# Patient Record
Sex: FEMALE | Race: WHITE | Employment: OTHER | URBAN - METROPOLITAN AREA
[De-identification: names, ages, dates, MRNs, and addresses within clinical notes are randomized per-mention and may not be internally consistent; named-entity substitution may affect disease eponyms.]

---

## 2020-09-28 ENCOUNTER — APPOINTMENT (OUTPATIENT)
Dept: GENERAL RADIOLOGY | Age: 65
End: 2020-09-28
Payer: MEDICARE

## 2020-09-28 ENCOUNTER — HOSPITAL ENCOUNTER (EMERGENCY)
Age: 65
Discharge: HOME | End: 2020-09-28
Attending: EMERGENCY MEDICINE
Payer: MEDICARE

## 2020-09-28 VITALS
HEIGHT: 64 IN | SYSTOLIC BLOOD PRESSURE: 134 MMHG | HEART RATE: 68 BPM | RESPIRATION RATE: 18 BRPM | OXYGEN SATURATION: 96 % | WEIGHT: 210 LBS | BODY MASS INDEX: 35.85 KG/M2 | TEMPERATURE: 99 F | DIASTOLIC BLOOD PRESSURE: 88 MMHG

## 2020-09-28 LAB
ABO/RH: NORMAL
ALBUMIN SERPL-MCNC: 4.3 G/DL (ref 3.5–5.2)
ALP BLD-CCNC: 79 U/L (ref 35–104)
ALT SERPL-CCNC: 9 U/L (ref 0–32)
ANION GAP SERPL CALCULATED.3IONS-SCNC: 11 MMOL/L (ref 7–16)
ANTIBODY SCREEN: NORMAL
AST SERPL-CCNC: 21 U/L (ref 0–31)
BACTERIA: ABNORMAL /HPF
BASOPHILS ABSOLUTE: 0.07 E9/L (ref 0–0.2)
BASOPHILS RELATIVE PERCENT: 0.8 % (ref 0–2)
BILIRUB SERPL-MCNC: 0.3 MG/DL (ref 0–1.2)
BILIRUBIN URINE: NEGATIVE
BLOOD, URINE: NEGATIVE
BUN BLDV-MCNC: 19 MG/DL (ref 8–23)
CALCIUM SERPL-MCNC: 9.6 MG/DL (ref 8.6–10.2)
CHLORIDE BLD-SCNC: 98 MMOL/L (ref 98–107)
CLARITY: ABNORMAL
CO2: 27 MMOL/L (ref 22–29)
COLOR: YELLOW
CREAT SERPL-MCNC: 1.2 MG/DL (ref 0.5–1)
EOSINOPHILS ABSOLUTE: 0.1 E9/L (ref 0.05–0.5)
EOSINOPHILS RELATIVE PERCENT: 1.2 % (ref 0–6)
EPITHELIAL CELLS, UA: ABNORMAL /HPF
GFR AFRICAN AMERICAN: 54
GFR NON-AFRICAN AMERICAN: 45 ML/MIN/1.73
GLUCOSE BLD-MCNC: 103 MG/DL (ref 74–99)
GLUCOSE URINE: NEGATIVE MG/DL
HCT VFR BLD CALC: 37.2 % (ref 34–48)
HEMOGLOBIN: 11.5 G/DL (ref 11.5–15.5)
IMMATURE GRANULOCYTES #: 0.02 E9/L
IMMATURE GRANULOCYTES %: 0.2 % (ref 0–5)
KETONES, URINE: ABNORMAL MG/DL
LEUKOCYTE ESTERASE, URINE: ABNORMAL
LYMPHOCYTES ABSOLUTE: 3.55 E9/L (ref 1.5–4)
LYMPHOCYTES RELATIVE PERCENT: 42 % (ref 20–42)
MCH RBC QN AUTO: 25.4 PG (ref 26–35)
MCHC RBC AUTO-ENTMCNC: 30.9 % (ref 32–34.5)
MCV RBC AUTO: 82.3 FL (ref 80–99.9)
MONOCYTES ABSOLUTE: 0.66 E9/L (ref 0.1–0.95)
MONOCYTES RELATIVE PERCENT: 7.8 % (ref 2–12)
NEUTROPHILS ABSOLUTE: 4.06 E9/L (ref 1.8–7.3)
NEUTROPHILS RELATIVE PERCENT: 48 % (ref 43–80)
NITRITE, URINE: NEGATIVE
PDW BLD-RTO: 13.8 FL (ref 11.5–15)
PH UA: 6 (ref 5–9)
PLATELET # BLD: 325 E9/L (ref 130–450)
PMV BLD AUTO: 8.6 FL (ref 7–12)
POTASSIUM SERPL-SCNC: 4.5 MMOL/L (ref 3.5–5)
PRO-BNP: 2103 PG/ML (ref 0–125)
PROTEIN UA: NEGATIVE MG/DL
RBC # BLD: 4.52 E12/L (ref 3.5–5.5)
RBC UA: ABNORMAL /HPF (ref 0–2)
SODIUM BLD-SCNC: 136 MMOL/L (ref 132–146)
SPECIFIC GRAVITY UA: 1.02 (ref 1–1.03)
TOTAL PROTEIN: 7.7 G/DL (ref 6.4–8.3)
TROPONIN: <0.01 NG/ML (ref 0–0.03)
UROBILINOGEN, URINE: 1 E.U./DL
WBC # BLD: 8.5 E9/L (ref 4.5–11.5)
WBC UA: ABNORMAL /HPF (ref 0–5)

## 2020-09-28 PROCEDURE — 86850 RBC ANTIBODY SCREEN: CPT

## 2020-09-28 PROCEDURE — 86900 BLOOD TYPING SEROLOGIC ABO: CPT

## 2020-09-28 PROCEDURE — 99284 EMERGENCY DEPT VISIT MOD MDM: CPT

## 2020-09-28 PROCEDURE — 84484 ASSAY OF TROPONIN QUANT: CPT

## 2020-09-28 PROCEDURE — 36415 COLL VENOUS BLD VENIPUNCTURE: CPT

## 2020-09-28 PROCEDURE — 80053 COMPREHEN METABOLIC PANEL: CPT

## 2020-09-28 PROCEDURE — 93005 ELECTROCARDIOGRAM TRACING: CPT | Performed by: EMERGENCY MEDICINE

## 2020-09-28 PROCEDURE — 85025 COMPLETE CBC W/AUTO DIFF WBC: CPT

## 2020-09-28 PROCEDURE — 86901 BLOOD TYPING SEROLOGIC RH(D): CPT

## 2020-09-28 PROCEDURE — 83880 ASSAY OF NATRIURETIC PEPTIDE: CPT

## 2020-09-28 PROCEDURE — 71045 X-RAY EXAM CHEST 1 VIEW: CPT

## 2020-09-28 PROCEDURE — U0003 INFECTIOUS AGENT DETECTION BY NUCLEIC ACID (DNA OR RNA); SEVERE ACUTE RESPIRATORY SYNDROME CORONAVIRUS 2 (SARS-COV-2) (CORONAVIRUS DISEASE [COVID-19]), AMPLIFIED PROBE TECHNIQUE, MAKING USE OF HIGH THROUGHPUT TECHNOLOGIES AS DESCRIBED BY CMS-2020-01-R: HCPCS

## 2020-09-28 PROCEDURE — 81001 URINALYSIS AUTO W/SCOPE: CPT

## 2020-09-28 RX ORDER — ASPIRIN 81 MG/1
81 TABLET ORAL DAILY
COMMUNITY
End: 2021-06-28

## 2020-09-28 RX ORDER — FAMOTIDINE 40 MG/1
40 TABLET, FILM COATED ORAL NIGHTLY
COMMUNITY

## 2020-09-28 RX ORDER — LISINOPRIL 20 MG/1
20 TABLET ORAL DAILY
COMMUNITY
End: 2021-11-02

## 2020-09-28 RX ORDER — VENLAFAXINE 75 MG/1
75 TABLET ORAL NIGHTLY
COMMUNITY
End: 2022-07-25 | Stop reason: ALTCHOICE

## 2020-09-28 ASSESSMENT — ENCOUNTER SYMPTOMS
COUGH: 1
ABDOMINAL PAIN: 0
SHORTNESS OF BREATH: 1
SINUS PRESSURE: 0
WHEEZING: 0
CHEST TIGHTNESS: 0
VOMITING: 0
ABDOMINAL DISTENTION: 0
DIARRHEA: 0
NAUSEA: 0
RHINORRHEA: 1
BACK PAIN: 0
SORE THROAT: 0

## 2020-09-28 NOTE — ED PROVIDER NOTES
Chief complaint:  Flulike symptoms      HPI history provided by the patient  Patient was in complaint 1 week of generalized fatigue and weakness with some congestion and slight cough. Feels little short of breath with exertion at times she states although it is more described as fatigue. No dyspnea otherwise. No stiff neck or headache. No abdominal pain. No flank pain. No nausea vomiting or diarrhea. Has had poor appetite and diminished activity. No vomiting. No actual fevers at home, temperature max of 99. Does have a history of chronic anemia not requiring transfusions in the past.  Known history of atrial fibrillation and takes both Eliquis and aspirin and has not missed doses. No extremity pain or swelling. Review of Systems   Constitutional: Positive for activity change, appetite change, fatigue and fever. Negative for chills and diaphoresis. HENT: Positive for congestion and rhinorrhea. Negative for ear pain, sinus pressure and sore throat. Respiratory: Positive for cough and shortness of breath. Negative for chest tightness and wheezing. Cardiovascular: Negative for chest pain, palpitations and leg swelling. Gastrointestinal: Negative for abdominal distention, abdominal pain, diarrhea, nausea and vomiting. Genitourinary: Negative for dysuria, flank pain, frequency and urgency. Musculoskeletal: Negative for arthralgias, back pain, gait problem, joint swelling, myalgias, neck pain and neck stiffness. Skin: Negative for rash and wound. Neurological: Negative for dizziness, seizures, syncope, weakness, light-headedness, numbness and headaches. Hematological: Negative for adenopathy. All other systems reviewed and are negative. Physical Exam  Vitals signs and nursing note reviewed. Constitutional:       General: She is not in acute distress. Appearance: She is well-developed. She is not ill-appearing, toxic-appearing or diaphoretic.    HENT:      Head: Normocephalic and atraumatic. Right Ear: Tympanic membrane, ear canal and external ear normal.      Left Ear: Tympanic membrane, ear canal and external ear normal.      Nose: Mucosal edema present. No congestion or rhinorrhea. Mouth/Throat:      Pharynx: Oropharynx is clear. Uvula midline. No pharyngeal swelling, oropharyngeal exudate, posterior oropharyngeal erythema or uvula swelling. Tonsils: No tonsillar exudate or tonsillar abscesses. Eyes:      General: No scleral icterus. Pupils: Pupils are equal, round, and reactive to light. Neck:      Musculoskeletal: Normal range of motion and neck supple. Normal range of motion. No neck rigidity, injury, pain with movement, spinous process tenderness or muscular tenderness. Trachea: Trachea and phonation normal. No tracheal tenderness or tracheal deviation. Cardiovascular:      Rate and Rhythm: Normal rate and regular rhythm. Heart sounds: Normal heart sounds. No murmur. Pulmonary:      Effort: Pulmonary effort is normal. No respiratory distress. Breath sounds: Normal breath sounds. No stridor, decreased air movement or transmitted upper airway sounds. No decreased breath sounds, wheezing, rhonchi or rales. Chest:      Chest wall: No tenderness. Abdominal:      General: Bowel sounds are normal. There is no distension. Palpations: Abdomen is soft. Tenderness: There is no abdominal tenderness. There is no right CVA tenderness, left CVA tenderness, guarding or rebound. Musculoskeletal:         General: No swelling, tenderness, deformity or signs of injury. Right lower leg: No edema. Left lower leg: No edema. Comments: Arms legs are neurovascular intact. No pretibial edema or calf pain. Lymphadenopathy:      Cervical: No cervical adenopathy. Skin:     General: Skin is warm and dry. Coloration: Skin is pale. Skin is not cyanotic, jaundiced or mottled.       Findings: No erythema, petechiae or rash.      Comments: Mildly pallorous appearing skin   Neurological:      General: No focal deficit present. Mental Status: She is alert and oriented to person, place, and time. GCS: GCS eye subscore is 4. GCS verbal subscore is 5. GCS motor subscore is 6. Cranial Nerves: Cranial nerves are intact. No cranial nerve deficit. Sensory: Sensation is intact. Motor: Motor function is intact. Coordination: Coordination is intact. Coordination normal.      Gait: Gait is intact. Procedures     MDM           EKG Interpretation    Interpreted by emergency department physician    Rhythm: atrial fibrillation - controlled  Rate: 73  Axis: normal  Ectopy: none  Conduction: normal  ST Segments: no acute change  T Waves: no acute change  Q Waves: none    Clinical Impression: atrial fibrillation (chronic)    Doyal Raddle Cardinal         --------------------------------------------- PAST HISTORY ---------------------------------------------  Past Medical History:  has a past medical history of Acid reflux, Atrial fibrillation ([facility] [address] Cancer (HCC), Chronic back pain, CPAP (continuous positive airway pressure) dependence, Fibromyalgia, History of bariatric surgery, History of cardiovascular stress test, Hyperlipidemia, Hypertension, Kidney stones, Osteoarthritis, and Unspecified sleep apnea. Past Surgical History:  has a past surgical history that includes tracheostomy (1999); Stanley-en-Y Gastric Bypass (04/17/2002); Cholecystectomy (2004); hernia repair (2004); Hysterectomy (1988); Appendectomy; Tonsillectomy; bladder suspension (1998); joint replacement (2009); Upper gastrointestinal endoscopy (2009); Colonoscopy (2008); Bariatric Surgery (2003); Breast surgery (2005); Shoulder arthroscopy (2/17/12); ECHO Compl W Dop Color Flow (8/10/2012); Uvulopalatopharygoplasty; and Neck surgery (06/2015). Social History:  reports that she has been smoking. She has a 20.00 pack-year smoking history.  She has never used smokeless tobacco. She reports that she does not drink alcohol or use drugs. Family History: family history includes Cancer in her paternal grandfather; Heart Disease in her maternal grandfather, maternal grandmother, mother, and sister; High Blood Pressure in her mother and sister; Stroke in her mother. The patients home medications have been reviewed. Allergies: Patient has no known allergies.     -------------------------------------------------- RESULTS -------------------------------------------------  Labs:  Results for orders placed or performed during the hospital encounter of 09/28/20   CBC Auto Differential   Result Value Ref Range    WBC 8.5 4.5 - 11.5 E9/L    RBC 4.52 3.50 - 5.50 E12/L    Hemoglobin 11.5 11.5 - 15.5 g/dL    Hematocrit 37.2 34.0 - 48.0 %    MCV 82.3 80.0 - 99.9 fL    MCH 25.4 (L) 26.0 - 35.0 pg    MCHC 30.9 (L) 32.0 - 34.5 %    RDW 13.8 11.5 - 15.0 fL    Platelets 332 050 - 909 E9/L    MPV 8.6 7.0 - 12.0 fL    Neutrophils % 48.0 43.0 - 80.0 %    Immature Granulocytes % 0.2 0.0 - 5.0 %    Lymphocytes % 42.0 20.0 - 42.0 %    Monocytes % 7.8 2.0 - 12.0 %    Eosinophils % 1.2 0.0 - 6.0 %    Basophils % 0.8 0.0 - 2.0 %    Neutrophils Absolute 4.06 1.80 - 7.30 E9/L    Immature Granulocytes # 0.02 E9/L    Lymphocytes Absolute 3.55 1.50 - 4.00 E9/L    Monocytes Absolute 0.66 0.10 - 0.95 E9/L    Eosinophils Absolute 0.10 0.05 - 0.50 E9/L    Basophils Absolute 0.07 0.00 - 0.20 E9/L   Comprehensive Metabolic Panel   Result Value Ref Range    Sodium 136 132 - 146 mmol/L    Potassium 4.5 3.5 - 5.0 mmol/L    Chloride 98 98 - 107 mmol/L    CO2 27 22 - 29 mmol/L    Anion Gap 11 7 - 16 mmol/L    Glucose 103 (H) 74 - 99 mg/dL    BUN 19 8 - 23 mg/dL    CREATININE 1.2 (H) 0.5 - 1.0 mg/dL    GFR Non-African American 45 >=60 mL/min/1.73    GFR African American 54     Calcium 9.6 8.6 - 10.2 mg/dL    Total Protein 7.7 6.4 - 8.3 g/dL    Alb 4.3 3.5 - 5.2 g/dL    Total Bilirubin 0.3 0.0 - 1.2 mg/dL    Alkaline Phosphatase 79 35 - 104 U/L    ALT 9 0 - 32 U/L    AST 21 0 - 31 U/L   Brain Natriuretic Peptide   Result Value Ref Range    Pro-BNP 2,103 (H) 0 - 125 pg/mL   Troponin   Result Value Ref Range    Troponin <0.01 0.00 - 0.03 ng/mL   Urinalysis   Result Value Ref Range    Color, UA Yellow Straw/Yellow    Clarity, UA SLCLOUDY Clear    Glucose, Ur Negative Negative mg/dL    Bilirubin Urine Negative Negative    Ketones, Urine TRACE (A) Negative mg/dL    Specific Gravity, UA 1.025 1.005 - 1.030    Blood, Urine Negative Negative    pH, UA 6.0 5.0 - 9.0    Protein, UA Negative Negative mg/dL    Urobilinogen, Urine 1.0 <2.0 E.U./dL    Nitrite, Urine Negative Negative    Leukocyte Esterase, Urine TRACE (A) Negative   Microscopic Urinalysis   Result Value Ref Range    WBC, UA 0-1 0 - 5 /HPF    RBC, UA NONE 0 - 2 /HPF    Epithelial Cells, UA MODERATE /HPF    Bacteria, UA FEW (A) None Seen /HPF   EKG 12 Lead   Result Value Ref Range    Ventricular Rate 73 BPM    Atrial Rate 288 BPM    QRS Duration 80 ms    Q-T Interval 396 ms    QTc Calculation (Bazett) 436 ms    R Axis 14 degrees    T Axis 68 degrees   TYPE AND SCREEN   Result Value Ref Range    ABO/Rh A POS     Antibody Screen NEG        Radiology:  XR CHEST PORTABLE   Final Result   No acute cardiopulmonary process. ------------------------- NURSING NOTES AND VITALS REVIEWED ---------------------------  Date / Time Roomed:  9/28/2020  3:36 PM  ED Bed Assignment:  05/05    The nursing notes within the ED encounter and vital signs as below have been reviewed.    BP (!) 128/92   Pulse 75   Temp 99 °F (37.2 °C) (Oral)   Resp 19   Ht 5' 4\" (1.626 m)   Wt 210 lb (95.3 kg)   SpO2 96%   BMI 36.05 kg/m²   Oxygen Saturation Interpretation: Normal      ------------------------------------------ PROGRESS NOTES ------------------------------------------  I have spoken with the patient and discussed todays results, in addition to providing specific details for the plan of care and counseling regarding the diagnosis and prognosis. Their questions are answered at this time and they are agreeable with the plan. I discussed at length with them reasons for immediate return here for re evaluation. They will followup with primary care by calling their office tomorrow. --------------------------------- ADDITIONAL PROVIDER NOTES ---------------------------------  At this time the patient is without objective evidence of an acute process requiring hospitalization or inpatient management. They have remained hemodynamically stable throughout their entire ED visit and are stable for discharge with outpatient follow-up. The plan has been discussed in detail and they are aware of the specific conditions for emergent return, as well as the importance of follow-up. New Prescriptions    No medications on file       Diagnosis:  1. Viral syndrome        Disposition:  Patient's disposition: Discharge to home  Patient's condition is stable.          [address] [facility], DO  09/28/20 1737

## 2020-09-29 ENCOUNTER — CARE COORDINATION (OUTPATIENT)
Dept: CARE COORDINATION | Age: 65
End: 2020-09-29

## 2020-09-29 LAB
EKG ATRIAL RATE: 288 BPM
EKG Q-T INTERVAL: 396 MS
EKG QRS DURATION: 80 MS
EKG QTC CALCULATION (BAZETT): 436 MS
EKG R AXIS: 14 DEGREES
EKG T AXIS: 68 DEGREES
EKG VENTRICULAR RATE: 73 BPM

## 2020-09-29 PROCEDURE — 93010 ELECTROCARDIOGRAM REPORT: CPT | Performed by: INTERNAL MEDICINE

## 2020-09-29 NOTE — CARE COORDINATION
Patient contacted regarding recent discharge and COVID-19 risk. Discussed COVID-19 related testing which was pending at this time. Test results were pending. Patient informed of results, if available? No     Care Transition Nurse/ Ambulatory Care Manager contacted the patient by telephone to perform post discharge assessment. Verified name and  with patient as identifiers. Patient has following risk factors of: no known risk factors. CTN/ACM reviewed discharge instructions, medical action plan and red flags related to discharge diagnosis. Reviewed and educated them on any new and changed medications related to discharge diagnosis. Advised obtaining a 90-day supply of all daily and as-needed medications. Education provided regarding infection prevention, and signs and symptoms of COVID-19 and when to seek medical attention with patient who verbalized understanding. Discussed exposure protocols and quarantine from [address] you at higher risk for severe illness  and given an opportunity for questions and concerns. The patient agrees to contact the COVID-19 hotline [phone] or PCP office for questions related to their healthcare. CTN/ACM provided contact information for future reference. From CDC: Are you at higher risk for severe illness?  Wash your hands often.  Avoid close contact (6 feet, which is about two arm lengths) with people who are sick.  Put distance between yourself and other people if COVID-19 is spreading in your community.  Clean and disinfect frequently touched surfaces.  Avoid all cruise travel and non-essential air travel.  Call your healthcare professional if you have concerns about COVID-19 and your underlying condition or if you are sick. For more information on steps you can take to protect yourself, see CDC's How to Protect Yourself    Pt will be further monitored by COVID Loop Team based on severity of symptoms and risk factors.   Patient: Pt states there are new or worsening symptoms  Prescribed Medications from ED/UC/Minor Care: N/A  MyChart: Pt MyChart is pending  Follow up Appointment: Patient has follow up appointment scheduled  Pt states she has appt with Dr Zuri Perez and Dr Estuardo Correia is no longer her PCP  ACP: Medical Decision Maker(s) confirmed

## 2020-09-30 LAB
SARS-COV-2: NOT DETECTED
SOURCE: NORMAL

## 2020-10-04 ENCOUNTER — CARE COORDINATION (OUTPATIENT)
Dept: CARE COORDINATION | Age: 65
End: 2020-10-04

## 2020-10-04 NOTE — CARE COORDINATION
Amparo Bowling MSN, RN, RN, 8:22 AM  Your test result came back as 'Not Detected'. However, you should still continue to follow CDC guidelines and continuing checking in on the Get Well Loop.   In reply to  \"I haven't seen my results of my covid test\"

## 2020-10-29 ENCOUNTER — HOSPITAL ENCOUNTER (EMERGENCY)
Age: 65
Discharge: HOME | End: 2020-10-29
Attending: EMERGENCY MEDICINE
Payer: MEDICARE

## 2020-10-29 VITALS
DIASTOLIC BLOOD PRESSURE: 53 MMHG | RESPIRATION RATE: 20 BRPM | HEART RATE: 64 BPM | SYSTOLIC BLOOD PRESSURE: 122 MMHG | WEIGHT: 215 LBS | OXYGEN SATURATION: 98 % | TEMPERATURE: 97.5 F | BODY MASS INDEX: 36.9 KG/M2

## 2020-10-29 LAB
BACTERIA: ABNORMAL /HPF
BILIRUBIN URINE: NEGATIVE
BLOOD, URINE: ABNORMAL
CLARITY: CLEAR
COLOR: YELLOW
EPITHELIAL CELLS, UA: ABNORMAL /HPF
GLUCOSE URINE: NEGATIVE MG/DL
KETONES, URINE: NEGATIVE MG/DL
LEUKOCYTE ESTERASE, URINE: ABNORMAL
NITRITE, URINE: NEGATIVE
PH UA: 6 (ref 5–9)
PROTEIN UA: NEGATIVE MG/DL
RBC UA: ABNORMAL /HPF (ref 0–2)
SPECIFIC GRAVITY UA: 1.02 (ref 1–1.03)
UROBILINOGEN, URINE: 1 E.U./DL
WBC UA: ABNORMAL /HPF (ref 0–5)

## 2020-10-29 PROCEDURE — G0382 LEV 3 HOSP TYPE B ED VISIT: HCPCS

## 2020-10-29 PROCEDURE — 81001 URINALYSIS AUTO W/SCOPE: CPT

## 2020-10-29 RX ORDER — PHENAZOPYRIDINE HYDROCHLORIDE 100 MG/1
100 TABLET, FILM COATED ORAL 3 TIMES DAILY PRN
Qty: 9 TABLET | Refills: 0 | Status: SHIPPED | OUTPATIENT
Start: 2020-10-29 | End: 2020-11-01

## 2020-10-29 RX ORDER — CEFDINIR 300 MG/1
300 CAPSULE ORAL 2 TIMES DAILY
Qty: 14 CAPSULE | Refills: 0 | Status: SHIPPED | OUTPATIENT
Start: 2020-10-29 | End: 2020-11-05

## 2020-10-29 ASSESSMENT — ENCOUNTER SYMPTOMS
WHEEZING: 0
NAUSEA: 0
ABDOMINAL DISTENTION: 0
SORE THROAT: 0
VOMITING: 0
EYE REDNESS: 0
BACK PAIN: 0
ABDOMINAL PAIN: 1
EYE PAIN: 0
DIARRHEA: 0
SHORTNESS OF BREATH: 0
COUGH: 0
EYE DISCHARGE: 0
SINUS PRESSURE: 0

## 2020-10-29 NOTE — ED PROVIDER NOTES
The history is provided by the patient. Female  Problem   Pain quality:  Burning  Pain severity:  Mild  Onset quality:  Gradual  Timing:  Constant  Progression:  Unchanged  Chronicity:  New  Relieved by:  Nothing  Worsened by:  Nothing  Ineffective treatments:  Cranberry juice  Urinary symptoms: frequent urination    Associated symptoms: abdominal pain    Associated symptoms: no fever, no nausea and no vomiting         Review of Systems   Constitutional: Negative for chills and fever. HENT: Negative for ear pain, sinus pressure and sore throat. Eyes: Negative for pain, discharge and redness. Respiratory: Negative for cough, shortness of breath and wheezing. Cardiovascular: Negative for chest pain. Gastrointestinal: Positive for abdominal pain. Negative for abdominal distention, diarrhea, nausea and vomiting. Genitourinary: Positive for dysuria and frequency. Musculoskeletal: Negative for arthralgias and back pain. Skin: Negative for rash and wound. Neurological: Negative for weakness and headaches. Hematological: Negative for adenopathy. Psychiatric/Behavioral: Negative. All other systems reviewed and are negative. Physical Exam  Vitals signs and nursing note reviewed. Constitutional:       Appearance: She is well-developed. HENT:      Head: Normocephalic and atraumatic. Eyes:      Pupils: Pupils are equal, round, and reactive to light. Neck:      Musculoskeletal: Normal range of motion and neck supple. Cardiovascular:      Rate and Rhythm: Normal rate and regular rhythm. Heart sounds: Normal heart sounds. No murmur. Pulmonary:      Effort: Pulmonary effort is normal.      Breath sounds: Normal breath sounds. Abdominal:      General: Bowel sounds are normal.      Palpations: Abdomen is soft. Tenderness: There is no abdominal tenderness. There is no left CVA tenderness, guarding or rebound. Skin:     General: Skin is warm and dry.    Neurological: Mental Status: She is alert and oriented to person, place, and time. Psychiatric:         Behavior: Behavior normal.         Thought Content: Thought content normal.         Judgment: Judgment normal.        --------------------------------------------- PAST HISTORY ---------------------------------------------  Past Medical History:  has a past medical history of Acid reflux, Atrial fibrillation ([address] Cancer (HCC), Chronic back pain, CPAP (continuous positive airway pressure) dependence, Fibromyalgia, History of bariatric surgery, History of cardiovascular stress test, Hyperlipidemia, Hypertension, Kidney stones, Osteoarthritis, and Unspecified sleep apnea. Past Surgical History:  has a past surgical history that includes tracheostomy (1999); Stanley-en-Y Gastric Bypass (04/17/2002); Cholecystectomy (2004); hernia repair (2004); Hysterectomy (1988); Appendectomy; Tonsillectomy; bladder suspension (1998); joint replacement (2009); Upper gastrointestinal endoscopy (2009); Colonoscopy (2008); Bariatric Surgery (2003); Breast surgery (2005); Shoulder arthroscopy (2/17/12); ECHO Compl W Dop Color Flow (8/10/2012); Uvulopalatopharygoplasty; and Neck surgery (06/2015). Social History:  reports that she has been smoking. She has a 20.00 pack-year smoking history. She has never used smokeless tobacco. She reports that she does not drink alcohol or use drugs. Family History: family history includes Cancer in her paternal grandfather; Heart Disease in her maternal grandfather, maternal grandmother, mother, and sister; High Blood Pressure in her mother and sister; Stroke in her mother. The patients home medications have been reviewed. Allergies: Patient has no known allergies.     -------------------------------------------------- RESULTS -------------------------------------------------  Results for orders placed or performed during the hospital encounter of 10/29/20   Urinalysis with Microscopic   Result

## 2021-01-04 ENCOUNTER — APPOINTMENT (OUTPATIENT)
Dept: GENERAL RADIOLOGY | Age: 66
End: 2021-01-04
Payer: MEDICARE

## 2021-01-04 ENCOUNTER — HOSPITAL ENCOUNTER (EMERGENCY)
Age: 66
Discharge: HOME | End: 2021-01-04
Attending: EMERGENCY MEDICINE
Payer: MEDICARE

## 2021-01-04 VITALS
TEMPERATURE: 98.7 F | SYSTOLIC BLOOD PRESSURE: 117 MMHG | RESPIRATION RATE: 18 BRPM | OXYGEN SATURATION: 96 % | HEART RATE: 78 BPM | DIASTOLIC BLOOD PRESSURE: 77 MMHG

## 2021-01-04 DIAGNOSIS — M17.12 OSTEOARTHRITIS OF LEFT KNEE, UNSPECIFIED OSTEOARTHRITIS TYPE: Primary | ICD-10-CM

## 2021-01-04 PROCEDURE — G0382 LEV 3 HOSP TYPE B ED VISIT: HCPCS

## 2021-01-04 PROCEDURE — 6360000002 HC RX W HCPCS: Performed by: EMERGENCY MEDICINE

## 2021-01-04 PROCEDURE — 73560 X-RAY EXAM OF KNEE 1 OR 2: CPT

## 2021-01-04 PROCEDURE — 96372 THER/PROPH/DIAG INJ SC/IM: CPT

## 2021-01-04 RX ORDER — DEXAMETHASONE SODIUM PHOSPHATE 10 MG/ML
10 INJECTION, SOLUTION INTRAMUSCULAR; INTRAVENOUS ONCE
Status: COMPLETED | OUTPATIENT
Start: 2021-01-04 | End: 2021-01-04

## 2021-01-04 RX ORDER — KETOROLAC TROMETHAMINE 30 MG/ML
30 INJECTION, SOLUTION INTRAMUSCULAR; INTRAVENOUS ONCE
Status: COMPLETED | OUTPATIENT
Start: 2021-01-04 | End: 2021-01-04

## 2021-01-04 RX ORDER — TRAMADOL HYDROCHLORIDE 50 MG/1
50 TABLET ORAL EVERY 6 HOURS PRN
Qty: 20 TABLET | Refills: 0 | Status: SHIPPED | OUTPATIENT
Start: 2021-01-04 | End: 2021-01-09

## 2021-01-04 RX ADMIN — KETOROLAC TROMETHAMINE 30 MG: 30 INJECTION, SOLUTION INTRAMUSCULAR; INTRAVENOUS at 16:42

## 2021-01-04 RX ADMIN — DEXAMETHASONE SODIUM PHOSPHATE 10 MG: 10 INJECTION, SOLUTION INTRAMUSCULAR; INTRAVENOUS at 16:43

## 2021-01-04 ASSESSMENT — ENCOUNTER SYMPTOMS
SINUS PRESSURE: 0
SHORTNESS OF BREATH: 0
EYE PAIN: 0
EYE REDNESS: 0
COUGH: 0
DIARRHEA: 0
WHEEZING: 0
ABDOMINAL DISTENTION: 0
NAUSEA: 0
BACK PAIN: 0
VOMITING: 0
SORE THROAT: 0
EYE DISCHARGE: 0

## 2021-01-04 ASSESSMENT — PAIN DESCRIPTION - PROGRESSION: CLINICAL_PROGRESSION: NOT CHANGED

## 2021-01-04 ASSESSMENT — PAIN DESCRIPTION - LOCATION: LOCATION: KNEE

## 2021-01-04 ASSESSMENT — PAIN - FUNCTIONAL ASSESSMENT: PAIN_FUNCTIONAL_ASSESSMENT: PREVENTS OR INTERFERES SOME ACTIVE ACTIVITIES AND ADLS

## 2021-01-04 NOTE — ED PROVIDER NOTES
The history is provided by the patient. Knee Problem  Location:  Knee  Time since incident:  5 days  Injury: no    Knee location:  L knee  Pain details:     Quality:  Aching    Severity:  Moderate  Associated symptoms: no back pain and no fever         Review of Systems   Constitutional: Negative for chills and fever. HENT: Negative for ear pain, sinus pressure and sore throat. Eyes: Negative for pain, discharge and redness. Respiratory: Negative for cough, shortness of breath and wheezing. Cardiovascular: Negative for chest pain. Gastrointestinal: Negative for abdominal distention, diarrhea, nausea and vomiting. Genitourinary: Negative for dysuria and frequency. Musculoskeletal: Negative for arthralgias and back pain. Skin: Negative for rash and wound. Neurological: Negative for weakness and headaches. Hematological: Negative for adenopathy. All other systems reviewed and are negative. Physical Exam  Vitals signs and nursing note reviewed. Constitutional:       Appearance: She is well-developed. HENT:      Head: Normocephalic and atraumatic. Right Ear: Hearing and external ear normal.      Left Ear: Hearing and external ear normal.      Nose: Nose normal.      Mouth/Throat:      Pharynx: Uvula midline. Eyes:      General: Lids are normal.      Conjunctiva/sclera: Conjunctivae normal.      Pupils: Pupils are equal, round, and reactive to light. Neck:      Musculoskeletal: Normal range of motion and neck supple. Cardiovascular:      Rate and Rhythm: Normal rate and regular rhythm. Heart sounds: Normal heart sounds. No murmur. Pulmonary:      Effort: Pulmonary effort is normal. No respiratory distress. Breath sounds: Normal breath sounds. No wheezing or rales. Abdominal:      General: Bowel sounds are normal.      Palpations: Abdomen is soft. Abdomen is not rigid. Tenderness: There is no abdominal tenderness. There is no guarding or rebound. Musculoskeletal:      Left knee: She exhibits decreased range of motion and swelling. She exhibits no effusion. Tenderness found. Skin:     General: Skin is warm and dry. Findings: No abrasion or rash. Neurological:      Mental Status: She is alert and oriented to person, place, and time. GCS: GCS eye subscore is 4. GCS verbal subscore is 5. GCS motor subscore is 6. Cranial Nerves: No cranial nerve deficit. Sensory: No sensory deficit. Coordination: Coordination normal.      Gait: Gait normal.          Procedures     MDM          --------------------------------------------- PAST HISTORY ---------------------------------------------  Past Medical History:  has a past medical history of Acid reflux, Atrial fibrillation ([facility] [address] Cancer (HCC), Chronic back pain, CPAP (continuous positive airway pressure) dependence, Fibromyalgia, History of bariatric surgery, History of cardiovascular stress test, Hyperlipidemia, Hypertension, Kidney stones, Osteoarthritis, and Unspecified sleep apnea. Past Surgical History:  has a past surgical history that includes tracheostomy (1999); Stanley-en-Y Gastric Bypass (04/17/2002); Cholecystectomy (2004); hernia repair (2004); Hysterectomy (1988); Appendectomy; Tonsillectomy; bladder suspension (1998); joint replacement (2009); Upper gastrointestinal endoscopy (2009); Colonoscopy (2008); Bariatric Surgery (2003); Breast surgery (2005); Shoulder arthroscopy (2/17/12); ECHO Compl W Dop Color Flow (8/10/2012); Uvulopalatopharygoplasty; and Neck surgery (06/2015). Social History:  reports that she quit smoking about a year ago. She has a 20.00 pack-year smoking history. She has never used smokeless tobacco. She reports that she does not drink alcohol or use drugs.     Family History: family history includes Cancer in her paternal grandfather; Heart Disease in her maternal grandfather, maternal grandmother, mother, and sister; High Blood Pressure in her mother and sister; Stroke in her mother. The patients home medications have been reviewed. Allergies: Patient has no known allergies. -------------------------------------------------- RESULTS -------------------------------------------------  Labs:  No results found for this visit on 01/04/21. Radiology:  XR KNEE LEFT (1-2 VIEWS)   Final Result   1. Decreased osseous mineralization. No acute osseous findings on this exam.   2.  Moderate to severe left knee osteoarthritis most pronounced in the medial   and patellofemoral compartments. ------------------------- NURSING NOTES AND VITALS REVIEWED ---------------------------  Date / Time Roomed:  1/4/2021  3:45 PM  ED Bed Assignment:  01/01    The nursing notes within the ED encounter and vital signs as below have been reviewed. /77   Pulse 78   Temp 98.7 °F (37.1 °C) (Infrared)   Resp 18   SpO2 96%   Oxygen Saturation Interpretation: Normal      ------------------------------------------ PROGRESS NOTES ------------------------------------------  I have spoken with the patient and discussed todays results, in addition to providing specific details for the plan of care and counseling regarding the diagnosis and prognosis. Their questions are answered at this time and they are agreeable with the plan. I discussed at length with them reasons for immediate return here for re evaluation. They will followup with primary care by calling their office tomorrow. Medications   ketorolac (TORADOL) injection 30 mg (30 mg Intramuscular Given 1/4/21 3692)   dexamethasone (PF) (DECADRON) injection 10 mg (10 mg Intramuscular Given 1/4/21 7599)           --------------------------------- ADDITIONAL PROVIDER NOTES ---------------------------------  At this time the patient is without objective evidence of an acute process requiring hospitalization or inpatient management.   They have remained hemodynamically stable throughout their entire ED visit and are stable for discharge with outpatient follow-up. The plan has been discussed in detail and they are aware of the specific conditions for emergent return, as well as the importance of follow-up. New Prescriptions    TRAMADOL (ULTRAM) 50 MG TABLET    Take 1 tablet by mouth every 6 hours as needed for Pain for up to 5 days. Diagnosis:  1. Osteoarthritis of left knee, unspecified osteoarthritis type        Disposition:  Patient's disposition: Discharge to home  Patient's condition is stable.                       Jez Cedillo MD  01/04/21 9774

## 2021-01-05 ENCOUNTER — HOSPITAL ENCOUNTER (OUTPATIENT)
Age: 66
Discharge: HOME | End: 2021-01-05
Payer: MEDICARE

## 2021-01-05 LAB
ALBUMIN SERPL-MCNC: 3.9 G/DL (ref 3.5–5.2)
ALP BLD-CCNC: 83 U/L (ref 35–104)
ALT SERPL-CCNC: 13 U/L (ref 0–32)
ANION GAP SERPL CALCULATED.3IONS-SCNC: 12 MMOL/L (ref 7–16)
AST SERPL-CCNC: 15 U/L (ref 0–31)
BASOPHILS ABSOLUTE: 0.02 E9/L (ref 0–0.2)
BASOPHILS RELATIVE PERCENT: 0.2 % (ref 0–2)
BILIRUB SERPL-MCNC: 0.4 MG/DL (ref 0–1.2)
BUN BLDV-MCNC: 23 MG/DL (ref 8–23)
CALCIUM SERPL-MCNC: 8.8 MG/DL (ref 8.6–10.2)
CHLORIDE BLD-SCNC: 99 MMOL/L (ref 98–107)
CO2: 24 MMOL/L (ref 22–29)
CREAT SERPL-MCNC: 0.8 MG/DL (ref 0.5–1)
EOSINOPHILS ABSOLUTE: 0 E9/L (ref 0.05–0.5)
EOSINOPHILS RELATIVE PERCENT: 0 % (ref 0–6)
GFR AFRICAN AMERICAN: >60
GFR NON-AFRICAN AMERICAN: >60 ML/MIN/1.73
GLUCOSE BLD-MCNC: 101 MG/DL (ref 74–99)
HCT VFR BLD CALC: 28.6 % (ref 34–48)
HEMOGLOBIN: 8.6 G/DL (ref 11.5–15.5)
IMMATURE GRANULOCYTES #: 0.07 E9/L
IMMATURE GRANULOCYTES %: 0.5 % (ref 0–5)
LYMPHOCYTES ABSOLUTE: 1.5 E9/L (ref 1.5–4)
LYMPHOCYTES RELATIVE PERCENT: 11.7 % (ref 20–42)
MCH RBC QN AUTO: 23.9 PG (ref 26–35)
MCHC RBC AUTO-ENTMCNC: 30.1 % (ref 32–34.5)
MCV RBC AUTO: 79.4 FL (ref 80–99.9)
MONOCYTES ABSOLUTE: 1.05 E9/L (ref 0.1–0.95)
MONOCYTES RELATIVE PERCENT: 8.2 % (ref 2–12)
NEUTROPHILS ABSOLUTE: 10.13 E9/L (ref 1.8–7.3)
NEUTROPHILS RELATIVE PERCENT: 79.4 % (ref 43–80)
PDW BLD-RTO: 14.8 FL (ref 11.5–15)
PLATELET # BLD: 317 E9/L (ref 130–450)
PMV BLD AUTO: 8.9 FL (ref 7–12)
POTASSIUM SERPL-SCNC: 3.9 MMOL/L (ref 3.5–5)
RBC # BLD: 3.6 E12/L (ref 3.5–5.5)
SODIUM BLD-SCNC: 135 MMOL/L (ref 132–146)
TOTAL PROTEIN: 7.4 G/DL (ref 6.4–8.3)
TSH SERPL DL<=0.05 MIU/L-ACNC: 1.85 UIU/ML (ref 0.27–4.2)
WBC # BLD: 12.8 E9/L (ref 4.5–11.5)

## 2021-01-05 PROCEDURE — 36415 COLL VENOUS BLD VENIPUNCTURE: CPT

## 2021-01-05 PROCEDURE — 84443 ASSAY THYROID STIM HORMONE: CPT

## 2021-01-05 PROCEDURE — 85025 COMPLETE CBC W/AUTO DIFF WBC: CPT

## 2021-01-05 PROCEDURE — 80053 COMPREHEN METABOLIC PANEL: CPT

## 2021-01-08 ENCOUNTER — HOSPITAL ENCOUNTER (OUTPATIENT)
Dept: MAMMOGRAPHY | Age: 66
Discharge: HOME | End: 2021-01-10
Payer: MEDICARE

## 2021-01-08 DIAGNOSIS — C50.919 MALIGNANT NEOPLASM OF FEMALE BREAST, UNSPECIFIED ESTROGEN RECEPTOR STATUS, UNSPECIFIED LATERALITY, UNSPECIFIED SITE OF BREAST (HCC): ICD-10-CM

## 2021-01-08 PROCEDURE — 77067 SCR MAMMO BI INCL CAD: CPT

## 2021-01-19 ENCOUNTER — OFFICE VISIT (OUTPATIENT)
Dept: ORTHOPEDIC SURGERY | Age: 66
End: 2021-01-19
Payer: MEDICARE

## 2021-01-19 VITALS — WEIGHT: 215 LBS | BODY MASS INDEX: 36.7 KG/M2 | HEIGHT: 64 IN

## 2021-01-19 DIAGNOSIS — M17.12 PRIMARY OSTEOARTHRITIS OF LEFT KNEE: Primary | ICD-10-CM

## 2021-01-19 PROCEDURE — G8417 CALC BMI ABV UP PARAM F/U: HCPCS | Performed by: ORTHOPAEDIC SURGERY

## 2021-01-19 PROCEDURE — 3017F COLORECTAL CA SCREEN DOC REV: CPT | Performed by: ORTHOPAEDIC SURGERY

## 2021-01-19 PROCEDURE — 1123F ACP DISCUSS/DSCN MKR DOCD: CPT | Performed by: ORTHOPAEDIC SURGERY

## 2021-01-19 PROCEDURE — G8427 DOCREV CUR MEDS BY ELIG CLIN: HCPCS | Performed by: ORTHOPAEDIC SURGERY

## 2021-01-19 PROCEDURE — 1036F TOBACCO NON-USER: CPT | Performed by: ORTHOPAEDIC SURGERY

## 2021-01-19 PROCEDURE — 99203 OFFICE O/P NEW LOW 30 MIN: CPT | Performed by: ORTHOPAEDIC SURGERY

## 2021-01-19 PROCEDURE — G8484 FLU IMMUNIZE NO ADMIN: HCPCS | Performed by: ORTHOPAEDIC SURGERY

## 2021-01-19 PROCEDURE — G8400 PT W/DXA NO RESULTS DOC: HCPCS | Performed by: ORTHOPAEDIC SURGERY

## 2021-01-19 PROCEDURE — 1090F PRES/ABSN URINE INCON ASSESS: CPT | Performed by: ORTHOPAEDIC SURGERY

## 2021-01-19 PROCEDURE — 4040F PNEUMOC VAC/ADMIN/RCVD: CPT | Performed by: ORTHOPAEDIC SURGERY

## 2021-01-19 RX ORDER — PANTOPRAZOLE SODIUM 40 MG/1
TABLET, DELAYED RELEASE ORAL
COMMUNITY
Start: 2021-01-18 | End: 2021-06-28

## 2021-01-19 NOTE — PROGRESS NOTES
Chief Complaint   Patient presents with    Knee Pain     Left knee pain for the past couple years. C/o the knee locking. No MARY JANE. Patient was given toradol and steroid at Urgent Care and is doing better today. Subjective:     Patient ID: Vesna Gomez is a 72 y.o..  female    Knee Pain  Patient complains of left knee pain. This is evaluated as a personal injury. There was not a history of injury. The pain began a few years ago. The pain is located medial. She describes  Her symptoms as aching. She has experienced popping, clicking, locking, and giving way in the affected knee. The patient has had pain with kneeling, squating, and climbing stairs. Symptoms improve with rest. The symptoms are worse with activity. The knee has not given out or felt unstable. The patient cannot bend and straighten the knee fully. The patient is active in none. Treatment to date has been ice, heat, Tylenol, NSAID's, without significant relief.      Past Medical History:   Diagnosis Date    Acid reflux     Atrial fibrillation (HCC)     Cancer (HCC) 5/19/2005    Breast Cancer (Left Breast)    Chronic back pain     CPAP (continuous positive airway pressure) dependence     sleep apnea    Fibromyalgia     History of bariatric surgery 04/17/2002    Dr. rTacey Morales - Open RYGB    History of cardiovascular stress test 8/10/2012    [facility]    Hyperlipidemia     Hypertension     Kidney stones     Osteoarthritis     Unspecified sleep apnea     Patient uses C-Pap      Past Surgical History:   Procedure Laterality Date    APPENDECTOMY      BARIATRIC SURGERY  2003   Araya Pump  2005    Dr. Nneka Chan  2004    Dr. Isaac Holland  2008    ECHO COMPL W DOP COLOR FLOW  8/10/2012        Zayda Templeton  2004    Dr. Eric York education: Not on file    Highest education level: Not on file   Occupational History    Not on file   Social Needs    Financial resource strain: Not on file    Food insecurity     Worry: Not on file     Inability: Not on file    Transportation needs     Medical: Not on file     Non-medical: Not on file   Tobacco Use    Smoking status: Former Smoker     Packs/day: 0.50     Years: 40.00     Pack years: 20.00     Quit date: 2020     Years since quittin.0    Smokeless tobacco: Never Used   Substance and Sexual Activity    Alcohol use: No     Alcohol/week: 0.0 standard drinks     Comment: rarely    Drug use: No    Sexual activity: Yes   Lifestyle    Physical activity     Days per week: Not on file     Minutes per session: Not on file    Stress: Not on file   Relationships    Social connections     Talks on phone: Not on file     Gets together: Not on file     Attends [employer] service: Not on file     Active member of club or organization: Not on file     Attends meetings of clubs or organizations: Not on file     Relationship status: Not on file    Intimate partner violence     Fear of current or ex partner: Not on file     Emotionally abused: Not on file     Physically abused: Not on file     Forced sexual activity: Not on file   Other Topics Concern    Not on file   Social History Narrative    Not on file     Family History   Problem Relation Age of Onset    Stroke Mother     High Blood Pressure Mother     Heart Disease Mother     Heart Disease Sister     High Blood Pressure Sister     Heart Disease Maternal Grandmother     Heart Disease Maternal Grandfather     Cancer Paternal Grandfather         Stomach         REVIEW OF SYSTEMS:     General/Constitution:  (-)weight loss, (-)fever, (-)chills, (-)weakness. Skin: (-) rash,(-) psoriasis,(-) eczema, (-)skin cancer.    Musculoskeletal: (-) fractures,  (-) dislocations,(-) collagen vascular disease, (-) fibromyalgia, (-) multiple sclerosis, (-) muscular dystrophy, (-) RSD,(-) joint pain (-)swelling, (-) joint pain,swelling. Neurologic: (-) epilepsy, (-)seizures,(-) brain tumor,(-) TIA, (-)stroke, (-)headaches, (-)Parkinson disease,(-) memory loss, (-) LOC. Cardiovascular: (-) Chest pain, (-) swelling in legs/feet, (-) SOB, (-) cramping in legs/feet with walking. Respiratory: (-) SOB, (-) Coughing, (-) night sweats. GI: (-) nausea, (-) vomiting, (-) diarrhea, (-) blood in stool, (-) gastric ulcer. Psychiatric: (-) Depression, (-) Anxiety, (-) bipolar disease, (-) Alzheimer's Disease  Allergic/Immunologic: (-) allergies latex, (-) allergies metal, (-) skin sensitivity. Hematlogic: (-) anemia, (-) blood transfusion, (-) DVT/PE, (-) Clotting disorders    Subjective:    Constitution:  Ht 5' 4\" (1.626 m)   Wt 215 lb (97.5 kg)   BMI 36.90 kg/m²     Psycihatric:  The patient is alert and oriented x 3, appears to be stated age and in no distress. Respiratory:  Respiratory effort is not labored. Patient is not gasping. Palpation of the chest reveals no tactile fremitus. Skin:  Upon inspection: the skin appears warm, dry and intact. There is  a previous scar over the affected area. There is any cellulitis, lymphedema or cutaneous lesions noted in the lower extremities. Upon palpation there is no induration noted. Neurologic:  Gait: antalgic; Motor exam of the lower extremities show ; quadriceps, hamstrings, foot dorsi and plantar flexors intact R.  5/5 and L. 5/5. Deep tendon reflexes are 2/4 at the knees and 2/4 at the ankles with strong extensor hallicus longus motor strength bilaterally. Sensory to both feet is intact to all sensory roots. Cardiovascular: The vascular exam is normal and is well perfused to distal extremities. Distal pulses DP/PT: R. 2+; L. 2+. There is cap refill noted less than two seconds in all digits. There is not edema of the bilateral lower extremities.   There is not varicosities noted in the diagnosis. I explained treatment options including surgical vs non surgical treatment. I reviewed in detail the risks and benefits and outlined the procedure in detail with expected outcomes and possible complications. I also discussed non surgical treatment such as injections (CSI and visco supplementation), physical therapy, topical creams and NSAID's. They have elected for conservative management at this time. Patient got steroid at ED and it has gotten somewhat better.  She gil odonnelln

## 2021-01-27 ENCOUNTER — HOSPITAL ENCOUNTER (OUTPATIENT)
Age: 66
Discharge: HOME | End: 2021-01-27
Payer: MEDICARE

## 2021-01-27 LAB
FERRITIN: 16 NG/ML
FOLATE: >20 NG/ML (ref 4.8–24.2)
HCT VFR BLD CALC: 31.1 % (ref 34–48)
HEMOGLOBIN: 8.9 G/DL (ref 11.5–15.5)
IRON SATURATION: 10 % (ref 15–50)
IRON: 38 MCG/DL (ref 37–145)
TOTAL IRON BINDING CAPACITY: 382 MCG/DL (ref 250–450)
VITAMIN B-12: 588 PG/ML (ref 211–946)

## 2021-01-27 PROCEDURE — 82728 ASSAY OF FERRITIN: CPT

## 2021-01-27 PROCEDURE — 85014 HEMATOCRIT: CPT

## 2021-01-27 PROCEDURE — 85018 HEMOGLOBIN: CPT

## 2021-01-27 PROCEDURE — 36415 COLL VENOUS BLD VENIPUNCTURE: CPT

## 2021-01-27 PROCEDURE — 83550 IRON BINDING TEST: CPT

## 2021-01-27 PROCEDURE — 83540 ASSAY OF IRON: CPT

## 2021-01-27 PROCEDURE — 82746 ASSAY OF FOLIC ACID SERUM: CPT

## 2021-01-27 PROCEDURE — 82607 VITAMIN B-12: CPT

## 2021-02-02 DIAGNOSIS — D50.9 IRON DEFICIENCY ANEMIA, UNSPECIFIED IRON DEFICIENCY ANEMIA TYPE: ICD-10-CM

## 2021-02-02 RX ORDER — SODIUM CHLORIDE 0.9 % (FLUSH) 0.9 %
10 SYRINGE (ML) INJECTION PRN
Status: CANCELLED | OUTPATIENT
Start: 2021-02-03

## 2021-02-02 RX ORDER — SODIUM CHLORIDE 9 MG/ML
INJECTION, SOLUTION INTRAVENOUS CONTINUOUS
Status: CANCELLED | OUTPATIENT
Start: 2021-02-03

## 2021-02-02 RX ORDER — METHYLPREDNISOLONE SODIUM SUCCINATE 125 MG/2ML
125 INJECTION, POWDER, LYOPHILIZED, FOR SOLUTION INTRAMUSCULAR; INTRAVENOUS ONCE
Status: CANCELLED | OUTPATIENT
Start: 2021-02-03 | End: 2021-02-03

## 2021-02-02 RX ORDER — DIPHENHYDRAMINE HYDROCHLORIDE 50 MG/ML
50 INJECTION INTRAMUSCULAR; INTRAVENOUS ONCE
Status: CANCELLED | OUTPATIENT
Start: 2021-02-03 | End: 2021-02-03

## 2021-02-02 RX ORDER — EPINEPHRINE 1 MG/ML
0.3 INJECTION, SOLUTION, CONCENTRATE INTRAVENOUS PRN
Status: CANCELLED | OUTPATIENT
Start: 2021-02-03

## 2021-02-02 RX ORDER — HEPARIN SODIUM (PORCINE) LOCK FLUSH IV SOLN 100 UNIT/ML 100 UNIT/ML
500 SOLUTION INTRAVENOUS PRN
Status: CANCELLED | OUTPATIENT
Start: 2021-02-03

## 2021-02-11 ENCOUNTER — HOSPITAL ENCOUNTER (OUTPATIENT)
Dept: INFUSION THERAPY | Age: 66
Setting detail: INFUSION SERIES
Discharge: HOME | End: 2021-02-11
Payer: MEDICARE

## 2021-02-11 VITALS
TEMPERATURE: 97.4 F | SYSTOLIC BLOOD PRESSURE: 141 MMHG | DIASTOLIC BLOOD PRESSURE: 78 MMHG | OXYGEN SATURATION: 100 % | HEART RATE: 80 BPM | RESPIRATION RATE: 22 BRPM

## 2021-02-11 DIAGNOSIS — D50.9 IRON DEFICIENCY ANEMIA, UNSPECIFIED IRON DEFICIENCY ANEMIA TYPE: Primary | ICD-10-CM

## 2021-02-11 PROCEDURE — 96361 HYDRATE IV INFUSION ADD-ON: CPT

## 2021-02-11 PROCEDURE — 6360000002 HC RX W HCPCS: Performed by: NURSE PRACTITIONER

## 2021-02-11 PROCEDURE — 96365 THER/PROPH/DIAG IV INF INIT: CPT

## 2021-02-11 PROCEDURE — 2580000003 HC RX 258: Performed by: NURSE PRACTITIONER

## 2021-02-11 RX ORDER — SODIUM CHLORIDE 0.9 % (FLUSH) 0.9 %
10 SYRINGE (ML) INJECTION PRN
Status: DISCONTINUED | OUTPATIENT
Start: 2021-02-11 | End: 2021-02-12 | Stop reason: HOSPADM

## 2021-02-11 RX ORDER — SODIUM CHLORIDE 0.9 % (FLUSH) 0.9 %
10 SYRINGE (ML) INJECTION PRN
Status: CANCELLED | OUTPATIENT
Start: 2021-02-18

## 2021-02-11 RX ORDER — SODIUM CHLORIDE 9 MG/ML
INJECTION, SOLUTION INTRAVENOUS CONTINUOUS
Status: ACTIVE | OUTPATIENT
Start: 2021-02-11 | End: 2021-02-11

## 2021-02-11 RX ORDER — HEPARIN SODIUM (PORCINE) LOCK FLUSH IV SOLN 100 UNIT/ML 100 UNIT/ML
500 SOLUTION INTRAVENOUS PRN
Status: DISCONTINUED | OUTPATIENT
Start: 2021-02-11 | End: 2021-02-12 | Stop reason: HOSPADM

## 2021-02-11 RX ORDER — HEPARIN SODIUM (PORCINE) LOCK FLUSH IV SOLN 100 UNIT/ML 100 UNIT/ML
500 SOLUTION INTRAVENOUS PRN
Status: CANCELLED | OUTPATIENT
Start: 2021-02-18

## 2021-02-11 RX ORDER — EPINEPHRINE 1 MG/ML
0.3 INJECTION, SOLUTION, CONCENTRATE INTRAVENOUS PRN
Status: CANCELLED | OUTPATIENT
Start: 2021-02-18

## 2021-02-11 RX ORDER — METHYLPREDNISOLONE SODIUM SUCCINATE 125 MG/2ML
125 INJECTION, POWDER, LYOPHILIZED, FOR SOLUTION INTRAMUSCULAR; INTRAVENOUS ONCE
Status: CANCELLED | OUTPATIENT
Start: 2021-02-18 | End: 2021-02-18

## 2021-02-11 RX ORDER — SODIUM CHLORIDE 9 MG/ML
INJECTION, SOLUTION INTRAVENOUS CONTINUOUS
Status: CANCELLED | OUTPATIENT
Start: 2021-02-18

## 2021-02-11 RX ORDER — DIPHENHYDRAMINE HYDROCHLORIDE 50 MG/ML
50 INJECTION INTRAMUSCULAR; INTRAVENOUS ONCE
Status: CANCELLED | OUTPATIENT
Start: 2021-02-18 | End: 2021-02-18

## 2021-02-11 RX ADMIN — FERRIC CARBOXYMALTOSE INJECTION 750 MG: 50 INJECTION, SOLUTION INTRAVENOUS at 10:53

## 2021-02-11 RX ADMIN — SODIUM CHLORIDE: 9 INJECTION, SOLUTION INTRAVENOUS at 10:30

## 2021-02-11 RX ADMIN — SODIUM CHLORIDE, PRESERVATIVE FREE 10 ML: 5 INJECTION INTRAVENOUS at 10:28

## 2021-02-18 ENCOUNTER — HOSPITAL ENCOUNTER (OUTPATIENT)
Dept: INFUSION THERAPY | Age: 66
Setting detail: INFUSION SERIES
Discharge: HOME | End: 2021-02-18
Payer: MEDICARE

## 2021-02-18 VITALS
DIASTOLIC BLOOD PRESSURE: 80 MMHG | TEMPERATURE: 96.6 F | SYSTOLIC BLOOD PRESSURE: 147 MMHG | HEART RATE: 83 BPM | RESPIRATION RATE: 24 BRPM

## 2021-02-18 DIAGNOSIS — D50.9 IRON DEFICIENCY ANEMIA, UNSPECIFIED IRON DEFICIENCY ANEMIA TYPE: Primary | ICD-10-CM

## 2021-02-18 PROCEDURE — 6360000002 HC RX W HCPCS: Performed by: NURSE PRACTITIONER

## 2021-02-18 PROCEDURE — 2580000003 HC RX 258: Performed by: NURSE PRACTITIONER

## 2021-02-18 PROCEDURE — 96365 THER/PROPH/DIAG IV INF INIT: CPT

## 2021-02-18 PROCEDURE — 96361 HYDRATE IV INFUSION ADD-ON: CPT

## 2021-02-18 RX ORDER — DIPHENHYDRAMINE HYDROCHLORIDE 50 MG/ML
50 INJECTION INTRAMUSCULAR; INTRAVENOUS ONCE
Status: CANCELLED | OUTPATIENT
Start: 2021-02-18 | End: 2021-02-18

## 2021-02-18 RX ORDER — SODIUM CHLORIDE 9 MG/ML
INJECTION, SOLUTION INTRAVENOUS CONTINUOUS
Status: CANCELLED | OUTPATIENT
Start: 2021-02-18

## 2021-02-18 RX ORDER — EPINEPHRINE 1 MG/ML
0.3 INJECTION, SOLUTION, CONCENTRATE INTRAVENOUS PRN
Status: CANCELLED | OUTPATIENT
Start: 2021-02-18

## 2021-02-18 RX ORDER — SODIUM CHLORIDE 0.9 % (FLUSH) 0.9 %
10 SYRINGE (ML) INJECTION PRN
Status: DISCONTINUED | OUTPATIENT
Start: 2021-02-18 | End: 2021-02-19 | Stop reason: HOSPADM

## 2021-02-18 RX ORDER — SODIUM CHLORIDE 9 MG/ML
INJECTION, SOLUTION INTRAVENOUS CONTINUOUS
Status: ACTIVE | OUTPATIENT
Start: 2021-02-18 | End: 2021-02-18

## 2021-02-18 RX ORDER — SODIUM CHLORIDE 0.9 % (FLUSH) 0.9 %
10 SYRINGE (ML) INJECTION PRN
Status: CANCELLED | OUTPATIENT
Start: 2021-02-18

## 2021-02-18 RX ORDER — METHYLPREDNISOLONE SODIUM SUCCINATE 125 MG/2ML
125 INJECTION, POWDER, LYOPHILIZED, FOR SOLUTION INTRAMUSCULAR; INTRAVENOUS ONCE
Status: CANCELLED | OUTPATIENT
Start: 2021-02-18 | End: 2021-02-18

## 2021-02-18 RX ORDER — HEPARIN SODIUM (PORCINE) LOCK FLUSH IV SOLN 100 UNIT/ML 100 UNIT/ML
500 SOLUTION INTRAVENOUS PRN
Status: CANCELLED | OUTPATIENT
Start: 2021-02-18

## 2021-02-18 RX ADMIN — SODIUM CHLORIDE: 9 INJECTION, SOLUTION INTRAVENOUS at 10:25

## 2021-02-18 RX ADMIN — SODIUM CHLORIDE, PRESERVATIVE FREE 10 ML: 5 INJECTION INTRAVENOUS at 10:17

## 2021-02-18 RX ADMIN — FERRIC CARBOXYMALTOSE INJECTION 750 MG: 50 INJECTION, SOLUTION INTRAVENOUS at 10:34

## 2021-02-18 RX ADMIN — SODIUM CHLORIDE, PRESERVATIVE FREE 10 ML: 5 INJECTION INTRAVENOUS at 10:54

## 2021-02-19 ENCOUNTER — HOSPITAL ENCOUNTER (OUTPATIENT)
Age: 66
Discharge: HOME | End: 2021-02-19
Payer: MEDICARE

## 2021-02-19 LAB
BASOPHILS ABSOLUTE: 0.06 E9/L (ref 0–0.2)
BASOPHILS RELATIVE PERCENT: 1.2 % (ref 0–2)
EOSINOPHILS ABSOLUTE: 0.08 E9/L (ref 0.05–0.5)
EOSINOPHILS RELATIVE PERCENT: 1.5 % (ref 0–6)
FERRITIN: 994 NG/ML
HCT VFR BLD CALC: 33.7 % (ref 34–48)
HEMOGLOBIN: 10.3 G/DL (ref 11.5–15.5)
IMMATURE GRANULOCYTES #: 0.03 E9/L
IMMATURE GRANULOCYTES %: 0.6 % (ref 0–5)
IMMATURE RETIC FRACT: 24 % (ref 3–15.9)
IRON SATURATION: 69 % (ref 15–50)
IRON: 273 MCG/DL (ref 37–145)
LYMPHOCYTES ABSOLUTE: 2.19 E9/L (ref 1.5–4)
LYMPHOCYTES RELATIVE PERCENT: 42.2 % (ref 20–42)
MCH RBC QN AUTO: 24.9 PG (ref 26–35)
MCHC RBC AUTO-ENTMCNC: 30.6 % (ref 32–34.5)
MCV RBC AUTO: 81.4 FL (ref 80–99.9)
MONOCYTES ABSOLUTE: 0.53 E9/L (ref 0.1–0.95)
MONOCYTES RELATIVE PERCENT: 10.2 % (ref 2–12)
NEUTROPHILS ABSOLUTE: 2.3 E9/L (ref 1.8–7.3)
NEUTROPHILS RELATIVE PERCENT: 44.3 % (ref 43–80)
PDW BLD-RTO: 17.8 FL (ref 11.5–15)
PLATELET # BLD: 244 E9/L (ref 130–450)
PMV BLD AUTO: 9.5 FL (ref 7–12)
RBC # BLD: 4.14 E12/L (ref 3.5–5.5)
RETIC HGB EQUIVALENT: 35.2 PG (ref 28.2–36.6)
RETICULOCYTE ABSOLUTE COUNT: 0.18 E12/L
RETICULOCYTE COUNT PCT: 4.3 % (ref 0.4–1.9)
TOTAL IRON BINDING CAPACITY: 398 MCG/DL (ref 250–450)
WBC # BLD: 5.2 E9/L (ref 4.5–11.5)

## 2021-02-19 PROCEDURE — 36415 COLL VENOUS BLD VENIPUNCTURE: CPT

## 2021-02-19 PROCEDURE — 83540 ASSAY OF IRON: CPT

## 2021-02-19 PROCEDURE — 82728 ASSAY OF FERRITIN: CPT

## 2021-02-19 PROCEDURE — 83550 IRON BINDING TEST: CPT

## 2021-02-19 PROCEDURE — 85025 COMPLETE CBC W/AUTO DIFF WBC: CPT

## 2021-02-19 PROCEDURE — 85045 AUTOMATED RETICULOCYTE COUNT: CPT

## 2021-03-02 ENCOUNTER — HOSPITAL ENCOUNTER (OUTPATIENT)
Age: 66
Discharge: HOME | End: 2021-03-04
Payer: MEDICARE

## 2021-03-02 ENCOUNTER — HOSPITAL ENCOUNTER (OUTPATIENT)
Dept: GENERAL RADIOLOGY | Age: 66
Discharge: HOME | End: 2021-03-04
Payer: MEDICARE

## 2021-03-02 DIAGNOSIS — R52 PAIN: ICD-10-CM

## 2021-03-02 PROCEDURE — 74018 RADEX ABDOMEN 1 VIEW: CPT

## 2021-03-11 ENCOUNTER — HOSPITAL ENCOUNTER (OUTPATIENT)
Dept: CT IMAGING | Age: 66
Discharge: HOME | End: 2021-03-11
Payer: MEDICARE

## 2021-03-11 DIAGNOSIS — R93.3 ABNORMAL SMALL BOWEL X-RAY: ICD-10-CM

## 2021-03-11 LAB
ALBUMIN SERPL-MCNC: 4.2 G/DL (ref 3.5–5.2)
ALP BLD-CCNC: 77 U/L (ref 35–104)
ALT SERPL-CCNC: 7 U/L (ref 0–32)
ANION GAP SERPL CALCULATED.3IONS-SCNC: 8 MMOL/L (ref 7–16)
AST SERPL-CCNC: 13 U/L (ref 0–31)
BASOPHILS ABSOLUTE: 0 E9/L (ref 0–0.2)
BASOPHILS RELATIVE PERCENT: 0 % (ref 0–2)
BILIRUB SERPL-MCNC: 0.6 MG/DL (ref 0–1.2)
BUN BLDV-MCNC: 16 MG/DL (ref 8–23)
C-REACTIVE PROTEIN: 0.4 MG/DL (ref 0–0.4)
CALCIUM SERPL-MCNC: 9 MG/DL (ref 8.6–10.2)
CHLORIDE BLD-SCNC: 104 MMOL/L (ref 98–107)
CO2: 27 MMOL/L (ref 22–29)
CREAT SERPL-MCNC: 0.9 MG/DL (ref 0.5–1)
EOSINOPHILS ABSOLUTE: 0.04 E9/L (ref 0.05–0.5)
EOSINOPHILS RELATIVE PERCENT: 1 % (ref 0–6)
GFR AFRICAN AMERICAN: >60
GFR NON-AFRICAN AMERICAN: >60 ML/MIN/1.73
GLUCOSE BLD-MCNC: 87 MG/DL (ref 74–99)
HCT VFR BLD CALC: 34.5 % (ref 34–48)
HEMOGLOBIN: 11.1 G/DL (ref 11.5–15.5)
LYMPHOCYTES ABSOLUTE: 1.6 E9/L (ref 1.5–4)
LYMPHOCYTES RELATIVE PERCENT: 41 % (ref 20–42)
MCH RBC QN AUTO: 27.3 PG (ref 26–35)
MCHC RBC AUTO-ENTMCNC: 32.2 % (ref 32–34.5)
MCV RBC AUTO: 85 FL (ref 80–99.9)
MONOCYTES ABSOLUTE: 0.23 E9/L (ref 0.1–0.95)
MONOCYTES RELATIVE PERCENT: 6 % (ref 2–12)
NEUTROPHILS ABSOLUTE: 2.03 E9/L (ref 1.8–7.3)
NEUTROPHILS RELATIVE PERCENT: 52 % (ref 43–80)
PDW BLD-RTO: 20.3 FL (ref 11.5–15)
PLATELET # BLD: 183 E9/L (ref 130–450)
PMV BLD AUTO: 8.8 FL (ref 7–12)
POTASSIUM SERPL-SCNC: 4 MMOL/L (ref 3.5–5)
RBC # BLD: 4.06 E12/L (ref 3.5–5.5)
RBC # BLD: NORMAL 10*6/UL
SEDIMENTATION RATE, ERYTHROCYTE: 24 MM/HR (ref 0–20)
SODIUM BLD-SCNC: 139 MMOL/L (ref 132–146)
TOTAL PROTEIN: 7 G/DL (ref 6.4–8.3)
WBC # BLD: 3.9 E9/L (ref 4.5–11.5)

## 2021-03-11 PROCEDURE — 85651 RBC SED RATE NONAUTOMATED: CPT

## 2021-03-11 PROCEDURE — 86140 C-REACTIVE PROTEIN: CPT

## 2021-03-11 PROCEDURE — 6360000004 HC RX CONTRAST MEDICATION: Performed by: RADIOLOGY

## 2021-03-11 PROCEDURE — 36415 COLL VENOUS BLD VENIPUNCTURE: CPT

## 2021-03-11 PROCEDURE — 82784 ASSAY IGA/IGD/IGG/IGM EACH: CPT

## 2021-03-11 PROCEDURE — 85025 COMPLETE CBC W/AUTO DIFF WBC: CPT

## 2021-03-11 PROCEDURE — 80053 COMPREHEN METABOLIC PANEL: CPT

## 2021-03-11 PROCEDURE — 83516 IMMUNOASSAY NONANTIBODY: CPT

## 2021-03-11 PROCEDURE — 2500000003 HC RX 250 WO HCPCS: Performed by: RADIOLOGY

## 2021-03-11 PROCEDURE — 74177 CT ABD & PELVIS W/CONTRAST: CPT

## 2021-03-11 RX ADMIN — IOPAMIDOL 75 ML: 755 INJECTION, SOLUTION INTRAVENOUS at 14:56

## 2021-03-11 RX ADMIN — BARIUM SULFATE 1350 ML: 1 SUSPENSION ORAL at 14:56

## 2021-03-14 LAB
GLIADIN ANTIBODIES IGA: 2 UNITS (ref 0–19)
GLIADIN ANTIBODIES IGG: 2 UNITS (ref 0–19)
IGA: 200 MG/DL (ref 70–400)
TISSUE TRANSGLUTAMINASE ANTIBODY: 8 U/ML (ref 0–5)
TISSUE TRANSGLUTAMINASE IGA: <2 U/ML (ref 0–3)

## 2021-05-04 ENCOUNTER — HOSPITAL ENCOUNTER (OUTPATIENT)
Age: 66
Discharge: HOME | End: 2021-05-06
Payer: MEDICARE

## 2021-05-04 ENCOUNTER — HOSPITAL ENCOUNTER (OUTPATIENT)
Dept: GENERAL RADIOLOGY | Age: 66
Discharge: HOME | End: 2021-05-06
Payer: MEDICARE

## 2021-05-04 DIAGNOSIS — M54.2 CERVICALGIA: ICD-10-CM

## 2021-05-04 PROCEDURE — 72050 X-RAY EXAM NECK SPINE 4/5VWS: CPT

## 2021-06-21 ENCOUNTER — INITIAL CONSULT (OUTPATIENT)
Dept: SURGERY | Age: 66
End: 2021-06-21
Payer: MEDICARE

## 2021-06-21 ENCOUNTER — TELEPHONE (OUTPATIENT)
Dept: SURGERY | Age: 66
End: 2021-06-21

## 2021-06-21 VITALS
SYSTOLIC BLOOD PRESSURE: 124 MMHG | WEIGHT: 206 LBS | TEMPERATURE: 97.2 F | BODY MASS INDEX: 35.17 KG/M2 | DIASTOLIC BLOOD PRESSURE: 77 MMHG | OXYGEN SATURATION: 96 % | HEART RATE: 84 BPM | HEIGHT: 64 IN

## 2021-06-21 DIAGNOSIS — K40.90 LEFT INGUINAL HERNIA: ICD-10-CM

## 2021-06-21 DIAGNOSIS — Z01.810 PREOP CARDIOVASCULAR EXAM: Primary | ICD-10-CM

## 2021-06-21 PROCEDURE — 1123F ACP DISCUSS/DSCN MKR DOCD: CPT | Performed by: SURGERY

## 2021-06-21 PROCEDURE — G8400 PT W/DXA NO RESULTS DOC: HCPCS | Performed by: SURGERY

## 2021-06-21 PROCEDURE — G8427 DOCREV CUR MEDS BY ELIG CLIN: HCPCS | Performed by: SURGERY

## 2021-06-21 PROCEDURE — G8417 CALC BMI ABV UP PARAM F/U: HCPCS | Performed by: SURGERY

## 2021-06-21 PROCEDURE — 1036F TOBACCO NON-USER: CPT | Performed by: SURGERY

## 2021-06-21 PROCEDURE — 4040F PNEUMOC VAC/ADMIN/RCVD: CPT | Performed by: SURGERY

## 2021-06-21 PROCEDURE — 3017F COLORECTAL CA SCREEN DOC REV: CPT | Performed by: SURGERY

## 2021-06-21 PROCEDURE — 1090F PRES/ABSN URINE INCON ASSESS: CPT | Performed by: SURGERY

## 2021-06-21 PROCEDURE — 99204 OFFICE O/P NEW MOD 45 MIN: CPT | Performed by: SURGERY

## 2021-06-21 NOTE — TELEPHONE ENCOUNTER
Prior Authorization Form:      DEMOGRAPHICS:                     Patient Name:  Joseline Carter  Patient :  1955            Insurance:  Payor: Christie Marcano / Plan: Chet GRISSOM HMO / Product Type: *No Product type* /   Insurance ID Number:    Payor/Plan Subscr  Sex Relation Sub. Ins. ID Effective Group Num   1. Luiz Sanya MEDICA* Estela Kumarel 1955 Female Self [id number] 1/3/21 [id number]                                   [address] [address]   2.  [place] -* Estela Quevedo 1955 Female Self [id number] 3/1/16                                    [address]         DIAGNOSIS & PROCEDURE:                       Procedure/Operation: Laparoscopic robotic left inguinal hernia repair with mesh, possible bilateral            CPT Code: 49931    Diagnosis:  Left inguinal hernia     ICD10 Code: K40.91    Location:  [address] [place] [address]    Surgeon:  Tuan Burrell INFORMATION:                          Date: 21    Time: TBD              Anesthesia:  General                                                       Status:  Outpatient        Special Comments:         Electronically signed by Amy Orosco RN on 2021 at 9:33 AM

## 2021-06-21 NOTE — PROGRESS NOTES
[id number] [facility]  2008    ECHO COMPL W DOP COLOR FLOW  8/10/2012         HERNIA REPAIR  2004    Dr. Suma Steven [facility]     JOINT REPLACEMENT  2009    Right Knee    NECK SURGERY  2015    fused 4,5,6- screws, in [address]    ASHOK-EN-Y GASTRIC BYPASS  2002    Dr. Bernard Dumont ARTHROSCOPY  12    LEFT REPAIR ROTATOR CUFF TEAR SUBACROMIAL DECOMPRESSION    TONSILLECTOMY     [address] - Patient states that this was due to her weight prior to [address] [facility]  2009    UVULOPALATOPHARYGOPLASTY         No Known Allergies    The patient has a family history that is negative for severe cardiovascular or respiratory issues, negative for reaction to anesthesia. Time spent reviewing past medical, surgical, social and family history, vitals, nursing assessment and images. No changes from above documented history.     Social History     Socioeconomic History    Marital status:      Spouse name: Not on file    Number of children: Not on file    Years of education: Not on file    Highest education level: Not on file   Occupational History    Not on file   Tobacco Use    Smoking status: Former Smoker     Packs/day: 0.50     Years: 40.00     Pack years: 20.00     Quit date: 2020     Years since quittin.4    Smokeless tobacco: Never Used   Substance and Sexual Activity    Alcohol use: No     Alcohol/week: 0.0 standard drinks     Comment: rarely    Drug use: No    Sexual activity: Yes   Other Topics Concern    Not on file   Social History Narrative    Not on file     Social Determinants of Health     Financial Resource Strain:     Difficulty of Paying Living Expenses:    Food Insecurity:     Worried About Running Out of Food in the Last Year:     [address] in the Last Year:    Transportation Needs:     Lack of Transportation (Medical):  Lack of Transportation (Non-Medical):    Physical Activity:     Days of Exercise per Week:     Minutes of Exercise per Session:    Stress:     Feeling of Stress :    Social Connections:     Frequency of Communication with Friends and Family:     Frequency of Social Gatherings with Friends and Family:     Attends Restoration Services:     Active Member of Clubs or Organizations:     Attends Club or Organization Meetings:     Marital Status:    Intimate Partner Violence:     Fear of Current or Ex-Partner:     Emotionally Abused:     Physically Abused:     Sexually Abused:        A complete 10 system review was performed and are otherwise negative unless mentioned in the above HPI. Specific negatives are listed below but may not include all those reviewed.     General ROS: negative obtundation, AMS  ENT ROS: negative rhinorrhea, epistaxis  Allergy and Immunology ROS: negative itchy/watery eyes or nasal congestion  Hematological and Lymphatic ROS: negative spontaneous bleeding or bruising  Endocrine ROS: negative  lethargy, mood swings, palpitations or polydipsia/polyuria  Respiratory ROS: negative sputum changes, stridor, tachypnea or wheezing  Cardiovascular ROS: negative for - loss of consciousness, murmur or orthopnea  Gastrointestinal ROS: negative for - hematochezia or hematemesis  Genito-Urinary ROS: negative for -  genital discharge or hematuria  Musculoskeletal ROS: negative for - focal weakness, gangrene  Psych/Neuro ROS: negative for - visual or auditory hallucinations, suicidal ideation    Physical exam:   /77 (Site: Right Upper Arm, Position: Sitting, Cuff Size: Large Adult)   Pulse 84   Temp 97.2 °F (36.2 °C)   Ht 5' 4\" (1.626 m)   Wt 206 lb (93.4 kg)   SpO2 96%   BMI 35.36 kg/m²   General appearance:  NAD, appears stated age  Head: NCAT, PERRLA, EOMI, red conjunctiva  Neck: supple, no masses, trachea midline  Lungs: Equal chest rise

## 2021-06-21 NOTE — TELEPHONE ENCOUNTER
Per Dr. Claudia Aleman, patient is scheduled for Laparoscopic robotic left inguinal hernia repair with mesh, possible bilateral  at [address] [place] [address] on 7/8/21. Surgery scheduling form faxed with confirmation, surgeon's calendar updated. Dr. Claudia Aleman to enter orders. Follow up appointment scheduled. Medical and cardiac clearance requested. Electronically signed by Chelsea Corrales RN on 6/21/2021 at 9:33 AM    Clearance appointments scheduled. Dr. Femi Mayer 7/1/21  Dr. Gabino Bacon 7/2/21  Electronically signed by Chelsea Corrales RN on 6/29/2021 at 1:25 PM    Medical clearance received from Dr. Gabino Bacon as long as cleared by cardiology. Dr. Aman Andrade office contacted to follow up on clearance. Patient was scheduled to have stress test last week, however prior auth was not obtained. Surgery to be rescheduled once clearance obtained. Electronically signed by Chelsea Corrales RN on 7/6/2021 at 10:01 AM    Call made to Dr. Aman Andrade office to follow up on cardiac clearance. Per office staff, patient did have stress test done. Clearance request re-faxed to Dr. Aman Andrade office.    Electronically signed by Chelsea Corrales RN on 9/29/2021 at 1:05 PM

## 2021-06-25 RX ORDER — SODIUM CHLORIDE, SODIUM LACTATE, POTASSIUM CHLORIDE, CALCIUM CHLORIDE 600; 310; 30; 20 MG/100ML; MG/100ML; MG/100ML; MG/100ML
INJECTION, SOLUTION INTRAVENOUS CONTINUOUS
Status: CANCELLED | OUTPATIENT
Start: 2021-06-25

## 2021-06-28 ENCOUNTER — HOSPITAL ENCOUNTER (OUTPATIENT)
Dept: PREADMISSION TESTING | Age: 66
Discharge: HOME | End: 2021-06-28
Payer: MEDICARE

## 2021-06-28 VITALS
WEIGHT: 211.38 LBS | HEART RATE: 53 BPM | DIASTOLIC BLOOD PRESSURE: 82 MMHG | HEIGHT: 64 IN | SYSTOLIC BLOOD PRESSURE: 152 MMHG | OXYGEN SATURATION: 95 % | RESPIRATION RATE: 18 BRPM | TEMPERATURE: 98.7 F | BODY MASS INDEX: 36.09 KG/M2

## 2021-06-28 DIAGNOSIS — Z01.818 PREOP TESTING: Primary | ICD-10-CM

## 2021-06-28 LAB
ALBUMIN SERPL-MCNC: 4.2 G/DL (ref 3.5–5.2)
ALP BLD-CCNC: 72 U/L (ref 35–104)
ALT SERPL-CCNC: 10 U/L (ref 0–32)
ANION GAP SERPL CALCULATED.3IONS-SCNC: 8 MMOL/L (ref 7–16)
AST SERPL-CCNC: 16 U/L (ref 0–31)
BACTERIA: NORMAL /HPF
BASOPHILS ABSOLUTE: 0.04 E9/L (ref 0–0.2)
BASOPHILS RELATIVE PERCENT: 0.8 % (ref 0–2)
BILIRUB SERPL-MCNC: 0.6 MG/DL (ref 0–1.2)
BILIRUBIN URINE: NEGATIVE
BLOOD, URINE: NEGATIVE
BUN BLDV-MCNC: 16 MG/DL (ref 6–23)
CALCIUM SERPL-MCNC: 9.2 MG/DL (ref 8.6–10.2)
CHLORIDE BLD-SCNC: 102 MMOL/L (ref 98–107)
CHOLESTEROL, FASTING: 194 MG/DL (ref 0–199)
CLARITY: CLEAR
CO2: 28 MMOL/L (ref 22–29)
COLOR: ABNORMAL
CREAT SERPL-MCNC: 0.8 MG/DL (ref 0.5–1)
EKG ATRIAL RATE: 57 BPM
EKG P-R INTERVAL: 180 MS
EKG Q-T INTERVAL: 470 MS
EKG QRS DURATION: 76 MS
EKG QTC CALCULATION (BAZETT): 457 MS
EKG R AXIS: 44 DEGREES
EKG T AXIS: 93 DEGREES
EKG VENTRICULAR RATE: 57 BPM
EOSINOPHILS ABSOLUTE: 0.09 E9/L (ref 0.05–0.5)
EOSINOPHILS RELATIVE PERCENT: 1.9 % (ref 0–6)
GFR AFRICAN AMERICAN: >60
GFR NON-AFRICAN AMERICAN: >60 ML/MIN/1.73
GLUCOSE FASTING: 93 MG/DL (ref 74–99)
GLUCOSE URINE: NEGATIVE MG/DL
HCT VFR BLD CALC: 36 % (ref 34–48)
HDLC SERPL-MCNC: 51 MG/DL
HEMOGLOBIN: 12.1 G/DL (ref 11.5–15.5)
IMMATURE GRANULOCYTES #: 0.01 E9/L
IMMATURE GRANULOCYTES %: 0.2 % (ref 0–5)
KETONES, URINE: NEGATIVE MG/DL
LDL CHOLESTEROL CALCULATED: 123 MG/DL (ref 0–99)
LEUKOCYTE ESTERASE, URINE: ABNORMAL
LYMPHOCYTES ABSOLUTE: 1.69 E9/L (ref 1.5–4)
LYMPHOCYTES RELATIVE PERCENT: 35.1 % (ref 20–42)
MCH RBC QN AUTO: 31.3 PG (ref 26–35)
MCHC RBC AUTO-ENTMCNC: 33.6 % (ref 32–34.5)
MCV RBC AUTO: 93 FL (ref 80–99.9)
MONOCYTES ABSOLUTE: 0.41 E9/L (ref 0.1–0.95)
MONOCYTES RELATIVE PERCENT: 8.5 % (ref 2–12)
NEUTROPHILS ABSOLUTE: 2.57 E9/L (ref 1.8–7.3)
NEUTROPHILS RELATIVE PERCENT: 53.5 % (ref 43–80)
NITRITE, URINE: NEGATIVE
PDW BLD-RTO: 13 FL (ref 11.5–15)
PH UA: 6 (ref 5–9)
PLATELET # BLD: 196 E9/L (ref 130–450)
PMV BLD AUTO: 9.4 FL (ref 7–12)
POTASSIUM SERPL-SCNC: 4.6 MMOL/L (ref 3.5–5)
PROTEIN UA: NEGATIVE MG/DL
RBC # BLD: 3.87 E12/L (ref 3.5–5.5)
RBC UA: NORMAL /HPF (ref 0–2)
SODIUM BLD-SCNC: 138 MMOL/L (ref 132–146)
SPECIFIC GRAVITY UA: 1.02 (ref 1–1.03)
TOTAL PROTEIN: 7.1 G/DL (ref 6.4–8.3)
TRIGLYCERIDE, FASTING: 101 MG/DL (ref 0–149)
UROBILINOGEN, URINE: 4 E.U./DL
VITAMIN D 25-HYDROXY: 28 NG/ML (ref 30–100)
VLDLC SERPL CALC-MCNC: 20 MG/DL
WBC # BLD: 4.8 E9/L (ref 4.5–11.5)
WBC UA: NORMAL /HPF (ref 0–5)

## 2021-06-28 PROCEDURE — 36415 COLL VENOUS BLD VENIPUNCTURE: CPT

## 2021-06-28 PROCEDURE — 80061 LIPID PANEL: CPT

## 2021-06-28 PROCEDURE — 85025 COMPLETE CBC W/AUTO DIFF WBC: CPT

## 2021-06-28 PROCEDURE — 81001 URINALYSIS AUTO W/SCOPE: CPT

## 2021-06-28 PROCEDURE — 86803 HEPATITIS C AB TEST: CPT

## 2021-06-28 PROCEDURE — 80053 COMPREHEN METABOLIC PANEL: CPT

## 2021-06-28 PROCEDURE — 82306 VITAMIN D 25 HYDROXY: CPT

## 2021-06-28 PROCEDURE — 93005 ELECTROCARDIOGRAM TRACING: CPT | Performed by: ANESTHESIOLOGY

## 2021-06-28 RX ORDER — OMEPRAZOLE 40 MG/1
40 CAPSULE, DELAYED RELEASE ORAL EVERY MORNING
COMMUNITY
Start: 2021-06-14

## 2021-06-28 RX ORDER — AMLODIPINE BESYLATE 5 MG/1
5 TABLET ORAL NIGHTLY
COMMUNITY
Start: 2021-04-19 | End: 2021-11-02

## 2021-06-28 RX ORDER — BUSPIRONE HYDROCHLORIDE 5 MG/1
5 TABLET ORAL 3 TIMES DAILY
COMMUNITY
Start: 2021-04-19

## 2021-06-28 NOTE — PROGRESS NOTES
Pt brought in lab  Orders from PCP. Sent to lab for draw. Westley Cast RN  6/28/2021  9:35 AM  CBC, CMP, Lipids, U/A and EKG faxed to Colleen Millan and [facility]. Pt has upcoming  appts for clearance from both doctors.   Westley Cast RN  6/28/2021  12:49 PM
sore throat, nausea, vomiting, etc.  Please notify your surgeon if you experience dizziness, shortness of breath or blurred vision between now & the time of your surgery. 12. If you have _x__dentures, they will be removed before going to the OR; we will provide you a container. If you wear ___contact lenses or ___glasses, they will be removed; please bring a case for them. 13. To provide excellent care visitors will be limited to 1  in the room at any given time. 14. During flu season no children under the age of 15 are permitted in the hospital for the safety of all patients. 15. Other come in main lobby and stop at                Please call AMBULATORY CARE if you have any further questions.    [address] [facility]     [address]

## 2021-06-29 LAB — HEPATITIS C ANTIBODY INTERPRETATION: NORMAL

## 2021-10-04 ENCOUNTER — TELEPHONE (OUTPATIENT)
Dept: SURGERY | Age: 66
End: 2021-10-04

## 2021-10-04 NOTE — TELEPHONE ENCOUNTER
Prior Authorization Form:      DEMOGRAPHICS:                     Patient Name:  Candice Keller  Patient :  1955            Insurance:  Payor: Irlanda Morales / Plan: Muriel GRISSOM HMO / Product Type: *No Product type* /   Insurance ID Number:    Payor/Plan Subscr  Sex Relation Sub. Ins. ID Effective Group Num   1. Evelene Candice MEDICA* Sherry [facility] 1955 Female Self [id number] 1/3/21 [id number]                                   [address] [address]   2.  [place] -* Sherry [facility] 1955 Female Self [id number] 3/1/16                                    [address]         DIAGNOSIS & PROCEDURE:                       Procedure/Operation: Laparoscopic robotic left inguinal hernia repair with mesh, possible bilateral            CPT Code: 58032    Diagnosis:  Left inguinal hernia     ICD10 Code: K40.90    Location:  [address] [place] [address]    Surgeon:  Jordin Peterson INFORMATION:                          Date: 21    Time: TBD              Anesthesia:  General                                                       Status:  Outpatient        Special Comments:         Electronically signed by Brandy Martinez RN on 10/4/2021 at 2:56 PM

## 2021-10-04 NOTE — TELEPHONE ENCOUNTER
Per Dr. Deloris Samuels, patient is scheduled for Laparoscopic robotic left inguinal hernia repair with mesh, possible bilateral  at [facility] on 11/4/21. Surgery scheduling form faxed with confirmation, surgeon's calendar updated. Dr. Deloris Samuels to enter orders. Follow up appointment scheduled. Patient has received COVID 19 vaccine. Cardiac clearance received from Dr. Mercy Brewer. Patient instructed to hold eliquis 3 days prior to surgery. Electronically signed by Bridgette Sharif RN on 10/4/2021 at 2:56 PM    Medical clearance received from Dr. Rohini Brooks.   Electronically signed by Bridgette Sharif RN on 10/11/2021 at 1:35 PM

## 2021-11-01 RX ORDER — SODIUM CHLORIDE 9 MG/ML
INJECTION, SOLUTION INTRAVENOUS CONTINUOUS
Status: CANCELLED | OUTPATIENT
Start: 2021-11-01

## 2021-11-02 ENCOUNTER — HOSPITAL ENCOUNTER (OUTPATIENT)
Dept: PREADMISSION TESTING | Age: 66
Discharge: HOME | End: 2021-11-02
Payer: MEDICARE

## 2021-11-02 VITALS
WEIGHT: 206 LBS | HEIGHT: 63 IN | RESPIRATION RATE: 18 BRPM | OXYGEN SATURATION: 98 % | TEMPERATURE: 97.2 F | BODY MASS INDEX: 36.5 KG/M2 | HEART RATE: 70 BPM

## 2021-11-02 DIAGNOSIS — Z01.818 PRE-OP TESTING: Primary | ICD-10-CM

## 2021-11-02 LAB
ANION GAP SERPL CALCULATED.3IONS-SCNC: 9 MMOL/L (ref 7–16)
BUN BLDV-MCNC: 28 MG/DL (ref 6–23)
CALCIUM SERPL-MCNC: 9.1 MG/DL (ref 8.6–10.2)
CHLORIDE BLD-SCNC: 100 MMOL/L (ref 98–107)
CO2: 27 MMOL/L (ref 22–29)
CREAT SERPL-MCNC: 1.1 MG/DL (ref 0.5–1)
GFR AFRICAN AMERICAN: >60
GFR NON-AFRICAN AMERICAN: 50 ML/MIN/1.73
GLUCOSE BLD-MCNC: 80 MG/DL (ref 74–99)
HCT VFR BLD CALC: 36.8 % (ref 34–48)
HEMOGLOBIN: 12.3 G/DL (ref 11.5–15.5)
MCH RBC QN AUTO: 31.3 PG (ref 26–35)
MCHC RBC AUTO-ENTMCNC: 33.4 % (ref 32–34.5)
MCV RBC AUTO: 93.6 FL (ref 80–99.9)
PDW BLD-RTO: 12.7 FL (ref 11.5–15)
PLATELET # BLD: 203 E9/L (ref 130–450)
PMV BLD AUTO: 9 FL (ref 7–12)
POTASSIUM REFLEX MAGNESIUM: 4 MMOL/L (ref 3.5–5)
RBC # BLD: 3.93 E12/L (ref 3.5–5.5)
SODIUM BLD-SCNC: 136 MMOL/L (ref 132–146)
WBC # BLD: 5.7 E9/L (ref 4.5–11.5)

## 2021-11-02 PROCEDURE — 36415 COLL VENOUS BLD VENIPUNCTURE: CPT

## 2021-11-02 PROCEDURE — 85027 COMPLETE CBC AUTOMATED: CPT

## 2021-11-02 PROCEDURE — 80048 BASIC METABOLIC PNL TOTAL CA: CPT

## 2021-11-02 RX ORDER — LISINOPRIL AND HYDROCHLOROTHIAZIDE 25; 20 MG/1; MG/1
1 TABLET ORAL DAILY
COMMUNITY
Start: 2021-10-22 | End: 2022-07-25 | Stop reason: ALTCHOICE

## 2021-11-02 RX ORDER — NICOTINE POLACRILEX 2 MG
1 GUM BUCCAL DAILY
COMMUNITY

## 2021-11-02 RX ORDER — MULTIVIT-MIN/IRON/FOLIC ACID/K 18-600-40
2 CAPSULE ORAL DAILY
COMMUNITY

## 2021-11-02 NOTE — PROGRESS NOTES
[address] [facility]                                                                                                                    PRE OP INSTRUCTIONS FOR  Mehran Solis        Date: 11/2/2021    Date of surgery: 11/4/21   Arrival Time: Hospital will call you between 5pm and 7pm with your final arrival time for surgery    1. Do not eat or drink anything after midnight prior to surgery. This includes no water, chewing gum, mints or ice chips. 2. Take the following medications with a small sip of water on the morning of Surgery: metoprolol, omeprazole buspar      3. Aspirin, Ibuprofen, Advil, Naproxen, Vitamin E and other Anti-inflammatory products should be stopped  before surgery  as directed by your physician. Take Tylenol only unless instructed otherwise by your surgeon. 4. Check with your Doctor regarding stopping Plavix, Coumadin, Lovenox, Eliquis, Effient, or other blood thinners. 5. Do not smoke,use illicit drugs and do not drink any alcoholic beverages 24 hours prior to surgery. 6. You may brush your teeth the morning of surgery. DO NOT SWALLOW WATER    7. You MUST make arrangements for a responsible adult to take you home after your surgery. You will not be allowed to leave alone or drive yourself home. It is strongly suggested someone stay with you the first 24 hrs. Your surgery will be cancelled if you do not have a ride home. 8. Please wear simple, loose fitting clothing to the hospital.  Rex Rojas not bring valuables (money, credit cards, checkbooks, etc.) Do not wear any makeup (including no eye makeup) or nail polish on your fingers or toes. 9. DO NOT wear any jewelry or piercings on day of surgery. All body piercing jewelry must be removed. 10. Shower the night before surgery with _x__Antibacterial soap /FREDA WIPES________    11. If you have a Living Will and Durable Power of  for Healthcare, please bring in a copy.     15. Notify your Surgeon if you develop any illness between now and surgery time, cough, cold, fever, sore throat, nausea, vomiting, etc.  Please notify your surgeon if you experience dizziness, shortness of breath or blurred vision between now & the time of your surgery. 13. If you have _x__dentures, they will be removed before going to the OR; we will provide you a container. If you wear ___contact lenses or ___glasses, they will be removed; please bring a case for them. 14. To provide excellent care visitors will be limited to 1n the room at any given time. 15. Sleep apnea patients need to bring CPAP AND SETTINGS to hospital on day of surgery. 16. During flu season no children under the age of 15 are permitted in the hospital for the safety of all patients. 17. Other come in main lobby and and stop at                  Please call AMBULATORY CARE if you have any further questions.    [address] [facility]     [address]

## 2021-11-03 ENCOUNTER — ANESTHESIA EVENT (OUTPATIENT)
Dept: OPERATING ROOM | Age: 66
End: 2021-11-03
Payer: MEDICARE

## 2021-11-04 ENCOUNTER — ANESTHESIA (OUTPATIENT)
Dept: OPERATING ROOM | Age: 66
End: 2021-11-04
Payer: MEDICARE

## 2021-11-04 ENCOUNTER — HOSPITAL ENCOUNTER (OUTPATIENT)
Age: 66
Setting detail: OUTPATIENT SURGERY
Discharge: HOME | End: 2021-11-04
Attending: SURGERY | Admitting: SURGERY
Payer: MEDICARE

## 2021-11-04 VITALS
OXYGEN SATURATION: 96 % | RESPIRATION RATE: 18 BRPM | TEMPERATURE: 97.2 F | SYSTOLIC BLOOD PRESSURE: 118 MMHG | DIASTOLIC BLOOD PRESSURE: 76 MMHG | HEART RATE: 90 BPM

## 2021-11-04 VITALS
SYSTOLIC BLOOD PRESSURE: 138 MMHG | OXYGEN SATURATION: 100 % | RESPIRATION RATE: 16 BRPM | DIASTOLIC BLOOD PRESSURE: 76 MMHG

## 2021-11-04 DIAGNOSIS — G89.18 POSTOPERATIVE PAIN: Primary | ICD-10-CM

## 2021-11-04 PROCEDURE — 6360000002 HC RX W HCPCS

## 2021-11-04 PROCEDURE — 2709999900 HC NON-CHARGEABLE SUPPLY: Performed by: SURGERY

## 2021-11-04 PROCEDURE — 3600000019 HC SURGERY ROBOT ADDTL 15MIN: Performed by: SURGERY

## 2021-11-04 PROCEDURE — 3700000001 HC ADD 15 MINUTES (ANESTHESIA): Performed by: SURGERY

## 2021-11-04 PROCEDURE — 7100000011 HC PHASE II RECOVERY - ADDTL 15 MIN: Performed by: SURGERY

## 2021-11-04 PROCEDURE — 7100000010 HC PHASE II RECOVERY - FIRST 15 MIN: Performed by: SURGERY

## 2021-11-04 PROCEDURE — 2500000003 HC RX 250 WO HCPCS

## 2021-11-04 PROCEDURE — 6360000002 HC RX W HCPCS: Performed by: SURGERY

## 2021-11-04 PROCEDURE — S2900 ROBOTIC SURGICAL SYSTEM: HCPCS | Performed by: SURGERY

## 2021-11-04 PROCEDURE — 3600000009 HC SURGERY ROBOT BASE: Performed by: SURGERY

## 2021-11-04 PROCEDURE — 49650 LAP ING HERNIA REPAIR INIT: CPT | Performed by: SURGERY

## 2021-11-04 PROCEDURE — 3700000000 HC ANESTHESIA ATTENDED CARE: Performed by: SURGERY

## 2021-11-04 PROCEDURE — 7100000001 HC PACU RECOVERY - ADDTL 15 MIN: Performed by: SURGERY

## 2021-11-04 PROCEDURE — 2580000003 HC RX 258

## 2021-11-04 PROCEDURE — 7100000000 HC PACU RECOVERY - FIRST 15 MIN: Performed by: SURGERY

## 2021-11-04 PROCEDURE — 6360000002 HC RX W HCPCS: Performed by: ANESTHESIOLOGY

## 2021-11-04 PROCEDURE — 2500000003 HC RX 250 WO HCPCS: Performed by: SURGERY

## 2021-11-04 PROCEDURE — C1781 MESH (IMPLANTABLE): HCPCS | Performed by: SURGERY

## 2021-11-04 PROCEDURE — 2580000003 HC RX 258: Performed by: SURGERY

## 2021-11-04 DEVICE — MESH CS LEFT EXTRA-LARGE 12CM X 17CM: Type: IMPLANTABLE DEVICE | Site: ABDOMEN | Status: FUNCTIONAL

## 2021-11-04 RX ORDER — PROPOFOL 10 MG/ML
INJECTION, EMULSION INTRAVENOUS PRN
Status: DISCONTINUED | OUTPATIENT
Start: 2021-11-04 | End: 2021-11-04 | Stop reason: SDUPTHER

## 2021-11-04 RX ORDER — MEPERIDINE HYDROCHLORIDE 25 MG/ML
INJECTION INTRAMUSCULAR; INTRAVENOUS; SUBCUTANEOUS
Status: COMPLETED
Start: 2021-11-04 | End: 2021-11-04

## 2021-11-04 RX ORDER — PROMETHAZINE HYDROCHLORIDE 25 MG/ML
6.25 INJECTION, SOLUTION INTRAMUSCULAR; INTRAVENOUS
Status: DISCONTINUED | OUTPATIENT
Start: 2021-11-04 | End: 2021-11-04 | Stop reason: HOSPADM

## 2021-11-04 RX ORDER — LIDOCAINE HYDROCHLORIDE 20 MG/ML
INJECTION, SOLUTION INTRAVENOUS PRN
Status: DISCONTINUED | OUTPATIENT
Start: 2021-11-04 | End: 2021-11-04 | Stop reason: SDUPTHER

## 2021-11-04 RX ORDER — SODIUM CHLORIDE 9 MG/ML
INJECTION, SOLUTION INTRAVENOUS CONTINUOUS
Status: DISCONTINUED | OUTPATIENT
Start: 2021-11-04 | End: 2021-11-04 | Stop reason: HOSPADM

## 2021-11-04 RX ORDER — SODIUM CHLORIDE 0.9 % (FLUSH) 0.9 %
10 SYRINGE (ML) INJECTION PRN
Status: DISCONTINUED | OUTPATIENT
Start: 2021-11-04 | End: 2021-11-04 | Stop reason: HOSPADM

## 2021-11-04 RX ORDER — LABETALOL HYDROCHLORIDE 5 MG/ML
INJECTION, SOLUTION INTRAVENOUS PRN
Status: DISCONTINUED | OUTPATIENT
Start: 2021-11-04 | End: 2021-11-04 | Stop reason: SDUPTHER

## 2021-11-04 RX ORDER — GLYCOPYRROLATE 1 MG/5 ML
SYRINGE (ML) INTRAVENOUS PRN
Status: DISCONTINUED | OUTPATIENT
Start: 2021-11-04 | End: 2021-11-04 | Stop reason: SDUPTHER

## 2021-11-04 RX ORDER — SODIUM CHLORIDE 9 MG/ML
25 INJECTION, SOLUTION INTRAVENOUS PRN
Status: DISCONTINUED | OUTPATIENT
Start: 2021-11-04 | End: 2021-11-04 | Stop reason: HOSPADM

## 2021-11-04 RX ORDER — DEXAMETHASONE SODIUM PHOSPHATE 10 MG/ML
INJECTION, SOLUTION INTRAMUSCULAR; INTRAVENOUS PRN
Status: DISCONTINUED | OUTPATIENT
Start: 2021-11-04 | End: 2021-11-04 | Stop reason: SDUPTHER

## 2021-11-04 RX ORDER — HYDROCODONE BITARTRATE AND ACETAMINOPHEN 5; 325 MG/1; MG/1
1 TABLET ORAL EVERY 6 HOURS PRN
Qty: 12 TABLET | Refills: 0 | Status: SHIPPED | OUTPATIENT
Start: 2021-11-04 | End: 2021-11-07

## 2021-11-04 RX ORDER — ONDANSETRON 2 MG/ML
INJECTION INTRAMUSCULAR; INTRAVENOUS PRN
Status: DISCONTINUED | OUTPATIENT
Start: 2021-11-04 | End: 2021-11-04 | Stop reason: SDUPTHER

## 2021-11-04 RX ORDER — NEOSTIGMINE METHYLSULFATE 1 MG/ML
INJECTION, SOLUTION INTRAVENOUS PRN
Status: DISCONTINUED | OUTPATIENT
Start: 2021-11-04 | End: 2021-11-04 | Stop reason: SDUPTHER

## 2021-11-04 RX ORDER — ONDANSETRON 4 MG/1
4 TABLET, ORALLY DISINTEGRATING ORAL EVERY 8 HOURS PRN
Qty: 20 TABLET | Refills: 1 | Status: SHIPPED | OUTPATIENT
Start: 2021-11-04 | End: 2022-07-25 | Stop reason: ALTCHOICE

## 2021-11-04 RX ORDER — ROCURONIUM BROMIDE 10 MG/ML
INJECTION, SOLUTION INTRAVENOUS PRN
Status: DISCONTINUED | OUTPATIENT
Start: 2021-11-04 | End: 2021-11-04 | Stop reason: SDUPTHER

## 2021-11-04 RX ORDER — MEPERIDINE HYDROCHLORIDE 25 MG/ML
12.5 INJECTION INTRAMUSCULAR; INTRAVENOUS; SUBCUTANEOUS EVERY 5 MIN PRN
Status: DISCONTINUED | OUTPATIENT
Start: 2021-11-04 | End: 2021-11-04 | Stop reason: HOSPADM

## 2021-11-04 RX ORDER — DIPHENHYDRAMINE HYDROCHLORIDE 50 MG/ML
INJECTION INTRAMUSCULAR; INTRAVENOUS PRN
Status: DISCONTINUED | OUTPATIENT
Start: 2021-11-04 | End: 2021-11-04 | Stop reason: SDUPTHER

## 2021-11-04 RX ORDER — SODIUM CHLORIDE 0.9 % (FLUSH) 0.9 %
10 SYRINGE (ML) INJECTION EVERY 12 HOURS SCHEDULED
Status: DISCONTINUED | OUTPATIENT
Start: 2021-11-04 | End: 2021-11-04 | Stop reason: HOSPADM

## 2021-11-04 RX ORDER — LABETALOL HYDROCHLORIDE 5 MG/ML
5 INJECTION, SOLUTION INTRAVENOUS EVERY 10 MIN PRN
Status: DISCONTINUED | OUTPATIENT
Start: 2021-11-04 | End: 2021-11-04 | Stop reason: HOSPADM

## 2021-11-04 RX ORDER — SODIUM CHLORIDE 9 MG/ML
INJECTION, SOLUTION INTRAVENOUS CONTINUOUS PRN
Status: DISCONTINUED | OUTPATIENT
Start: 2021-11-04 | End: 2021-11-04 | Stop reason: SDUPTHER

## 2021-11-04 RX ORDER — HYDRALAZINE HYDROCHLORIDE 20 MG/ML
5 INJECTION INTRAMUSCULAR; INTRAVENOUS EVERY 10 MIN PRN
Status: DISCONTINUED | OUTPATIENT
Start: 2021-11-04 | End: 2021-11-04 | Stop reason: HOSPADM

## 2021-11-04 RX ORDER — DOCUSATE SODIUM 100 MG/1
100 CAPSULE, LIQUID FILLED ORAL 2 TIMES DAILY
Qty: 30 CAPSULE | Refills: 0 | Status: SHIPPED | OUTPATIENT
Start: 2021-11-04 | End: 2021-11-19

## 2021-11-04 RX ORDER — BUPIVACAINE HYDROCHLORIDE AND EPINEPHRINE 2.5; 5 MG/ML; UG/ML
INJECTION, SOLUTION EPIDURAL; INFILTRATION; INTRACAUDAL; PERINEURAL PRN
Status: DISCONTINUED | OUTPATIENT
Start: 2021-11-04 | End: 2021-11-04 | Stop reason: ALTCHOICE

## 2021-11-04 RX ORDER — FENTANYL CITRATE 50 UG/ML
INJECTION, SOLUTION INTRAMUSCULAR; INTRAVENOUS PRN
Status: DISCONTINUED | OUTPATIENT
Start: 2021-11-04 | End: 2021-11-04 | Stop reason: SDUPTHER

## 2021-11-04 RX ORDER — MIDAZOLAM HYDROCHLORIDE 1 MG/ML
INJECTION INTRAMUSCULAR; INTRAVENOUS PRN
Status: DISCONTINUED | OUTPATIENT
Start: 2021-11-04 | End: 2021-11-04 | Stop reason: SDUPTHER

## 2021-11-04 RX ADMIN — SODIUM CHLORIDE: 9 INJECTION, SOLUTION INTRAVENOUS at 07:49

## 2021-11-04 RX ADMIN — Medication 3 MG: at 08:53

## 2021-11-04 RX ADMIN — LIDOCAINE HYDROCHLORIDE 40 MG: 20 INJECTION, SOLUTION INTRAVENOUS at 08:08

## 2021-11-04 RX ADMIN — Medication 2000 MG: at 08:12

## 2021-11-04 RX ADMIN — FENTANYL CITRATE 50 MCG: 50 INJECTION, SOLUTION INTRAMUSCULAR; INTRAVENOUS at 08:51

## 2021-11-04 RX ADMIN — SODIUM CHLORIDE: 9 INJECTION, SOLUTION INTRAVENOUS at 08:54

## 2021-11-04 RX ADMIN — MEPERIDINE HYDROCHLORIDE 12.5 MG: 25 INJECTION, SOLUTION INTRAMUSCULAR; INTRAVENOUS; SUBCUTANEOUS at 09:48

## 2021-11-04 RX ADMIN — DIPHENHYDRAMINE HYDROCHLORIDE 50 MG: 50 INJECTION, SOLUTION INTRAMUSCULAR; INTRAVENOUS at 08:48

## 2021-11-04 RX ADMIN — PROPOFOL 120 MG: 10 INJECTION, EMULSION INTRAVENOUS at 08:08

## 2021-11-04 RX ADMIN — ONDANSETRON 4 MG: 2 INJECTION INTRAMUSCULAR; INTRAVENOUS at 08:49

## 2021-11-04 RX ADMIN — FENTANYL CITRATE 50 MCG: 50 INJECTION, SOLUTION INTRAMUSCULAR; INTRAVENOUS at 08:05

## 2021-11-04 RX ADMIN — ROCURONIUM BROMIDE 40 MG: 10 SOLUTION INTRAVENOUS at 08:08

## 2021-11-04 RX ADMIN — DEXAMETHASONE SODIUM PHOSPHATE 10 MG: 10 INJECTION, SOLUTION INTRAMUSCULAR; INTRAVENOUS at 08:12

## 2021-11-04 RX ADMIN — SODIUM CHLORIDE: 9 INJECTION, SOLUTION INTRAVENOUS at 07:25

## 2021-11-04 RX ADMIN — MIDAZOLAM 2 MG: 1 INJECTION INTRAMUSCULAR; INTRAVENOUS at 07:56

## 2021-11-04 RX ADMIN — ROCURONIUM BROMIDE 10 MG: 10 SOLUTION INTRAVENOUS at 08:24

## 2021-11-04 RX ADMIN — Medication 0.6 MG: at 08:53

## 2021-11-04 RX ADMIN — LABETALOL HYDROCHLORIDE 5 MG: 5 INJECTION INTRAVENOUS at 08:31

## 2021-11-04 RX ADMIN — MEPERIDINE HYDROCHLORIDE 12.5 MG: 25 INJECTION, SOLUTION INTRAMUSCULAR; INTRAVENOUS; SUBCUTANEOUS at 09:53

## 2021-11-04 ASSESSMENT — PULMONARY FUNCTION TESTS
PIF_VALUE: 19
PIF_VALUE: 31
PIF_VALUE: 32
PIF_VALUE: 3
PIF_VALUE: 22
PIF_VALUE: 1
PIF_VALUE: 32
PIF_VALUE: 24
PIF_VALUE: 31
PIF_VALUE: 1
PIF_VALUE: 24
PIF_VALUE: 30
PIF_VALUE: 32
PIF_VALUE: 32
PIF_VALUE: 3
PIF_VALUE: 3
PIF_VALUE: 31
PIF_VALUE: 28
PIF_VALUE: 31
PIF_VALUE: 22
PIF_VALUE: 22
PIF_VALUE: 2
PIF_VALUE: 1
PIF_VALUE: 17
PIF_VALUE: 31
PIF_VALUE: 0
PIF_VALUE: 1
PIF_VALUE: 22
PIF_VALUE: 22
PIF_VALUE: 26
PIF_VALUE: 18
PIF_VALUE: 25
PIF_VALUE: 31
PIF_VALUE: 30
PIF_VALUE: 1
PIF_VALUE: 18
PIF_VALUE: 0
PIF_VALUE: 31
PIF_VALUE: 1
PIF_VALUE: 20
PIF_VALUE: 3
PIF_VALUE: 18
PIF_VALUE: 31
PIF_VALUE: 2
PIF_VALUE: 31
PIF_VALUE: 31
PIF_VALUE: 23
PIF_VALUE: 31
PIF_VALUE: 17
PIF_VALUE: 3
PIF_VALUE: 1
PIF_VALUE: 24
PIF_VALUE: 31
PIF_VALUE: 28
PIF_VALUE: 4
PIF_VALUE: 32
PIF_VALUE: 32
PIF_VALUE: 18
PIF_VALUE: 1
PIF_VALUE: 32
PIF_VALUE: 31
PIF_VALUE: 1
PIF_VALUE: 32
PIF_VALUE: 27
PIF_VALUE: 32
PIF_VALUE: 31
PIF_VALUE: 26
PIF_VALUE: 2

## 2021-11-04 ASSESSMENT — PAIN DESCRIPTION - LOCATION
LOCATION: ABDOMEN
LOCATION: ABDOMEN

## 2021-11-04 ASSESSMENT — PAIN DESCRIPTION - DESCRIPTORS
DESCRIPTORS: CRAMPING
DESCRIPTORS: PRESSURE;SORE;DISCOMFORT

## 2021-11-04 ASSESSMENT — PAIN DESCRIPTION - PROGRESSION: CLINICAL_PROGRESSION: NOT CHANGED

## 2021-11-04 ASSESSMENT — PAIN DESCRIPTION - PAIN TYPE
TYPE: SURGICAL PAIN
TYPE: SURGICAL PAIN

## 2021-11-04 ASSESSMENT — PAIN SCALES - GENERAL
PAINLEVEL_OUTOF10: 2
PAINLEVEL_OUTOF10: 3

## 2021-11-04 ASSESSMENT — PAIN DESCRIPTION - FREQUENCY: FREQUENCY: INTERMITTENT

## 2021-11-04 ASSESSMENT — PAIN - FUNCTIONAL ASSESSMENT: PAIN_FUNCTIONAL_ASSESSMENT: ACTIVITIES ARE NOT PREVENTED

## 2021-11-04 ASSESSMENT — PAIN DESCRIPTION - ORIENTATION: ORIENTATION: MID

## 2021-11-04 NOTE — ANESTHESIA PRE PROCEDURE
Department of Anesthesiology  Preprocedure Note       Name:  Fayn Given   Age:  77 y.o.  :  1955                                          MRN:  [id number]         Date:  2021      Surgeon: Esthela Rizvi):  Irlanda Trammell MD    Procedure: Procedure(s):  LEFT INGUINAL HERNIA REPAIR WITH MESH, POSSIBLE BILATERAL,  LAPAROSCOPIC ROBOTIC XI    Medications prior to admission:   Prior to Admission medications    Medication Sig Start Date End Date Taking?  Authorizing Provider   busPIRone (BUSPAR) 5 MG tablet Take 5 mg by mouth 3 times daily  21  Yes Historical Provider, MD   omeprazole (PRILOSEC) 40 MG delayed release capsule Take 40 mg by mouth every morning 21  Yes Historical Provider, MD   apixaban (ELIQUIS) 5 MG TABS tablet Take by mouth 2 times daily Last dose 2021   Yes Historical Provider, MD   metoprolol (LOPRESSOR) 50 MG tablet Take 1 tablet by mouth 2 times daily 16  Yes Lacy Sparks DO   lisinopril-hydroCHLOROthiazide (PRINZIDE;ZESTORETIC) 20-25 MG per tablet Take 1 tablet by mouth daily  10/22/21   Historical Provider, MD   Cholecalciferol (VITAMIN D) 50 MCG (2000) CAPS capsule Take 2 capsules by mouth daily During winter months    Historical Provider, MD   Biotin 1 MG CAPS Take 1 tablet by mouth daily    Historical Provider, MD   famotidine (PEPCID) 40 MG tablet Take 40 mg by mouth nightly as needed    Historical Provider, MD   venlafaxine (EFFEXOR) 75 MG tablet Take 75 mg by mouth nightly    Historical Provider, MD   amitriptyline (ELAVIL) 25 MG tablet Take 2 tablets by mouth nightly  Patient taking differently: Take 75 mg by mouth nightly  16   Lacy Sparks DO   tiZANidine (ZANAFLEX) 4 MG tablet Take 1 tablet by mouth daily  Patient taking differently: Take 4 mg by mouth daily Takes at night 16   Lacy pSarks DO       Current medications:    Current Facility-Administered Medications   Medication Dose Route Frequency Provider Last Rate Last Admin    0.9 % sodium chloride infusion   IntraVENous Continuous Jeferson Mccray  mL/hr at 11/04/21 0725 New Bag at 11/04/21 0725    sodium chloride flush 0.9 % injection 10 mL  10 mL IntraVENous 2 times per day Jeferson Mccray MD        sodium chloride flush 0.9 % injection 10 mL  10 mL IntraVENous PRN Jeferson Mccray MD        0.9 % sodium chloride infusion  25 mL IntraVENous PRN Jeferson Mccray MD        ceFAZolin (ANCEF) 2000 mg in sterile water 20 mL IV syringe  2,000 mg IntraVENous On Call to Adriane Lilly MD        0.9 % sodium chloride infusion   IntraVENous Continuous Dana Mesa MD           Allergies:     Allergies   Allergen Reactions    Morphine And Related Nausea And Vomiting    Percocet [Oxycodone-Acetaminophen] Nausea And Vomiting       Problem List:    Patient Active Problem List   Diagnosis Code    Hip pain M25.559    Trochanteric bursitis M70.60    Shoulder pain M25.519    Rotator cuff tear M75.100    Subacromial impingement M75.40    Labral tear of shoulder S43.439A    Primary osteoarthritis of left knee M17.12    Iron deficiency anemia, unspecified D50.9       Past Medical History:        Diagnosis Date    Acid reflux     Atrial fibrillation (HCC)     Cancer (HCC) 5/19/2005    Breast Cancer (Left Breast)    Chronic back pain     CPAP (continuous positive airway pressure) dependence     not using CPAP    Fibromyalgia     History of bariatric surgery 04/17/2002    Dr. Juan Kirk - Open RYGB    History of cardiovascular stress test 8/10/2012    LEXISCAN    Hyperlipidemia     Hypertension     Kidney stones     Low iron     Osteoarthritis     Unspecified sleep apnea     Patient does not use C-Pap        Past Surgical History:        Procedure Laterality Date    APPENDECTOMY      BARIATRIC SURGERY  2003   Frankie Gill  2005    [facility] [id number] [facility]      Dr. Karl Allen      ECHO COMPL W DOP COLOR FLOW  8/10/2012         HERNIA REPAIR      Dr. Anupama Mckeon [facility]     JOINT REPLACEMENT  2009    Right Knee    NECK SURGERY  2015    fused 4,5,6- screws, in [address]    ASHOK-EN-Y GASTRIC BYPASS  2002    Dr. Debbie Conde ARTHROSCOPY  12    LEFT REPAIR ROTATOR CUFF TEAR SUBACROMIAL DECOMPRESSION    TONSILLECTOMY      TRACHEOSTOMY      [place] - Patient states that this was due to her weight prior to [place]  2009    UVULOPALATOPHARYGOPLASTY         Social History:    Social History     Tobacco Use    Smoking status: Former Smoker     Packs/day: 0.50     Years: 40.00     Pack years: 20.00     Quit date: 2020     Years since quittin.8    Smokeless tobacco: Never Used   Substance Use Topics    Alcohol use: No     Alcohol/week: 0.0 standard drinks     Comment: rarely                                Counseling given: Not Answered      Vital Signs (Current): There were no vitals filed for this visit.                                            BP Readings from Last 3 Encounters:   21 (!) 152/82   21 124/77   21 (!) 147/80       NPO Status: Time of last liquid consumption:                         Time of last solid consumption:                         Date of last liquid consumption: 21                        Date of last solid food consumption: 21    BMI:   Wt Readings from Last 3 Encounters:   21 206 lb (93.4 kg)   21 211 lb 6 oz (95.9 kg)   21 206 lb (93.4 kg)     There is no height or weight on file to calculate BMI.    CBC:   Lab Results   Component Value Date    WBC 5.7 2021    RBC 3.93 2021    HGB 12.3 2021    HCT 36.8 2021 MCV 93.6 11/02/2021    RDW 12.7 11/02/2021     11/02/2021       CMP:   Lab Results   Component Value Date     11/02/2021    K 4.0 11/02/2021     11/02/2021    CO2 27 11/02/2021    BUN 28 11/02/2021    CREATININE 1.1 11/02/2021    GFRAA >60 11/02/2021    LABGLOM 50 11/02/2021    GLUCOSE 80 11/02/2021    GLUCOSE 115 02/11/2012    PROT 7.1 06/28/2021    CALCIUM 9.1 11/02/2021    BILITOT 0.6 06/28/2021    ALKPHOS 72 06/28/2021    AST 16 06/28/2021    ALT 10 06/28/2021       POC Tests: No results for input(s): POCGLU, POCNA, POCK, POCCL, POCBUN, POCHEMO, POCHCT in the last 72 hours. Coags:   Lab Results   Component Value Date    PROTIME 11.7 06/21/2016    PROTIME 12.5 11/09/2010    INR 1.1 06/21/2016    APTT 31.0 06/21/2016       HCG (If Applicable): No results found for: PREGTESTUR, PREGSERUM, HCG, HCGQUANT     ABGs: No results found for: PHART, PO2ART, DUA0GLP, DUR1POU, BEART, M3AXYAZU     Type & Screen (If Applicable):  No results found for: LABABO, LABRH    Drug/Infectious Status (If Applicable):  No results found for: HIV, HEPCAB    COVID-19 Screening (If Applicable):   Lab Results   Component Value Date    COVID19 Not Detected 09/28/2020           Anesthesia Evaluation  Patient summary reviewed no history of anesthetic complications:   Airway: Mallampati: II  TM distance: >3 FB   Neck ROM: full  Mouth opening: > = 3 FB Dental:          Pulmonary: breath sounds clear to auscultation  (+) sleep apnea: on noncompliant,      Smoker: Former smoker. ROS comment: Hx of tracheostomy   Cardiovascular:    (+) hypertension:,       ECG reviewed  Rhythm: regular  Rate: normal    Stress test reviewed             ROS comment: Stress Test 2012:  Impression- Unremarkable study.     Finding- SPECT gated images of the left ventricular myocardium were  obtained for purposes of left ventricular ejection fraction  determination.  Left ventricular ejection fraction is calculated at  63%.       IMPRESSION-  Left ventricular ejection fraction calculated at 63%. EKG 6/2021:  Sinus bradycardia with premature supraventricular complexes and with occasional premature ventricular complexes Nonspecific ST and T wave abnormality Abnormal ECG When compared with ECG of 28-SEP-2020 15:54, Sinus rhythm has replaced Atrial fibrillation Nonspecific T wave abnormality now evident in Lateral leads Confirmed by Gregg Humphries ([id number]) on 6/28/2021 2:32:51 PM     Neuro/Psych:   (+) neuromuscular disease (Chronic back pain):,             GI/Hepatic/Renal:   (+) GERD:, renal disease: kidney stones,          ROS comment: Hx of bariatric surgery. Endo/Other:    (+) blood dyscrasia: anticoagulation therapy:., malignancy/cancer (Left breast CA). Abdominal:             Vascular: negative vascular ROS. Other Findings:           Anesthesia Plan      general     ASA 2       Induction: intravenous. MIPS: Postoperative opioids intended, Prophylactic antiemetics administered and Postoperative trial extubation. Anesthetic plan and risks discussed with patient.                       Jaleel Choi MD   11/4/2021

## 2021-11-04 NOTE — OP NOTE
Operative Note  Lizandro Mascorro MD, MS Peral Hubbard  MRN: [id number]  DATE OF PROCEDURE: 11/4/2021    PREOPERATIVE DIAGNOSIS: Symptomatic left inguinal hernia. POSTOPERATIVE DIAGNOSIS: Symptomatic left inguinal hernia. PROCEDURE: Laparoscopic robotic assisted left inguinal hernia repair with mesh. Lysis of adhesions     ANESTHESIA: General endotracheal.     SURGEON: Re Mack MD     ASSISTANT: None. COMPLICATIONS: None. ESTIMATED BLOOD LOSS: Minimal.     FLUIDS: Crystalloid. SPECIMENS: none. MESH: BARD 3D Max Midweight XL left side     INDICATIONS: Perla Hubbard is a 77 y.o. female with symptomatic inguinal hernia. On physical exam, he had a palpable left inguinal hernia. After being explained the risks, benefits, and alternatives of procedure, including but not limited to bleeding, scar, infection, recurrence he agreed to proceed. DESCRIPTION OF PROCEDURE: He was taken to the operating room, placed supine and administered general anesthesia and intubated. Once the airway was secured and he was adequately sedated, he was prepped and draped in the normal sterile fashion. Timeout was performed to confirm surgical site and the patient's name. He received preoperative antibiotics in the form of IV. We initially made an 8-mm incision in left middle abdomen left of the umbilicus, inserted a Veress needle, confirmed the needle placement and insufflated to 15 mmHg. We then removed the Veress needle, inserted an 8-mm trocar, inserted the camera and, following, inspected the abdomen. Upon entrance into the abdomen, we identified adhesions to the midline and lower pelvis. An 8mm trocar was inserted in the right middle abdomen and 5mm trocar in right lower quadrant. Lysis of adhesions was done for 15min taking down adhesions to the midline to allow a third 8mm trocar to be inserted above the umbilicus.  The patient was placed in steep Trendelenburg position. The robot was then docked over the left side of the patient. We used electrocautery and scissors to dissect the preperitoneal space creating unilateral peritoneal inguinal flaps. We started on the left side and dissected out the cord structures and completely dissected out the hernia sac reducing it from the inguinal canal. There was a small indirect and directhernia sac with large indirect and direct lipoma, which was completely reduced from the inguinal canal. We completely exposed the pubic tubercle and Avi's ligament. There was not hernia on the right side. We completely reduced the hernia sac off of the cord structures. We surrounded the cord structures and skeletonized them. 3grams of Juventino descicant was placed in the inguinal canal to minimize seroma formation. We then inserted a 17 cm x 12 cm piece of 3D max Midweight XL placed in the inguinal canal and unrolled it. It self adhered to the inguinal canal overlapping Avi's ligament with at least 2cm overlap. It was completely unfolded and it secured itself in place. We then closed the preperitoneal space with running 6 inch V-Loc suture. We then removed both of the needles from the V-Loc sutures, decompressed the abdomen and removed each one of our trocars. We performed inguinal block with 0.25% Marcaine 10 mL. We then reapproximated each one of the skin incisions using 4-0 Monocryl suture. SurgiGlue was placed over the top. The patient was awoken, extubated and transferred to the postoperative care unit in stable condition. All instrument counts, lap counts, and needle counts were correct at the completion of the procedure.     Jeff Vela MD, MS  Minimally Invasive and Bariatric Surgery  [phone] (p)  11/4/2021  8:56 AM

## 2021-11-04 NOTE — H&P
General Surgery History and Physical  [facility]    Patient's Name/Date of Birth: Jerri Lopez / [birth year]    Date: November 4, 2021     Surgeon: King Foster M.D.    PCP: Segundo Wilkinson MD     Chief Complaint: Abdominal hernia    HPI:   Jerri Lopez is a 77 y.o. female who presents for evaluation of left inguinal hernia hernia. Timing is constant, radiation to left groin, alleviated by none and started several years ago and severity is 8/10. she has history of multiple abdominal surgeries. she has history of previous repairs of the hernia. Denies nausea, vomiting, fever, chills, SOB, chest pain, diarrheal constipation. No history of abnormal colonoscopy, last with Dr Jorge Luis Rendon in last 5yrs. They are a nonsmoker.     Open appy age 15  Open RNYGB Dr Elissa Hanson 2002- lost 134lbs- regained 60lbs  Lap arash    Patient Active Problem List   Diagnosis    Hip pain    Trochanteric bursitis    Shoulder pain    Rotator cuff tear    Subacromial impingement    Labral tear of shoulder    Primary osteoarthritis of left knee    Iron deficiency anemia, unspecified       Past Medical History:   Diagnosis Date    Acid reflux     Atrial fibrillation (HCC)     Cancer ([facility] [address] 5/19/2005    Breast Cancer (Left Breast)    Chronic back pain     CPAP (continuous positive airway pressure) dependence     not using CPAP    Fibromyalgia     History of bariatric surgery 04/17/2002    Dr. Windy Brady - Open RYGB    History of cardiovascular stress test 8/10/2012    LEXISCAN    Hyperlipidemia     Hypertension     Kidney stones     Low iron     Osteoarthritis     Unspecified sleep apnea     Patient does not use C-Pap        Past Surgical History:   Procedure Laterality Date    APPENDECTOMY      BARIATRIC SURGERY  2003   Zaira Rivera  2005    Dr. Nicolle Beard  2004     [facility]    COLONOSCOPY  2008    ECHO COMPL W DOP COLOR FLOW  8/10/2012         HERNIA REPAIR      Dr. Arpan Funes [facility]     JOINT REPLACEMENT  2009    Right Knee    NECK SURGERY  2015    fused 4,5,6- screws, in [address]    ASHOK-EN-Y GASTRIC BYPASS  2002    Dr. Troy Pedro ARTHROSCOPY  12    LEFT REPAIR ROTATOR CUFF TEAR SUBACROMIAL DECOMPRESSION    TONSILLECTOMY      [place] - Patient states that this was due to her weight prior to [place]  2009    UVULOPALATOPHARYGOPLASTY         Allergies   Allergen Reactions    Morphine And Related Nausea And Vomiting    Percocet [Oxycodone-Acetaminophen] Nausea And Vomiting       The patient has a family history that is negative for severe cardiovascular or respiratory issues, negative for reaction to anesthesia. Time spent reviewing past medical, surgical, social and family history, vitals, nursing assessment and images. No changes from above documented history.     Social History     Socioeconomic History    Marital status:      Spouse name: Not on file    Number of children: Not on file    Years of education: Not on file    Highest education level: Not on file   Occupational History    Not on file   Tobacco Use    Smoking status: Former Smoker     Packs/day: 0.50     Years: 40.00     Pack years: 20.00     Quit date: 2020     Years since quittin.8    Smokeless tobacco: Never Used   Vaping Use    Vaping Use: Never used   Substance and Sexual Activity    Alcohol use: No     Alcohol/week: 0.0 standard drinks     Comment: rarely    Drug use: No    Sexual activity: Yes   Other Topics Concern    Not on file   Social History Narrative    Not on file     Social Determinants of Health     Financial Resource Strain:     Difficulty of Paying Living Expenses:    Food Insecurity:     Worried About Running Out of Food in the Last Year:     [address] in the Last Year:    Transportation Needs:     Lack of Transportation (Medical):  Lack of Transportation (Non-Medical):    Physical Activity:     Days of Exercise per Week:     Minutes of Exercise per Session:    Stress:     Feeling of Stress :    Social Connections:     Frequency of Communication with Friends and Family:     Frequency of Social Gatherings with Friends and Family:     Attends [employer] Services:     Active Member of Clubs or Organizations:     Attends Club or Organization Meetings:     Marital Status:    Intimate Partner Violence:     Fear of Current or Ex-Partner:     Emotionally Abused:     Physically Abused:     Sexually Abused:        A complete 10 system review was performed and are otherwise negative unless mentioned in the above HPI. Specific negatives are listed below but may not include all those reviewed. General ROS: negative obtundation, AMS  ENT ROS: negative rhinorrhea, epistaxis  Allergy and Immunology ROS: negative itchy/watery eyes or nasal congestion  Hematological and Lymphatic ROS: negative spontaneous bleeding or bruising  Endocrine ROS: negative  lethargy, mood swings, palpitations or polydipsia/polyuria  Respiratory ROS: negative sputum changes, stridor, tachypnea or wheezing  Cardiovascular ROS: negative for - loss of consciousness, murmur or orthopnea  Gastrointestinal ROS: negative for - hematochezia or hematemesis  Genito-Urinary ROS: negative for -  genital discharge or hematuria  Musculoskeletal ROS: negative for - focal weakness, gangrene  Psych/Neuro ROS: negative for - visual or auditory hallucinations, suicidal ideation    Physical exam:   There were no vitals taken for this visit.   General appearance:  NAD, appears stated age  Head: NCAT, PERRLA, EOMI, red conjunctiva  Neck: supple, no masses, trachea midline  Lungs: Equal chest rise bilateral, no retractions, no wheezing  Heart: Reg rate  Abdomen: soft, obese, multiple abdominal incisions that are well-healed. Right lower quadrant open appendectomy incision well-healed midline open gastric bypass incision well-healed no hernia, low abdominal scar likely from previous abdominal plasty well-healed  Skin; warm and dry, no cyanosis  Gu: no cva tenderness, left direct space inguinal hernia partially reducible no hernia felt on the right  Extremities: atraumatic, no focal motor deficits, no open wounds  Psych: No tremor, visual hallucinations        Radiology: I reviewed relevant abdominal imaging from this admission and that available in the EMR including CT abd/pel from March. My assessment is left direct space hernia    Assessment:  Lesli Hayden is a 77 y.o. female with direct space left inguinal hernia  Patient Active Problem List   Diagnosis    Hip pain    Trochanteric bursitis    Shoulder pain    Rotator cuff tear    Subacromial impingement    Labral tear of shoulder    Primary osteoarthritis of left knee    Iron deficiency anemia, unspecified         Plan:  OR for Laparoscopic robot assisted left inguinal hernia repair with mesh  Medical clearance  Cardiac clearance    Discussed the risk, benefits and alternatives of surgery including wound infections, bleeding, scar, seroma, mesh infection, mesh removal and migration and recurrent hernia formation and the risks of general anesthetic including MI, CVA, sudden death or reactions to anesthetic medications. The patient understands the risks and alternatives and the possibility of converting to an open procedure. All questions were answered to the patient's satisfaction and they freely signed the consent.          Leslye Dawson MD  7:49 AM  11/4/2021

## 2021-11-04 NOTE — ANESTHESIA POSTPROCEDURE EVALUATION
Department of Anesthesiology  Postprocedure Note    Patient: Barbara Gross  MRN: [id number]  YOB: 1955  Date of evaluation: 11/4/2021  Time:  11:24 AM     Procedure Summary     Date: 11/04/21 Room / Location: [address] [place] [address] / [address] [place] [address]    Anesthesia Start: 5800 Anesthesia Stop: 0730    Procedure: LEFT INGUINAL HERNIA REPAIR WITH MESH, POSSIBLE BILATERAL,  LAPAROSCOPIC ROBOTIC XI (Left ) Diagnosis: (LEFT INGUINAL HERNIA)    Surgeons: Vincent Hathaway MD Responsible Provider: Manuel Saleem MD    Anesthesia Type: general ASA Status: 2          Anesthesia Type: general    Melissa Phase I: Melissa Score: 10    Melissa Phase II:      Last vitals: Reviewed and per EMR flowsheets.        Anesthesia Post Evaluation    Patient location during evaluation: PACU  Patient participation: complete - patient participated  Level of consciousness: awake and alert  Airway patency: patent  Nausea & Vomiting: no nausea and no vomiting  Complications: no  Cardiovascular status: hemodynamically stable  Respiratory status: acceptable  Hydration status: euvolemic

## 2021-11-17 ENCOUNTER — OFFICE VISIT (OUTPATIENT)
Dept: SURGERY | Age: 66
End: 2021-11-17

## 2021-11-17 VITALS
SYSTOLIC BLOOD PRESSURE: 120 MMHG | HEART RATE: 89 BPM | OXYGEN SATURATION: 98 % | BODY MASS INDEX: 36.5 KG/M2 | HEIGHT: 63 IN | WEIGHT: 206 LBS | RESPIRATION RATE: 16 BRPM | TEMPERATURE: 97.8 F | DIASTOLIC BLOOD PRESSURE: 78 MMHG

## 2021-11-17 DIAGNOSIS — K40.90 LEFT INGUINAL HERNIA: Primary | ICD-10-CM

## 2021-11-17 PROCEDURE — 99024 POSTOP FOLLOW-UP VISIT: CPT | Performed by: SURGERY

## 2021-11-17 NOTE — PROGRESS NOTES
General Surgery Office Note  Zhen Peterson MD, MS    Patient's Name/Date of Birth: Lesli Hayden / [birth year]    Date: November 17, 2021     Chief compaint: Postop visit from laparoscopic robot assisted left inguinal hernia repair with mesh    Surgeon: Tabby Nascimento MD    Patient Active Problem List   Diagnosis    Hip pain    Trochanteric bursitis    Shoulder pain    Rotator cuff tear    Subacromial impingement    Labral tear of shoulder    Primary osteoarthritis of left knee    Iron deficiency anemia, unspecified       Subjective: Doing well, diarrhea postop, no abd pain, took no pain meds, took one stool softener. Objective:  /78 (Site: Right Upper Arm, Position: Sitting, Cuff Size: Medium Adult)   Pulse 89   Temp 97.8 °F (36.6 °C) (Temporal)   Resp 16   Ht 5' 3\" (1.6 m)   Wt 206 lb (93.4 kg)   SpO2 98%   BMI 36.49 kg/m²   Labs:  No results for input(s): WBC, HGB, HCT in the last 72 hours. Invalid input(s): PLR  Lab Results   Component Value Date    CREATININE 1.1 (H) 11/02/2021    BUN 28 (H) 11/02/2021     11/02/2021    K 4.0 11/02/2021     11/02/2021    CO2 27 11/02/2021     No results for input(s): LIPASE, AMYLASE in the last 72 hours.       Review of Systems -  General ROS: negative for - chills, fatigue or malaise  ENT ROS: negative for - hearing change, nasal congestion or nasal discharge  Allergy and Immunology ROS: negative for - hives, itchy/watery eyes or nasal congestion  Hematological and Lymphatic ROS: negative for - blood clots, blood transfusions, bruising or fatigue  Endocrine ROS: negative for - malaise/lethargy, mood swings, palpitations or polydipsia/polyuria  Respiratory ROS: negative for - sputum changes, stridor, tachypnea or wheezing  Cardiovascular ROS: negative for - irregular heartbeat, loss of consciousness, murmur or orthopnea  Gastrointestinal ROS: negative for - diarrhea, or hematemesis  Genito-Urinary ROS: negative for -  genital discharge, genital ulcers or hematuria  Musculoskeletal ROS: negative for - gait disturbance, muscle pain or muscular weakness  Psych/Neuro ROS: negative for - visual or auditory hallucinations, suicidal ideation    General appearance: AA, NAD  HEENT: NCAT, PERRLA, EOMI  Lungs: Clear, equal rise bilateral  Heart: Reg  Abdomen: soft, nondistended, nontender, incisions well healed, no signs of infection, no cellulitis, no hematoma  Ext: Atraumatic, no swelling  : No hernia recurrence        Assessment/Plan:  Abigail Hansen is a 77 y.o. female 2 weeks s/p laparoscopic robot assisted left inguinal hernia repair with mesh. Doing well.     Resume activity gradually   No heavy lifting more than 20lbs for 4 weeks total  Pathology reviewed and copy provided  Follow up as needed  High fiber diet  immodium prn for diarrhea    Physician Signature: Electronically signed by Dr. Ashley Fallon  [phone] (p)  11/17/2021  10:12 AM

## 2022-01-10 ENCOUNTER — HOSPITAL ENCOUNTER (OUTPATIENT)
Dept: GENERAL RADIOLOGY | Age: 67
Discharge: HOME | End: 2022-01-12
Payer: COMMERCIAL

## 2022-01-10 ENCOUNTER — HOSPITAL ENCOUNTER (OUTPATIENT)
Age: 67
Discharge: HOME | End: 2022-01-12
Payer: COMMERCIAL

## 2022-01-10 DIAGNOSIS — R05.1 ACUTE COUGH: ICD-10-CM

## 2022-01-10 DIAGNOSIS — B97.21 SARS-ASSOCIATED CORONAVIRUS INFECTION: ICD-10-CM

## 2022-01-10 PROCEDURE — 71046 X-RAY EXAM CHEST 2 VIEWS: CPT

## 2022-03-07 ENCOUNTER — HOSPITAL ENCOUNTER (OUTPATIENT)
Dept: MAMMOGRAPHY | Age: 67
Discharge: HOME | End: 2022-03-09
Payer: COMMERCIAL

## 2022-03-07 VITALS — BODY MASS INDEX: 34.66 KG/M2 | HEIGHT: 64 IN | WEIGHT: 203 LBS

## 2022-03-07 DIAGNOSIS — Z12.31 ENCOUNTER FOR SCREENING MAMMOGRAM FOR MALIGNANT NEOPLASM OF BREAST: ICD-10-CM

## 2022-03-07 DIAGNOSIS — Z12.31 OTHER SCREENING MAMMOGRAM: ICD-10-CM

## 2022-03-07 PROCEDURE — 77067 SCR MAMMO BI INCL CAD: CPT

## 2022-03-10 ENCOUNTER — HOSPITAL ENCOUNTER (OUTPATIENT)
Age: 67
Discharge: HOME | End: 2022-03-10
Payer: COMMERCIAL

## 2022-03-10 LAB
BUN BLDV-MCNC: 20 MG/DL (ref 6–23)
CREAT SERPL-MCNC: 1 MG/DL (ref 0.5–1)
GFR AFRICAN AMERICAN: >60
GFR NON-AFRICAN AMERICAN: 55 ML/MIN/1.73

## 2022-03-10 PROCEDURE — 82565 ASSAY OF CREATININE: CPT

## 2022-03-10 PROCEDURE — 84520 ASSAY OF UREA NITROGEN: CPT

## 2022-03-10 PROCEDURE — 36415 COLL VENOUS BLD VENIPUNCTURE: CPT

## 2022-03-11 ENCOUNTER — HOSPITAL ENCOUNTER (OUTPATIENT)
Dept: CT IMAGING | Age: 67
Discharge: HOME | End: 2022-03-11
Payer: COMMERCIAL

## 2022-03-11 DIAGNOSIS — J20.9 ACUTE BRONCHITIS, UNSPECIFIED ORGANISM: ICD-10-CM

## 2022-03-11 PROCEDURE — 71260 CT THORAX DX C+: CPT

## 2022-03-11 PROCEDURE — 6360000004 HC RX CONTRAST MEDICATION: Performed by: RADIOLOGY

## 2022-03-11 RX ADMIN — IOPAMIDOL 75 ML: 755 INJECTION, SOLUTION INTRAVENOUS at 10:02

## 2022-04-05 ENCOUNTER — APPOINTMENT (OUTPATIENT)
Dept: CT IMAGING | Age: 67
End: 2022-04-05
Payer: COMMERCIAL

## 2022-04-05 ENCOUNTER — HOSPITAL ENCOUNTER (EMERGENCY)
Age: 67
Discharge: HOME | End: 2022-04-06
Attending: EMERGENCY MEDICINE
Payer: COMMERCIAL

## 2022-04-05 ENCOUNTER — APPOINTMENT (OUTPATIENT)
Dept: GENERAL RADIOLOGY | Age: 67
End: 2022-04-05
Payer: COMMERCIAL

## 2022-04-05 VITALS
OXYGEN SATURATION: 96 % | TEMPERATURE: 97.9 F | SYSTOLIC BLOOD PRESSURE: 157 MMHG | HEART RATE: 93 BPM | DIASTOLIC BLOOD PRESSURE: 71 MMHG

## 2022-04-05 DIAGNOSIS — R06.09 DYSPNEA ON EXERTION: Primary | ICD-10-CM

## 2022-04-05 DIAGNOSIS — M54.2 NECK PAIN: ICD-10-CM

## 2022-04-05 LAB
ALBUMIN SERPL-MCNC: 4 G/DL (ref 3.5–5.2)
ALP BLD-CCNC: 66 U/L (ref 35–104)
ALT SERPL-CCNC: 13 U/L (ref 0–32)
ANION GAP SERPL CALCULATED.3IONS-SCNC: 14 MMOL/L (ref 7–16)
AST SERPL-CCNC: 18 U/L (ref 0–31)
BASOPHILS ABSOLUTE: 0.04 E9/L (ref 0–0.2)
BASOPHILS RELATIVE PERCENT: 0.5 % (ref 0–2)
BILIRUB SERPL-MCNC: 0.4 MG/DL (ref 0–1.2)
BUN BLDV-MCNC: 21 MG/DL (ref 6–23)
CALCIUM SERPL-MCNC: 9 MG/DL (ref 8.6–10.2)
CHLORIDE BLD-SCNC: 101 MMOL/L (ref 98–107)
CO2: 23 MMOL/L (ref 22–29)
CREAT SERPL-MCNC: 0.9 MG/DL (ref 0.5–1)
EOSINOPHILS ABSOLUTE: 0.11 E9/L (ref 0.05–0.5)
EOSINOPHILS RELATIVE PERCENT: 1.4 % (ref 0–6)
GFR AFRICAN AMERICAN: >60
GFR NON-AFRICAN AMERICAN: >60 ML/MIN/1.73
GLUCOSE BLD-MCNC: 108 MG/DL (ref 74–99)
HCT VFR BLD CALC: 33 % (ref 34–48)
HEMOGLOBIN: 10.7 G/DL (ref 11.5–15.5)
IMMATURE GRANULOCYTES #: 0.02 E9/L
IMMATURE GRANULOCYTES %: 0.2 % (ref 0–5)
LYMPHOCYTES ABSOLUTE: 2.51 E9/L (ref 1.5–4)
LYMPHOCYTES RELATIVE PERCENT: 31 % (ref 20–42)
MAGNESIUM: 1.4 MG/DL (ref 1.6–2.6)
MCH RBC QN AUTO: 31.3 PG (ref 26–35)
MCHC RBC AUTO-ENTMCNC: 32.4 % (ref 32–34.5)
MCV RBC AUTO: 96.5 FL (ref 80–99.9)
MONOCYTES ABSOLUTE: 0.61 E9/L (ref 0.1–0.95)
MONOCYTES RELATIVE PERCENT: 7.5 % (ref 2–12)
NEUTROPHILS ABSOLUTE: 4.81 E9/L (ref 1.8–7.3)
NEUTROPHILS RELATIVE PERCENT: 59.4 % (ref 43–80)
PDW BLD-RTO: 13.3 FL (ref 11.5–15)
PLATELET # BLD: 207 E9/L (ref 130–450)
PMV BLD AUTO: 9.2 FL (ref 7–12)
POTASSIUM REFLEX MAGNESIUM: 3.3 MMOL/L (ref 3.5–5)
PRO-BNP: 1848 PG/ML (ref 0–125)
RBC # BLD: 3.42 E12/L (ref 3.5–5.5)
SODIUM BLD-SCNC: 138 MMOL/L (ref 132–146)
TOTAL PROTEIN: 7 G/DL (ref 6.4–8.3)
TROPONIN, HIGH SENSITIVITY: 12 NG/L (ref 0–9)
WBC # BLD: 8.1 E9/L (ref 4.5–11.5)

## 2022-04-05 PROCEDURE — 93005 ELECTROCARDIOGRAM TRACING: CPT | Performed by: PHYSICIAN ASSISTANT

## 2022-04-05 PROCEDURE — 96365 THER/PROPH/DIAG IV INF INIT: CPT

## 2022-04-05 PROCEDURE — 80053 COMPREHEN METABOLIC PANEL: CPT

## 2022-04-05 PROCEDURE — 71046 X-RAY EXAM CHEST 2 VIEWS: CPT

## 2022-04-05 PROCEDURE — 84484 ASSAY OF TROPONIN QUANT: CPT

## 2022-04-05 PROCEDURE — 96375 TX/PRO/DX INJ NEW DRUG ADDON: CPT

## 2022-04-05 PROCEDURE — 99284 EMERGENCY DEPT VISIT MOD MDM: CPT

## 2022-04-05 PROCEDURE — 36415 COLL VENOUS BLD VENIPUNCTURE: CPT

## 2022-04-05 PROCEDURE — 85025 COMPLETE CBC W/AUTO DIFF WBC: CPT

## 2022-04-05 PROCEDURE — 83735 ASSAY OF MAGNESIUM: CPT

## 2022-04-05 PROCEDURE — 72125 CT NECK SPINE W/O DYE: CPT

## 2022-04-05 PROCEDURE — 83880 ASSAY OF NATRIURETIC PEPTIDE: CPT

## 2022-04-05 RX ORDER — MAGNESIUM SULFATE 1 G/100ML
1000 INJECTION INTRAVENOUS ONCE
Status: COMPLETED | OUTPATIENT
Start: 2022-04-05 | End: 2022-04-06

## 2022-04-05 RX ORDER — KETOROLAC TROMETHAMINE 15 MG/ML
15 INJECTION, SOLUTION INTRAMUSCULAR; INTRAVENOUS ONCE
Status: COMPLETED | OUTPATIENT
Start: 2022-04-05 | End: 2022-04-06

## 2022-04-05 ASSESSMENT — PAIN - FUNCTIONAL ASSESSMENT: PAIN_FUNCTIONAL_ASSESSMENT: 0-10

## 2022-04-05 ASSESSMENT — PAIN SCALES - GENERAL: PAINLEVEL_OUTOF10: 8

## 2022-04-06 LAB
EKG ATRIAL RATE: 87 BPM
EKG P AXIS: 110 DEGREES
EKG P-R INTERVAL: 160 MS
EKG Q-T INTERVAL: 456 MS
EKG QRS DURATION: 82 MS
EKG QTC CALCULATION (BAZETT): 548 MS
EKG R AXIS: 107 DEGREES
EKG T AXIS: 26 DEGREES
EKG VENTRICULAR RATE: 87 BPM
TROPONIN, HIGH SENSITIVITY: 12 NG/L (ref 0–9)

## 2022-04-06 PROCEDURE — 96365 THER/PROPH/DIAG IV INF INIT: CPT

## 2022-04-06 PROCEDURE — 6360000002 HC RX W HCPCS

## 2022-04-06 PROCEDURE — 96375 TX/PRO/DX INJ NEW DRUG ADDON: CPT

## 2022-04-06 PROCEDURE — 6370000000 HC RX 637 (ALT 250 FOR IP)

## 2022-04-06 PROCEDURE — 93010 ELECTROCARDIOGRAM REPORT: CPT | Performed by: INTERNAL MEDICINE

## 2022-04-06 RX ADMIN — MAGNESIUM SULFATE HEPTAHYDRATE 1000 MG: 1 INJECTION, SOLUTION INTRAVENOUS at 00:23

## 2022-04-06 RX ADMIN — KETOROLAC TROMETHAMINE 15 MG: 15 INJECTION, SOLUTION INTRAMUSCULAR; INTRAVENOUS at 00:20

## 2022-04-06 RX ADMIN — POTASSIUM BICARBONATE 20 MEQ: 782 TABLET, EFFERVESCENT ORAL at 00:19

## 2022-04-06 ASSESSMENT — ENCOUNTER SYMPTOMS
VOMITING: 0
DIARRHEA: 0
PHOTOPHOBIA: 0
VOICE CHANGE: 0
SHORTNESS OF BREATH: 1
NAUSEA: 0
ABDOMINAL PAIN: 0
WHEEZING: 0
TROUBLE SWALLOWING: 0

## 2022-04-06 ASSESSMENT — PAIN SCALES - GENERAL: PAINLEVEL_OUTOF10: 8

## 2022-04-06 NOTE — ED NOTES
Department of Emergency Medicine  FIRST PROVIDER TRIAGE NOTE             Independent MLP           4/5/22  9:00 PM EDT    Date of Encounter: 4/5/22   MRN: [id number]      HPI: Silver Vega is a 77 y.o. female who presents to the ED for No chief complaint on file. neck pain hx of fusion of neck 8 years ago sob denies cp     ROS: Negative for fever or cough. PE: Gen Appearance/Constitutional: alert  CV: irregular rate  Pulm: CTA bilat     Initial Plan of Care: All treatment areas with department are currently occupied. Plan to order/Initiate the following while awaiting opening in ED: labs, EKG and imaging studies.   Initiate Treatment-Testing, Proceed toTreatment Area When Bed Available for ED Attending/MLP to Continue Care    Electronically signed by PARVEEN Cook   DD: 4/5/22       PARVEEN Cook  04/05/22 0315

## 2022-04-06 NOTE — ED PROVIDER NOTES
77y.o. year old female presenting to the emergency room with concerns of neck pain beginning 10 days ago. Patient reports that symptom's onset 10 days ago. Worsen with moving. Improves with heat. Severity of 8 out of 10 pain, with no radiation . Symptoms are constant in timing. Symptoms described as aching. Patient reports  associated symptoms of shortness of breath with exertion beginning weeks ago . Patient in no acute distress. Patient was in her [employer] when She yawn and felt a pain in her neck. Chief Complaint   Patient presents with    Other     Neck pain for 10 days.  Shortness of Breath     Hx afib. Review of Systems   Constitutional: Negative for chills, fatigue and fever. HENT: Negative for trouble swallowing and voice change. Eyes: Negative for photophobia and visual disturbance. Respiratory: Positive for shortness of breath. Negative for wheezing. Cardiovascular: Negative for chest pain. Gastrointestinal: Negative for abdominal pain, diarrhea, nausea and vomiting. Musculoskeletal: Positive for neck pain. Negative for arthralgias. Skin: Negative for wound. Neurological: Negative for dizziness, syncope and headaches. Psychiatric/Behavioral: Negative for behavioral problems and confusion. Physical Exam  Vitals reviewed. Constitutional:       General: She is not in acute distress. Appearance: Normal appearance. HENT:      Head: Normocephalic. Right Ear: External ear normal.      Left Ear: External ear normal.      Nose: Nose normal.      Mouth/Throat:      Mouth: Mucous membranes are moist.   Eyes:      General:         Right eye: No discharge. Left eye: No discharge. Conjunctiva/sclera: Conjunctivae normal.   Cardiovascular:      Rate and Rhythm: Normal rate and regular rhythm. Heart sounds: No friction rub. Pulmonary:      Effort: Pulmonary effort is normal. No tachypnea, accessory muscle usage or respiratory distress. Breath sounds: No stridor. No decreased breath sounds, wheezing, rhonchi or rales. Abdominal:      General: There is no distension. Tenderness: There is no abdominal tenderness. There is no guarding or rebound. Musculoskeletal:         General: No tenderness or deformity. Cervical back: Normal range of motion. No rigidity or tenderness. Muscular tenderness present. No spinous process tenderness. Normal range of motion. Skin:     General: Skin is warm. Coloration: Skin is not jaundiced. Neurological:      Mental Status: She is alert. Sensory: No sensory deficit. Motor: No weakness. Psychiatric:         Mood and Affect: Mood normal.         Behavior: Behavior normal.          Procedures         MDM  Number of Diagnoses or Management Options  Dyspnea on exertion  Neck pain  Diagnosis management comments: 59-year-old female presenting to the emergency department with complaints of neck pain, shortness of breath. History of neck surgeries. Electrolyte abnormalities noted, mag and potassium replaced. Troponin delta 0. CT demonstrated postoperative and degenerative changes, no acute fracture. Chest x-ray showed no acute process. Spoke with patient, discussed today's results Toradol given. Will plan of discharge patient will follow up with PCP and spine specialist.  Patient stable at time of discharge.        Amount and/or Complexity of Data Reviewed  Decide to obtain previous medical records or to obtain history from someone other than the patient: yes                    --------------------------------------------- PAST HISTORY ---------------------------------------------  Past Medical History:  has a past medical history of Acid reflux, Atrial fibrillation ([facility] [address] Cancer ([facility] [address] Chronic back pain, CPAP (continuous positive airway pressure) dependence, Fibromyalgia, History of bariatric surgery, History of cardiovascular stress test, Hyperlipidemia, Hypertension, Kidney stones, Low iron, Osteoarthritis, and Unspecified sleep apnea. Past Surgical History:  has a past surgical history that includes tracheostomy (1999); Stanley-en-Y Gastric Bypass (04/17/2002); Cholecystectomy (2004); hernia repair (2004); Hysterectomy (1988); Appendectomy; Tonsillectomy; bladder suspension (1998); joint replacement (2009); Upper gastrointestinal endoscopy (2009); Colonoscopy (2008); Bariatric Surgery (2003); Breast surgery (2005); Shoulder arthroscopy (2/17/12); ECHO Compl W Dop Color Flow (8/10/2012); Uvulopalatopharygoplasty; Neck surgery (06/2015); and hernia repair (Left, 11/4/2021). Social History:  reports that she quit smoking about 2 years ago. She has a 20.00 pack-year smoking history. She has never used smokeless tobacco. She reports that she does not drink alcohol and does not use drugs. Family History: family history includes Cancer in her paternal grandfather; Heart Disease in her maternal grandfather, maternal grandmother, mother, and sister; High Blood Pressure in her mother and sister; Stroke in her mother. The patients home medications have been reviewed.     Allergies: Morphine and related and Percocet [oxycodone-acetaminophen]    -------------------------------------------------- RESULTS -------------------------------------------------  Labs:  Results for orders placed or performed during the hospital encounter of 04/05/22   CBC with Auto Differential   Result Value Ref Range    WBC 8.1 4.5 - 11.5 E9/L    RBC 3.42 (L) 3.50 - 5.50 E12/L    Hemoglobin 10.7 (L) 11.5 - 15.5 g/dL    Hematocrit 33.0 (L) 34.0 - 48.0 %    MCV 96.5 80.0 - 99.9 fL    MCH 31.3 26.0 - 35.0 pg    MCHC 32.4 32.0 - 34.5 %    RDW 13.3 11.5 - 15.0 fL    Platelets 025 095 - 854 E9/L    MPV 9.2 7.0 - 12.0 fL    Neutrophils % 59.4 43.0 - 80.0 %    Immature Granulocytes % 0.2 0.0 - 5.0 %    Lymphocytes % 31.0 20.0 - 42.0 %    Monocytes % 7.5 2.0 - 12.0 %    Eosinophils % 1.4 0.0 - 6.0 %    Basophils % 0.5 0.0 - 2.0 % Neutrophils Absolute 4.81 1.80 - 7.30 E9/L    Immature Granulocytes # 0.02 E9/L    Lymphocytes Absolute 2.51 1.50 - 4.00 E9/L    Monocytes Absolute 0.61 0.10 - 0.95 E9/L    Eosinophils Absolute 0.11 0.05 - 0.50 E9/L    Basophils Absolute 0.04 0.00 - 0.20 E9/L   Comprehensive Metabolic Panel w/ Reflex to MG   Result Value Ref Range    Sodium 138 132 - 146 mmol/L    Potassium reflex Magnesium 3.3 (L) 3.5 - 5.0 mmol/L    Chloride 101 98 - 107 mmol/L    CO2 23 22 - 29 mmol/L    Anion Gap 14 7 - 16 mmol/L    Glucose 108 (H) 74 - 99 mg/dL    BUN 21 6 - 23 mg/dL    CREATININE 0.9 0.5 - 1.0 mg/dL    GFR Non-African American >60 >=60 mL/min/1.73    GFR African American >60     Calcium 9.0 8.6 - 10.2 mg/dL    Total Protein 7.0 6.4 - 8.3 g/dL    Albumin 4.0 3.5 - 5.2 g/dL    Total Bilirubin 0.4 0.0 - 1.2 mg/dL    Alkaline Phosphatase 66 35 - 104 U/L    ALT 13 0 - 32 U/L    AST 18 0 - 31 U/L   Troponin   Result Value Ref Range    Troponin, High Sensitivity 12 (H) 0 - 9 ng/L   Brain Natriuretic Peptide   Result Value Ref Range    Pro-BNP 1,848 (H) 0 - 125 pg/mL   Magnesium   Result Value Ref Range    Magnesium 1.4 (L) 1.6 - 2.6 mg/dL   Troponin   Result Value Ref Range    Troponin, High Sensitivity 12 (H) 0 - 9 ng/L   EKG 12 Lead   Result Value Ref Range    Ventricular Rate 87 BPM    Atrial Rate 87 BPM    P-R Interval 160 ms    QRS Duration 82 ms    Q-T Interval 456 ms    QTc Calculation (Bazett) 548 ms    P Axis 110 degrees    R Axis 107 degrees    T Axis 26 degrees       Radiology:  XR CHEST (2 VW)   Final Result   No acute process. CT CERVICAL SPINE WO CONTRAST   Final Result   Postoperative and degenerative changes as detailed above.   MRI would be   useful if symptoms persist.      RECOMMENDATIONS:   Unavailable             ------------------------- NURSING NOTES AND VITALS REVIEWED ---------------------------  Date / Time Roomed:  4/5/2022 10:59 PM  ED Bed Assignment:  15/15    The nursing notes within the ED encounter and vital signs as below have been reviewed. BP (!) 157/71   Pulse 93   Temp 97.9 °F (36.6 °C)   SpO2 96%   Oxygen Saturation Interpretation: Normal      ------------------------------------------ PROGRESS NOTES ------------------------------------------  I have spoken with the patient and discussed todays results, in addition to providing specific details for the plan of care and counseling regarding the diagnosis and prognosis. Their questions are answered at this time and they are agreeable with the plan. I discussed at length with them reasons for immediate return here for re evaluation. They will followup with primary care by calling their office tomorrow. --------------------------------- ADDITIONAL PROVIDER NOTES ---------------------------------  At this time the patient is without objective evidence of an acute process requiring hospitalization or inpatient management. They have remained hemodynamically stable throughout their entire ED visit and are stable for discharge with outpatient follow-up. The plan has been discussed in detail and they are aware of the specific conditions for emergent return, as well as the importance of follow-up. Discharge Medication List as of 4/6/2022  1:59 AM          Diagnosis:  1. Dyspnea on exertion    2. Neck pain        Disposition:  Patient's disposition: Discharge to home  Patient's condition is stable. Attending was present and available throughout encounter including all critical portions; See Attending Note/Attestation for Final [facility],   Resident  04/06/22 6504    ATTENDING PROVIDER ATTESTATION:     I have personally performed and/or participated in the history, exam, medical decision making, and procedures and agree with all pertinent clinical information. I have also reviewed and agree with the past medical, family and social history unless otherwise noted.     I have discussed this patient in detail with the resident, and provided the instruction and education regarding neck pain and shortness of breath. .    My findings/Plan: Heart RRR. Lungs CTA bilaterally. Abdomen soft, nontender. Bowel sounds normal. Paraspinal cervical tenderness. No midline vertebral tenderness. No neuro deficits. Supportive care. Discharge for outpatient follow up.            [facility],   05/05/22 8015

## 2022-07-11 ENCOUNTER — HOSPITAL ENCOUNTER (OUTPATIENT)
Age: 67
Discharge: HOME | End: 2022-07-11
Payer: COMMERCIAL

## 2022-07-11 LAB
ANION GAP SERPL CALCULATED.3IONS-SCNC: 10 MMOL/L (ref 7–16)
BUN BLDV-MCNC: 15 MG/DL (ref 6–23)
CALCIUM SERPL-MCNC: 9 MG/DL (ref 8.6–10.2)
CHLORIDE BLD-SCNC: 99 MMOL/L (ref 98–107)
CO2: 28 MMOL/L (ref 22–29)
CREAT SERPL-MCNC: 1 MG/DL (ref 0.5–1)
GFR AFRICAN AMERICAN: >60
GFR NON-AFRICAN AMERICAN: 55 ML/MIN/1.73
GLUCOSE BLD-MCNC: 107 MG/DL (ref 74–99)
POTASSIUM SERPL-SCNC: 3.8 MMOL/L (ref 3.5–5)
SODIUM BLD-SCNC: 137 MMOL/L (ref 132–146)

## 2022-07-11 PROCEDURE — 80048 BASIC METABOLIC PNL TOTAL CA: CPT

## 2022-07-11 PROCEDURE — 36415 COLL VENOUS BLD VENIPUNCTURE: CPT

## 2022-07-25 ENCOUNTER — APPOINTMENT (OUTPATIENT)
Dept: CT IMAGING | Age: 67
DRG: 871 | End: 2022-07-25
Payer: COMMERCIAL

## 2022-07-25 ENCOUNTER — HOSPITAL ENCOUNTER (INPATIENT)
Age: 67
LOS: 2 days | Discharge: HOME | DRG: 871 | End: 2022-07-27
Attending: EMERGENCY MEDICINE | Admitting: INTERNAL MEDICINE
Payer: COMMERCIAL

## 2022-07-25 DIAGNOSIS — U07.1 COVID-19: Primary | ICD-10-CM

## 2022-07-25 DIAGNOSIS — R09.02 HYPOXIA: ICD-10-CM

## 2022-07-25 DIAGNOSIS — J15.9 COMMUNITY ACQUIRED BACTERIAL PNEUMONIA: ICD-10-CM

## 2022-07-25 LAB
ANION GAP SERPL CALCULATED.3IONS-SCNC: 10 MMOL/L (ref 7–16)
BUN BLDV-MCNC: 11 MG/DL (ref 6–23)
C-REACTIVE PROTEIN: 1.3 MG/DL (ref 0–0.4)
CALCIUM SERPL-MCNC: 9.2 MG/DL (ref 8.6–10.2)
CHLORIDE BLD-SCNC: 102 MMOL/L (ref 98–107)
CO2: 25 MMOL/L (ref 22–29)
CREAT SERPL-MCNC: 0.8 MG/DL (ref 0.5–1)
D DIMER: 294 NG/ML DDU
EKG ATRIAL RATE: 105 BPM
EKG Q-T INTERVAL: 352 MS
EKG QRS DURATION: 86 MS
EKG QTC CALCULATION (BAZETT): 469 MS
EKG R AXIS: 63 DEGREES
EKG T AXIS: 72 DEGREES
EKG VENTRICULAR RATE: 107 BPM
FERRITIN: 116 NG/ML
GFR AFRICAN AMERICAN: >60
GFR NON-AFRICAN AMERICAN: >60 ML/MIN/1.73
GLUCOSE BLD-MCNC: 119 MG/DL (ref 74–99)
HCT VFR BLD CALC: 34.9 % (ref 34–48)
HEMOGLOBIN: 11.7 G/DL (ref 11.5–15.5)
LACTATE DEHYDROGENASE: 309 U/L (ref 135–214)
MCH RBC QN AUTO: 31 PG (ref 26–35)
MCHC RBC AUTO-ENTMCNC: 33.5 % (ref 32–34.5)
MCV RBC AUTO: 92.3 FL (ref 80–99.9)
PDW BLD-RTO: 13.6 FL (ref 11.5–15)
PLATELET # BLD: 151 E9/L (ref 130–450)
PMV BLD AUTO: 9.5 FL (ref 7–12)
POTASSIUM SERPL-SCNC: 3.8 MMOL/L (ref 3.5–5)
PRO-BNP: 5052 PG/ML (ref 0–125)
RBC # BLD: 3.78 E12/L (ref 3.5–5.5)
SARS-COV-2, NAAT: DETECTED
SODIUM BLD-SCNC: 137 MMOL/L (ref 132–146)
TROPONIN, HIGH SENSITIVITY: 12 NG/L (ref 0–9)
TROPONIN, HIGH SENSITIVITY: 34 NG/L (ref 0–9)
TROPONIN, HIGH SENSITIVITY: 39 NG/L (ref 0–9)
WBC # BLD: 4.5 E9/L (ref 4.5–11.5)

## 2022-07-25 PROCEDURE — 71275 CT ANGIOGRAPHY CHEST: CPT

## 2022-07-25 PROCEDURE — 6370000000 HC RX 637 (ALT 250 FOR IP): Performed by: NURSE PRACTITIONER

## 2022-07-25 PROCEDURE — 85027 COMPLETE CBC AUTOMATED: CPT

## 2022-07-25 PROCEDURE — 94664 DEMO&/EVAL PT USE INHALER: CPT

## 2022-07-25 PROCEDURE — 80048 BASIC METABOLIC PNL TOTAL CA: CPT

## 2022-07-25 PROCEDURE — 6360000002 HC RX W HCPCS: Performed by: INTERNAL MEDICINE

## 2022-07-25 PROCEDURE — 87635 SARS-COV-2 COVID-19 AMP PRB: CPT

## 2022-07-25 PROCEDURE — 6370000000 HC RX 637 (ALT 250 FOR IP): Performed by: EMERGENCY MEDICINE

## 2022-07-25 PROCEDURE — 6370000000 HC RX 637 (ALT 250 FOR IP): Performed by: INTERNAL MEDICINE

## 2022-07-25 PROCEDURE — 84484 ASSAY OF TROPONIN QUANT: CPT

## 2022-07-25 PROCEDURE — 2580000003 HC RX 258: Performed by: EMERGENCY MEDICINE

## 2022-07-25 PROCEDURE — 6360000004 HC RX CONTRAST MEDICATION: Performed by: RADIOLOGY

## 2022-07-25 PROCEDURE — 85378 FIBRIN DEGRADE SEMIQUANT: CPT

## 2022-07-25 PROCEDURE — 93005 ELECTROCARDIOGRAM TRACING: CPT | Performed by: EMERGENCY MEDICINE

## 2022-07-25 PROCEDURE — 94640 AIRWAY INHALATION TREATMENT: CPT

## 2022-07-25 PROCEDURE — 96374 THER/PROPH/DIAG INJ IV PUSH: CPT

## 2022-07-25 PROCEDURE — 6360000002 HC RX W HCPCS: Performed by: EMERGENCY MEDICINE

## 2022-07-25 PROCEDURE — 99285 EMERGENCY DEPT VISIT HI MDM: CPT

## 2022-07-25 PROCEDURE — 82728 ASSAY OF FERRITIN: CPT

## 2022-07-25 PROCEDURE — 2580000003 HC RX 258: Performed by: INTERNAL MEDICINE

## 2022-07-25 PROCEDURE — 86140 C-REACTIVE PROTEIN: CPT

## 2022-07-25 PROCEDURE — 87449 NOS EACH ORGANISM AG IA: CPT

## 2022-07-25 PROCEDURE — 2060000000 HC ICU INTERMEDIATE R&B

## 2022-07-25 PROCEDURE — 96361 HYDRATE IV INFUSION ADD-ON: CPT

## 2022-07-25 PROCEDURE — 83880 ASSAY OF NATRIURETIC PEPTIDE: CPT

## 2022-07-25 PROCEDURE — 96375 TX/PRO/DX INJ NEW DRUG ADDON: CPT

## 2022-07-25 PROCEDURE — 83615 LACTATE (LD) (LDH) ENZYME: CPT

## 2022-07-25 RX ORDER — AZELASTINE HCL 205.5 UG/1
2 SPRAY NASAL 2 TIMES DAILY PRN
COMMUNITY

## 2022-07-25 RX ORDER — ZINC SULFATE 50(220)MG
50 CAPSULE ORAL
Status: DISCONTINUED | OUTPATIENT
Start: 2022-07-25 | End: 2022-07-27 | Stop reason: HOSPADM

## 2022-07-25 RX ORDER — LISINOPRIL AND HYDROCHLOROTHIAZIDE 25; 20 MG/1; MG/1
1 TABLET ORAL DAILY
Status: DISCONTINUED | OUTPATIENT
Start: 2022-07-25 | End: 2022-07-25

## 2022-07-25 RX ORDER — 0.9 % SODIUM CHLORIDE 0.9 %
1000 INTRAVENOUS SOLUTION INTRAVENOUS ONCE
Status: COMPLETED | OUTPATIENT
Start: 2022-07-25 | End: 2022-07-25

## 2022-07-25 RX ORDER — IPRATROPIUM BROMIDE AND ALBUTEROL SULFATE 2.5; .5 MG/3ML; MG/3ML
1 SOLUTION RESPIRATORY (INHALATION)
Status: DISCONTINUED | OUTPATIENT
Start: 2022-07-25 | End: 2022-07-26 | Stop reason: ALTCHOICE

## 2022-07-25 RX ORDER — TIZANIDINE 4 MG/1
4 TABLET ORAL DAILY
Status: DISCONTINUED | OUTPATIENT
Start: 2022-07-25 | End: 2022-07-25

## 2022-07-25 RX ORDER — FLUTICASONE FUROATE, UMECLIDINIUM BROMIDE AND VILANTEROL TRIFENATATE 100; 62.5; 25 UG/1; UG/1; UG/1
1 POWDER RESPIRATORY (INHALATION) DAILY
COMMUNITY

## 2022-07-25 RX ORDER — FAMOTIDINE 20 MG/1
20 TABLET, FILM COATED ORAL 2 TIMES DAILY
Status: DISCONTINUED | OUTPATIENT
Start: 2022-07-25 | End: 2022-07-27 | Stop reason: HOSPADM

## 2022-07-25 RX ORDER — VENLAFAXINE HYDROCHLORIDE 37.5 MG/1
75 CAPSULE, EXTENDED RELEASE ORAL NIGHTLY
Status: DISCONTINUED | OUTPATIENT
Start: 2022-07-25 | End: 2022-07-27 | Stop reason: HOSPADM

## 2022-07-25 RX ORDER — FUROSEMIDE 40 MG/1
40 TABLET ORAL EVERY OTHER DAY
COMMUNITY

## 2022-07-25 RX ORDER — ONDANSETRON 2 MG/ML
4 INJECTION INTRAMUSCULAR; INTRAVENOUS EVERY 6 HOURS PRN
Status: DISCONTINUED | OUTPATIENT
Start: 2022-07-25 | End: 2022-07-27 | Stop reason: HOSPADM

## 2022-07-25 RX ORDER — AZITHROMYCIN 250 MG/1
500 TABLET, FILM COATED ORAL DAILY
Status: DISCONTINUED | OUTPATIENT
Start: 2022-07-25 | End: 2022-07-27 | Stop reason: HOSPADM

## 2022-07-25 RX ORDER — AMITRIPTYLINE HYDROCHLORIDE 75 MG/1
75 TABLET, FILM COATED ORAL NIGHTLY
COMMUNITY

## 2022-07-25 RX ORDER — ONDANSETRON 2 MG/ML
4 INJECTION INTRAMUSCULAR; INTRAVENOUS ONCE
Status: COMPLETED | OUTPATIENT
Start: 2022-07-25 | End: 2022-07-25

## 2022-07-25 RX ORDER — ACETAMINOPHEN 325 MG/1
650 TABLET ORAL EVERY 6 HOURS PRN
Status: DISCONTINUED | OUTPATIENT
Start: 2022-07-25 | End: 2022-07-27 | Stop reason: HOSPADM

## 2022-07-25 RX ORDER — MONTELUKAST SODIUM 10 MG/1
10 TABLET ORAL NIGHTLY
Status: DISCONTINUED | OUTPATIENT
Start: 2022-07-25 | End: 2022-07-27 | Stop reason: HOSPADM

## 2022-07-25 RX ORDER — DEXAMETHASONE SODIUM PHOSPHATE 10 MG/ML
6 INJECTION INTRAMUSCULAR; INTRAVENOUS EVERY 24 HOURS
Status: DISCONTINUED | OUTPATIENT
Start: 2022-07-26 | End: 2022-07-27 | Stop reason: HOSPADM

## 2022-07-25 RX ORDER — MONTELUKAST SODIUM 10 MG/1
10 TABLET ORAL NIGHTLY
COMMUNITY

## 2022-07-25 RX ORDER — VALSARTAN AND HYDROCHLOROTHIAZIDE 160; 25 MG/1; MG/1
1 TABLET ORAL DAILY
Status: DISCONTINUED | OUTPATIENT
Start: 2022-07-25 | End: 2022-07-25 | Stop reason: CLARIF

## 2022-07-25 RX ORDER — TIZANIDINE 4 MG/1
4 TABLET ORAL ONCE
Status: COMPLETED | OUTPATIENT
Start: 2022-07-25 | End: 2022-07-25

## 2022-07-25 RX ORDER — ACETAMINOPHEN 325 MG/1
650 TABLET ORAL ONCE
Status: COMPLETED | OUTPATIENT
Start: 2022-07-25 | End: 2022-07-25

## 2022-07-25 RX ORDER — ASCORBIC ACID 500 MG
1000 TABLET ORAL
Status: DISCONTINUED | OUTPATIENT
Start: 2022-07-25 | End: 2022-07-27 | Stop reason: HOSPADM

## 2022-07-25 RX ORDER — BUDESONIDE 0.5 MG/2ML
500 INHALANT ORAL 2 TIMES DAILY
Status: DISCONTINUED | OUTPATIENT
Start: 2022-07-25 | End: 2022-07-26 | Stop reason: ALTCHOICE

## 2022-07-25 RX ORDER — VALSARTAN 80 MG/1
160 TABLET ORAL DAILY
Status: DISCONTINUED | OUTPATIENT
Start: 2022-07-25 | End: 2022-07-27 | Stop reason: HOSPADM

## 2022-07-25 RX ORDER — DEXAMETHASONE SODIUM PHOSPHATE 10 MG/ML
10 INJECTION INTRAMUSCULAR; INTRAVENOUS ONCE
Status: COMPLETED | OUTPATIENT
Start: 2022-07-25 | End: 2022-07-25

## 2022-07-25 RX ORDER — AMITRIPTYLINE HYDROCHLORIDE 25 MG/1
75 TABLET, FILM COATED ORAL NIGHTLY
Status: DISCONTINUED | OUTPATIENT
Start: 2022-07-25 | End: 2022-07-27 | Stop reason: HOSPADM

## 2022-07-25 RX ORDER — TIZANIDINE 4 MG/1
4-8 TABLET ORAL NIGHTLY
COMMUNITY

## 2022-07-25 RX ORDER — NYSTATIN 100000 U/G
1 CREAM TOPICAL 2 TIMES DAILY PRN
COMMUNITY

## 2022-07-25 RX ORDER — VENLAFAXINE HYDROCHLORIDE 75 MG/1
75 CAPSULE, EXTENDED RELEASE ORAL NIGHTLY
COMMUNITY

## 2022-07-25 RX ORDER — VITAMIN B COMPLEX
100 TABLET ORAL
Status: DISCONTINUED | OUTPATIENT
Start: 2022-07-25 | End: 2022-07-27 | Stop reason: HOSPADM

## 2022-07-25 RX ORDER — VENLAFAXINE 75 MG/1
75 TABLET ORAL NIGHTLY
Status: DISCONTINUED | OUTPATIENT
Start: 2022-07-25 | End: 2022-07-25

## 2022-07-25 RX ORDER — HYDROCHLOROTHIAZIDE 25 MG/1
25 TABLET ORAL DAILY
Status: DISCONTINUED | OUTPATIENT
Start: 2022-07-25 | End: 2022-07-27 | Stop reason: HOSPADM

## 2022-07-25 RX ORDER — ACETAMINOPHEN 650 MG/1
650 SUPPOSITORY RECTAL EVERY 6 HOURS PRN
Status: DISCONTINUED | OUTPATIENT
Start: 2022-07-25 | End: 2022-07-27 | Stop reason: HOSPADM

## 2022-07-25 RX ORDER — VALSARTAN AND HYDROCHLOROTHIAZIDE 160; 25 MG/1; MG/1
1 TABLET ORAL DAILY
COMMUNITY

## 2022-07-25 RX ORDER — TIZANIDINE 4 MG/1
4 TABLET ORAL NIGHTLY
Status: DISCONTINUED | OUTPATIENT
Start: 2022-07-26 | End: 2022-07-26

## 2022-07-25 RX ORDER — GUAIFENESIN/DEXTROMETHORPHAN 100-10MG/5
5 SYRUP ORAL EVERY 4 HOURS PRN
Status: DISCONTINUED | OUTPATIENT
Start: 2022-07-25 | End: 2022-07-27 | Stop reason: HOSPADM

## 2022-07-25 RX ORDER — BUSPIRONE HYDROCHLORIDE 5 MG/1
5 TABLET ORAL 3 TIMES DAILY
Status: DISCONTINUED | OUTPATIENT
Start: 2022-07-25 | End: 2022-07-27 | Stop reason: HOSPADM

## 2022-07-25 RX ORDER — ALBUTEROL SULFATE 2.5 MG/3ML
2.5 SOLUTION RESPIRATORY (INHALATION) EVERY 6 HOURS PRN
Status: DISCONTINUED | OUTPATIENT
Start: 2022-07-25 | End: 2022-07-26 | Stop reason: ALTCHOICE

## 2022-07-25 RX ORDER — FUROSEMIDE 40 MG/1
40 TABLET ORAL EVERY OTHER DAY
Status: DISCONTINUED | OUTPATIENT
Start: 2022-07-26 | End: 2022-07-27 | Stop reason: HOSPADM

## 2022-07-25 RX ORDER — IPRATROPIUM BROMIDE AND ALBUTEROL SULFATE 2.5; .5 MG/3ML; MG/3ML
1 SOLUTION RESPIRATORY (INHALATION)
Status: DISCONTINUED | OUTPATIENT
Start: 2022-07-25 | End: 2022-07-25 | Stop reason: HOSPADM

## 2022-07-25 RX ADMIN — DEXAMETHASONE SODIUM PHOSPHATE 10 MG: 10 INJECTION INTRAMUSCULAR; INTRAVENOUS at 09:53

## 2022-07-25 RX ADMIN — ONDANSETRON 4 MG: 2 INJECTION INTRAMUSCULAR; INTRAVENOUS at 07:33

## 2022-07-25 RX ADMIN — IPRATROPIUM BROMIDE AND ALBUTEROL SULFATE 1 AMPULE: .5; 2.5 SOLUTION RESPIRATORY (INHALATION) at 19:13

## 2022-07-25 RX ADMIN — APIXABAN 5 MG: 5 TABLET, FILM COATED ORAL at 13:09

## 2022-07-25 RX ADMIN — TIZANIDINE 4 MG: 4 TABLET ORAL at 21:38

## 2022-07-25 RX ADMIN — ACETAMINOPHEN 650 MG: 325 TABLET ORAL at 15:36

## 2022-07-25 RX ADMIN — Medication 4000 UNITS: at 20:29

## 2022-07-25 RX ADMIN — METOPROLOL TARTRATE 50 MG: 25 TABLET, FILM COATED ORAL at 20:30

## 2022-07-25 RX ADMIN — ACETAMINOPHEN 650 MG: 325 TABLET ORAL at 07:44

## 2022-07-25 RX ADMIN — AZITHROMYCIN MONOHYDRATE 500 MG: 250 TABLET ORAL at 13:09

## 2022-07-25 RX ADMIN — CEFTRIAXONE SODIUM 1000 MG: 1 INJECTION, POWDER, FOR SOLUTION INTRAMUSCULAR; INTRAVENOUS at 15:36

## 2022-07-25 RX ADMIN — ACETAMINOPHEN 650 MG: 325 TABLET ORAL at 21:38

## 2022-07-25 RX ADMIN — MONTELUKAST 10 MG: 10 TABLET, FILM COATED ORAL at 20:31

## 2022-07-25 RX ADMIN — AMITRIPTYLINE HYDROCHLORIDE 75 MG: 25 TABLET, FILM COATED ORAL at 20:30

## 2022-07-25 RX ADMIN — BUSPIRONE HYDROCHLORIDE 5 MG: 5 TABLET ORAL at 20:30

## 2022-07-25 RX ADMIN — FAMOTIDINE 20 MG: 20 TABLET, FILM COATED ORAL at 13:09

## 2022-07-25 RX ADMIN — IOPAMIDOL 75 ML: 755 INJECTION, SOLUTION INTRAVENOUS at 08:42

## 2022-07-25 RX ADMIN — ZINC SULFATE 220 MG (50 MG) CAPSULE 50 MG: CAPSULE at 20:30

## 2022-07-25 RX ADMIN — APIXABAN 5 MG: 5 TABLET, FILM COATED ORAL at 20:30

## 2022-07-25 RX ADMIN — IPRATROPIUM BROMIDE AND ALBUTEROL SULFATE 1 AMPULE: .5; 2.5 SOLUTION RESPIRATORY (INHALATION) at 07:49

## 2022-07-25 RX ADMIN — BUDESONIDE 500 MCG: 0.5 SUSPENSION RESPIRATORY (INHALATION) at 19:13

## 2022-07-25 RX ADMIN — IPRATROPIUM BROMIDE AND ALBUTEROL SULFATE 1 AMPULE: .5; 2.5 SOLUTION RESPIRATORY (INHALATION) at 14:05

## 2022-07-25 RX ADMIN — SODIUM CHLORIDE 1000 ML: 9 INJECTION, SOLUTION INTRAVENOUS at 07:32

## 2022-07-25 RX ADMIN — OXYCODONE HYDROCHLORIDE AND ACETAMINOPHEN 1000 MG: 500 TABLET ORAL at 20:29

## 2022-07-25 RX ADMIN — FAMOTIDINE 20 MG: 20 TABLET, FILM COATED ORAL at 20:31

## 2022-07-25 RX ADMIN — METOPROLOL TARTRATE 50 MG: 25 TABLET, FILM COATED ORAL at 13:08

## 2022-07-25 RX ADMIN — VENLAFAXINE HYDROCHLORIDE 75 MG: 37.5 CAPSULE, EXTENDED RELEASE ORAL at 20:30

## 2022-07-25 ASSESSMENT — ENCOUNTER SYMPTOMS
SHORTNESS OF BREATH: 1
SORE THROAT: 0
CONSTIPATION: 0
WHEEZING: 0
ABDOMINAL PAIN: 0
NAUSEA: 1
DIARRHEA: 0
CHEST TIGHTNESS: 1
VOMITING: 0
COUGH: 1
EYES NEGATIVE: 1
SPUTUM PRODUCTION: 0

## 2022-07-25 ASSESSMENT — PAIN SCALES - GENERAL
PAINLEVEL_OUTOF10: 3
PAINLEVEL_OUTOF10: 8
PAINLEVEL_OUTOF10: 8
PAINLEVEL_OUTOF10: 0
PAINLEVEL_OUTOF10: 6

## 2022-07-25 ASSESSMENT — PAIN DESCRIPTION - LOCATION
LOCATION: HEAD
LOCATION: GENERALIZED
LOCATION: HEAD

## 2022-07-25 ASSESSMENT — PAIN - FUNCTIONAL ASSESSMENT: PAIN_FUNCTIONAL_ASSESSMENT: 0-10

## 2022-07-25 NOTE — CARE COORDINATION
SS Note: Covid test Positive. Pt presented for SOB. Pt on 2L 02- n/c. No hx of 02. SW met w/pt in ED 14 for transition of care planning. Spoke from door wearing mask/PPE and explained role. Pt is single- she was  and her x- is now . She has 3 daughters- all live local. Pt lives alone @ the [facility] [place]. PTA, pt is independent in IADL's/ADL's, drives and has insurance- [employer]- Dual. Pt was actually caring for a 13 month old to help out the mother as the 12th month old would have went into foster care. This 17-month old is now with another family member. PCP is [address] [place] [address] is [facility]- otherwise, will use [facility] if needed. Pt has following DME: none. Denies hx of [facility] & has past hx of TIFFANY w/[facility]. No hx of 02. SW completed ACP w/pt. This included completion of POA & LW. Pt requests to be DNR/CCA and sticky note to physician completed. Original and copy of POA/LW given to pt and SW had POA & LW scanned to chart. PT/OT to evaluate but pt plans on returning home if possible.    Electronically signed by KATIANA Shepard on 2022 at 12:42 PM verbal cues/1 person assist

## 2022-07-25 NOTE — H&P
Department of Internal Medicine  History and Physical Examination     Primary Care Physician: Filiberto Steele MD   Admitting Physician:  Filiberto Steele MD  Admission date and time: 7/25/2022  6:43 AM    Room:  10/10  Admitting diagnosis: COVID-19 [U07.1]    Patient Name: Suzann Krabbe  MRN: [id number]    Date of Service: 7/25/2022     Chief Complaint:  SOB    HISTORY OF PRESENT ILLNESS:    Mehran is a 77-year-old female patient who presented to [address] [place] [address] emergency department with sudden onset of shortness of breath. Began 2 hours prior to ER arrival.  She was recently diagnosed with COVID-19 as an outpatient. She had systemic generalized viral illness symptomatology as well as nausea and poor appetite. ER evaluation was remarkable for hypoxia requiring nasal cannula oxygen and bilateral pneumonia on imaging with pulmonary embolism excluded. She met criteria for sepsis on presentation due to vital signs and identified infection. The remainder of her diagnostic and laboratory work-up was relatively unremarkable. Case was discussed with the ER physician and Dr. Shanna Catherine whom is providing coverage for her primary care provider Dr. Aleena Mora. She will be admitted for further care management.     PAST MEDICAL Hx:  Past Medical History:   Diagnosis Date    Acid reflux     Atrial fibrillation (HCC)     Cancer (HCC) 5/19/2005    Breast Cancer (Left Breast)    Chronic back pain     CPAP (continuous positive airway pressure) dependence     not using CPAP    Fibromyalgia     History of bariatric surgery 04/17/2002    Dr. Hoang Turner RYGB    History of cardiovascular stress test 8/10/2012    LEXISCAN    Hyperlipidemia     Hypertension     Kidney stones     Low iron     Osteoarthritis     Unspecified sleep apnea     Patient does not use C-Pap        PAST SURGICAL Hx:   Past Surgical History:   Procedure Laterality Date    APPENDECTOMY      BARIATRIC SURGERY  2003    [address]  2005    Dr. Adriane Smith  2004    Dr. Gwendolynn Dakins  2008    ECHO COMPL W DOP COLOR FLOW  8/10/2012         HERNIA REPAIR  2004    Dr. Teri Murray Left 11/4/2021    LEFT INGUINAL HERNIA REPAIR WITH MESH, POSSIBLE BILATERAL,  LAPAROSCOPIC ROBOTIC XI performed by Irlanda Trammell MD at [facility]    Dr. Parks Ly - [facility]  2009    Right Knee    NECK SURGERY  06/2015    fused 4,5,6- screws, [place] [address] [place]    ASHOK-EN-Y GASTRIC BYPASS  04/17/2002    Dr. Josué Meyer RYGB    SHOULDER ARTHROSCOPY  2/17/12    LEFT REPAIR ROTATOR CUFF TEAR SUBACROMIAL [address] - Patient states that this was due to [facility] weight [facility]  [facility]    UVULOPALATOPHARYNGOPLASTY         FAMILY Hx:  Family History   Problem Relation Age of Onset    Stroke Mother     High Blood Pressure Mother     Heart Disease Mother     Heart Disease Sister     High Blood Pressure Sister     Heart Disease Maternal Grandmother     Heart Disease Maternal Grandfather     Cancer Paternal Grandfather         Stomach       HOME MEDICATIONS:  Prior to Admission medications    Medication Sig Start Date End Date Taking?  Authorizing Provider   ondansetron (ZOFRAN-ODT) 4 MG disintegrating tablet Take 1 tablet by mouth every 8 hours as needed for Nausea or Vomiting 11/4/21   Irlanda Trammell MD   lisinopril-hydroCHLOROthiazide [facility]) 20-25 MG per tablet Take 1 tablet by mouth daily  10/22/21   Historical Provider, MD   Cholecalciferol (VITAMIN D) 50 MCG (2000 UT) CAPS capsule Take 2 capsules by mouth daily During winter months    Historical Provider, MD   Biotin 1 MG CAPS Take 1 tablet by mouth daily    Historical Provider, MD   busPIRone (BUSPAR) 5 MG tablet Take 5 mg by mouth 3 times daily  21   Historical Provider, MD   omeprazole (PRILOSEC) 40 MG delayed release capsule Take 40 mg by mouth every morning 21   Historical Provider, MD   famotidine (PEPCID) 40 MG tablet Take 40 mg by mouth nightly as needed    Historical Provider, MD   apixaban (ELIQUIS) 5 MG TABS tablet Take by mouth 2 times daily Last dose 2021    Historical Provider, MD   venlafaxine (EFFEXOR) 75 MG tablet Take 75 mg by mouth nightly    Historical Provider, MD   metoprolol (LOPRESSOR) 50 MG tablet Take 1 tablet by mouth 2 times daily 16   Driss Santana,    amitriptyline (ELAVIL) 25 MG tablet Take 2 tablets by mouth nightly  Patient taking differently: Take 75 mg by mouth nightly  16   Driss Santana DO   tiZANidine (ZANAFLEX) 4 MG tablet Take 1 tablet by mouth daily  Patient taking differently: Take 4 mg by mouth daily Takes at night 16   Driss Santana DO       ALLERGIES:  Morphine and related and Percocet [oxycodone-acetaminophen]    SOCIAL Hx:  Social History     Socioeconomic History    Marital status:      Spouse name: Not on file    Number of children: Not on file    Years of education: Not on file    Highest education level: Not on file   Occupational History    Not on file   Tobacco Use    Smoking status: Former     Packs/day: 0.50     Years: 40.00     Pack years: 20.00     Types: Cigarettes     Quit date: 2020     Years since quittin.5    Smokeless tobacco: Never   Vaping Use    Vaping Use: Never used   Substance and Sexual Activity    Alcohol use: No     Alcohol/week: 0.0 standard drinks     Comment: rarely    Drug use: No    Sexual activity: Yes   Other Topics Concern    Not on file   Social History Narrative    Not on file     Social Determinants of Health     Financial Resource Strain: Not on file   Food Insecurity: Not on file   Transportation Needs: Not on file   Physical Activity: Not on file   Stress: Not on file   Social Connections: Not on file   Intimate Partner Violence: Not on file   Housing Stability: Not on file       Review of System:   Constitutional:   Positive for significant fatigue and malaise , + fever/chills  HEENT:   Denies ear pain, sore throat, sinus or eye problems. Cardiovascular:   Denies any chest pain, or palpitations. Positive for history of atrial fibrillation on Eliquis therapy. Respiratory:   + dyspnea at rest, + dyspnea on exertion, + coughing, - sputum, - hemoptysis  Gastrointestinal:   Positive for nausea but denies vomiting. - diarrhea, - constipation. + poor appetite and poor intake. Denies any abdominal pain. Genitourinary:    Denies any urgency, frequency, hematuria. Voiding  without difficulty. Extremities:   Denies lower extremity swelling, edema or cyanosis. Neurology:    Denies any headache or focal neurological deficits, positive for generalized weakness and fatigue without focal component  Psch:   Denies being anxious or depressed. Musculoskeletal:    Denies  myalgias, joint complaints or back pain. Integumentary:   Denies any rashes, ulcers, or excoriations. Denies bruising. Hematologic/Lymphatic:  Denies bruising or bleeding. PHYSICAL EXAM:  VITALS:  Vitals:    07/25/22 1145   BP: (!) 157/110   Pulse: (!) 115   Resp: 18   Temp:    SpO2: 98%         To prevent transmission of COVID-19 and also the need to preserve PPE, a physical examination of the patient was not directly performed but discussed with the ED physician and staff. Patient's symptoms were directly discussed, test results were reviewed and all questions were answered. Every effort was made to coordinate care accordingly.        LABORATORY DATA:  CBC with Differential:    Lab Results   Component Value Date/Time    WBC 4.5 07/25/2022 07:24 AM    RBC 3.78 07/25/2022 07:24 AM    HGB 11.7 07/25/2022 07:24 AM    HCT 34.9 07/25/2022 07:24 AM     07/25/2022 07:24 AM    MCV 92.3 07/25/2022 07:24 AM    MCH 31.0 07/25/2022 07:24 AM    MCHC 33.5 07/25/2022 07:24 AM    RDW 13.6 07/25/2022 07:24 AM    SEGSPCT 43 08/07/2012 09:10 AM    LYMPHOPCT 31.0 04/05/2022 09:20 PM    MONOPCT 7.5 04/05/2022 09:20 PM    BASOPCT 0.5 04/05/2022 09:20 PM    MONOSABS 0.61 04/05/2022 09:20 PM    LYMPHSABS 2.51 04/05/2022 09:20 PM    EOSABS 0.11 04/05/2022 09:20 PM    BASOSABS 0.04 04/05/2022 09:20 PM     CMP:    Lab Results   Component Value Date/Time     07/25/2022 07:24 AM    K 3.8 07/25/2022 07:24 AM    K 3.3 04/05/2022 09:20 PM     07/25/2022 07:24 AM    CO2 25 07/25/2022 07:24 AM    BUN 11 07/25/2022 07:24 AM    CREATININE 0.8 07/25/2022 07:24 AM    GFRAA >60 07/25/2022 07:24 AM    LABGLOM >60 07/25/2022 07:24 AM    GLUCOSE 119 07/25/2022 07:24 AM    GLUCOSE 115 02/11/2012 08:15 AM    PROT 7.0 04/05/2022 09:20 PM    LABALBU 4.0 04/05/2022 09:20 PM    LABALBU 4.3 02/11/2012 08:15 AM    CALCIUM 9.2 07/25/2022 07:24 AM    BILITOT 0.4 04/05/2022 09:20 PM    ALKPHOS 66 04/05/2022 09:20 PM    AST 18 04/05/2022 09:20 PM    ALT 13 04/05/2022 09:20 PM       ASSESSMENT:  Acute respiratory failure with hypoxia secondary to COVID-19 pneumonia in a partially vaccinated host, delinquent on booster  Sepsis (tachycardia, tachypnea) 2/2 bilateral atypical pneumonia secondary COVID-19 infection  Paroxysmal atrial fibrillation with variable ventricular response on chronic Eliquis therapy  Morbid obesity with BMI 35.70 kg per metered squared with obstructive sleep apnea on CPAP therapy nocturnally  Essential hypertension  Hyperlipidemia  History of breast cancer  History of Stanley-en-Y gastric bypass    PLAN:  Mehran presented to [facility] ER with sudden onset of dyspnea and having been recently diagnosed with COVID as an outpatient. She was hypoxic and required institution of nasal cannula oxygen. Pulmonary embolism was excluded.   She met criteria for sepsis based upon tachycardia and tachypnea with identified pneumonia present. Heart rates are also elevated with history of atrial fibrillation chronically anticoagulated with Eliquis therapy. Presently, 2L Nasal cannula oxygen is required. Incentive spirometer and flutter valve will be employed. Infectious work-up is being undertaken maximum COVID protocol is being undertaken and inflammatory biomarkers are being monitored. Clinical pharmacy has been asked to consider the institution of protocol driven therapy. Dexamethasone, concomitant antimicrobial therapy and respiratory supportive measures are in place. Chronic morbidities, laboratory values and vital signs are being monitored and addressed accordingly. PT, OT and social work for discharge planning purposes. Care will return to Dr. Sai Abebe tomorrow. See additional orders for details.      HAILEY Fajardo CNP,   12:58 PM  7/25/2022    Electronically signed by HAILEY Fajardo CNP on 7/25/22 at 10:36 AM EDT

## 2022-07-25 NOTE — PROGRESS NOTES
Pharmacy Consultation Note    Consult date: 7/25/2022  Physician/provider: Dr. Jasvir Zarate has been consulted to evaluate criteria for Baricitinib therapy. The patient DOES NOT currently meet [facility] P&T approved Covid-19 treatment criteria for Baricitinib due to oxygen requirements: patient is currently 93% on room air. Please re-consult if the clinical condition changes and patient meets criteria for initiation based on [facility] P&T approved criteria for use.     Thank you for the consult,  Latricia Solano, PharmD, BCPS 7/25/2022 6:59 PM   [phone]

## 2022-07-25 NOTE — ED PROVIDER NOTES
The history is provided by the patient. Shortness of Breath  Severity:  Moderate  Onset quality:  Gradual  Duration:  2 hours  Timing:  Constant  Progression:  Worsening  Chronicity:  New  Context: activity and URI    Context comment:  (+) COVID at home  Relieved by:  Nothing  Worsened by: Activity  Ineffective treatments:  Rest  Associated symptoms: cough    Associated symptoms: no abdominal pain, no chest pain, no diaphoresis, no fever, no sore throat, no sputum production, no vomiting and no wheezing    Risk factors: hx of cancer and obesity    Risk factors: no hx of PE/DVT      Review of Systems   Constitutional:  Positive for activity change and fatigue. Negative for appetite change, chills, diaphoresis and fever. HENT:  Negative for congestion and sore throat. Eyes: Negative. Respiratory:  Positive for cough, chest tightness and shortness of breath. Negative for sputum production and wheezing. Cardiovascular:  Negative for chest pain, palpitations and leg swelling. Gastrointestinal:  Positive for nausea. Negative for abdominal pain, constipation, diarrhea and vomiting. Genitourinary: Negative. Musculoskeletal: Negative. Skin: Negative. Neurological: Negative. Hematological: Negative. Psychiatric/Behavioral: Negative. Physical Exam  Vitals and nursing note reviewed. Constitutional:       General: She is not in acute distress. Appearance: She is well-developed. She is obese. She is ill-appearing. She is not toxic-appearing. HENT:      Head: Normocephalic and atraumatic. Mouth/Throat:      Mouth: Mucous membranes are moist.      Pharynx: Oropharynx is clear. No pharyngeal swelling. Eyes:      Pupils: Pupils are equal, round, and reactive to light. Cardiovascular:      Rate and Rhythm: Regular rhythm. Tachycardia present. Heart sounds: Normal heart sounds. No murmur heard.   Pulmonary:      Effort: Pulmonary effort is normal. No tachypnea or respiratory distress. Breath sounds: Examination of the right-lower field reveals decreased breath sounds. Examination of the left-lower field reveals decreased breath sounds. Decreased breath sounds present. No wheezing or rales. Chest:      Chest wall: No tenderness or edema. There is no dullness to percussion. Abdominal:      General: Bowel sounds are normal.      Palpations: Abdomen is soft. Tenderness: There is no abdominal tenderness. There is no guarding or rebound. Musculoskeletal:         General: Normal range of motion. Cervical back: Normal range of motion and neck supple. Right lower leg: No tenderness. No edema. Left lower leg: No tenderness. No edema. Skin:     General: Skin is warm and dry. Capillary Refill: Capillary refill takes less than 2 seconds. Coloration: Skin is not pale. Findings: No rash. Neurological:      General: No focal deficit present. Mental Status: She is alert and oriented to person, place, and time. Cranial Nerves: No cranial nerve deficit. Coordination: Coordination normal.   Psychiatric:         Mood and Affect: Mood normal.         Behavior: Behavior normal.       Procedures    [facility]      ED Course as of 07/25/22 0904   Mon Jul 25, 2022   8046 Previous EKG without evidence of a fib [JS]   0825 Patient reports a history of A Fib and states that is why she takes Eliquis.  [JS]      ED Course User Index  [JS] Jose Torre DO     EKG Interpretation    Interpreted by emergency department physician    Rhythm: atrial fibrillation - controlled  Rate: normal  Axis: normal  Ectopy: PVC  Conduction: normal  ST Segments: nonspecific changes  T Waves: no acute change  Q Waves: none    Clinical Impression: atrial fibrillation (new onset)    Jose Torre DO  ED Course as of 07/25/22 0904   Mon Jul 25, 2022   0742 Previous EKG without evidence of a fib [JS]   0825 Patient reports a history of A Fib and states that is why she takes Eliquis. [JS]      ED Course User Index  [JS] West Gonzalez, DO       --------------------------------------------- PAST HISTORY ---------------------------------------------  Past Medical History:  has a past medical history of Acid reflux, Atrial fibrillation ([facility] [address] [facility] Chronic back pain, CPAP (continuous positive airway pressure) dependence, Fibromyalgia, History of bariatric surgery, History of cardiovascular stress test, Hyperlipidemia, Hypertension, Kidney stones, Low iron, Osteoarthritis, and Unspecified sleep apnea. Past Surgical History:  has a past surgical history that includes tracheostomy (1999); Stanley-en-Y Gastric Bypass (04/17/2002); Cholecystectomy (2004); hernia repair (2004); Hysterectomy (1988); Appendectomy; Tonsillectomy; bladder suspension (1998); joint replacement (2009); Upper gastrointestinal endoscopy (2009); Colonoscopy (2008); Bariatric Surgery (2003); Breast surgery (2005); Shoulder arthroscopy (2/17/12); ECHO Compl W Dop Color Flow (8/10/2012); Uvulopalatopharygoplasty; Neck surgery (06/2015); and hernia repair (Left, 11/4/2021). Social History:  reports that she quit smoking about 2 years ago. She has a 20.00 pack-year smoking history. She has never used smokeless tobacco. She reports that she does not drink alcohol and does not use drugs. Family History: family history includes Cancer in her paternal grandfather; Heart Disease in her maternal grandfather, maternal grandmother, mother, and sister; High Blood Pressure in her mother and sister; Stroke in her mother. The patients home medications have been reviewed.     Allergies: Morphine and related and Percocet [oxycodone-acetaminophen]    -------------------------------------------------- RESULTS -------------------------------------------------    Lab  Results for orders placed or performed during the hospital encounter of 48/24/19   Basic metabolic panel   Result Value Ref Range    Sodium 137 132 - 146 mmol/L    Potassium 3.8 3.5 - 5.0 mmol/L    Chloride 102 98 - 107 mmol/L    CO2 25 22 - 29 mmol/L    Anion Gap 10 7 - 16 mmol/L    Glucose 119 (H) 74 - 99 mg/dL    BUN 11 6 - 23 mg/dL    Creatinine 0.8 0.5 - 1.0 mg/dL    GFR Non-African American >60 >=60 mL/min/1.73    GFR African American >60     Calcium 9.2 8.6 - 10.2 mg/dL   CBC   Result Value Ref Range    WBC 4.5 4.5 - 11.5 E9/L    RBC 3.78 3.50 - 5.50 E12/L    Hemoglobin 11.7 11.5 - 15.5 g/dL    Hematocrit 34.9 34.0 - 48.0 %    MCV 92.3 80.0 - 99.9 fL    MCH 31.0 26.0 - 35.0 pg    MCHC 33.5 32.0 - 34.5 %    RDW 13.6 11.5 - 15.0 fL    Platelets 352 641 - 858 E9/L    MPV 9.5 7.0 - 12.0 fL   Troponin   Result Value Ref Range    Troponin, High Sensitivity 12 (H) 0 - 9 ng/L   Brain Natriuretic Peptide   Result Value Ref Range    Pro-BNP 5,052 (H) 0 - 125 pg/mL   EKG 12 Lead   Result Value Ref Range    Ventricular Rate 107 BPM    Atrial Rate 105 BPM    QRS Duration 86 ms    Q-T Interval 352 ms    QTc Calculation (Bazett) 469 ms    R Axis 63 degrees    T Axis 72 degrees       Radiology  CTA CHEST W CONTRAST   Final Result   1. There is no pulmonary embolus. 2. Very vague bilateral ground-glass airspace disease. Given that there are   small bilateral pleural effusions the airspace disease could represent mild   CHF however ground-glass viral pneumonia cannot be excluded. RECOMMENDATIONS:   Unavailable               ------------------------- NURSING NOTES AND VITALS REVIEWED ---------------------------  Date / Time Roomed:  7/25/2022  6:43 AM  ED Bed Assignment:  10/10    The nursing notes within the ED encounter and vital signs as below have been reviewed.    Patient Vitals for the past 24 hrs:   BP Temp Temp src Pulse Resp SpO2 Height Weight   07/25/22 0749 -- -- -- (!) 103 28 94 % -- --   07/25/22 0700 (!) 141/99 -- -- (!) 108 23 92 % -- --   07/25/22 0646 (!) 159/102 98.1 °F (36.7 °C) Oral (!) 111 24 91 % 5' 4\" (1.626 m) 208 lb (94.3 kg) Oxygen Saturation Interpretation: Abnormal      ------------------------------------------ PROGRESS NOTES ------------------------------------------  Re-evaluation(s):  Time: 8:50 AM.  Patients symptoms worsened as she took off her oxygen  Repeat physical examination is not changed      I have spoken with the patient and discussed todays results, in addition to providing specific details for the plan of care and counseling regarding the diagnosis and prognosis. Their questions are answered at this time and they are agreeable with the plan.      --------------------------------- ADDITIONAL PROVIDER NOTES ---------------------------------  Consultations:  Spoke with Dr. Shanna Catherine,  They will admit this patient. This patient's ED course included: a personal history and physicial examination and multiple bedside re-evaluations    This patient has been closely monitored during their ED course. Please note that the withdrawal or failure to initiate urgent interventions for this patient would likely result in a life threatening deterioration or permanent disability. Accordingly this patient received 30 minutes of critical care time, excluding separately billable procedures. Systems at risk for deterioration include: respiratory. Clinical Impression  1. COVID-19    2. Hypoxia          Disposition  Patient's disposition: Admit to telemetry  Patient's condition is stable.          Elspeth Serum, DO  07/25/22 4519

## 2022-07-25 NOTE — ACP (ADVANCE CARE PLANNING)
Advance Care Planning     Advance Care Planning Activator (Inpatient)  Conversation Note      Date of ACP Conversation: 7/25/2022     Conversation Conducted with: Patient with Decision Making Capacity    ACP Activator: Keaton [facility] Decision Maker:     Current Designated Health Care Decision Maker:     Primary Decision Maker: Eric Peña Child - [phone]    Secondary Decision Maker: DinoraLelo sharpe 1st Alt POA - Child - [phone]    Supplemental (Other) Decision Maker: Quinten Newberry 2nd Alt POA - Child - [phone]  Click here to complete Healthcare Decision Makers including section of the Healthcare Decision Maker Relationship (ie \"Primary\")  Today we documented Decision Maker(s) consistent with ACP documents on file. POA and LW completed today (reflects above) and original and copy given to pt. Care Preferences    Ventilation: \"If you were in your present state of health and suddenly became very ill and were unable to breathe on your own, what would your preference be about the use of a ventilator (breathing machine) if it were available to you? \"      Would the patient desire the use of ventilator (breathing machine)?: no    \"If your health worsens and it becomes clear that your chance of recovery is unlikely, what would your preference be about the use of a ventilator (breathing machine) if it were available to you? \"     Would the patient desire the use of ventilator (breathing machine)?: No      Resuscitation  \"CPR works best to restart the heart when there is a sudden event, like a heart attack, in someone who is otherwise healthy. Unfortunately, CPR does not typically restart the heart for people who have serious health conditions or who are very sick. \"    \"In the event your heart stopped as a result of an underlying serious health condition, would you want attempts to be made to restart your heart (answer \"yes\" for attempt to resuscitate) or would you prefer a natural death (answer \"no\" for do not attempt to resuscitate)? \" no       [] Yes   [x] No   Educated Patient / [facility] regarding differences between Advance Directives and portable DNR orders. Length of ACP Conversation in minutes:  22    Conversation Outcomes:  [x] ACP discussion completed  [] Existing advance directive reviewed with patient; no changes to patient's previously recorded wishes  [] New Advance Directive completed  [] Portable Do Not Rescitate prepared for Provider review and signature  [] POLST/POST/MOLST/MOST prepared for Provider review and signature      Follow-up plan:    [] Schedule follow-up conversation to continue planning  [x] Referred individual to Provider for additional questions/concerns   [] Advised patient/agent/surrogate to review completed ACP document and update if needed with changes in condition, patient preferences or care setting    [x] This note routed to one or more involved healthcare providers.

## 2022-07-26 PROBLEM — A41.9 SEPSIS (HCC): Status: ACTIVE | Noted: 2022-07-26

## 2022-07-26 PROBLEM — I48.0 PAROXYSMAL A-FIB (HCC): Status: ACTIVE | Noted: 2022-07-26

## 2022-07-26 PROBLEM — J96.00 ACUTE RESPIRATORY FAILURE DUE TO COVID-19 (HCC): Status: ACTIVE | Noted: 2022-07-25

## 2022-07-26 PROBLEM — J15.9 COMMUNITY ACQUIRED BACTERIAL PNEUMONIA: Status: ACTIVE | Noted: 2022-07-26

## 2022-07-26 LAB
ALBUMIN SERPL-MCNC: 4.4 G/DL (ref 3.5–5.2)
ALP BLD-CCNC: 71 U/L (ref 35–104)
ALT SERPL-CCNC: 22 U/L (ref 0–32)
ANION GAP SERPL CALCULATED.3IONS-SCNC: 11 MMOL/L (ref 7–16)
APTT: 35.1 SEC (ref 24.5–35.1)
AST SERPL-CCNC: 34 U/L (ref 0–31)
BASOPHILS ABSOLUTE: 0.01 E9/L (ref 0–0.2)
BASOPHILS RELATIVE PERCENT: 0.1 % (ref 0–2)
BILIRUB SERPL-MCNC: 0.7 MG/DL (ref 0–1.2)
BUN BLDV-MCNC: 17 MG/DL (ref 6–23)
C-REACTIVE PROTEIN: 5.4 MG/DL (ref 0–0.4)
CALCIUM SERPL-MCNC: 9.1 MG/DL (ref 8.6–10.2)
CHLORIDE BLD-SCNC: 99 MMOL/L (ref 98–107)
CO2: 24 MMOL/L (ref 22–29)
CREAT SERPL-MCNC: 0.7 MG/DL (ref 0.5–1)
D DIMER: 266 NG/ML DDU
EOSINOPHILS ABSOLUTE: 0 E9/L (ref 0.05–0.5)
EOSINOPHILS RELATIVE PERCENT: 0 % (ref 0–6)
FIBRINOGEN: 329 MG/DL (ref 200–400)
GFR AFRICAN AMERICAN: >60
GFR NON-AFRICAN AMERICAN: >60 ML/MIN/1.73
GLUCOSE BLD-MCNC: 124 MG/DL (ref 74–99)
HCT VFR BLD CALC: 37.6 % (ref 34–48)
HEMOGLOBIN: 12.1 G/DL (ref 11.5–15.5)
IMMATURE GRANULOCYTES #: 0.04 E9/L
IMMATURE GRANULOCYTES %: 0.5 % (ref 0–5)
INR BLD: 1.6
LACTIC ACID: 1.8 MMOL/L (ref 0.5–2.2)
LYMPHOCYTES ABSOLUTE: 0.97 E9/L (ref 1.5–4)
LYMPHOCYTES RELATIVE PERCENT: 13.1 % (ref 20–42)
MAGNESIUM: 1.6 MG/DL (ref 1.6–2.6)
MCH RBC QN AUTO: 30.5 PG (ref 26–35)
MCHC RBC AUTO-ENTMCNC: 32.2 % (ref 32–34.5)
MCV RBC AUTO: 94.7 FL (ref 80–99.9)
MONOCYTES ABSOLUTE: 0.53 E9/L (ref 0.1–0.95)
MONOCYTES RELATIVE PERCENT: 7.2 % (ref 2–12)
NEUTROPHILS ABSOLUTE: 5.86 E9/L (ref 1.8–7.3)
NEUTROPHILS RELATIVE PERCENT: 79.1 % (ref 43–80)
PDW BLD-RTO: 13.5 FL (ref 11.5–15)
PHOSPHORUS: 3.9 MG/DL (ref 2.5–4.5)
PLATELET # BLD: 182 E9/L (ref 130–450)
PMV BLD AUTO: 9.8 FL (ref 7–12)
POTASSIUM SERPL-SCNC: 4.1 MMOL/L (ref 3.5–5)
PROTHROMBIN TIME: 18 SEC (ref 9.3–12.4)
RBC # BLD: 3.97 E12/L (ref 3.5–5.5)
SODIUM BLD-SCNC: 134 MMOL/L (ref 132–146)
TOTAL PROTEIN: 7.4 G/DL (ref 6.4–8.3)
TROPONIN, HIGH SENSITIVITY: 74 NG/L (ref 0–9)
VITAMIN D 25-HYDROXY: 82 NG/ML (ref 30–100)
WBC # BLD: 7.4 E9/L (ref 4.5–11.5)

## 2022-07-26 PROCEDURE — 36415 COLL VENOUS BLD VENIPUNCTURE: CPT

## 2022-07-26 PROCEDURE — 94640 AIRWAY INHALATION TREATMENT: CPT

## 2022-07-26 PROCEDURE — 2060000000 HC ICU INTERMEDIATE R&B

## 2022-07-26 PROCEDURE — 84484 ASSAY OF TROPONIN QUANT: CPT

## 2022-07-26 PROCEDURE — 80053 COMPREHEN METABOLIC PANEL: CPT

## 2022-07-26 PROCEDURE — 85730 THROMBOPLASTIN TIME PARTIAL: CPT

## 2022-07-26 PROCEDURE — 97161 PT EVAL LOW COMPLEX 20 MIN: CPT | Performed by: PHYSICAL THERAPIST

## 2022-07-26 PROCEDURE — 85610 PROTHROMBIN TIME: CPT

## 2022-07-26 PROCEDURE — 6370000000 HC RX 637 (ALT 250 FOR IP): Performed by: INTERNAL MEDICINE

## 2022-07-26 PROCEDURE — 82306 VITAMIN D 25 HYDROXY: CPT

## 2022-07-26 PROCEDURE — 85378 FIBRIN DEGRADE SEMIQUANT: CPT

## 2022-07-26 PROCEDURE — 85025 COMPLETE CBC W/AUTO DIFF WBC: CPT

## 2022-07-26 PROCEDURE — 6360000002 HC RX W HCPCS: Performed by: INTERNAL MEDICINE

## 2022-07-26 PROCEDURE — 2580000003 HC RX 258: Performed by: INTERNAL MEDICINE

## 2022-07-26 PROCEDURE — 97165 OT EVAL LOW COMPLEX 30 MIN: CPT

## 2022-07-26 PROCEDURE — 6370000000 HC RX 637 (ALT 250 FOR IP): Performed by: NURSE PRACTITIONER

## 2022-07-26 PROCEDURE — 86140 C-REACTIVE PROTEIN: CPT

## 2022-07-26 PROCEDURE — 83605 ASSAY OF LACTIC ACID: CPT

## 2022-07-26 PROCEDURE — 84100 ASSAY OF PHOSPHORUS: CPT

## 2022-07-26 PROCEDURE — 83735 ASSAY OF MAGNESIUM: CPT

## 2022-07-26 PROCEDURE — 85384 FIBRINOGEN ACTIVITY: CPT

## 2022-07-26 PROCEDURE — 2700000000 HC OXYGEN THERAPY PER DAY

## 2022-07-26 RX ORDER — FLUTICASONE PROPIONATE 110 UG/1
1 AEROSOL, METERED RESPIRATORY (INHALATION) 2 TIMES DAILY
Status: DISCONTINUED | OUTPATIENT
Start: 2022-07-26 | End: 2022-07-27 | Stop reason: HOSPADM

## 2022-07-26 RX ORDER — ALBUTEROL SULFATE 90 UG/1
2 AEROSOL, METERED RESPIRATORY (INHALATION) EVERY 6 HOURS PRN
Status: DISCONTINUED | OUTPATIENT
Start: 2022-07-26 | End: 2022-07-27 | Stop reason: HOSPADM

## 2022-07-26 RX ORDER — TIZANIDINE 4 MG/1
4 TABLET ORAL ONCE
Status: COMPLETED | OUTPATIENT
Start: 2022-07-26 | End: 2022-07-27

## 2022-07-26 RX ORDER — TIZANIDINE 4 MG/1
8 TABLET ORAL NIGHTLY
Status: DISCONTINUED | OUTPATIENT
Start: 2022-07-27 | End: 2022-07-27 | Stop reason: HOSPADM

## 2022-07-26 RX ADMIN — VALSARTAN 160 MG: 80 TABLET, FILM COATED ORAL at 08:44

## 2022-07-26 RX ADMIN — ZINC SULFATE 220 MG (50 MG) CAPSULE 50 MG: CAPSULE at 17:34

## 2022-07-26 RX ADMIN — ACETAMINOPHEN 650 MG: 325 TABLET ORAL at 05:33

## 2022-07-26 RX ADMIN — BUSPIRONE HYDROCHLORIDE 5 MG: 5 TABLET ORAL at 12:49

## 2022-07-26 RX ADMIN — APIXABAN 5 MG: 5 TABLET, FILM COATED ORAL at 21:08

## 2022-07-26 RX ADMIN — OXYCODONE HYDROCHLORIDE AND ACETAMINOPHEN 1000 MG: 500 TABLET ORAL at 17:34

## 2022-07-26 RX ADMIN — AMITRIPTYLINE HYDROCHLORIDE 75 MG: 25 TABLET, FILM COATED ORAL at 21:08

## 2022-07-26 RX ADMIN — TIZANIDINE 4 MG: 4 TABLET ORAL at 21:08

## 2022-07-26 RX ADMIN — BUSPIRONE HYDROCHLORIDE 5 MG: 5 TABLET ORAL at 21:08

## 2022-07-26 RX ADMIN — DEXAMETHASONE SODIUM PHOSPHATE 6 MG: 10 INJECTION INTRAMUSCULAR; INTRAVENOUS at 12:49

## 2022-07-26 RX ADMIN — IPRATROPIUM BROMIDE AND ALBUTEROL SULFATE 1 AMPULE: .5; 2.5 SOLUTION RESPIRATORY (INHALATION) at 14:47

## 2022-07-26 RX ADMIN — APIXABAN 5 MG: 5 TABLET, FILM COATED ORAL at 08:45

## 2022-07-26 RX ADMIN — FAMOTIDINE 20 MG: 20 TABLET, FILM COATED ORAL at 08:44

## 2022-07-26 RX ADMIN — VENLAFAXINE HYDROCHLORIDE 75 MG: 37.5 CAPSULE, EXTENDED RELEASE ORAL at 21:07

## 2022-07-26 RX ADMIN — ACETAMINOPHEN 650 MG: 325 TABLET ORAL at 21:08

## 2022-07-26 RX ADMIN — FUROSEMIDE 40 MG: 40 TABLET ORAL at 08:44

## 2022-07-26 RX ADMIN — BUDESONIDE 500 MCG: 0.5 SUSPENSION RESPIRATORY (INHALATION) at 06:44

## 2022-07-26 RX ADMIN — Medication 4000 UNITS: at 17:33

## 2022-07-26 RX ADMIN — AZITHROMYCIN MONOHYDRATE 500 MG: 250 TABLET ORAL at 12:50

## 2022-07-26 RX ADMIN — ACETAMINOPHEN 650 MG: 325 TABLET ORAL at 12:49

## 2022-07-26 RX ADMIN — IPRATROPIUM BROMIDE AND ALBUTEROL SULFATE 1 AMPULE: .5; 2.5 SOLUTION RESPIRATORY (INHALATION) at 18:25

## 2022-07-26 RX ADMIN — METOPROLOL TARTRATE 50 MG: 25 TABLET, FILM COATED ORAL at 08:44

## 2022-07-26 RX ADMIN — HYDROCHLOROTHIAZIDE 25 MG: 25 TABLET ORAL at 08:44

## 2022-07-26 RX ADMIN — BUSPIRONE HYDROCHLORIDE 5 MG: 5 TABLET ORAL at 08:45

## 2022-07-26 RX ADMIN — IPRATROPIUM BROMIDE AND ALBUTEROL SULFATE 1 AMPULE: .5; 2.5 SOLUTION RESPIRATORY (INHALATION) at 10:54

## 2022-07-26 RX ADMIN — MONTELUKAST 10 MG: 10 TABLET, FILM COATED ORAL at 21:08

## 2022-07-26 RX ADMIN — FAMOTIDINE 20 MG: 20 TABLET, FILM COATED ORAL at 21:08

## 2022-07-26 RX ADMIN — ONDANSETRON 4 MG: 2 INJECTION INTRAMUSCULAR; INTRAVENOUS at 14:23

## 2022-07-26 RX ADMIN — IPRATROPIUM BROMIDE AND ALBUTEROL SULFATE 1 AMPULE: .5; 2.5 SOLUTION RESPIRATORY (INHALATION) at 06:44

## 2022-07-26 RX ADMIN — CEFTRIAXONE SODIUM 1000 MG: 1 INJECTION, POWDER, FOR SOLUTION INTRAMUSCULAR; INTRAVENOUS at 12:49

## 2022-07-26 RX ADMIN — BUDESONIDE 500 MCG: 0.5 SUSPENSION RESPIRATORY (INHALATION) at 18:25

## 2022-07-26 RX ADMIN — METOPROLOL TARTRATE 50 MG: 25 TABLET, FILM COATED ORAL at 21:07

## 2022-07-26 ASSESSMENT — PAIN SCALES - GENERAL
PAINLEVEL_OUTOF10: 0
PAINLEVEL_OUTOF10: 3
PAINLEVEL_OUTOF10: 0
PAINLEVEL_OUTOF10: 3

## 2022-07-26 ASSESSMENT — PAIN DESCRIPTION - LOCATION
LOCATION: HEAD
LOCATION: GENERALIZED

## 2022-07-26 NOTE — PROGRESS NOTES
Pulse ox was__96__% on room air at rest.  Ambulated patient on room air. Oxygen saturation was __90___% on room air while ambulating.

## 2022-07-26 NOTE — PROGRESS NOTES
Subjective:  Mehran was seen and examined at bedside today. The patient's questions were answered and tests were reviewed. There were no new problems reported overnight. Patient is tolerating current diet. Pt is doing well. C/o cough with little yellow sputum. Pos fatigue and bodyaches. No f,c,n,v,diarrhea. Currently 95% on RA. States she may desat to 91% with exertion. A complete review of systems and social history was completed on admission and remains unchanged unless otherwise noted    Scheduled Meds:   ipratropium-albuterol  1 ampule Inhalation Q4H WA    amitriptyline  75 mg Oral Nightly    apixaban  5 mg Oral BID    busPIRone  5 mg Oral TID    Vitamin D  100 mcg Oral Dinner    famotidine  20 mg Oral BID    metoprolol tartrate  50 mg Oral BID    dexamethasone  6 mg IntraVENous Q24H    azithromycin  500 mg Oral Daily    cefTRIAXone (ROCEPHIN) IV  1,000 mg IntraVENous Q24H    budesonide  500 mcg Nebulization BID    zinc sulfate  50 mg Oral Dinner    ascorbic acid  1,000 mg Oral Dinner    venlafaxine  75 mg Oral Nightly    tiZANidine  4 mg Oral Nightly    montelukast  10 mg Oral Nightly    furosemide  40 mg Oral Every Other Day    valsartan  160 mg Oral Daily    And    hydroCHLOROthiazide  25 mg Oral Daily     Continuous Infusions:  PRN Meds:acetaminophen **OR** acetaminophen, guaiFENesin-dextromethorphan, albuterol, ondansetron    Objective:  BP (!) 137/91   Pulse 87   Temp (!) 96.5 °F (35.8 °C) (Oral)   Resp 22   Ht 5' 4\" (1.626 m)   Wt 217 lb 2 oz (98.5 kg)   SpO2 91%   BMI 37.27 kg/m²   In: 180 [P.O.:180]  Out: -    In: 180   Out: -      AAO x 3, currently in NAD  RRR, pos S1, S2  CTA bilaterally, no wheeze, rales or rhonchi  bowel sounds present, nontender, nondistended  No clubbing, cyanosis, or edema  No neuro changes   No obvious rashes or lesions.     Recent Labs     07/25/22  0724 07/26/22  0710   WBC 4.5 7.4   HGB 11.7 12.1    182     Recent Labs     07/25/22  0724 07/26/22  0710    134   K 3.8 4.1    99   CO2 25 24   BUN 11 17   CREATININE 0.8 0.7   GLUCOSE 119* 124*     Recent Labs     07/26/22  0710   BILITOT 0.7   ALKPHOS 71   AST 34*   ALT 22     Recent Labs     07/26/22  0710   INR 1.6     No results for input(s): CKTOTAL, CKMB, CKMBINDEX, TROPONINI in the last 72 hours. CTA CHEST W CONTRAST    Result Date: 7/25/2022  EXAMINATION: CTA OF THE CHEST 7/25/2022 8:40 am TECHNIQUE: CTA of the chest was performed after the administration of intravenous contrast.  Multiplanar reformatted images are provided for review. MIP images are provided for review. Automated exposure control, iterative reconstruction, and/or weight based adjustment of the mA/kV was utilized to reduce the radiation dose to as low as reasonably achievable. COMPARISON: None. HISTORY: ORDERING SYSTEM PROVIDED HISTORY: covid with SOB; r/o PE TECHNOLOGIST PROVIDED HISTORY: Reason for exam:->covid with SOB; r/o PE Decision Support Exception - unselect if not a suspected or confirmed emergency medical condition->Emergency Medical Condition (MA) FINDINGS: Pulmonary Arteries: Pulmonary arteries are adequately opacified for evaluation. No evidence of intraluminal filling defect to suggest pulmonary embolism. Main pulmonary artery is normal in caliber. Mediastinum: No evidence of mediastinal lymphadenopathy. The heart and pericardium demonstrate no acute abnormality. There is no acute abnormality of the thoracic aorta. Lungs/pleura: Vague patchy bilateral ground-glass airspace disease is noted. Differential includes pneumonia versus CHF. Given that there are small bilateral pleural effusions the findings may represent CHF. Ground-glass viral pneumonia is not excluded given the history of COVID. Upper Abdomen: Limited images of the upper abdomen are unremarkable. Soft Tissues/Bones:  Age related degenerative changes of the visualized osseous structures without focal destructive lesion.      1. There is no pulmonary embolus. 2. Very vague bilateral ground-glass airspace disease. Given that there are small bilateral pleural effusions the airspace disease could represent mild CHF however ground-glass viral pneumonia cannot be excluded. RECOMMENDATIONS: Unavailable     Assessment:  Fany Kessler is a 79y.o. year old female who presented on 7/25/2022 and is being treated for:  Principal Problem:    Acute respiratory failure due to COVID-19 [facility])  Active Problems:    Sepsis ([facility] [address]    Paroxysmal A-fib ([facility] [address]    Community acquired bacterial pneumonia  Resolved Problems:    * No resolved hospital problems. *    Plan  Chest CTA c/w Covid pneumonia however sxs c/w mild superimposed CAP  Pt is improving clinically won IV Ceftriaxone and Azithromycin. Oxygen is being monitored. Vitals stable. With minimal bilat pleural effusion and congestion, will maintain on home diuretics, arb, betablocker  Check Echocardiogram - planned tomorrow. Strict I/Os  Continue Eliquis and betablocker for Atrial fib - stable   PT/OT/SW  DC Home when stable - possibly tomorrow after Echo  Otherwise continue current therapy. Please see orders for further management and care. More than 50% of my time was spent at the bedside counseling/coordinating care with the patient and/or family with face to face contact. This time was spent reviewing notes and laboratory data as well as instructing and counseling the patient. Time I spent with the family or surrogate(s) is included only if the patient was incapable of providing the necessary information or participating in medical decisions. I also discussed the differential diagnosis and all of the proposed management plans with the patient and individuals accompanying the patient. I am readily available for any further decision-making and intervention.      Azeb Killian MD  4:37 PM  7/26/2022

## 2022-07-26 NOTE — PLAN OF CARE
Problem: Discharge Planning  Goal: Discharge to home or other facility with appropriate resources  Outcome: Progressing Towards Goal  Flowsheets (Taken 7/26/2022 0830)  Discharge to home or other facility with appropriate resources:   Identify barriers to discharge with patient and caregiver   Arrange for needed discharge resources and transportation as appropriate   Identify discharge learning needs (meds, wound care, etc)     Problem: Safety - Adult  Goal: Free from fall injury  Outcome: Progressing Towards Goal  Flowsheets (Taken 7/26/2022 1917)  Free From Fall Injury: Instruct family/caregiver on patient safety     Problem: ABCDS Injury Assessment  Goal: Absence of physical injury  Outcome: Progressing Towards Goal  Flowsheets (Taken 7/26/2022 1917)  Absence of Physical Injury: Implement safety measures based on patient assessment

## 2022-07-26 NOTE — PROGRESS NOTES
[address] [facility]  [address]. [place]        Date:2022                                                  Patient Name: Gela Hinton    MRN: [id number]    : 1955    Room: [address] [place] [address]      Evaluating OT: Nghia Chavez OTR/L #[id number]     Referring Provider and Specific Provider Orders/Date:      22 1200  OT eval and treat  Start:  22 1200,   End:  22 1200,   ONE TIME,   Standing Count:  1 Occurrences,   R         Rose Gusman,       Placement Recommendation: Home        Diagnosis:   1. COVID-19    2.  Hypoxia         Surgery: None      Pertinent Medical History:       Past Medical History:   Diagnosis Date    Acid reflux     Atrial fibrillation (HCC)     Cancer (HCC) 2005    Breast Cancer (Left Breast)    Chronic back pain     CPAP (continuous positive airway pressure) dependence     not using CPAP    Fibromyalgia     History of bariatric surgery 2002    Dr. Belkis Louis RYGB    History of cardiovascular stress test 8/10/2012    LEXISCAN    Hyperlipidemia     Hypertension     Kidney stones     Low iron     Osteoarthritis     Unspecified sleep apnea     Patient does not use C-Pap          Past Surgical History:   Procedure Laterality Date    APPENDECTOMY      BARIATRIC SURGERY      Myrna Kinsey      Dr. Ramonita Steele      Dr. Angel Krause      ECHO COMPL W DOP COLOR FLOW  8/10/2012         HERNIA REPAIR  2004    Dr. Kenia Conde Left 2021    LEFT INGUINAL [address], POSSIBLE BILATERAL,  LAPAROSCOPIC ROBOTIC XI performed by Yaya Galvez MD at [facility]    Dr. Cho [address] REPLACEMENT  2009    Right Knee    NECK SURGERY  06/2015    fused 4,5,6- screws, in [address]    ASHOK-EN-Y GASTRIC BYPASS  04/17/2002    Dr. Peewee Kirkpatrick RYGB    SHOULDER ARTHROSCOPY  2/17/12    LEFT REPAIR ROTATOR CUFF TEAR SUBACROMIAL DECOMPRESSION    TONSILLECTOMY      [address] - Patient states that this was due to her weight prior to [address]   2009    UVULOPALATOPHARYNGOPLASTY          Precautions:  Fall Risk, droplet plus isolation, COVID positive      Assessment of current deficits    [x] Functional mobility  [x]ADLs  [x] Strength               []Cognition    [x] Functional transfers   [x] IADLs         [] Safety Awareness   [x]Endurance    [] Fine Coordination              [x] Balance      [] Vision/perception   []Sensation     []Gross Motor Coordination  [] ROM  [] Delirium                   [] Motor Control     OT PLAN OF CARE   OT POC based on physician orders, patient diagnosis and results of clinical assessment    Frequency/Duration 1-3 days/wk for 2 weeks PRN     Specific OT Treatment Interventions to include:   * Instruction/training on adapted ADL techniques and AE recommendations to increase functional independence within precautions       * Training on energy conservation strategies, correct breathing pattern and techniques to improve independence/tolerance for self-care routine  * Functional transfer/mobility training/DME recommendations for increased independence, safety, and fall prevention  * Patient/Family education to increase follow through with safety techniques and functional independence  * Recommendation of environmental modifications for increased safety with functional transfers/mobility and ADLs  * Therapeutic exercise to improve motor endurance, ROM, and functional strength for ADLs/functional transfers  * Therapeutic activities to facilitate/challenge dynamic balance, stand tolerance for increased safety and independence with ADLs    Recommended Adaptive Equipment: shower chair      Home Living: Lives alone, 2nd floor apartment, elevator access. No steps to enter. Bathroom set-up: tub/shower, standard height commode. Equipment owned: None    Prior Level of Function: Independent with ADLs , Independent with IADLs; ambulated independently. Pt currently caring for 13 month old but the baby is currently staying with another family member. Driving: Yes   Occupation: N/a  Enjoys: Staying active      Pain Level: Pt denied pain; Nursing notified. Cognition: A&O: 4/4; Follows 3 step directions   Memory: intact   Sequencing: intact   Problem solving: intact   Judgement/safety: intact    [facility]   AM-PAC Daily Activity Inpatient   How much help for putting on and taking off regular lower body clothing?: A Little  How much help for Bathing?: A Little  How much help for Toileting?: A Little  How much help for putting on and taking off regular upper body clothing?: A Little  How much help for taking care of personal grooming?: A Little  How much help for eating meals?: None  AM-[facility] Inpatient Daily Activity Raw Score: 19  AM-PAC Inpatient ADL T-Scale Score : 40.22  ADL Inpatient CMS 0-100% Score: 42.8  ADL Inpatient CMS G-Code Modifier : CK     Functional Assessment:    Initial Eval Status  Date: 7/26/22   Treatment Status  Date: STGs = LTGs  Time frame: 10-14 days   Feeding Independent     Independent    Grooming Supervision     Independent    UB Dressing Supervision    Independent    LB Dressing Supervision     Independent    Bathing Supervision     Independent    Toileting Supervision     Independent    Bed Mobility  Supine to sit: Not Assessed   Sit to supine: Not Assessed     Supine to sit:  Independent   Sit to supine: Independent    Functional Transfers Sit to stand: Supervision  Stand to sit: Supervision   Commode Transfer: Supervision       Independent    Functional Mobility Supervision without AD within room. No episodes of loss of balance. Independent    Balance Sitting:     Static: good     Dynamic: good  Standing: fair plus     Sitting:     Static: good    Dynamic: good  Standing: good    Activity Tolerance fair  / fair plus   Increase standing tolerance >5 minutes for improved engagement with functional transfers and indep in ADLs     Visual/  Perceptual Glasses: N/a       NA      Hand Dominance: Right      AROM (PROM) Strength Additional Info:  Goal:   RUE  WFL 4-/5 good  and wfl FMC/dexterity noted during ADL tasks   Improve overall RUE strength  for participation in functional tasks   LUE WFL 4-/5 good  and wfl FMC/dexterity noted during ADL tasks   Improve overall LUE strength  for participation in functional tasks     [facility]   Sensation:   No c/o numbness or tingling  Tone:  WFL   Edema:      Vitals:  HR at rest:  bpm HR with activity: 90s bpm HR at end of session: 86 bpm   SpO2 at rest: % SpO2 with activity: 93-94% SpO2 at end of session: 97%   BP at rest:  BP with activity:  BP at end of session:      Comments: RN cleared patient for OT. Upon arrival patient at EOB. Therapist facilitated and instructed pt on adapted  techniques & compensatory strategies to improve safety and independence with basic ADLs, bed mobility, functional transfers and mobility to allow pt to achieve highest level of independence and safely. Pt demonstrated good understanding of education & follow through. At end of session, patient was in chair with call light and phone within reach, all lines and tubes intact. Overall, patient demonstrated  decreased independence and safety during completion of ADL tasks. Pt would benefit from continued skilled OT to increase safety and independence with completion of ADL tasks and functional mobility for improved quality of life. Rehab Potential: Good for established goals.       Patient / Family Goal: Return home      Patient and/or family were instructed on functional diagnosis, prognosis/goals and OT plan of care. Demonstrated good understanding. Eval Complexity: Low    Time In: 11:05 AM   Time Out: 11:25 AM    Total Treatment Time: 0       Min Units   OT Eval Low 97165  X  1    OT Eval Medium 52500      OT Eval High 69688      OT Re-Eval [id number]            ADL/Self Care 20989     Therapeutic Activities 24907       Therapeutic Ex 07368       Orthotic Management 30628       Manual 10731     Neuro Re-Ed 03046       Non-Billable Time        Evaluation Time additionally includes thorough review of current medical information, gathering information on past medical history/social history and prior level of function, interpretation of standardized testing/informal observation of tasks, assessment of data and development of plan of care and goals.         Evaluating OT: Jesus Guerrero OTR/L #[id number]

## 2022-07-26 NOTE — PLAN OF CARE
Problem: Discharge Planning  Goal: Discharge to home or other facility with appropriate resources  Outcome: Progressing Towards Goal  Flowsheets  Taken 7/25/2022 2015 by Jazzy Diamond RN  Discharge to home or other facility with appropriate resources: Identify barriers to discharge with patient and caregiver  Taken 7/25/2022 1825 by Nhi Arellano RN  Discharge to home or other facility with appropriate resources:   Identify barriers to discharge with patient and caregiver   Identify discharge learning needs (meds, wound care, etc)   Refer to discharge planning if patient needs post-hospital services based on physician order or complex needs related to functional status, cognitive ability or social support system   Arrange for needed discharge resources and transportation as appropriate   Arrange for interpreters to assist at discharge as needed     Problem: Safety - Adult  Goal: Free from fall injury  Outcome: Progressing Towards Goal  Flowsheets (Taken 7/25/2022 2015)  Free From Fall Injury: Instruct family/caregiver on patient safety     Problem: ABCDS Injury Assessment  Goal: Absence of physical injury  Outcome: Progressing Towards Goal  Flowsheets (Taken 7/25/2022 2015)  Absence of Physical Injury: Implement safety measures based on patient assessment

## 2022-07-26 NOTE — CARE COORDINATION
SOCIAL WORK / DISCHARGE PLANNING:   COVID positive. Pt resides alone, dc plan to return home as previous. Room air but has used 2L, watch for O2 needs.                Electronically signed by KATIANA To on 7/26/2022 at 4:07 PM

## 2022-07-26 NOTE — PROGRESS NOTES
Physical Therapy    Physical Therapy Initial Evaluation/Plan of Care    Room #:  8942/3769-29  Patient Name: Alverto De La Fuente  YOB: 1955  MRN: [id number]    Date of Service: 7/26/2022     Tentative placement recommendation: Home  Equipment recommendation: None      Evaluating Physical Therapist: Elise Santana PT  #[id number]      Specific Provider Orders/Date/Referring Provider :  07/25/22 1200    PT eval and treat  Start:  07/25/22 1200,   End:  07/25/22 1200,   ONE TIME,   Standing Count:  1 Occurrences,   R         Loki Herzog DO     Admitting Diagnosis:   Hypoxia [R09.02]  COVID-19 [U07.1]    Admitted with    fatigue cough and shortness of breath , nausea covid +  Surgery: none  Visit Diagnoses         Codes    Hypoxia     R09.02            Patient Active Problem List   Diagnosis    Hip pain    Trochanteric bursitis    Shoulder pain    Rotator cuff tear    Subacromial impingement    Labral tear of shoulder    Primary osteoarthritis of left knee    Iron deficiency anemia, unspecified    COVID-19        ASSESSMENT of Current Deficits Patient exhibits decreased strength, balance, and endurance impairing functional mobility, transfers, gait , gait distance, and tolerance to activity are barriers to d/c and require skilled intervention during hospital stay to attain pre hospital level of function. Decreased strength, balance and endurance  increases patient's risk for fall.  Patient with difficulty oxygenating impairing endurance and functional independence         PHYSICAL THERAPY  PLAN OF CARE       Physical therapy plan of care is established based on physician order,  patient diagnosis and clinical assessment    Current Treatment Recommendations:    -Gait: Gait training and Standing activities to improve: base of support, weight shift, weight bearing    -Endurance: Utilize Supervised activities to increase level of endurance to allow for safe functional mobility including transfers and gait -Stairs: Stair training with instruction on proper technique and hand placement on rail    PT long term treatment goals are located in below grid    Patient and or family understand(s) diagnosis, prognosis, and plan of care. Frequency of treatments: Patient will be seen  daily.          Prior Level of Function: Patient ambulated independently    Rehab Potential: good   for baseline    Past medical history:   Past Medical History:   Diagnosis Date    Acid reflux     Atrial fibrillation (HCC)     Cancer ([facility] [address] 5/19/2005    Breast Cancer (Left Breast)    Chronic back pain     CPAP (continuous positive airway pressure) dependence     not using CPAP    Fibromyalgia     History of bariatric surgery 04/17/2002    Dr. Gerard Paul RYGB    History of cardiovascular stress test 8/10/2012    LEXISCAN    Hyperlipidemia     Hypertension     Kidney stones     Low iron     Osteoarthritis     Unspecified sleep apnea     Patient does not use C-Pap      Past Surgical History:   Procedure Laterality Date    APPENDECTOMY      BARIATRIC SURGERY  2003    [address] [place] [address]  2005    Dr. Rizwana Calvo  2004    Dr. Seda Constantino  2008    ECHO COMPL W DOP COLOR FLOW  8/10/2012         [facility]  2004    Dr. Juan Richardson Left 11/4/2021    LEFT INGUINAL [address], POSSIBLE BILATERAL,  LAPAROSCOPIC ROBOTIC XI performed by Samira Millard [facility] at [facility]    Dr. Sia Isaac - [address] [place] [address]  2009    Right Knee    NECK SURGERY  06/2015    fused 4,5,6- screws, in [address]    ASHOK-EN-Y GASTRIC BYPASS  04/17/2002    Dr. Gerard Paul RYGB    SHOULDER ARTHROSCOPY  2/17/12    LEFT REPAIR ROTATOR CUFF TEAR SUBACROMIAL DECOMPRESSION    TONSILLECTOMY Standing:  fair    Sitting EOB:  good    Dynamic Standing: good       Patient is Alert & Oriented x person, place, time, and situation and follows directions    Sensation:  Patient  denies numbness/tingling   Edema:  yes bilateral lower extremities   Endurance: fair       Vitals:  2 liters nasal cannula   Blood Pressure at rest  Blood Pressure during session    Heart Rate at rest 103 Heart Rate during session 103   SPO2 at rest 99%  SPO2 during session 98%     Patient education  Patient educated on role of Physical Therapy, risks of immobility, safety and plan of care, energy conservation, importance of positional changes for oxygen exchange,  importance of mobility while in hospital , ankle pumps, quad set and glut set for edema control, blood clot prevention, safety , O2 line management and safety , and proper use/technique of incentive spirometer     Patient response to education:   Pt verbalized understanding Pt demonstrated skill Pt requires further education in this area   Yes Partial Yes      Treatment:  Patient practiced and was instructed/facilitated in the following treatment: Patient   Sat edge of bed 10 minutes with Independent to increase dynamic sitting balance and activity tolerance. seated and standing challenges      Therapeutic Exercises:  ankle pumps, squat, and calf raise  x 10 reps. At end of session, patient sitting edge of bed with     call light and phone within reach,  all lines and tubes intact, nursing notified. Patient would benefit from continued skilled Physical Therapy to improve functional independence and quality of life.          Patient's/ family goals   home       Time in  320  Time out  340    Total Treatment Time  0 minutes    Evaluation time includes thorough review of current medical information, gathering information on past medical history/social history and prior level of function, completion of standardized testing/informal observation of tasks, assessment of data, and development of Plan of care and goals.      CPT codes:  Low Complexity PT evaluation (07063)  No treatment billed    Clifm Grandchild, PT

## 2022-07-27 VITALS
RESPIRATION RATE: 20 BRPM | OXYGEN SATURATION: 97 % | HEART RATE: 96 BPM | WEIGHT: 217 LBS | DIASTOLIC BLOOD PRESSURE: 90 MMHG | SYSTOLIC BLOOD PRESSURE: 121 MMHG | TEMPERATURE: 97.7 F | HEIGHT: 64 IN | BODY MASS INDEX: 37.05 KG/M2

## 2022-07-27 LAB
C-REACTIVE PROTEIN: 5.3 MG/DL (ref 0–0.4)
D DIMER: 239 NG/ML DDU
L. PNEUMOPHILA SEROGP 1 UR AG: NORMAL
LV EF: 42 %
LVEF MODALITY: NORMAL
PROCALCITONIN: 0.05 NG/ML (ref 0–0.08)
STREP PNEUMONIAE ANTIGEN, URINE: NORMAL

## 2022-07-27 PROCEDURE — 97110 THERAPEUTIC EXERCISES: CPT

## 2022-07-27 PROCEDURE — 36415 COLL VENOUS BLD VENIPUNCTURE: CPT

## 2022-07-27 PROCEDURE — 2580000003 HC RX 258: Performed by: INTERNAL MEDICINE

## 2022-07-27 PROCEDURE — 6370000000 HC RX 637 (ALT 250 FOR IP): Performed by: INTERNAL MEDICINE

## 2022-07-27 PROCEDURE — 97530 THERAPEUTIC ACTIVITIES: CPT

## 2022-07-27 PROCEDURE — 93306 TTE W/DOPPLER COMPLETE: CPT

## 2022-07-27 PROCEDURE — 6360000002 HC RX W HCPCS: Performed by: INTERNAL MEDICINE

## 2022-07-27 PROCEDURE — 94640 AIRWAY INHALATION TREATMENT: CPT

## 2022-07-27 PROCEDURE — 6370000000 HC RX 637 (ALT 250 FOR IP): Performed by: NURSE PRACTITIONER

## 2022-07-27 PROCEDURE — 84145 PROCALCITONIN (PCT): CPT

## 2022-07-27 PROCEDURE — 2700000000 HC OXYGEN THERAPY PER DAY

## 2022-07-27 PROCEDURE — 85378 FIBRIN DEGRADE SEMIQUANT: CPT

## 2022-07-27 PROCEDURE — 86140 C-REACTIVE PROTEIN: CPT

## 2022-07-27 RX ORDER — FLUTICASONE PROPIONATE 110 UG/1
1 AEROSOL, METERED RESPIRATORY (INHALATION) 2 TIMES DAILY
Qty: 12 G | Refills: 3 | Status: SHIPPED | OUTPATIENT
Start: 2022-07-27

## 2022-07-27 RX ORDER — AZITHROMYCIN 500 MG/1
500 TABLET, FILM COATED ORAL DAILY
Qty: 4 TABLET | Refills: 0 | Status: SHIPPED | OUTPATIENT
Start: 2022-07-28 | End: 2022-08-01

## 2022-07-27 RX ORDER — PANTOPRAZOLE SODIUM 40 MG/1
40 TABLET, DELAYED RELEASE ORAL
Status: DISCONTINUED | OUTPATIENT
Start: 2022-07-27 | End: 2022-07-27 | Stop reason: HOSPADM

## 2022-07-27 RX ORDER — METHYLPREDNISOLONE 4 MG/1
TABLET ORAL
Qty: 1 KIT | Refills: 0 | Status: SHIPPED | OUTPATIENT
Start: 2022-07-27 | End: 2022-08-02

## 2022-07-27 RX ADMIN — VALSARTAN 160 MG: 80 TABLET, FILM COATED ORAL at 10:51

## 2022-07-27 RX ADMIN — IPRATROPIUM BROMIDE AND ALBUTEROL 1 PUFF: 20; 100 SPRAY, METERED RESPIRATORY (INHALATION) at 14:52

## 2022-07-27 RX ADMIN — DEXAMETHASONE SODIUM PHOSPHATE 6 MG: 10 INJECTION INTRAMUSCULAR; INTRAVENOUS at 10:52

## 2022-07-27 RX ADMIN — FLUTICASONE PROPIONATE 1 PUFF: 110 AEROSOL, METERED RESPIRATORY (INHALATION) at 06:14

## 2022-07-27 RX ADMIN — IPRATROPIUM BROMIDE AND ALBUTEROL 1 PUFF: 20; 100 SPRAY, METERED RESPIRATORY (INHALATION) at 09:41

## 2022-07-27 RX ADMIN — ACETAMINOPHEN 650 MG: 325 TABLET ORAL at 11:09

## 2022-07-27 RX ADMIN — HYDROCHLOROTHIAZIDE 25 MG: 25 TABLET ORAL at 10:52

## 2022-07-27 RX ADMIN — BUSPIRONE HYDROCHLORIDE 5 MG: 5 TABLET ORAL at 10:51

## 2022-07-27 RX ADMIN — OXYCODONE HYDROCHLORIDE AND ACETAMINOPHEN 1000 MG: 500 TABLET ORAL at 16:10

## 2022-07-27 RX ADMIN — IPRATROPIUM BROMIDE AND ALBUTEROL 1 PUFF: 20; 100 SPRAY, METERED RESPIRATORY (INHALATION) at 06:14

## 2022-07-27 RX ADMIN — METOPROLOL TARTRATE 50 MG: 25 TABLET, FILM COATED ORAL at 10:54

## 2022-07-27 RX ADMIN — PANTOPRAZOLE SODIUM 40 MG: 40 TABLET, DELAYED RELEASE ORAL at 15:44

## 2022-07-27 RX ADMIN — Medication 4000 UNITS: at 15:44

## 2022-07-27 RX ADMIN — CEFTRIAXONE SODIUM 1000 MG: 1 INJECTION, POWDER, FOR SOLUTION INTRAMUSCULAR; INTRAVENOUS at 16:10

## 2022-07-27 RX ADMIN — APIXABAN 5 MG: 5 TABLET, FILM COATED ORAL at 10:51

## 2022-07-27 RX ADMIN — TIZANIDINE 4 MG: 4 TABLET ORAL at 00:32

## 2022-07-27 RX ADMIN — AZITHROMYCIN MONOHYDRATE 500 MG: 250 TABLET ORAL at 15:44

## 2022-07-27 RX ADMIN — FAMOTIDINE 20 MG: 20 TABLET, FILM COATED ORAL at 10:52

## 2022-07-27 RX ADMIN — BUSPIRONE HYDROCHLORIDE 5 MG: 5 TABLET ORAL at 15:44

## 2022-07-27 RX ADMIN — ZINC SULFATE 220 MG (50 MG) CAPSULE 50 MG: CAPSULE at 15:44

## 2022-07-27 ASSESSMENT — PAIN SCALES - GENERAL
PAINLEVEL_OUTOF10: 1
PAINLEVEL_OUTOF10: 0
PAINLEVEL_OUTOF10: 4
PAINLEVEL_OUTOF10: 5

## 2022-07-27 ASSESSMENT — PAIN DESCRIPTION - LOCATION: LOCATION: HEAD

## 2022-07-27 ASSESSMENT — PAIN - FUNCTIONAL ASSESSMENT: PAIN_FUNCTIONAL_ASSESSMENT: ACTIVITIES ARE NOT PREVENTED

## 2022-07-27 ASSESSMENT — PAIN DESCRIPTION - ONSET: ONSET: GRADUAL

## 2022-07-27 ASSESSMENT — PAIN DESCRIPTION - DESCRIPTORS: DESCRIPTORS: ACHING

## 2022-07-27 ASSESSMENT — PAIN DESCRIPTION - PAIN TYPE: TYPE: ACUTE PAIN

## 2022-07-27 ASSESSMENT — PAIN DESCRIPTION - ORIENTATION: ORIENTATION: MID

## 2022-07-27 ASSESSMENT — PAIN DESCRIPTION - FREQUENCY: FREQUENCY: INTERMITTENT

## 2022-07-27 NOTE — PLAN OF CARE
Problem: Discharge Planning  Goal: Discharge to home or other facility with appropriate resources  7/27/2022 0525 by Jazzy Diamond RN  Outcome: Progressing Towards Goal  Flowsheets (Taken 7/26/2022 2100)  Discharge to home or other facility with appropriate resources: Identify barriers to discharge with patient and caregiver     Problem: Safety - Adult  Goal: Free from fall injury  7/27/2022 0525 by Jazzy Diamond RN  Outcome: Progressing Towards Goal  Flowsheets (Taken 7/26/2022 2100)  Free From Fall Injury: Instruct family/caregiver on patient safety     Problem: ABCDS Injury Assessment  Goal: Absence of physical injury  7/27/2022 0525 by Jazzy Diamond RN  Outcome: Progressing Towards Goal  Flowsheets (Taken 7/26/2022 2100)  Absence of Physical Injury: Implement safety measures based on patient assessment     Problem: Pain  Goal: Verbalizes/displays adequate comfort level or baseline comfort level  Outcome: Progressing Towards Goal

## 2022-07-27 NOTE — PROGRESS NOTES
Patient's oxygen desaturating to 86% on room air while sleeping. Patient states she has a CPAP machine at home but it is broken. Asked patient if I could contact the doctor to get a CPAP ordered for her here and she stated there is no point because I will not use it here. 2 L 02 applied and oxygen saturations came back to 93% on 2 L.  Electronically signed by Jazzy Diamond RN on 7/26/2022 at 10:03 PM

## 2022-07-27 NOTE — CARE COORDINATION
SOCIAL WORK / DISCHARGE PLANNING:   COVID positive. Sw spoke with pt via phone. She is pleased with her care. States she feels better and is ambulatory in room on room air. Denies any dc needs. Await echo results, possible dc today. Charmaine Anup is able to provide home going transport.                  Electronically signed by KATIANA Burton on 7/27/2022 at 4:01 PM

## 2022-07-27 NOTE — PROGRESS NOTES
Physical Therapy    Physical Therapy Treatment Note/Plan of Care    Room #:  3049/1050-38  Patient Name: Estuardo Blackman  YOB: 1955  MRN: [id number]    Date of Service: 7/27/2022     Tentative placement recommendation: Home  Equipment recommendation: None      Evaluating Physical Therapist: Flory Garcia, PT  #[id number]      Specific Provider Orders/Date/Referring Provider :  07/25/22 1200    PT eval and treat  Start:  07/25/22 1200,   End:  07/25/22 1200,   ONE TIME,   Standing Count:  1 Occurrences,   R         Miguel Quails, DO     Admitting Diagnosis:   Hypoxia [R09.02]  COVID-19 [U07.1]    Admitted with    fatigue cough and shortness of breath , nausea covid +  Surgery: none  Visit Diagnoses         Codes    Hypoxia     R09.02            Patient Active Problem List   Diagnosis    Hip pain    Trochanteric bursitis    Shoulder pain    Rotator cuff tear    Subacromial impingement    Labral tear of shoulder    Primary osteoarthritis of left knee    Iron deficiency anemia, unspecified    Acute respiratory failure due to COVID-19 ([address]    Sepsis ([facility] [address]    Paroxysmal A-fib ([facility] [address]    Community acquired bacterial pneumonia        ASSESSMENT of Current Deficits Patient able to progress with increased distance on room air but she needed 2 standing rest periods due to shortness of breath. Her SPO2 was 93-96% throughout tx session.       PHYSICAL THERAPY  PLAN OF CARE       Physical therapy plan of care is established based on physician order,  patient diagnosis and clinical assessment    Current Treatment Recommendations:    -Gait: Gait training and Standing activities to improve: base of support, weight shift, weight bearing    -Endurance: Utilize Supervised activities to increase level of endurance to allow for safe functional mobility including transfers and gait   -Stairs: Stair training with instruction on proper technique and hand placement on rail    PT long term treatment goals are located in below UVULOPALATOPHARYNGOPLASTY         SUBJECTIVE:    Precautions: Activity as tolerated, falls, alarm, and Droplet plus/COVID-19     Social history: Patient lives alone in a apartment     with Elevator  to enter     Equipment owned: None,       [facility]   How much difficulty turning over in bed?: None  How much difficulty sitting down on / standing up from a chair with arms?: None  How much difficulty moving from lying on back to sitting on side of bed?: None  How much help from another person moving to and from a bed to a chair?: None  How much help from another person needed to walk in hospital room?: A Little  How much help from another person for climbing 3-5 steps with a railing?: A Little  AM-PAC Inpatient Mobility Raw Score : 22  AM-PAC Inpatient T-Scale Score : 53.28  Mobility Inpatient CMS 0-100% Score: 20.91  Mobility Inpatient CMS G-Code Modifier : [address] cleared patient for PT treatment. OBJECTIVE;   Initial Evaluation  Date: 7/26/2022 Treatment Date:  7/27/2022       Short Term/ Long Term   Goals   Was pt agreeable to Eval/treatment? Yes yes To be met in 3 days   Pain level   8/10  Head and neck 4/10  HA    Bed Mobility    Rolling: Independent    Supine to sit:  Independent    Sit to supine: Independent    Scooting: Independent    Rolling: Not assessed patient in chair   Supine to sit: Not assessed patient in chair   Sit to supine: Not assessed patient in chair   Scooting: Not assessed patient in chair       Transfers Sit to stand: Independent   Sit to stand: Independent        Ambulation     2x25 feet using  no device with Independent      2 x 130 feet using  no device with Supervision    cues for safety, pacing, and pursed lip breathing     75 feet using  no device with Independent    ROM Within functional limits        Strength BUE:  refer to OT eval  RLE:  4/5  LLE:  4/5  Increase strength in affected mm groups by 1/3 grade   Balance Sitting EOB: good    Dynamic Standing:  fair   Sitting EOB: good   Dynamic Standing: good    Sitting EOB:  good    Dynamic Standing: good       Patient is Alert & Oriented x person, place, time, and situation and follows directions    Sensation:  Patient  denies numbness/tingling   Edema:  yes bilateral lower extremities   Endurance: fair       Vitals: room air   Blood Pressure at rest  Blood Pressure during session    Heart Rate at rest  Heart Rate during session    SPO2 at rest 98%  SPO2 during session 93 - 98%     Patient education  Patient educated on role of Physical Therapy, risks of immobility, safety and plan of care, energy conservation, importance of positional changes for oxygen exchange,  importance of mobility while in hospital , ankle pumps, quad set and glut set for edema control, blood clot prevention, safety , O2 line management and safety , and proper use/technique of incentive spirometer     Patient response to education:   Pt verbalized understanding Pt demonstrated skill Pt requires further education in this area   Yes Partial Yes      Treatment:  Patient practiced and was instructed/facilitated in the following treatment: Patient    sitting in a chair at start of tx session. Pt performed seated exercises. Pt stood, ambulated in the hallway, and back to the chair. Therapeutic Exercises:  ankle pumps, hip abduction/adduction, long arc quad, and seated marching,  x 20 reps. 2 - 3 sets. AROM    At end of session, patient in chair with     call light and phone within reach,  all lines and tubes intact, nursing notified. Patient would benefit from continued skilled Physical Therapy to improve functional independence and quality of life. Patient's/ family goals   home       Time in 10:45  Time out 11:10    Total Treatment Time  25 minutes        CPT codes:    Therapeutic activities (82386)   11 minutes  1 unit(s)  Therapeutic exercises (35565)   14 minutes  1 unit(s)    Bruce Gomez  PTA  LIC # [id number]

## 2022-07-27 NOTE — DISCHARGE SUMMARY
Discharge Summary    Barbara Gross  :  1824  MRN:  [id number]    Admit date:  2022  Discharge date:  2022    Admitting Physician:    Deepa Farooq MD    Discharge Diagnoses:    Principal Problem:    Acute respiratory failure due to COVID-19 [facility])  Active Problems:    Sepsis ([facility] [address]    Paroxysmal A-fib ([facility] [address]    Community acquired bacterial pneumonia  Resolved Problems:    * No resolved hospital problems. *    Consults:   IP CONSULT TO PHARMACY     Condition at Discharge:  Stable    HPI/Hospital Course: See HPI. Pt was admitted with acute respiratory failure deu to Covid 19 with superimposed bacterial CAP. She was treated with aerosols, steroids, IV Abx and oxygen. Inflammatory markers followed and trended down. Echo performed but report pending. Today on day of discharge pt feels better with no further complaints. Vitals and Labs are stable. Will f/u echo upon discharge. Added medrol and finish Azithromycin upon discharge. Did not qualify for home O2. Physical Exam  BP (!) 121/90   Pulse 96   Temp 97.7 °F (36.5 °C) (Infrared)   Resp 20   Ht 5' 4\" (1.626 m)   Wt 217 lb (98.4 kg)   SpO2 97%   BMI 37.25 kg/m²   RRR   CTA bilaterally, no wheeze, rales or rhonchi   bowel sounds present, nontender, nondistended   No clubbing, cyanosis, or edema   Neuro - at baseline     Pertinent Results during this Hospital Course:  CTA CHEST W CONTRAST    Result Date: 2022  EXAMINATION: CTA OF THE CHEST 2022 8:40 am TECHNIQUE: CTA of the chest was performed after the administration of intravenous contrast.  Multiplanar reformatted images are provided for review. MIP images are provided for review. Automated exposure control, iterative reconstruction, and/or weight based adjustment of the mA/kV was utilized to reduce the radiation dose to as low as reasonably achievable. COMPARISON: None.  HISTORY: ORDERING SYSTEM PROVIDED HISTORY: covid with SOB; r/o PE TECHNOLOGIST PROVIDED HISTORY: Reason for exam:->covid with SOB; r/o PE Decision Support Exception - unselect if not a suspected or confirmed emergency medical condition->Emergency Medical Condition (MA) FINDINGS: Pulmonary Arteries: Pulmonary arteries are adequately opacified for evaluation. No evidence of intraluminal filling defect to suggest pulmonary embolism. Main pulmonary artery is normal in caliber. Mediastinum: No evidence of mediastinal lymphadenopathy. The heart and pericardium demonstrate no acute abnormality. There is no acute abnormality of the thoracic aorta. Lungs/pleura: Vague patchy bilateral ground-glass airspace disease is noted. Differential includes pneumonia versus CHF. Given that there are small bilateral pleural effusions the findings may represent CHF. Ground-glass viral pneumonia is not excluded given the history of COVID. Upper Abdomen: Limited images of the upper abdomen are unremarkable. Soft Tissues/Bones:  Age related degenerative changes of the visualized osseous structures without focal destructive lesion. 1. There is no pulmonary embolus. 2. Very vague bilateral ground-glass airspace disease. Given that there are small bilateral pleural effusions the airspace disease could represent mild CHF however ground-glass viral pneumonia cannot be excluded.  RECOMMENDATIONS: Unavailable     Lab Results   Component Value Date/Time    WBC 7.4 07/26/2022 07:10 AM    HGB 12.1 07/26/2022 07:10 AM    HCT 37.6 07/26/2022 07:10 AM    MCV 94.7 07/26/2022 07:10 AM     07/26/2022 07:10 AM     07/26/2022 07:10 AM    K 4.1 07/26/2022 07:10 AM    K 3.3 04/05/2022 09:20 PM    CL 99 07/26/2022 07:10 AM    CO2 24 07/26/2022 07:10 AM    BUN 17 07/26/2022 07:10 AM    CREATININE 0.7 07/26/2022 07:10 AM    CALCIUM 9.1 07/26/2022 07:10 AM    PHOS 3.9 07/26/2022 07:10 AM    ALKPHOS 71 07/26/2022 07:10 AM    ALT 22 07/26/2022 07:10 AM    AST 34 07/26/2022 07:10 AM    BILITOT 0.7 07/26/2022 07:10 AM    LABALBU 4.4 07/26/2022 07:10 AM    LABALBU 4.3 02/11/2012 08:15 AM    LDLCALC 123 06/28/2021 10:09 AM    TRIG 181 03/29/2016 01:00 PM     Lab Results   Component Value Date    INR 1.6 07/26/2022    INR 1.1 06/21/2016    INR 1.0 11/09/2010     Discharge Medications:    Current Discharge Medication List             Details   fluticasone (FLOVENT HFA) 110 MCG/ACT inhaler Inhale 1 puff into the lungs in the morning and 1 puff in the evening. Qty: 12 g, Refills: 3      azithromycin (ZITHROMAX) 500 MG tablet Take 1 tablet by mouth daily for 4 doses  Qty: 4 tablet, Refills: 0    Associated Diagnoses: Community acquired bacterial pneumonia      methylPREDNISolone (MEDROL DOSEPACK) 4 MG tablet Take by mouth. Qty: 1 kit, Refills: 0                Details   amitriptyline (ELAVIL) 75 MG tablet Take 75 mg by mouth nightly      valsartan-hydroCHLOROthiazide (DIOVAN-HCT) 160-25 MG per tablet Take 1 tablet by mouth in the morning. tiZANidine (ZANAFLEX) 4 MG tablet Take 4-8 mg by mouth nightly      fluticasone-umeclidin-vilant (TRELEGY ELLIPTA) 100-62.5-25 MCG/INH AEPB Inhale 1 puff into the lungs daily      furosemide (LASIX) 40 MG tablet Take 40 mg by mouth every other day      montelukast (SINGULAIR) 10 MG tablet Take 10 mg by mouth nightly      azelastine HCl 0.15 % SOLN 2 sprays by Nasal route 2 times daily as needed for Rhinitis      venlafaxine (EFFEXOR XR) 75 MG extended release capsule Take 75 mg by mouth nightly      nystatin (MYCOSTATIN) 715291 UNIT/GM cream Apply 1 each topically 2 times daily as needed for Dry Skin      Cholecalciferol (VITAMIN D) 50 MCG (2000 UT) CAPS capsule Take 2 capsules by mouth in the morning.       Biotin 1 MG CAPS Take 1 tablet by mouth daily      busPIRone (BUSPAR) 5 MG tablet Take 5 mg by mouth 3 times daily       omeprazole (PRILOSEC) 40 MG delayed release capsule Take 40 mg by mouth every morning      famotidine (PEPCID) 40 MG tablet Take 40 mg by mouth nightly      apixaban (ELIQUIS) 5 MG TABS tablet Take 5 mg by mouth in the morning and 5 mg before bedtime. Indications: RESTART 11/6.      metoprolol (LOPRESSOR) 50 MG tablet Take 1 tablet by mouth 2 times daily  Qty: 180 tablet, Refills: 5                        Current Discharge Medication List        Disposition:   home    Follow-up in office in 1 wk  Patient advised to bring all meds to appointment.     Note that over 30 minutes was spent in preparing discharge papers, discussing discharge with patient, nursing and ancillary staff, medication review, etc.    Signed:  Lennox Buddy, MD  7/27/2022, 4:01 PM

## 2022-07-28 ENCOUNTER — CARE COORDINATION (OUTPATIENT)
Dept: CARE COORDINATION | Age: 67
End: 2022-07-28

## 2022-07-28 NOTE — CARE COORDINATION
[facility] Transitions Initial Follow Up Call    Call within 2 business days of discharge: Yes    Patient: Spring Dumont Patient : 1955   MRN: <[id number]>  Reason for Admission: [facility] -2022 covid  Discharge Date: 22 RARS: Readmission Risk Score: 9.1      Last Discharge [facility]       Date Complaint Diagnosis Description Type Department Provider    22 Shortness of Breath COVID-19 . .. ED to Hosp-Admission (Discharged) (ADMITTED) Farhad Pruett MD; Deedee Taylor. .. Spoke with: pt states she is feeling better is not having a fever and states she is only short of breath with exertion. Reviewed dc instructions and answered med questions and pt is to see pcp 8/3. Ctn info given Transitions of Care Initial Call    Was this an external facility discharge? No Discharge Facility: [facility]    Challenges to be reviewed by the provider   Additional needs identified to be addressed with provider: No  none             Method of communication with provider : none    Advance Care Planning:   Does patient have an Advance Directive:  not reviewed . Care Transition Nurse contacted the patient by telephone to perform post hospital discharge assessment. Verified name and  with patient as identifiers. Provided introduction to self, and explanation of the CTN role. CTN reviewed discharge instructions, medical action plan and red flags with patient who verbalized understanding. Patient given an opportunity to ask questions and does not have any further questions or concerns at this time. Were discharge instructions available to patient? Yes. Reviewed appropriate site of care based on symptoms and resources available to patient including: PCP  When to call 911. The patient agrees to contact the PCP office for questions related to their healthcare. Medication reconciliation was performed with patient, who verbalizes understanding of administration of home medications. Advised obtaining a 90-day supply of all daily and as-needed medications. Was patient discharged with a pulse oximeter? no    CTN provided contact information. No further follow-up call indicated based on severity of symptoms and risk factors. Plan for next call:          Facility: [address] [place] [address] Transitions 24 Hour Call    Do you have a copy of your discharge instructions?: Yes  Do you have all of your prescriptions and are they filled?: Yes  Care Transitions Interventions         Follow Up  No future appointments.     Aiden Rivas LPN

## 2022-08-24 ENCOUNTER — HOSPITAL ENCOUNTER (OUTPATIENT)
Age: 67
Discharge: HOME | End: 2022-08-26
Payer: COMMERCIAL

## 2022-08-24 ENCOUNTER — HOSPITAL ENCOUNTER (OUTPATIENT)
Age: 67
Discharge: HOME | End: 2022-08-24
Payer: COMMERCIAL

## 2022-08-24 ENCOUNTER — HOSPITAL ENCOUNTER (OUTPATIENT)
Dept: GENERAL RADIOLOGY | Age: 67
Discharge: HOME | End: 2022-08-26
Payer: COMMERCIAL

## 2022-08-24 DIAGNOSIS — R06.00 DYSPNEA AND RESPIRATORY ABNORMALITY: ICD-10-CM

## 2022-08-24 DIAGNOSIS — R06.89 DYSPNEA AND RESPIRATORY ABNORMALITY: ICD-10-CM

## 2022-08-24 LAB
ALBUMIN SERPL-MCNC: 4.1 G/DL (ref 3.5–5.2)
ALP BLD-CCNC: 57 U/L (ref 35–104)
ALT SERPL-CCNC: 10 U/L (ref 0–32)
ANION GAP SERPL CALCULATED.3IONS-SCNC: 9 MMOL/L (ref 7–16)
AST SERPL-CCNC: 14 U/L (ref 0–31)
BASOPHILS ABSOLUTE: 0.04 E9/L (ref 0–0.2)
BASOPHILS RELATIVE PERCENT: 0.7 % (ref 0–2)
BILIRUB SERPL-MCNC: 0.5 MG/DL (ref 0–1.2)
BUN BLDV-MCNC: 23 MG/DL (ref 6–23)
CALCIUM SERPL-MCNC: 9.3 MG/DL (ref 8.6–10.2)
CHLORIDE BLD-SCNC: 99 MMOL/L (ref 98–107)
CO2: 29 MMOL/L (ref 22–29)
CREAT SERPL-MCNC: 1 MG/DL (ref 0.5–1)
EOSINOPHILS ABSOLUTE: 0.09 E9/L (ref 0.05–0.5)
EOSINOPHILS RELATIVE PERCENT: 1.7 % (ref 0–6)
GFR AFRICAN AMERICAN: >60
GFR NON-AFRICAN AMERICAN: 55 ML/MIN/1.73
GLUCOSE BLD-MCNC: 100 MG/DL (ref 74–99)
HCT VFR BLD CALC: 35.3 % (ref 34–48)
HEMOGLOBIN: 11.5 G/DL (ref 11.5–15.5)
IMMATURE GRANULOCYTES #: 0.02 E9/L
IMMATURE GRANULOCYTES %: 0.4 % (ref 0–5)
LYMPHOCYTES ABSOLUTE: 1.89 E9/L (ref 1.5–4)
LYMPHOCYTES RELATIVE PERCENT: 35 % (ref 20–42)
MCH RBC QN AUTO: 31.1 PG (ref 26–35)
MCHC RBC AUTO-ENTMCNC: 32.6 % (ref 32–34.5)
MCV RBC AUTO: 95.4 FL (ref 80–99.9)
MONOCYTES ABSOLUTE: 0.56 E9/L (ref 0.1–0.95)
MONOCYTES RELATIVE PERCENT: 10.4 % (ref 2–12)
NEUTROPHILS ABSOLUTE: 2.8 E9/L (ref 1.8–7.3)
NEUTROPHILS RELATIVE PERCENT: 51.8 % (ref 43–80)
PDW BLD-RTO: 14 FL (ref 11.5–15)
PLATELET # BLD: 191 E9/L (ref 130–450)
PMV BLD AUTO: 9.4 FL (ref 7–12)
POTASSIUM SERPL-SCNC: 4.4 MMOL/L (ref 3.5–5)
RBC # BLD: 3.7 E12/L (ref 3.5–5.5)
SODIUM BLD-SCNC: 137 MMOL/L (ref 132–146)
TOTAL PROTEIN: 6.9 G/DL (ref 6.4–8.3)
WBC # BLD: 5.4 E9/L (ref 4.5–11.5)

## 2022-08-24 PROCEDURE — 80053 COMPREHEN METABOLIC PANEL: CPT

## 2022-08-24 PROCEDURE — 85025 COMPLETE CBC W/AUTO DIFF WBC: CPT

## 2022-08-24 PROCEDURE — 36415 COLL VENOUS BLD VENIPUNCTURE: CPT

## 2022-08-24 PROCEDURE — 71046 X-RAY EXAM CHEST 2 VIEWS: CPT

## 2022-09-27 ENCOUNTER — OFFICE VISIT (OUTPATIENT)
Dept: NEUROSURGERY | Age: 67
End: 2022-09-27
Payer: COMMERCIAL

## 2022-09-27 VITALS
HEART RATE: 59 BPM | BODY MASS INDEX: 36.54 KG/M2 | HEIGHT: 64 IN | WEIGHT: 214 LBS | SYSTOLIC BLOOD PRESSURE: 137 MMHG | DIASTOLIC BLOOD PRESSURE: 86 MMHG

## 2022-09-27 DIAGNOSIS — M47.812 SPONDYLOSIS OF CERVICAL REGION WITHOUT MYELOPATHY OR RADICULOPATHY: Primary | ICD-10-CM

## 2022-09-27 PROCEDURE — 1090F PRES/ABSN URINE INCON ASSESS: CPT | Performed by: PHYSICIAN ASSISTANT

## 2022-09-27 PROCEDURE — 99202 OFFICE O/P NEW SF 15 MIN: CPT

## 2022-09-27 PROCEDURE — 1036F TOBACCO NON-USER: CPT | Performed by: PHYSICIAN ASSISTANT

## 2022-09-27 PROCEDURE — 99203 OFFICE O/P NEW LOW 30 MIN: CPT | Performed by: PHYSICIAN ASSISTANT

## 2022-09-27 PROCEDURE — G8427 DOCREV CUR MEDS BY ELIG CLIN: HCPCS | Performed by: PHYSICIAN ASSISTANT

## 2022-09-27 PROCEDURE — G8400 PT W/DXA NO RESULTS DOC: HCPCS | Performed by: PHYSICIAN ASSISTANT

## 2022-09-27 PROCEDURE — 3017F COLORECTAL CA SCREEN DOC REV: CPT | Performed by: PHYSICIAN ASSISTANT

## 2022-09-27 PROCEDURE — 1123F ACP DISCUSS/DSCN MKR DOCD: CPT | Performed by: PHYSICIAN ASSISTANT

## 2022-09-27 PROCEDURE — G8417 CALC BMI ABV UP PARAM F/U: HCPCS | Performed by: PHYSICIAN ASSISTANT

## 2022-09-27 ASSESSMENT — ENCOUNTER SYMPTOMS
EYES NEGATIVE: 1
RESPIRATORY NEGATIVE: 1
GASTROINTESTINAL NEGATIVE: 1
ALLERGIC/IMMUNOLOGIC NEGATIVE: 1

## 2022-09-27 NOTE — PROGRESS NOTES
Subjective:      Patient ID: Lola May is a 79 y.o. female. Neck Pain   This is a chronic problem. The current episode started more than 1 year ago. The problem has been gradually worsening. The pain is present in the midline. The quality of the pain is described as aching. The pain is at a severity of 8/10. Treatments tried: prior cervical fusion, physical therapy. The treatment provided mild relief. Review of Systems   Constitutional: Negative. HENT: Negative. Eyes: Negative. Respiratory: Negative. Cardiovascular: Negative. Gastrointestinal: Negative. Endocrine: Negative. Genitourinary: Negative. Musculoskeletal:  Positive for neck pain. Skin: Negative. Allergic/Immunologic: Negative. Neurological: Negative. Hematological: Negative. Psychiatric/Behavioral: Negative. Objective:   Physical Exam  Constitutional:       Appearance: Normal appearance. HENT:      Head: Normocephalic and atraumatic. Nose: Nose normal.   Eyes:      Pupils: Pupils are equal, round, and reactive to light. Pulmonary:      Effort: Pulmonary effort is normal.   Abdominal:      General: There is no distension. Skin:     General: Skin is warm and dry. Neurological:      Mental Status: She is alert. GCS: GCS eye subscore is 4. GCS verbal subscore is 5. GCS motor subscore is 6. Cranial Nerves: Cranial nerves 2-12 are intact. Sensory: Sensation is intact. Motor: Motor function is intact. Coordination: Coordination is intact. Gait: Gait abnormal.      Deep Tendon Reflexes: Reflexes are normal and symmetric. Psychiatric:         Mood and Affect: Mood normal.       Assessment:      79year old with chronic neck pain for years. She had a prior C4-6 ACDF. Cervical CT shows C4-5 pseudoarthrosis and severe C3-4 and C6-7 DDD. Plan: Will order cervical MRI.   She may continue with NSAIDS and PT if beneficial.        PARVEEN Sweeney

## 2022-10-17 ENCOUNTER — HOSPITAL ENCOUNTER (OUTPATIENT)
Dept: MRI IMAGING | Age: 67
Discharge: HOME | End: 2022-10-19
Payer: COMMERCIAL

## 2022-10-17 ENCOUNTER — ANESTHESIA (OUTPATIENT)
Dept: MRI IMAGING | Age: 67
End: 2022-10-17

## 2022-10-17 ENCOUNTER — ANESTHESIA EVENT (OUTPATIENT)
Dept: MRI IMAGING | Age: 67
End: 2022-10-17

## 2022-10-17 VITALS
RESPIRATION RATE: 19 BRPM | OXYGEN SATURATION: 96 % | HEART RATE: 65 BPM | SYSTOLIC BLOOD PRESSURE: 135 MMHG | DIASTOLIC BLOOD PRESSURE: 72 MMHG

## 2022-10-17 DIAGNOSIS — M47.812 SPONDYLOSIS OF CERVICAL REGION WITHOUT MYELOPATHY OR RADICULOPATHY: ICD-10-CM

## 2022-10-17 PROCEDURE — A9579 GAD-BASE MR CONTRAST NOS,1ML: HCPCS | Performed by: RADIOLOGY

## 2022-10-17 PROCEDURE — 6360000004 HC RX CONTRAST MEDICATION: Performed by: RADIOLOGY

## 2022-10-17 PROCEDURE — 3700000000 HC ANESTHESIA ATTENDED CARE

## 2022-10-17 PROCEDURE — 6360000002 HC RX W HCPCS: Performed by: NURSE ANESTHETIST, CERTIFIED REGISTERED

## 2022-10-17 PROCEDURE — 72156 MRI NECK SPINE W/O & W/DYE: CPT

## 2022-10-17 PROCEDURE — 3700000001 HC ADD 15 MINUTES (ANESTHESIA)

## 2022-10-17 RX ORDER — MIDAZOLAM HYDROCHLORIDE 1 MG/ML
INJECTION INTRAMUSCULAR; INTRAVENOUS PRN
Status: DISCONTINUED | OUTPATIENT
Start: 2022-10-17 | End: 2022-10-17 | Stop reason: SDUPTHER

## 2022-10-17 RX ADMIN — MIDAZOLAM 2 MG: 1 INJECTION INTRAMUSCULAR; INTRAVENOUS at 08:12

## 2022-10-17 RX ADMIN — GADOTERIDOL 20 ML: 279.3 INJECTION, SOLUTION INTRAVENOUS at 08:39

## 2022-10-17 ASSESSMENT — PAIN - FUNCTIONAL ASSESSMENT: PAIN_FUNCTIONAL_ASSESSMENT: NONE - DENIES PAIN

## 2022-10-17 NOTE — ANESTHESIA PRE PROCEDURE
Department of Anesthesiology  Preprocedure Note       Name:  Juan Troncoso   Age:  79 y.o.  :  1955                                          MRN:  [id number]         Date:  10/17/2022      Surgeon: Markus Serrano  Procedure: MRI    Medications prior to admission:   Prior to Admission medications    Medication Sig Start Date End Date Taking? Authorizing Provider   fluticasone (FLOVENT HFA) 110 MCG/ACT inhaler Inhale 1 puff into the lungs in the morning and 1 puff in the evening. 22   Verba Severe, MD   amitriptyline (ELAVIL) 75 MG tablet Take 75 mg by mouth nightly    Historical Provider, MD   valsartan-hydroCHLOROthiazide (DIOVAN-HCT) 160-25 MG per tablet Take 1 tablet by mouth in the morning. Historical Provider, MD   tiZANidine (ZANAFLEX) 4 MG tablet Take 4-8 mg by mouth nightly    Historical Provider, MD   fluticasone-umeclidin-vilant (TRELEGY ELLIPTA) 100-62.5-25 MCG/INH AEPB Inhale 1 puff into the lungs daily    Historical Provider, MD   furosemide (LASIX) 40 MG tablet Take 40 mg by mouth every other day    Historical Provider, MD   montelukast (SINGULAIR) 10 MG tablet Take 10 mg by mouth nightly    Historical Provider, MD   azelastine HCl 0.15 % SOLN 2 sprays by Nasal route 2 times daily as needed for Rhinitis    Historical Provider, MD   venlafaxine (EFFEXOR XR) 75 MG extended release capsule Take 75 mg by mouth nightly    Historical Provider, MD   nystatin (MYCOSTATIN) 328972 UNIT/GM cream Apply 1 each topically 2 times daily as needed for Dry Skin    Historical Provider, MD   Cholecalciferol (VITAMIN D) 50 MCG ( UT) CAPS capsule Take 2 capsules by mouth in the morning.     Historical Provider, MD   Biotin 1 MG CAPS Take 1 tablet by mouth daily    Historical Provider, MD   busPIRone (BUSPAR) 5 MG tablet Take 5 mg by mouth 3 times daily  21   Historical Provider, MD   omeprazole (PRILOSEC) 40 MG delayed release capsule Take 40 mg by mouth every morning 21   Historical Provider, MD famotidine (PEPCID) 40 MG tablet Take 40 mg by mouth nightly    Historical Provider, MD   apixaban (ELIQUIS) 5 MG TABS tablet Take 5 mg by mouth in the morning and 5 mg before bedtime. Indications: RESTART 11/6. Historical Provider, MD   metoprolol (LOPRESSOR) 50 MG tablet Take 1 tablet by mouth 2 times daily 6/13/16   Ledell Heads, DO       Current medications:    Current Outpatient Medications   Medication Sig Dispense Refill    fluticasone (FLOVENT HFA) 110 MCG/ACT inhaler Inhale 1 puff into the lungs in the morning and 1 puff in the evening. 12 g 3    amitriptyline (ELAVIL) 75 MG tablet Take 75 mg by mouth nightly      valsartan-hydroCHLOROthiazide (DIOVAN-HCT) 160-25 MG per tablet Take 1 tablet by mouth in the morning.  tiZANidine (ZANAFLEX) 4 MG tablet Take 4-8 mg by mouth nightly      fluticasone-umeclidin-vilant (TRELEGY ELLIPTA) 100-62.5-25 MCG/INH AEPB Inhale 1 puff into the lungs daily      furosemide (LASIX) 40 MG tablet Take 40 mg by mouth every other day      montelukast (SINGULAIR) 10 MG tablet Take 10 mg by mouth nightly      azelastine HCl 0.15 % SOLN 2 sprays by Nasal route 2 times daily as needed for Rhinitis      venlafaxine (EFFEXOR XR) 75 MG extended release capsule Take 75 mg by mouth nightly      nystatin (MYCOSTATIN) 109464 UNIT/GM cream Apply 1 each topically 2 times daily as needed for Dry Skin      Cholecalciferol (VITAMIN D) 50 MCG (2000 UT) CAPS capsule Take 2 capsules by mouth in the morning.  Biotin 1 MG CAPS Take 1 tablet by mouth daily      busPIRone (BUSPAR) 5 MG tablet Take 5 mg by mouth 3 times daily       omeprazole (PRILOSEC) 40 MG delayed release capsule Take 40 mg by mouth every morning      famotidine (PEPCID) 40 MG tablet Take 40 mg by mouth nightly      apixaban (ELIQUIS) 5 MG TABS tablet Take 5 mg by mouth in the morning and 5 mg before bedtime. Indications: RESTART 11/6.       metoprolol (LOPRESSOR) 50 MG tablet Take 1 tablet by mouth 2 times daily 180 tablet 5     No current facility-administered medications for this encounter. Facility-Administered Medications Ordered in Other Encounters   Medication Dose Route Frequency Provider Last Rate Last Admin    midazolam (VERSED) injection   IntraVENous PRN HAILEY Rodriguez - CRNA   2 mg at 10/17/22 1669       Allergies:     Allergies   Allergen Reactions    Morphine And Related Nausea And Vomiting    Percocet [Oxycodone-Acetaminophen] Nausea And Vomiting       Problem List:    Patient Active Problem List   Diagnosis Code    Hip pain M25.559    Trochanteric bursitis M70.60    Shoulder pain M25.519    Rotator cuff tear M75.100    Subacromial impingement M75.40    Labral tear of shoulder S43.439A    Primary osteoarthritis of left knee M17.12    Iron deficiency anemia, unspecified D50.9    Acute respiratory failure due to COVID-19 ([facility]) U07.1, J96.00    Sepsis ([facility]) A41.9    Paroxysmal A-fib ([facility]) I48.0    Community acquired bacterial pneumonia J15.9       Past Medical History:        Diagnosis Date    Acid reflux     Atrial fibrillation (HCC)     Cancer ([facility]) 5/19/2005    Breast Cancer (Left Breast)    Chronic back pain     CPAP (continuous positive airway pressure) dependence     not using CPAP    Fibromyalgia     History of bariatric surgery 04/17/2002    Dr. Daniel Fields - Open RYGB    History of cardiovascular stress test 8/10/2012    LEXISCAN    Hyperlipidemia     Hypertension     Kidney stones     Low iron     Osteoarthritis     Unspecified sleep apnea     Patient does not use C-Pap        Past Surgical History:        Procedure Laterality Date    APPENDECTOMY      BARIATRIC SURGERY  2003   Jazmine Donovan SURGERY  2005    Dr. Myrna Holbrook - Left Breast    CHOLECYSTECTOMY  2004    Dr. Gerson Mejia    COLONOSCOPY  2008    ECHO COMPL W DOP COLOR FLOW  8/10/2012         HERNIA REPAIR  2004    Dr. Erik Eliasor - [place] Left 2021    LEFT INGUINAL HERNIA REPAIR WITH MESH, POSSIBLE BILATERAL,  LAPAROSCOPIC ROBOTIC XI performed by Merline Ghotra MD at [place] [address] ([facility])      Dr. Sheron Arriaga [facility]  2009    Right Knee    NECK SURGERY  2015    fused 4,5,6- screws, in [place]    ASHOK-EN-Y GASTRIC BYPASS  2002    Dr. Shira Alberts ARTHROSCOPY  12    LEFT REPAIR ROTATOR CUFF TEAR SUBACROMIAL DECOMPRESSION    [facility] - Patient states that this was due to her weight prior to [place]  2009    UVULOPALATOPHARYGOPLASTY         Social History:    Social History     Tobacco Use    Smoking status: Former     Packs/day: 0.50     Years: 40.00     Pack years: 20.00     Types: Cigarettes     Quit date: 2020     Years since quittin.7    Smokeless tobacco: Never   Substance Use Topics    Alcohol use: No     Alcohol/week: 0.0 standard drinks     Comment: rarely                                Counseling given: Not Answered      Vital Signs (Current): There were no vitals filed for this visit.                                            BP Readings from Last 3 Encounters:   22 137/86   22 (!) 121/90   22 (!) 157/71       NPO Status: Time of last liquid consumption:                         Time of last solid consumption:                                                 Date of last solid food consumption: 10/16/22    BMI:   Wt Readings from Last 3 Encounters:   22 214 lb (97.1 kg)   22 217 lb (98.4 kg)   22 203 lb (92.1 kg)     There is no height or weight on file to calculate BMI.    CBC:   Lab Results   Component Value Date/Time    WBC 5.4 2022 08:58 AM    RBC 3.70 08/24/2022 08:58 AM    HGB 11.5 08/24/2022 08:58 AM    HCT 35.3 08/24/2022 08:58 AM    MCV 95.4 08/24/2022 08:58 AM    RDW 14.0 08/24/2022 08:58 AM     08/24/2022 08:58 AM       CMP:   Lab Results   Component Value Date/Time     08/24/2022 08:58 AM    K 4.4 08/24/2022 08:58 AM    K 3.3 04/05/2022 09:20 PM    CL 99 08/24/2022 08:58 AM    CO2 29 08/24/2022 08:58 AM    BUN 23 08/24/2022 08:58 AM    CREATININE 1.0 08/24/2022 08:58 AM    GFRAA >60 08/24/2022 08:58 AM    LABGLOM 55 08/24/2022 08:58 AM    GLUCOSE 100 08/24/2022 08:58 AM    GLUCOSE 115 02/11/2012 08:15 AM    PROT 6.9 08/24/2022 08:58 AM    CALCIUM 9.3 08/24/2022 08:58 AM    BILITOT 0.5 08/24/2022 08:58 AM    ALKPHOS 57 08/24/2022 08:58 AM    AST 14 08/24/2022 08:58 AM    ALT 10 08/24/2022 08:58 AM       POC Tests: No results for input(s): POCGLU, POCNA, POCK, POCCL, POCBUN, POCHEMO, POCHCT in the last 72 hours. Coags:   Lab Results   Component Value Date/Time    PROTIME 18.0 07/26/2022 07:10 AM    PROTIME 12.5 11/09/2010 04:30 PM    INR 1.6 07/26/2022 07:10 AM    APTT 35.1 07/26/2022 07:10 AM       HCG (If Applicable): No results found for: PREGTESTUR, PREGSERUM, HCG, HCGQUANT     ABGs: No results found for: PHART, PO2ART, SYD0YHV, JUB8MZL, BEART, Q6INURLZ     Type & Screen (If Applicable):  No results found for: LABABO, LABRH    Drug/Infectious Status (If Applicable):  No results found for: HIV, HEPCAB    COVID-19 Screening (If Applicable):   Lab Results   Component Value Date/Time    COVID19 DETECTED 07/25/2022 09:46 AM    COVID19 Not Detected 09/28/2020 06:04 PM           Anesthesia Evaluation  Patient summary reviewed and Nursing notes reviewed no history of anesthetic complications:   Airway: Mallampati: II  TM distance: >3 FB   Neck ROM: limited  Mouth opening: > = 3 FB   Dental:    (+) upper dentures and lower dentures      Pulmonary: breath sounds clear to auscultation  (+) sleep apnea: on noncompliant,      Smoker:  Former smoker. ROS comment: Hx of tracheostomy X3 years, many years ago. Has since had COVID-no issues currently. Cardiovascular:    (+) hypertension:, dysrhythmias (Cardioversion few weeks ago, for another this week.): atrial fibrillation,       ECG reviewed  Rhythm: irregular  Rate: normal    Stress test reviewed             ROS comment: Stress Test 2012:  Impression- Unremarkable study.     Finding- SPECT gated images of the left ventricular myocardium were  obtained for purposes of left ventricular ejection fraction  determination.  Left ventricular ejection fraction is calculated at  63%.       IMPRESSION-  Left ventricular ejection fraction calculated at 63%. Neuro/Psych:   (+) neuromuscular disease (Chronic back pain, Fibromyalgia):,              ROS comment: Hx C4-6 fusion in [place], states she did not have any pain before surgery but has lived with constant neck pain since. Does not radiate to arms, no numbness/tingling, but has a knot-like feeling in back of head. GI/Hepatic/Renal:   (+) GERD: well controlled,          ROS comment: Hx of bariatric surgery. Endo/Other:    (+) blood dyscrasia: anticoagulation therapy:., malignancy/cancer (Hx left breast CA). Abdominal:       Abdomen: soft. Vascular: negative vascular ROS. Other Findings:      7/27/22 Echo  Summary   Left ventricular size is grossly normal.   Borderline concentric left ventricular hypertrophy. Ejection fraction is measured at 42%. No evidence of left ventricular mass or thrombus noted. The left atrium is mildly dilated. Interatrial septum appears intact. Mildly dilated right ventricle. Mildly enlarged right atrium size. Mild to moderate mitral regurgitation is present. No evidence of mitral valve stenosis. Mild to moderate tricuspid regurgitation. RVSP is 38.90 mmHg. Pulmonary hypertension is mild . Irregular rhythm--atrial fibrillation.    Limited due to Covid       Anesthesia Plan      MAC     ASA 3       Induction: intravenous. Anesthetic plan and risks discussed with patient. Plan discussed with attending.                     HAILEY Noble - TATI   10/17/2022

## 2022-10-17 NOTE — ANESTHESIA POSTPROCEDURE EVALUATION
Department of Anesthesiology  Postprocedure Note    Patient: Jaqui Fajardo  MRN: [id number]  YOB: 1955  Date of evaluation: 10/17/2022      Procedure Summary     Date: 10/17/22 Room / Location: [address] MRI; [address] [facility] [address] Procedures; [address] Radiology    Anesthesia Start: 7702 Anesthesia Stop: 9794    Procedure: MRI CERVICAL SPINE W WO CONTRAST Diagnosis: Spondylosis of cervical region without myelopathy or radiculopathy    Scheduled Providers: HAILEY Jordan - CRNA Responsible Provider: Bernie Bernard MD    Anesthesia Type: MAC ASA Status: 3          Anesthesia Type: No value filed.     Melissa Phase I:      Melissa Phase II:        Anesthesia Post Evaluation    Patient location during evaluation: PACU  Patient participation: complete - patient participated  Level of consciousness: awake  Pain score: 0  Airway patency: patent  Nausea & Vomiting: no nausea  Complications: no  Cardiovascular status: hemodynamically stable  Respiratory status: acceptable  Hydration status: stable

## 2022-10-26 ENCOUNTER — OFFICE VISIT (OUTPATIENT)
Dept: NEUROSURGERY | Age: 67
End: 2022-10-26
Payer: COMMERCIAL

## 2022-10-26 VITALS
DIASTOLIC BLOOD PRESSURE: 67 MMHG | TEMPERATURE: 98.2 F | RESPIRATION RATE: 24 BRPM | OXYGEN SATURATION: 94 % | WEIGHT: 214 LBS | SYSTOLIC BLOOD PRESSURE: 100 MMHG | HEIGHT: 64 IN | BODY MASS INDEX: 36.54 KG/M2 | HEART RATE: 57 BPM

## 2022-10-26 DIAGNOSIS — M47.892 OTHER OSTEOARTHRITIS OF SPINE, CERVICAL REGION: Primary | ICD-10-CM

## 2022-10-26 PROCEDURE — 1123F ACP DISCUSS/DSCN MKR DOCD: CPT | Performed by: PHYSICIAN ASSISTANT

## 2022-10-26 PROCEDURE — 99214 OFFICE O/P EST MOD 30 MIN: CPT | Performed by: PHYSICIAN ASSISTANT

## 2022-10-26 PROCEDURE — G8427 DOCREV CUR MEDS BY ELIG CLIN: HCPCS | Performed by: PHYSICIAN ASSISTANT

## 2022-10-26 PROCEDURE — 1036F TOBACCO NON-USER: CPT | Performed by: PHYSICIAN ASSISTANT

## 2022-10-26 PROCEDURE — 1090F PRES/ABSN URINE INCON ASSESS: CPT | Performed by: PHYSICIAN ASSISTANT

## 2022-10-26 PROCEDURE — G8484 FLU IMMUNIZE NO ADMIN: HCPCS | Performed by: PHYSICIAN ASSISTANT

## 2022-10-26 PROCEDURE — 99212 OFFICE O/P EST SF 10 MIN: CPT

## 2022-10-26 PROCEDURE — G8417 CALC BMI ABV UP PARAM F/U: HCPCS | Performed by: PHYSICIAN ASSISTANT

## 2022-10-26 PROCEDURE — 3017F COLORECTAL CA SCREEN DOC REV: CPT | Performed by: PHYSICIAN ASSISTANT

## 2022-10-26 PROCEDURE — G8400 PT W/DXA NO RESULTS DOC: HCPCS | Performed by: PHYSICIAN ASSISTANT

## 2022-10-26 ASSESSMENT — ENCOUNTER SYMPTOMS
EYES NEGATIVE: 1
GASTROINTESTINAL NEGATIVE: 1
ALLERGIC/IMMUNOLOGIC NEGATIVE: 1
RESPIRATORY NEGATIVE: 1

## 2022-10-26 NOTE — PROGRESS NOTES
Subjective:      Patient ID: Alexis Valerio is a 79 y.o. female. Neck Pain   This is a chronic problem. The current episode started more than 1 year ago. The problem has been gradually worsening. The pain is present in the midline. The quality of the pain is described as aching. The pain is at a severity of 8/10. Treatments tried: prior cervical fusion, physical therapy. The treatment provided mild relief. Review of Systems   Constitutional: Negative. HENT: Negative. Eyes: Negative. Respiratory: Negative. Cardiovascular: Negative. Gastrointestinal: Negative. Endocrine: Negative. Genitourinary: Negative. Musculoskeletal:  Positive for neck pain. Skin: Negative. Allergic/Immunologic: Negative. Neurological: Negative. Hematological: Negative. Psychiatric/Behavioral: Negative. Objective:   Physical Exam  Constitutional:       Appearance: Normal appearance. HENT:      Head: Normocephalic and atraumatic. Nose: Nose normal.   Eyes:      Pupils: Pupils are equal, round, and reactive to light. Pulmonary:      Effort: Pulmonary effort is normal.   Abdominal:      General: There is no distension. Skin:     General: Skin is warm and dry. Neurological:      Mental Status: She is alert. GCS: GCS eye subscore is 4. GCS verbal subscore is 5. GCS motor subscore is 6. Cranial Nerves: Cranial nerves 2-12 are intact. Sensory: Sensation is intact. Motor: Motor function is intact. Coordination: Coordination is intact. Gait: Gait abnormal.      Deep Tendon Reflexes: Reflexes are normal and symmetric. Psychiatric:         Mood and Affect: Mood normal.       Assessment:      79year old with chronic neck pain for years. She had a prior C4-6 ACDF. Cervical CT/MRI shows C4-5 pseudoarthrosis and severe C3-4 and C6-7 DDD. Plan:      Pain management will be consulted.  She may continue with NSAIDS and PT if beneficial. She does not want to consider surgical options at this time.         PARVEEN Diego

## 2022-11-06 ENCOUNTER — APPOINTMENT (OUTPATIENT)
Dept: GENERAL RADIOLOGY | Age: 67
DRG: 871 | End: 2022-11-06
Payer: MEDICARE

## 2022-11-06 ENCOUNTER — HOSPITAL ENCOUNTER (INPATIENT)
Age: 67
LOS: 4 days | Discharge: HOME | DRG: 871 | End: 2022-11-11
Attending: STUDENT IN AN ORGANIZED HEALTH CARE EDUCATION/TRAINING PROGRAM | Admitting: INTERNAL MEDICINE
Payer: MEDICARE

## 2022-11-06 ENCOUNTER — APPOINTMENT (OUTPATIENT)
Dept: CT IMAGING | Age: 67
DRG: 871 | End: 2022-11-06
Payer: MEDICARE

## 2022-11-06 DIAGNOSIS — R78.81 E. COLI BACTEREMIA: ICD-10-CM

## 2022-11-06 DIAGNOSIS — N30.00 ACUTE CYSTITIS WITHOUT HEMATURIA: Primary | ICD-10-CM

## 2022-11-06 DIAGNOSIS — B96.20 E. COLI BACTEREMIA: ICD-10-CM

## 2022-11-06 DIAGNOSIS — R50.9 FEVER, UNSPECIFIED FEVER CAUSE: ICD-10-CM

## 2022-11-06 PROBLEM — N39.0 UTI (URINARY TRACT INFECTION): Status: ACTIVE | Noted: 2022-11-06

## 2022-11-06 LAB
ALBUMIN SERPL-MCNC: 4.5 G/DL (ref 3.5–5.2)
ALP BLD-CCNC: 59 U/L (ref 35–104)
ALT SERPL-CCNC: 14 U/L (ref 0–32)
ANION GAP SERPL CALCULATED.3IONS-SCNC: 12 MMOL/L (ref 7–16)
AST SERPL-CCNC: 17 U/L (ref 0–31)
BACTERIA: ABNORMAL /HPF
BASOPHILS ABSOLUTE: 0.05 E9/L (ref 0–0.2)
BASOPHILS RELATIVE PERCENT: 0.5 % (ref 0–2)
BILIRUB SERPL-MCNC: 0.9 MG/DL (ref 0–1.2)
BILIRUBIN URINE: NEGATIVE
BLOOD, URINE: ABNORMAL
BUN BLDV-MCNC: 20 MG/DL (ref 6–23)
CALCIUM SERPL-MCNC: 9.5 MG/DL (ref 8.6–10.2)
CHLORIDE BLD-SCNC: 95 MMOL/L (ref 98–107)
CLARITY: ABNORMAL
CO2: 28 MMOL/L (ref 22–29)
COLOR: YELLOW
CREAT SERPL-MCNC: 1.1 MG/DL (ref 0.5–1)
EOSINOPHILS ABSOLUTE: 0.02 E9/L (ref 0.05–0.5)
EOSINOPHILS RELATIVE PERCENT: 0.2 % (ref 0–6)
GFR SERPL CREATININE-BSD FRML MDRD: 55 ML/MIN/1.73
GLUCOSE BLD-MCNC: 117 MG/DL (ref 74–99)
GLUCOSE URINE: NEGATIVE MG/DL
HCT VFR BLD CALC: 37.4 % (ref 34–48)
HEMOGLOBIN: 12.7 G/DL (ref 11.5–15.5)
IMMATURE GRANULOCYTES #: 0.05 E9/L
IMMATURE GRANULOCYTES %: 0.5 % (ref 0–5)
KETONES, URINE: NEGATIVE MG/DL
LACTIC ACID, SEPSIS: 1.5 MMOL/L (ref 0.5–1.9)
LACTIC ACID, SEPSIS: 3.3 MMOL/L (ref 0.5–1.9)
LACTIC ACID: 1.9 MMOL/L (ref 0.5–2.2)
LEUKOCYTE ESTERASE, URINE: ABNORMAL
LIPASE: 45 U/L (ref 13–60)
LYMPHOCYTES ABSOLUTE: 0.94 E9/L (ref 1.5–4)
LYMPHOCYTES RELATIVE PERCENT: 9.4 % (ref 20–42)
MCH RBC QN AUTO: 31 PG (ref 26–35)
MCHC RBC AUTO-ENTMCNC: 34 % (ref 32–34.5)
MCV RBC AUTO: 91.2 FL (ref 80–99.9)
MONOCYTES ABSOLUTE: 0.7 E9/L (ref 0.1–0.95)
MONOCYTES RELATIVE PERCENT: 7 % (ref 2–12)
NEUTROPHILS ABSOLUTE: 8.23 E9/L (ref 1.8–7.3)
NEUTROPHILS RELATIVE PERCENT: 82.4 % (ref 43–80)
NITRITE, URINE: NEGATIVE
PDW BLD-RTO: 12.8 FL (ref 11.5–15)
PH UA: 6 (ref 5–9)
PLATELET # BLD: 196 E9/L (ref 130–450)
PMV BLD AUTO: 9.3 FL (ref 7–12)
POTASSIUM SERPL-SCNC: 4.1 MMOL/L (ref 3.5–5)
PRO-BNP: 5590 PG/ML (ref 0–125)
PROTEIN UA: ABNORMAL MG/DL
RBC # BLD: 4.1 E12/L (ref 3.5–5.5)
RBC UA: ABNORMAL /HPF (ref 0–2)
SARS-COV-2, NAAT: NOT DETECTED
SODIUM BLD-SCNC: 135 MMOL/L (ref 132–146)
SPECIFIC GRAVITY UA: 1.01 (ref 1–1.03)
TOTAL PROTEIN: 7.7 G/DL (ref 6.4–8.3)
TROPONIN, HIGH SENSITIVITY: 13 NG/L (ref 0–9)
TROPONIN, HIGH SENSITIVITY: 15 NG/L (ref 0–9)
TROPONIN, HIGH SENSITIVITY: 30 NG/L (ref 0–9)
UROBILINOGEN, URINE: 0.2 E.U./DL
WBC # BLD: 10 E9/L (ref 4.5–11.5)
WBC UA: ABNORMAL /HPF (ref 0–5)

## 2022-11-06 PROCEDURE — 87040 BLOOD CULTURE FOR BACTERIA: CPT

## 2022-11-06 PROCEDURE — 96374 THER/PROPH/DIAG INJ IV PUSH: CPT

## 2022-11-06 PROCEDURE — 80053 COMPREHEN METABOLIC PANEL: CPT

## 2022-11-06 PROCEDURE — 83880 ASSAY OF NATRIURETIC PEPTIDE: CPT

## 2022-11-06 PROCEDURE — 83605 ASSAY OF LACTIC ACID: CPT

## 2022-11-06 PROCEDURE — 87635 SARS-COV-2 COVID-19 AMP PRB: CPT

## 2022-11-06 PROCEDURE — 83690 ASSAY OF LIPASE: CPT

## 2022-11-06 PROCEDURE — 84484 ASSAY OF TROPONIN QUANT: CPT

## 2022-11-06 PROCEDURE — 6360000002 HC RX W HCPCS: Performed by: STUDENT IN AN ORGANIZED HEALTH CARE EDUCATION/TRAINING PROGRAM

## 2022-11-06 PROCEDURE — 36415 COLL VENOUS BLD VENIPUNCTURE: CPT

## 2022-11-06 PROCEDURE — 71045 X-RAY EXAM CHEST 1 VIEW: CPT

## 2022-11-06 PROCEDURE — G0378 HOSPITAL OBSERVATION PER HR: HCPCS

## 2022-11-06 PROCEDURE — 6370000000 HC RX 637 (ALT 250 FOR IP): Performed by: INTERNAL MEDICINE

## 2022-11-06 PROCEDURE — 2500000003 HC RX 250 WO HCPCS: Performed by: INTERNAL MEDICINE

## 2022-11-06 PROCEDURE — 93005 ELECTROCARDIOGRAM TRACING: CPT | Performed by: NURSE PRACTITIONER

## 2022-11-06 PROCEDURE — 99285 EMERGENCY DEPT VISIT HI MDM: CPT

## 2022-11-06 PROCEDURE — 2580000003 HC RX 258: Performed by: STUDENT IN AN ORGANIZED HEALTH CARE EDUCATION/TRAINING PROGRAM

## 2022-11-06 PROCEDURE — 96375 TX/PRO/DX INJ NEW DRUG ADDON: CPT

## 2022-11-06 PROCEDURE — 85025 COMPLETE CBC W/AUTO DIFF WBC: CPT

## 2022-11-06 PROCEDURE — 87088 URINE BACTERIA CULTURE: CPT

## 2022-11-06 PROCEDURE — 87077 CULTURE AEROBIC IDENTIFY: CPT

## 2022-11-06 PROCEDURE — 81001 URINALYSIS AUTO W/SCOPE: CPT

## 2022-11-06 PROCEDURE — 6370000000 HC RX 637 (ALT 250 FOR IP): Performed by: STUDENT IN AN ORGANIZED HEALTH CARE EDUCATION/TRAINING PROGRAM

## 2022-11-06 PROCEDURE — 2580000003 HC RX 258: Performed by: INTERNAL MEDICINE

## 2022-11-06 PROCEDURE — 74176 CT ABD & PELVIS W/O CONTRAST: CPT

## 2022-11-06 PROCEDURE — 87186 SC STD MICRODIL/AGAR DIL: CPT

## 2022-11-06 RX ORDER — PANTOPRAZOLE SODIUM 40 MG/1
40 TABLET, DELAYED RELEASE ORAL
Status: DISCONTINUED | OUTPATIENT
Start: 2022-11-07 | End: 2022-11-11 | Stop reason: HOSPADM

## 2022-11-06 RX ORDER — BUSPIRONE HYDROCHLORIDE 10 MG/1
5 TABLET ORAL 3 TIMES DAILY
Status: DISCONTINUED | OUTPATIENT
Start: 2022-11-06 | End: 2022-11-11 | Stop reason: HOSPADM

## 2022-11-06 RX ORDER — 0.9 % SODIUM CHLORIDE 0.9 %
1000 INTRAVENOUS SOLUTION INTRAVENOUS ONCE
Status: COMPLETED | OUTPATIENT
Start: 2022-11-06 | End: 2022-11-06

## 2022-11-06 RX ORDER — ASPIRIN 81 MG/1
324 TABLET, CHEWABLE ORAL ONCE
Status: COMPLETED | OUTPATIENT
Start: 2022-11-07 | End: 2022-11-06

## 2022-11-06 RX ORDER — ACETAMINOPHEN 325 MG/1
650 TABLET ORAL ONCE
Status: COMPLETED | OUTPATIENT
Start: 2022-11-06 | End: 2022-11-06

## 2022-11-06 RX ORDER — OMEPRAZOLE 20 MG/1
40 CAPSULE, DELAYED RELEASE ORAL EVERY MORNING
Status: DISCONTINUED | OUTPATIENT
Start: 2022-11-07 | End: 2022-11-06 | Stop reason: CLARIF

## 2022-11-06 RX ORDER — FAMOTIDINE 20 MG/1
40 TABLET, FILM COATED ORAL NIGHTLY
Status: DISCONTINUED | OUTPATIENT
Start: 2022-11-06 | End: 2022-11-11 | Stop reason: HOSPADM

## 2022-11-06 RX ORDER — ACETAMINOPHEN 325 MG/1
650 TABLET ORAL EVERY 6 HOURS PRN
Status: DISCONTINUED | OUTPATIENT
Start: 2022-11-06 | End: 2022-11-11 | Stop reason: HOSPADM

## 2022-11-06 RX ORDER — NITROGLYCERIN 0.4 MG/1
0.4 TABLET SUBLINGUAL EVERY 5 MIN PRN
Status: DISCONTINUED | OUTPATIENT
Start: 2022-11-06 | End: 2022-11-11 | Stop reason: HOSPADM

## 2022-11-06 RX ORDER — DEXTROSE MONOHYDRATE 100 MG/ML
INJECTION, SOLUTION INTRAVENOUS CONTINUOUS PRN
Status: DISCONTINUED | OUTPATIENT
Start: 2022-11-06 | End: 2022-11-11 | Stop reason: HOSPADM

## 2022-11-06 RX ORDER — SODIUM CHLORIDE 9 MG/ML
INJECTION, SOLUTION INTRAVENOUS PRN
Status: DISCONTINUED | OUTPATIENT
Start: 2022-11-06 | End: 2022-11-11 | Stop reason: HOSPADM

## 2022-11-06 RX ORDER — ONDANSETRON 2 MG/ML
4 INJECTION INTRAMUSCULAR; INTRAVENOUS ONCE
Status: COMPLETED | OUTPATIENT
Start: 2022-11-06 | End: 2022-11-06

## 2022-11-06 RX ORDER — POLYETHYLENE GLYCOL 3350 17 G/17G
17 POWDER, FOR SOLUTION ORAL DAILY PRN
Status: DISCONTINUED | OUTPATIENT
Start: 2022-11-06 | End: 2022-11-11 | Stop reason: HOSPADM

## 2022-11-06 RX ORDER — VENLAFAXINE HYDROCHLORIDE 37.5 MG/1
75 CAPSULE, EXTENDED RELEASE ORAL NIGHTLY
Status: DISCONTINUED | OUTPATIENT
Start: 2022-11-06 | End: 2022-11-11 | Stop reason: HOSPADM

## 2022-11-06 RX ORDER — ACETAMINOPHEN 650 MG/1
650 SUPPOSITORY RECTAL EVERY 6 HOURS PRN
Status: DISCONTINUED | OUTPATIENT
Start: 2022-11-06 | End: 2022-11-11 | Stop reason: HOSPADM

## 2022-11-06 RX ORDER — MONTELUKAST SODIUM 10 MG/1
10 TABLET ORAL NIGHTLY
Status: DISCONTINUED | OUTPATIENT
Start: 2022-11-06 | End: 2022-11-11 | Stop reason: HOSPADM

## 2022-11-06 RX ORDER — ENOXAPARIN SODIUM 100 MG/ML
40 INJECTION SUBCUTANEOUS DAILY
Status: DISCONTINUED | OUTPATIENT
Start: 2022-11-07 | End: 2022-11-06 | Stop reason: ALTCHOICE

## 2022-11-06 RX ORDER — SODIUM CHLORIDE 0.9 % (FLUSH) 0.9 %
5-40 SYRINGE (ML) INJECTION PRN
Status: DISCONTINUED | OUTPATIENT
Start: 2022-11-06 | End: 2022-11-11 | Stop reason: HOSPADM

## 2022-11-06 RX ORDER — METOPROLOL TARTRATE 50 MG/1
50 TABLET, FILM COATED ORAL 2 TIMES DAILY
Status: DISCONTINUED | OUTPATIENT
Start: 2022-11-06 | End: 2022-11-08

## 2022-11-06 RX ORDER — METOPROLOL TARTRATE 5 MG/5ML
5 INJECTION INTRAVENOUS EVERY 5 MIN PRN
Status: DISCONTINUED | OUTPATIENT
Start: 2022-11-06 | End: 2022-11-11 | Stop reason: HOSPADM

## 2022-11-06 RX ORDER — ANTIARTHRITIC COMBINATION NO.2 900 MG
5000 TABLET ORAL DAILY
COMMUNITY

## 2022-11-06 RX ORDER — SODIUM CHLORIDE 0.9 % (FLUSH) 0.9 %
5-40 SYRINGE (ML) INJECTION EVERY 12 HOURS SCHEDULED
Status: DISCONTINUED | OUTPATIENT
Start: 2022-11-06 | End: 2022-11-11 | Stop reason: HOSPADM

## 2022-11-06 RX ORDER — ALBUTEROL SULFATE 90 UG/1
2 AEROSOL, METERED RESPIRATORY (INHALATION) EVERY 6 HOURS PRN
COMMUNITY

## 2022-11-06 RX ORDER — AMITRIPTYLINE HYDROCHLORIDE 25 MG/1
75 TABLET, FILM COATED ORAL NIGHTLY
Status: DISCONTINUED | OUTPATIENT
Start: 2022-11-06 | End: 2022-11-11 | Stop reason: HOSPADM

## 2022-11-06 RX ADMIN — ACETAMINOPHEN 650 MG: 325 TABLET ORAL at 23:50

## 2022-11-06 RX ADMIN — AMITRIPTYLINE HYDROCHLORIDE 75 MG: 25 TABLET, FILM COATED ORAL at 23:49

## 2022-11-06 RX ADMIN — APIXABAN 5 MG: 5 TABLET, FILM COATED ORAL at 23:50

## 2022-11-06 RX ADMIN — Medication 10 ML: at 23:53

## 2022-11-06 RX ADMIN — METOPROLOL TARTRATE 5 MG: 5 INJECTION, SOLUTION INTRAVENOUS at 23:52

## 2022-11-06 RX ADMIN — VENLAFAXINE HYDROCHLORIDE 75 MG: 37.5 CAPSULE, EXTENDED RELEASE ORAL at 23:51

## 2022-11-06 RX ADMIN — WATER 1000 MG: 1 INJECTION INTRAMUSCULAR; INTRAVENOUS; SUBCUTANEOUS at 20:38

## 2022-11-06 RX ADMIN — ONDANSETRON 4 MG: 2 INJECTION INTRAMUSCULAR; INTRAVENOUS at 22:20

## 2022-11-06 RX ADMIN — SODIUM CHLORIDE 1000 ML: 9 INJECTION, SOLUTION INTRAVENOUS at 18:01

## 2022-11-06 RX ADMIN — ACETAMINOPHEN 650 MG: 325 TABLET ORAL at 16:09

## 2022-11-06 RX ADMIN — FAMOTIDINE 40 MG: 20 TABLET, FILM COATED ORAL at 23:51

## 2022-11-06 RX ADMIN — ASPIRIN 81 MG 324 MG: 81 TABLET ORAL at 23:50

## 2022-11-06 RX ADMIN — BUSPIRONE HYDROCHLORIDE 5 MG: 10 TABLET ORAL at 23:50

## 2022-11-06 ASSESSMENT — PAIN DESCRIPTION - PAIN TYPE
TYPE: ACUTE PAIN
TYPE: ACUTE PAIN

## 2022-11-06 ASSESSMENT — PAIN DESCRIPTION - LOCATION
LOCATION: CHEST
LOCATION: CHEST
LOCATION: CHEST;HEAD
LOCATION: GENERALIZED
LOCATION: CHEST

## 2022-11-06 ASSESSMENT — ENCOUNTER SYMPTOMS
DIARRHEA: 0
PHOTOPHOBIA: 0
TROUBLE SWALLOWING: 0
ABDOMINAL PAIN: 0
CONSTIPATION: 0
SHORTNESS OF BREATH: 0
VOMITING: 0
VOICE CHANGE: 0
NAUSEA: 0
RHINORRHEA: 0
COUGH: 0

## 2022-11-06 ASSESSMENT — PAIN DESCRIPTION - DESCRIPTORS
DESCRIPTORS: ACHING;PRESSURE
DESCRIPTORS: ACHING;PRESSURE;DISCOMFORT
DESCRIPTORS: PRESSURE;ACHING;DISCOMFORT
DESCRIPTORS: CRUSHING;PRESSURE

## 2022-11-06 ASSESSMENT — PAIN DESCRIPTION - ORIENTATION
ORIENTATION: MID
ORIENTATION: MID

## 2022-11-06 ASSESSMENT — PAIN SCALES - GENERAL
PAINLEVEL_OUTOF10: 3
PAINLEVEL_OUTOF10: 5
PAINLEVEL_OUTOF10: 6

## 2022-11-06 ASSESSMENT — PAIN - FUNCTIONAL ASSESSMENT: PAIN_FUNCTIONAL_ASSESSMENT: NONE - DENIES PAIN

## 2022-11-06 ASSESSMENT — PAIN DESCRIPTION - ONSET: ONSET: PROGRESSIVE

## 2022-11-06 ASSESSMENT — PAIN DESCRIPTION - FREQUENCY: FREQUENCY: CONTINUOUS

## 2022-11-06 NOTE — ED PROVIDER NOTES
Patient is a 20-year-old female with a history of persistent A. fib refractory to refractory to cardioversion, recent COVID in 7/2022, obesity who presents to the emergency department with complaint of a fever, decreased energy, decreased appetite, shortness of breath on exertion for the past 2 days. She states that her shortness of breath and chest pain on exertion has been progressive over the past few weeks, she is followed currently by cardiology. Patient states 2 days of decreased appetite, decreased energy, fevers with T-max 101 today. Patient took Tylenol approximately 4 hours prior to arrival in the emergency department. Patient is vaccinated against COVID-19. Patient also endorses urinary symptoms including dysuria, frequency, urgency, feeling of retention. Patient denies any hematuria. Patient denies any low back pain, chills. Patient denies any trauma. Patient denies any known sick contacts. Review of Systems   Constitutional:  Negative for chills, fatigue and fever. HENT:  Negative for congestion, rhinorrhea, trouble swallowing and voice change. Eyes:  Negative for photophobia and visual disturbance. Respiratory:  Negative for cough and shortness of breath. Cardiovascular:  Negative for chest pain, palpitations and leg swelling. Gastrointestinal:  Negative for abdominal pain, constipation, diarrhea, nausea and vomiting. Endocrine: Negative for polyuria. Genitourinary:  Negative for dysuria, frequency, hematuria and urgency. Musculoskeletal:  Negative for arthralgias and myalgias. Skin:  Negative for rash and wound. Allergic/Immunologic: Negative for immunocompromised state. Neurological:  Negative for dizziness, syncope, weakness, light-headedness, numbness and headaches. Psychiatric/Behavioral:  Negative for confusion. The patient is not nervous/anxious. Physical Exam  Vitals and nursing note reviewed. Constitutional:       General: She is awake.  She is not in acute distress. Appearance: She is obese. She is not ill-appearing. HENT:      Head: Normocephalic and atraumatic. Mouth/Throat:      Mouth: Mucous membranes are moist.   Eyes:      Pupils: Pupils are equal, round, and reactive to light. Cardiovascular:      Rate and Rhythm: Normal rate. Rhythm irregular. Pulses: Normal pulses. Radial pulses are 2+ on the right side and 2+ on the left side. Heart sounds: Normal heart sounds. Pulmonary:      Effort: Pulmonary effort is normal.      Breath sounds: Normal breath sounds. Abdominal:      Palpations: Abdomen is soft. Tenderness: There is no abdominal tenderness. Musculoskeletal:         General: Normal range of motion. Cervical back: Normal range of motion. Right lower leg: No edema. Left lower leg: No edema. Skin:     General: Skin is warm and dry. Capillary Refill: Capillary refill takes less than 2 seconds. Neurological:      General: No focal deficit present. Mental Status: She is alert and oriented to person, place, and time. Psychiatric:         Behavior: Behavior is cooperative. MDM  Number of Diagnoses or Management Options  Acute cystitis without hematuria  Fever, unspecified fever cause  Diagnosis management comments: Patient is a 70-year-old female presents to the emergency department with complaints of fever and shortness of breath is been ongoing for weeks. Patient presents awake, alert, following all commands, speaking in full sentences, ill-appearing. Vital signs were noted. Patient is not hypoxic. Physical exam shows ill-appearing. Work-up including lab significant for urinalysis concerning for acute cystitis, urine culture will be followed, patient will be treated. EKG showing chronic A. fib, and patient is on anticoagulation which she states she is taking regularly. Troponin is mildly elevated, repeat with a delta of 2, not significant.   X-ray was reviewed, CT was obtained for patient having history of renal stones, no renal stones are noted at this time. Patient with weakness, stating she is unable to perform her ADLs, decision was made to admit for UTI and continued IV antibiotics was discussed with patient and she is agreeable. Hospitalist was consulted and, was accepted to their service. Patient was monitored in the emergency department till time of bed is available. Amount and/or Complexity of Data Reviewed  Clinical lab tests: ordered and reviewed  Tests in the radiology section of CPT®: ordered and reviewed       ED Course as of 11/07/22 2057   [facility] Nov 06, 2022 1802 EKG: This EKG is signed and interpreted by me. Rate: 116  Rhythm: Atrial fibrillation  Interpretation: atrial fibrillation (chronic), no ST elevation, left axis deviation, st depression lateral,   Comparison: changes compared to previous EKG   [SS]   1904 Patient would like to follow with Dr Suresh Tinajero for cardiology her sister sees the same doctor [SS]   2128 Patient was reevaluated, she states that she still having severe chills, given another blanket. Patient is nauseous, will be given antiemetic. Disposition including admission was discussed, patient is agreeable [QC]      ED Course User Index  [QC] Yuni Cedeño MD  [SS] Carlos Trujillo MD      EKG: This EKG is signed and interpreted by me. Rate: 116  Rhythm: Atrial Fib  Interpretation: Atrial Fib normal QRS, normal QT interval, no acute ST or T wave changes  Comparison: changes compared to previous EKG     ED Course as of 11/07/22 2057   [facility] Nov 06, 2022 1802 EKG: This EKG is signed and interpreted by me.     Rate: 116  Rhythm: Atrial fibrillation  Interpretation: atrial fibrillation (chronic), no ST elevation, left axis deviation, st depression lateral,   Comparison: changes compared to previous EKG   [SS]   1904 Patient would like to follow with Dr Suresh Tinajero for cardiology her sister sees the same doctor [SS]   2128 Patient was reevaluated, she states that she still having severe chills, given another blanket. Patient is nauseous, will be given antiemetic. Disposition including admission was discussed, patient is agreeable [QC]      ED Course User Index  [QC] Vasquez Cummings MD  [SS] Chad Hurt MD       --------------------------------------------- PAST HISTORY ---------------------------------------------  Past Medical History:  has a past medical history of Acid reflux, Atrial fibrillation ([facility] [address] [facility] Chronic back pain, CPAP (continuous positive airway pressure) dependence, Fibromyalgia, History of bariatric surgery, History of cardiovascular stress test, Hyperlipidemia, Hypertension, Kidney stones, Low iron, Osteoarthritis, and Unspecified sleep apnea. Past Surgical History:  has a past surgical history that includes tracheostomy (1999); Stanley-en-Y Gastric Bypass (04/17/2002); Cholecystectomy (2004); hernia repair (2004); Hysterectomy (1988); Appendectomy; Tonsillectomy; bladder suspension (1998); joint replacement (2009); Upper gastrointestinal endoscopy (2009); Colonoscopy (2008); Bariatric Surgery (2003); Breast surgery (2005); Shoulder arthroscopy (2/17/12); ECHO Compl W Dop Color Flow (8/10/2012); Uvulopalatopharygoplasty; Neck surgery (06/2015); and hernia repair (Left, 11/4/2021). Social History:  reports that she quit smoking about 2 years ago. Her smoking use included cigarettes. She has a 20.00 pack-year smoking history. She has never used smokeless tobacco. She reports that she does not drink alcohol and does not use drugs. Family History: family history includes Cancer in her paternal grandfather; Heart Disease in her maternal grandfather, maternal grandmother, mother, and sister; High Blood Pressure in her mother and sister; Stroke in her mother. The patients home medications have been reviewed.     Allergies: Morphine and related and Percocet [oxycodone-acetaminophen]    -------------------------------------------------- RESULTS -------------------------------------------------    LABS:  Results for orders placed or performed during the hospital encounter of 11/06/22   COVID-19, Rapid    Specimen: Nasopharyngeal Swab   Result Value Ref Range    SARS-CoV-2, NAAT Not Detected Not Detected   Culture, Blood 2    Specimen: Blood   Result Value Ref Range    Culture, Blood 2 (A)      Gram stain performed from blood culture bottle media  Gram negative rods     Respiratory Panel, Molecular, with COVID-19 (Restricted: peds pts or suitable admitted adults)    Specimen: Nasopharyngeal; Nares   Result Value Ref Range    Adenovirus by PCR Not Detected Not Detected    Bordetella parapertussis by PCR Not Detected Not Detected    Bordetella pertussis by PCR Not Detected Not Detected    Chlamydophilia pneumoniae by PCR Not Detected Not Detected    Coronavirus 229E by PCR Not Detected Not Detected    Coronavirus HKU1 by PCR Not Detected Not Detected    Coronavirus NL63 by PCR Not Detected Not Detected    Coronavirus OC43 by PCR Not Detected Not Detected    SARS-CoV-2, PCR Not Detected Not Detected    Human Metapneumovirus by PCR Not Detected Not Detected    Human Rhinovirus/Enterovirus by PCR Not Detected Not Detected    Influenza A by PCR Not Detected Not Detected    Influenza B by PCR Not Detected Not Detected    Mycoplasma pneumoniae by PCR Not Detected Not Detected    Parainfluenza Virus 1 by PCR Not Detected Not Detected    Parainfluenza Virus 2 by PCR Not Detected Not Detected    Parainfluenza Virus 3 by PCR Not Detected Not Detected    Parainfluenza Virus 4 by PCR Not Detected Not Detected    Respiratory Syncytial Virus by PCR Not Detected Not Detected   Troponin   Result Value Ref Range    Troponin, High Sensitivity 13 (H) 0 - 9 ng/L   Brain Natriuretic Peptide   Result Value Ref Range    Pro-BNP 5,590 (H) 0 - 125 pg/mL   CMP   Result Value Ref Range    Sodium 135 132 - 146 mmol/L    Potassium 4.1 3.5 - 5.0 mmol/L    Chloride 95 (L) 98 - 107 mmol/L    CO2 28 22 - 29 mmol/L    Anion Gap 12 7 - 16 mmol/L    Glucose 117 (H) 74 - 99 mg/dL    BUN 20 6 - 23 mg/dL    Creatinine 1.1 (H) 0.5 - 1.0 mg/dL    Est, Glom Filt Rate 55 >=60 mL/min/1.73    Calcium 9.5 8.6 - 10.2 mg/dL    Total Protein 7.7 6.4 - 8.3 g/dL    Albumin 4.5 3.5 - 5.2 g/dL    Total Bilirubin 0.9 0.0 - 1.2 mg/dL    Alkaline Phosphatase 59 35 - 104 U/L    ALT 14 0 - 32 U/L    AST 17 0 - 31 U/L   CBC with Auto Differential   Result Value Ref Range    WBC 10.0 4.5 - 11.5 E9/L    RBC 4.10 3.50 - 5.50 E12/L    Hemoglobin 12.7 11.5 - 15.5 g/dL    Hematocrit 37.4 34.0 - 48.0 %    MCV 91.2 80.0 - 99.9 fL    MCH 31.0 26.0 - 35.0 pg    MCHC 34.0 32.0 - 34.5 %    RDW 12.8 11.5 - 15.0 fL    Platelets 710 469 - 087 E9/L    MPV 9.3 7.0 - 12.0 fL    Neutrophils % 82.4 (H) 43.0 - 80.0 %    Immature Granulocytes % 0.5 0.0 - 5.0 %    Lymphocytes % 9.4 (L) 20.0 - 42.0 %    Monocytes % 7.0 2.0 - 12.0 %    Eosinophils % 0.2 0.0 - 6.0 %    Basophils % 0.5 0.0 - 2.0 %    Neutrophils Absolute 8.23 (H) 1.80 - 7.30 E9/L    Immature Granulocytes # 0.05 E9/L    Lymphocytes Absolute 0.94 (L) 1.50 - 4.00 E9/L    Monocytes Absolute 0.70 0.10 - 0.95 E9/L    Eosinophils Absolute 0.02 (L) 0.05 - 0.50 E9/L    Basophils Absolute 0.05 0.00 - 0.20 E9/L   Lipase   Result Value Ref Range    Lipase 45 13 - 60 U/L   Lactic Acid   Result Value Ref Range    Lactic Acid 1.9 0.5 - 2.2 mmol/L   Urinalysis with Microscopic   Result Value Ref Range    Color, UA Yellow Straw/Yellow    Clarity, UA SLCLOUDY Clear    Glucose, Ur Negative Negative mg/dL    Bilirubin Urine Negative Negative    Ketones, Urine Negative Negative mg/dL    Specific Gravity, UA 1.015 1.005 - 1.030    Blood, Urine MODERATE (A) Negative    pH, UA 6.0 5.0 - 9.0    Protein, UA TRACE Negative mg/dL    Urobilinogen, Urine 0.2 <2.0 E.U./dL    Nitrite, Urine Negative Negative    Leukocyte Esterase, Urine SMALL (A) Negative    WBC, UA 5-10 (A) 0 - 5 /HPF    RBC, UA 2-5 0 - 2 /HPF    Bacteria, UA MANY (A) None Seen /HPF   Troponin   Result Value Ref Range    Troponin, High Sensitivity 15 (H) 0 - 9 ng/L   Lactate, Sepsis   Result Value Ref Range    Lactic Acid, Sepsis 3.3 (H) 0.5 - 1.9 mmol/L   Lactate, Sepsis   Result Value Ref Range    Lactic Acid, Sepsis 1.5 0.5 - 1.9 mmol/L   Troponin   Result Value Ref Range    Troponin, High Sensitivity 30 (H) 0 - 9 ng/L   Troponin   Result Value Ref Range    Troponin, High Sensitivity 94 (H) 0 - 9 ng/L   Comprehensive Metabolic Panel   Result Value Ref Range    Sodium 139 132 - 146 mmol/L    Potassium 3.6 3.5 - 5.0 mmol/L    Chloride 98 98 - 107 mmol/L    CO2 25 22 - 29 mmol/L    Anion Gap 16 7 - 16 mmol/L    Glucose 117 (H) 74 - 99 mg/dL    BUN 17 6 - 23 mg/dL    Creatinine 0.9 0.5 - 1.0 mg/dL    Est, Glom Filt Rate >60 >=60 mL/min/1.73    Calcium 9.1 8.6 - 10.2 mg/dL    Total Protein 7.1 6.4 - 8.3 g/dL    Albumin 4.0 3.5 - 5.2 g/dL    Total Bilirubin 1.2 0.0 - 1.2 mg/dL    Alkaline Phosphatase 56 35 - 104 U/L    ALT 11 0 - 32 U/L    AST 17 0 - 31 U/L   CBC with Auto Differential   Result Value Ref Range    WBC 14.9 (H) 4.5 - 11.5 E9/L    RBC 4.10 3.50 - 5.50 E12/L    Hemoglobin 12.5 11.5 - 15.5 g/dL    Hematocrit 39.2 34.0 - 48.0 %    MCV 95.6 80.0 - 99.9 fL    MCH 30.5 26.0 - 35.0 pg    MCHC 31.9 (L) 32.0 - 34.5 %    RDW 13.0 11.5 - 15.0 fL    Platelets 035 264 - 943 E9/L    MPV 9.5 7.0 - 12.0 fL    Neutrophils % 83.3 (H) 43.0 - 80.0 %    Immature Granulocytes % 0.5 0.0 - 5.0 %    Lymphocytes % 9.2 (L) 20.0 - 42.0 %    Monocytes % 6.2 2.0 - 12.0 %    Eosinophils % 0.5 0.0 - 6.0 %    Basophils % 0.3 0.0 - 2.0 %    Neutrophils Absolute 12.39 (H) 1.80 - 7.30 E9/L    Immature Granulocytes # 0.08 E9/L    Lymphocytes Absolute 1.37 (L) 1.50 - 4.00 E9/L    Monocytes Absolute 0.92 0.10 - 0.95 E9/L    Eosinophils Absolute 0.07 0.05 - 0.50 E9/L    Basophils Absolute 0.05 0.00 - 0.20 E9/L   Phosphorus   Result Value Ref Range    Phosphorus 3.7 2.5 - 4.5 mg/dL   Procalcitonin   Result Value Ref Range    Procalcitonin 3.85 (H) 0.00 - 0.08 ng/mL   Magnesium   Result Value Ref Range    Magnesium 1.4 (L) 1.6 - 2.6 mg/dL   TSH   Result Value Ref Range    TSH 1.750 0.270 - 4.200 uIU/mL   T4, Free   Result Value Ref Range    T4 Free 1.19 0.93 - 1.70 ng/dL   Troponin   Result Value Ref Range    Troponin, High Sensitivity 124 (H) 0 - 9 ng/L   Lactic Acid   Result Value Ref Range    Lactic Acid 1.5 0.5 - 2.2 mmol/L   Lactic Acid   Result Value Ref Range    Lactic Acid 2.0 0.5 - 2.2 mmol/L   Lipid Panel   Result Value Ref Range    Cholesterol, Total 158 0 - 199 mg/dL    Triglycerides 90 0 - 149 mg/dL    HDL 53 >40 mg/dL    LDL Calculated 87 0 - 99 mg/dL    VLDL Cholesterol Calculated 18 mg/dL   D-Dimer, Quantitative   Result Value Ref Range    D-Dimer, Quant 405 ng/mL DDU   EKG 12 Lead   Result Value Ref Range    Ventricular Rate 116 BPM    Atrial Rate 312 BPM    QRS Duration 70 ms    Q-T Interval 350 ms    QTc Calculation (Bazett) 486 ms    R Axis -41 degrees    T Axis 110 degrees   EKG 12 Lead   Result Value Ref Range    Ventricular Rate 105 BPM    Atrial Rate 312 BPM    QRS Duration 80 ms    Q-T Interval 374 ms    QTc Calculation (Bazett) 494 ms    R Axis 65 degrees    T Axis 90 degrees       RADIOLOGY:  XR CHEST PORTABLE   Final Result   No acute disease. RECOMMENDATION:   Careful clinical correlation and follow up recommended. CT ABDOMEN PELVIS WO CONTRAST Additional Contrast? None   Final Result   No urinary tract stones or hydronephrosis. No evidence for diverticulitis. Cholecystectomy. 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 XR CHEST 1 VIEW   Final Result   No acute process.            ------------------------- NURSING NOTES AND VITALS REVIEWED ---------------------------  Date / Time Roomed:  11/6/2022  3:26 PM  ED Bed Assignment:  [id number]    The nursing notes within the ED encounter and vital signs as below have been reviewed. Patient Vitals for the past 24 hrs:   BP Temp Temp src Pulse Resp SpO2 Weight   11/07/22 2036 130/81 -- -- 88 -- -- --   11/07/22 2000 130/81 -- -- 85 -- -- --   11/07/22 1708 -- -- -- -- -- 95 % --   11/07/22 1600 122/65 98 °F (36.7 °C) Temporal 97 16 95 % --   11/07/22 1311 -- -- -- -- -- 95 % --   11/07/22 0758 -- -- -- -- -- 94 % --   11/07/22 0715 (!) 136/97 98.5 °F (36.9 °C) Oral 97 20 98 % --   11/07/22 0600 118/74 98.3 °F (36.8 °C) Oral 86 18 96 % --   11/07/22 0300 130/83 -- -- (!) 102 -- -- --   11/07/22 0230 (!) 146/104 98.9 °F (37.2 °C) Oral (!) 112 20 95 % --   11/07/22 0200 -- -- -- -- -- -- 221 lb (100.2 kg)   11/07/22 0109 (!) 137/90 -- -- (!) 107 -- -- --   11/07/22 0100 120/87 99.2 °F (37.3 °C) -- (!) 110 20 95 % --   11/07/22 0045 (!) 148/87 99.2 °F (37.3 °C) -- (!) 109 20 92 % --   11/07/22 0015 (!) 139/90 99 °F (37.2 °C) -- (!) 111 22 94 % --   11/06/22 2330 (!) 155/94 99 °F (37.2 °C) Axillary (!) 112 22 94 % --   11/06/22 2300 (!) 172/83 (!) 100.9 °F (38.3 °C) -- (!) 118 24 97 % --   11/06/22 2215 (!) 174/137 -- -- (!) 150 22 95 % --   11/06/22 2100 (!) 160/100 -- -- 94 18 97 % --     Oxygen Saturation Interpretation: Normal    ------------------------------------------ PROGRESS NOTES ------------------------------------------  Re-evaluation(s):  Patients symptoms are improving  Repeat physical examination is not changed    Counseling:  I have spoken with the patient and discussed todays results, in addition to providing specific details for the plan of care and counseling regarding the diagnosis and prognosis. Their questions are answered at this time and they are agreeable with the plan of admission.    --------------------------------- ADDITIONAL PROVIDER NOTES ---------------------------------  Consultations:  Spoke with hospitalist.  Discussed case.   They will admit the patient. This patient's ED course included: a personal history and physicial examination, multiple bedside re-evaluations, IV medications, cardiac monitoring, and continuous pulse oximetry    This patient has remained hemodynamically stable during their ED course. Diagnosis:  1. Acute cystitis without hematuria    2. Fever, unspecified fever cause      Disposition:  Patient's disposition: Admit to telemetry  Patient's condition is stable. 11/7/22, 2:31 AM EST.     This note is prepared by Rosa Isela Morgan MD -PGY- 3                 Rosa Isela Morgan MD  Resident  11/07/22 3066

## 2022-11-07 ENCOUNTER — APPOINTMENT (OUTPATIENT)
Dept: GENERAL RADIOLOGY | Age: 67
DRG: 871 | End: 2022-11-07
Payer: MEDICARE

## 2022-11-07 PROBLEM — I21.4 NSTEMI (NON-ST ELEVATED MYOCARDIAL INFARCTION) (HCC): Status: ACTIVE | Noted: 2022-11-07

## 2022-11-07 PROBLEM — J96.01 ACUTE RESPIRATORY FAILURE WITH HYPOXIA (HCC): Status: ACTIVE | Noted: 2022-11-07

## 2022-11-07 LAB
ADENOVIRUS BY PCR: NOT DETECTED
ALBUMIN SERPL-MCNC: 4 G/DL (ref 3.5–5.2)
ALP BLD-CCNC: 56 U/L (ref 35–104)
ALT SERPL-CCNC: 11 U/L (ref 0–32)
ANION GAP SERPL CALCULATED.3IONS-SCNC: 16 MMOL/L (ref 7–16)
AST SERPL-CCNC: 17 U/L (ref 0–31)
BASOPHILS ABSOLUTE: 0.05 E9/L (ref 0–0.2)
BASOPHILS RELATIVE PERCENT: 0.3 % (ref 0–2)
BILIRUB SERPL-MCNC: 1.2 MG/DL (ref 0–1.2)
BORDETELLA PARAPERTUSSIS BY PCR: NOT DETECTED
BORDETELLA PERTUSSIS BY PCR: NOT DETECTED
BUN BLDV-MCNC: 17 MG/DL (ref 6–23)
CALCIUM SERPL-MCNC: 9.1 MG/DL (ref 8.6–10.2)
CHLAMYDOPHILIA PNEUMONIAE BY PCR: NOT DETECTED
CHLORIDE BLD-SCNC: 98 MMOL/L (ref 98–107)
CHOLESTEROL, TOTAL: 158 MG/DL (ref 0–199)
CO2: 25 MMOL/L (ref 22–29)
CORONAVIRUS 229E BY PCR: NOT DETECTED
CORONAVIRUS HKU1 BY PCR: NOT DETECTED
CORONAVIRUS NL63 BY PCR: NOT DETECTED
CORONAVIRUS OC43 BY PCR: NOT DETECTED
CREAT SERPL-MCNC: 0.9 MG/DL (ref 0.5–1)
D DIMER: 405 NG/ML DDU
EOSINOPHILS ABSOLUTE: 0.07 E9/L (ref 0.05–0.5)
EOSINOPHILS RELATIVE PERCENT: 0.5 % (ref 0–6)
GFR SERPL CREATININE-BSD FRML MDRD: >60 ML/MIN/1.73
GLUCOSE BLD-MCNC: 117 MG/DL (ref 74–99)
HCT VFR BLD CALC: 39.2 % (ref 34–48)
HDLC SERPL-MCNC: 53 MG/DL
HEMOGLOBIN: 12.5 G/DL (ref 11.5–15.5)
HUMAN METAPNEUMOVIRUS BY PCR: NOT DETECTED
HUMAN RHINOVIRUS/ENTEROVIRUS BY PCR: NOT DETECTED
IMMATURE GRANULOCYTES #: 0.08 E9/L
IMMATURE GRANULOCYTES %: 0.5 % (ref 0–5)
INFLUENZA A BY PCR: NOT DETECTED
INFLUENZA B BY PCR: NOT DETECTED
LACTIC ACID: 1.5 MMOL/L (ref 0.5–2.2)
LACTIC ACID: 1.7 MMOL/L (ref 0.5–2.2)
LACTIC ACID: 2 MMOL/L (ref 0.5–2.2)
LDL CHOLESTEROL CALCULATED: 87 MG/DL (ref 0–99)
LYMPHOCYTES ABSOLUTE: 1.37 E9/L (ref 1.5–4)
LYMPHOCYTES RELATIVE PERCENT: 9.2 % (ref 20–42)
MAGNESIUM: 1.4 MG/DL (ref 1.6–2.6)
MCH RBC QN AUTO: 30.5 PG (ref 26–35)
MCHC RBC AUTO-ENTMCNC: 31.9 % (ref 32–34.5)
MCV RBC AUTO: 95.6 FL (ref 80–99.9)
MONOCYTES ABSOLUTE: 0.92 E9/L (ref 0.1–0.95)
MONOCYTES RELATIVE PERCENT: 6.2 % (ref 2–12)
MYCOPLASMA PNEUMONIAE BY PCR: NOT DETECTED
NEUTROPHILS ABSOLUTE: 12.39 E9/L (ref 1.8–7.3)
NEUTROPHILS RELATIVE PERCENT: 83.3 % (ref 43–80)
PARAINFLUENZA VIRUS 1 BY PCR: NOT DETECTED
PARAINFLUENZA VIRUS 2 BY PCR: NOT DETECTED
PARAINFLUENZA VIRUS 3 BY PCR: NOT DETECTED
PARAINFLUENZA VIRUS 4 BY PCR: NOT DETECTED
PDW BLD-RTO: 13 FL (ref 11.5–15)
PHOSPHORUS: 3.7 MG/DL (ref 2.5–4.5)
PLATELET # BLD: 183 E9/L (ref 130–450)
PMV BLD AUTO: 9.5 FL (ref 7–12)
POTASSIUM SERPL-SCNC: 3.6 MMOL/L (ref 3.5–5)
PROCALCITONIN: 3.85 NG/ML (ref 0–0.08)
RBC # BLD: 4.1 E12/L (ref 3.5–5.5)
RESPIRATORY SYNCYTIAL VIRUS BY PCR: NOT DETECTED
SARS-COV-2, PCR: NOT DETECTED
SODIUM BLD-SCNC: 139 MMOL/L (ref 132–146)
T4 FREE: 1.19 NG/DL (ref 0.93–1.7)
TOTAL PROTEIN: 7.1 G/DL (ref 6.4–8.3)
TRIGL SERPL-MCNC: 90 MG/DL (ref 0–149)
TROPONIN, HIGH SENSITIVITY: 124 NG/L (ref 0–9)
TROPONIN, HIGH SENSITIVITY: 94 NG/L (ref 0–9)
TSH SERPL DL<=0.05 MIU/L-ACNC: 1.75 UIU/ML (ref 0.27–4.2)
VLDLC SERPL CALC-MCNC: 18 MG/DL
WBC # BLD: 14.9 E9/L (ref 4.5–11.5)

## 2022-11-07 PROCEDURE — 71045 X-RAY EXAM CHEST 1 VIEW: CPT

## 2022-11-07 PROCEDURE — 80061 LIPID PANEL: CPT

## 2022-11-07 PROCEDURE — 84145 PROCALCITONIN (PCT): CPT

## 2022-11-07 PROCEDURE — 93005 ELECTROCARDIOGRAM TRACING: CPT | Performed by: INTERNAL MEDICINE

## 2022-11-07 PROCEDURE — 0202U NFCT DS 22 TRGT SARS-COV-2: CPT

## 2022-11-07 PROCEDURE — 94640 AIRWAY INHALATION TREATMENT: CPT

## 2022-11-07 PROCEDURE — 1200000000 HC SEMI PRIVATE

## 2022-11-07 PROCEDURE — 80053 COMPREHEN METABOLIC PANEL: CPT

## 2022-11-07 PROCEDURE — 85025 COMPLETE CBC W/AUTO DIFF WBC: CPT

## 2022-11-07 PROCEDURE — 84443 ASSAY THYROID STIM HORMONE: CPT

## 2022-11-07 PROCEDURE — 6370000000 HC RX 637 (ALT 250 FOR IP): Performed by: INTERNAL MEDICINE

## 2022-11-07 PROCEDURE — 6360000002 HC RX W HCPCS: Performed by: INTERNAL MEDICINE

## 2022-11-07 PROCEDURE — 84484 ASSAY OF TROPONIN QUANT: CPT

## 2022-11-07 PROCEDURE — 84100 ASSAY OF PHOSPHORUS: CPT

## 2022-11-07 PROCEDURE — 85378 FIBRIN DEGRADE SEMIQUANT: CPT

## 2022-11-07 PROCEDURE — 83735 ASSAY OF MAGNESIUM: CPT

## 2022-11-07 PROCEDURE — 2580000003 HC RX 258: Performed by: INTERNAL MEDICINE

## 2022-11-07 PROCEDURE — 2500000003 HC RX 250 WO HCPCS: Performed by: INTERNAL MEDICINE

## 2022-11-07 PROCEDURE — 83605 ASSAY OF LACTIC ACID: CPT

## 2022-11-07 PROCEDURE — 84439 ASSAY OF FREE THYROXINE: CPT

## 2022-11-07 PROCEDURE — 2700000000 HC OXYGEN THERAPY PER DAY

## 2022-11-07 PROCEDURE — 36415 COLL VENOUS BLD VENIPUNCTURE: CPT

## 2022-11-07 RX ORDER — 0.9 % SODIUM CHLORIDE 0.9 %
500 INTRAVENOUS SOLUTION INTRAVENOUS ONCE
Status: COMPLETED | OUTPATIENT
Start: 2022-11-07 | End: 2022-11-07

## 2022-11-07 RX ORDER — LEVALBUTEROL INHALATION SOLUTION 0.63 MG/3ML
0.63 SOLUTION RESPIRATORY (INHALATION) EVERY 6 HOURS
Status: DISCONTINUED | OUTPATIENT
Start: 2022-11-07 | End: 2022-11-11 | Stop reason: HOSPADM

## 2022-11-07 RX ORDER — NITROGLYCERIN 40 MG/1
1 PATCH TRANSDERMAL DAILY
Status: DISCONTINUED | OUTPATIENT
Start: 2022-11-07 | End: 2022-11-07

## 2022-11-07 RX ORDER — DIAPER,BRIEF,INFANT-TODD,DISP
EACH MISCELLANEOUS 2 TIMES DAILY
Status: DISCONTINUED | OUTPATIENT
Start: 2022-11-07 | End: 2022-11-11 | Stop reason: HOSPADM

## 2022-11-07 RX ORDER — BUDESONIDE 0.5 MG/2ML
0.5 INHALANT ORAL 2 TIMES DAILY
Status: DISCONTINUED | OUTPATIENT
Start: 2022-11-07 | End: 2022-11-11 | Stop reason: HOSPADM

## 2022-11-07 RX ADMIN — METOPROLOL TARTRATE 50 MG: 50 TABLET, FILM COATED ORAL at 08:28

## 2022-11-07 RX ADMIN — APIXABAN 5 MG: 5 TABLET, FILM COATED ORAL at 20:27

## 2022-11-07 RX ADMIN — BUDESONIDE 500 MCG: 0.5 SUSPENSION RESPIRATORY (INHALATION) at 07:58

## 2022-11-07 RX ADMIN — HYDROCORTISONE: 1 CREAM TOPICAL at 12:06

## 2022-11-07 RX ADMIN — ACETAMINOPHEN 650 MG: 325 TABLET ORAL at 17:11

## 2022-11-07 RX ADMIN — BUSPIRONE HYDROCHLORIDE 5 MG: 10 TABLET ORAL at 13:21

## 2022-11-07 RX ADMIN — METOPROLOL TARTRATE 50 MG: 50 TABLET, FILM COATED ORAL at 20:36

## 2022-11-07 RX ADMIN — AMITRIPTYLINE HYDROCHLORIDE 75 MG: 25 TABLET, FILM COATED ORAL at 20:28

## 2022-11-07 RX ADMIN — MONTELUKAST 10 MG: 10 TABLET, FILM COATED ORAL at 20:29

## 2022-11-07 RX ADMIN — LEVALBUTEROL 0.63 MG: 0.63 SOLUTION RESPIRATORY (INHALATION) at 13:11

## 2022-11-07 RX ADMIN — Medication 10 ML: at 08:28

## 2022-11-07 RX ADMIN — NITROGLYCERIN 0.5 INCH: 20 OINTMENT TOPICAL at 17:38

## 2022-11-07 RX ADMIN — NITROGLYCERIN 0.5 INCH: 20 OINTMENT TOPICAL at 23:53

## 2022-11-07 RX ADMIN — NITROGLYCERIN 0.5 INCH: 20 OINTMENT TOPICAL at 12:05

## 2022-11-07 RX ADMIN — BUSPIRONE HYDROCHLORIDE 5 MG: 10 TABLET ORAL at 20:27

## 2022-11-07 RX ADMIN — ACETAMINOPHEN 650 MG: 325 TABLET ORAL at 08:27

## 2022-11-07 RX ADMIN — APIXABAN 5 MG: 5 TABLET, FILM COATED ORAL at 08:27

## 2022-11-07 RX ADMIN — LEVALBUTEROL 0.63 MG: 0.63 SOLUTION RESPIRATORY (INHALATION) at 07:58

## 2022-11-07 RX ADMIN — LEVALBUTEROL 0.63 MG: 0.63 SOLUTION RESPIRATORY (INHALATION) at 22:09

## 2022-11-07 RX ADMIN — HYDROCORTISONE: 1 CREAM TOPICAL at 20:31

## 2022-11-07 RX ADMIN — NITROGLYCERIN 0.4 MG: 0.4 TABLET, ORALLY DISINTEGRATING SUBLINGUAL at 00:53

## 2022-11-07 RX ADMIN — BUDESONIDE 500 MCG: 0.5 SUSPENSION RESPIRATORY (INHALATION) at 17:08

## 2022-11-07 RX ADMIN — FAMOTIDINE 40 MG: 20 TABLET, FILM COATED ORAL at 20:40

## 2022-11-07 RX ADMIN — SODIUM CHLORIDE 500 ML: 9 INJECTION, SOLUTION INTRAVENOUS at 03:10

## 2022-11-07 RX ADMIN — BUSPIRONE HYDROCHLORIDE 5 MG: 10 TABLET ORAL at 08:28

## 2022-11-07 RX ADMIN — WATER 1000 MG: 1 INJECTION INTRAMUSCULAR; INTRAVENOUS; SUBCUTANEOUS at 20:17

## 2022-11-07 RX ADMIN — LEVALBUTEROL 0.63 MG: 0.63 SOLUTION RESPIRATORY (INHALATION) at 17:08

## 2022-11-07 RX ADMIN — Medication 10 ML: at 20:40

## 2022-11-07 RX ADMIN — VENLAFAXINE HYDROCHLORIDE 75 MG: 37.5 CAPSULE, EXTENDED RELEASE ORAL at 20:27

## 2022-11-07 RX ADMIN — NITROGLYCERIN 0.5 INCH: 20 OINTMENT TOPICAL at 03:13

## 2022-11-07 ASSESSMENT — PAIN SCALES - GENERAL
PAINLEVEL_OUTOF10: 8
PAINLEVEL_OUTOF10: 1
PAINLEVEL_OUTOF10: 2
PAINLEVEL_OUTOF10: 2
PAINLEVEL_OUTOF10: 4
PAINLEVEL_OUTOF10: 6
PAINLEVEL_OUTOF10: 8
PAINLEVEL_OUTOF10: 8
PAINLEVEL_OUTOF10: 5
PAINLEVEL_OUTOF10: 3
PAINLEVEL_OUTOF10: 4
PAINLEVEL_OUTOF10: 2

## 2022-11-07 ASSESSMENT — PAIN DESCRIPTION - FREQUENCY
FREQUENCY: CONTINUOUS
FREQUENCY: CONTINUOUS

## 2022-11-07 ASSESSMENT — PAIN DESCRIPTION - LOCATION
LOCATION: CHEST
LOCATION: CHEST;HEAD
LOCATION: HEAD
LOCATION: HEAD
LOCATION: CHEST;HEAD
LOCATION: CHEST;HEAD
LOCATION: HEAD
LOCATION: CHEST;HEAD

## 2022-11-07 ASSESSMENT — PAIN DESCRIPTION - DESCRIPTORS
DESCRIPTORS: ACHING;THROBBING
DESCRIPTORS: ACHING;PRESSURE
DESCRIPTORS: ACHING;POUNDING;PRESSURE
DESCRIPTORS: ACHING
DESCRIPTORS: ACHING

## 2022-11-07 ASSESSMENT — PAIN DESCRIPTION - ORIENTATION
ORIENTATION: MID
ORIENTATION: MID;RIGHT;LEFT;ANTERIOR;POSTERIOR
ORIENTATION: MID

## 2022-11-07 ASSESSMENT — PAIN DESCRIPTION - ONSET
ONSET: ON-GOING
ONSET: ON-GOING

## 2022-11-07 ASSESSMENT — PAIN - FUNCTIONAL ASSESSMENT: PAIN_FUNCTIONAL_ASSESSMENT: ACTIVITIES ARE NOT PREVENTED

## 2022-11-07 ASSESSMENT — PAIN DESCRIPTION - PAIN TYPE: TYPE: ACUTE PAIN

## 2022-11-07 NOTE — H&P
Department of Internal Medicine  History and Physical    PCP: Mita Christiansen MD  Admitting Physician: Dr. Keesha Lopez  Consultants:   Date of Service: 11/6/2022    CHIEF COMPLAINT:  n/v/dysuria     HISTORY OF PRESENT ILLNESS:    Patient is 80-year-old female presented to the ED due to malaise. Patient had not been feeling well for the last few days. She has been increasingly  weaker. She does admit to subjective fever and chills. She does complain of dysuria and urinary frequency. In the ED she did develop chest pressure. She is also became short of breath and hypoxic. Patient was placed on supplemental oxygen via nasal cannula.     PAST MEDICAL Hx:  Past Medical History:   Diagnosis Date    Acid reflux     Atrial fibrillation (HCC)     Cancer (HCC) 5/19/2005    Breast Cancer (Left Breast)    Chronic back pain     CPAP (continuous positive airway pressure) dependence     not using CPAP    Fibromyalgia     History of bariatric surgery 04/17/2002    Dr. Kathleen Woodruff RYGB    History of cardiovascular stress test 8/10/2012    LEXISCAN    Hyperlipidemia     Hypertension     Kidney stones     Low iron     Osteoarthritis     Unspecified sleep apnea     Patient does not use C-Pap        PAST SURGICAL Hx:   Past Surgical History:   Procedure Laterality Date    APPENDECTOMY      BARIATRIC SURGERY  2003    Funmi Ureña  2005    Dr. Balinda Schlatter  2004    Dr. Carlos Cesar  2008    ECHO [place]  8/10/2012         Arabella Luu  2004    Dr. Ivanna Deng Left 11/4/2021    LEFT INGUINAL [address], POSSIBLE BILATERAL,  LAPAROSCOPIC ROBOTIC XI performed by Mary Jo Solo MD [address] REPLACEMENT  2009    Right Knee    NECK SURGERY  06/2015    fused 4,5,6- screws, in [address]    ASHOK-EN-Y GASTRIC BYPASS  04/17/2002    Dr. Darvin Peterson RYGB    SHOULDER ARTHROSCOPY  2/17/12    LEFT REPAIR ROTATOR CUFF TEAR SUBACROMIAL [id number] - Patient states that this was due to her weight prior to [address]   2009    UVULOPALATOPHARYGOPLASTY         FAMILY Hx:  Family History   Problem Relation Age of Onset    Stroke Mother     High Blood Pressure Mother     Heart Disease Mother     Heart Disease Sister     High Blood Pressure Sister     Heart Disease Maternal Grandmother     Heart Disease Maternal Grandfather     Cancer Paternal Grandfather         Stomach       HOME MEDICATIONS:  Prior to Admission medications    Medication Sig Start Date End Date Taking? Authorizing Provider   Biotin 5000 MCG TABS Take 5,000 mcg by mouth daily   Yes Historical Provider, MD   albuterol sulfate HFA (VENTOLIN HFA) 108 (90 Base) MCG/ACT inhaler Inhale 2 puffs into the lungs every 6 hours as needed for Wheezing   Yes Historical Provider, MD   fluticasone (FLOVENT HFA) 110 MCG/ACT inhaler Inhale 1 puff into the lungs in the morning and 1 puff in the evening. 7/27/22   Enola Holter, MD   amitriptyline (ELAVIL) 75 MG tablet Take 75 mg by mouth nightly    Historical Provider, MD   valsartan-hydroCHLOROthiazide (DIOVAN-HCT) 160-25 MG per tablet Take 1 tablet by mouth in the morning.     Historical Provider, MD   tiZANidine (ZANAFLEX) 4 MG tablet Take 8 mg by mouth nightly    Historical Provider, MD   fluticasone-umeclidin-vilant (TRELEGY ELLIPTA) 100-62.5-25 MCG/INH AEPB Inhale 1 puff into the lungs daily    Historical Provider, MD   furosemide (LASIX) 40 MG tablet Take 40 mg by mouth daily as needed (Swelling)    Historical Provider, MD   montelukast (SINGULAIR) 10 MG tablet Take 10 mg by mouth nightly Historical Provider, MD   venlafaxine (EFFEXOR XR) 75 MG extended release capsule Take 75 mg by mouth nightly    Historical Provider, MD   Cholecalciferol (VITAMIN D) 50 MCG ( UT) CAPS capsule Take 1 capsule by mouth daily    Historical Provider, MD   busPIRone (BUSPAR) 5 MG tablet Take 5 mg by mouth 3 times daily  21   Historical Provider, MD   omeprazole (PRILOSEC) 40 MG delayed release capsule Take 40 mg by mouth every morning 21   Historical Provider, MD   famotidine (PEPCID) 40 MG tablet Take 40 mg by mouth nightly    Historical Provider, MD   apixaban (ELIQUIS) 5 MG TABS tablet Take 5 mg by mouth 2 times daily    Historical Provider, MD   metoprolol (LOPRESSOR) 50 MG tablet Take 1 tablet by mouth 2 times daily 16   Jaqui Felt, DO       ALLERGIES:  Morphine and related and Percocet [oxycodone-acetaminophen]    SOCIAL Hx:  Social History     Socioeconomic History    Marital status:      Spouse name: Not on file    Number of children: Not on file    Years of education: Not on file    Highest education level: Not on file   Occupational History    Not on file   Tobacco Use    Smoking status: Former     Packs/day: 0.50     Years: 40.00     Pack years: 20.00     Types: Cigarettes     Quit date: 2020     Years since quittin.8    Smokeless tobacco: Never   Vaping Use    Vaping Use: Never used   Substance and Sexual Activity    Alcohol use: No     Alcohol/week: 0.0 standard drinks     Comment: rarely    Drug use: No    Sexual activity: Yes   Other Topics Concern    Not on file   Social History Narrative    Not on file     Social Determinants of Health     Financial Resource Strain: Not on file   Food Insecurity: Not on file   Transportation Needs: Not on file   Physical Activity: Not on file   Stress: Not on file   Social Connections: Not on file   Intimate Partner Violence: Not on file   Housing Stability: Not on file       ROS: Positive in bold  General:   Denies chills, fatigue, fever, malaise, night sweats or weight loss    Psychological:   Denies anxiety, disorientation or hallucinations    ENT:    Denies epistaxis, headaches, vertigo or visual changes    Cardiovascular:   Denies any chest pain, irregular heartbeats, or palpitations. No paroxysmal nocturnal dyspnea. Respiratory:   Denies shortness of breath, coughing, sputum production, hemoptysis, or wheezing. No orthopnea. Gastrointestinal:   Denies nausea, vomiting, diarrhea, or constipation. Denies any abdominal pain. Denies change in bowel habits or stools. Genito-Urinary:    Denies any urgency, frequency, hematuria. Voiding without difficulty. Musculoskeletal:   Denies joint pain, joint stiffness, joint swelling or muscle pain    Neurology:    Denies any headache or focal neurological deficits. No weakness or paresthesia. Derm:    Denies any rashes, ulcers, or excoriations. Denies bruising. Extremities:   Denies any lower extremity swelling or edema. PHYSICAL EXAM: Abnormal findings noted  VITALS:  Vitals:    11/06/22 2215   BP: (!) 174/137   Pulse: (!) 150   Resp: 22   Temp:    SpO2: 95%         CONSTITUTIONAL:    Awake, alert, cooperative, no apparent distress, and appears stated age    EYES:    EOMI, sclera clear, conjunctiva normal    ENT:    Normocephalic, atraumatic,  External ears without lesions. NECK:    Supple, symmetrical, trachea midline, no JVD    HEMATOLOGIC/LYMPHATICS:    No cervical lymphadenopathy and no supraclavicular lymphadenopathy    LUNGS:    Symmetric. No increased work of breathing, good air exchange, clear to auscultation bilaterally, no wheezes, rhonchi, or rales,   Patient has diminished breath sounds bilaterally    CARDIOVASCULAR:    Normal apical impulse, regular rate and rhythm, normal S1 and S2, no S3 or S4, and no murmur noted    ABDOMEN:     soft, non-distended, non-tender    MUSCULOSKELETAL:    There is no redness, warmth, or swelling of the joints. NEUROLOGIC:    Awake, alert, oriented to name, place and time. y. SKIN:    No bruising or bleeding. No redness, warmth, or swelling    EXTREMITIES:    Peripheral pulses present. No edema, cyanosis, or swelling.   Patient has bilateral lower extremity edema    LINES/CATHETERS     LABORATORY DATA:  CBC with Differential:    Lab Results   Component Value Date/Time    WBC 10.0 11/06/2022 03:50 PM    RBC 4.10 11/06/2022 03:50 PM    HGB 12.7 11/06/2022 03:50 PM    HCT 37.4 11/06/2022 03:50 PM     11/06/2022 03:50 PM    MCV 91.2 11/06/2022 03:50 PM    MCH 31.0 11/06/2022 03:50 PM    MCHC 34.0 11/06/2022 03:50 PM    RDW 12.8 11/06/2022 03:50 PM    SEGSPCT 43 08/07/2012 09:10 AM    LYMPHOPCT 9.4 11/06/2022 03:50 PM    MONOPCT 7.0 11/06/2022 03:50 PM    BASOPCT 0.5 11/06/2022 03:50 PM    MONOSABS 0.70 11/06/2022 03:50 PM    LYMPHSABS 0.94 11/06/2022 03:50 PM    EOSABS 0.02 11/06/2022 03:50 PM    BASOSABS 0.05 11/06/2022 03:50 PM     CMP:    Lab Results   Component Value Date/Time     11/06/2022 03:50 PM    K 4.1 11/06/2022 03:50 PM    K 3.3 04/05/2022 09:20 PM    CL 95 11/06/2022 03:50 PM    CO2 28 11/06/2022 03:50 PM    BUN 20 11/06/2022 03:50 PM    CREATININE 1.1 11/06/2022 03:50 PM    GFRAA >60 08/24/2022 08:58 AM    LABGLOM 55 11/06/2022 03:50 PM    GLUCOSE 117 11/06/2022 03:50 PM    GLUCOSE 115 02/11/2012 08:15 AM    PROT 7.7 11/06/2022 03:50 PM    LABALBU 4.5 11/06/2022 03:50 PM    LABALBU 4.3 02/11/2012 08:15 AM    CALCIUM 9.5 11/06/2022 03:50 PM    BILITOT 0.9 11/06/2022 03:50 PM    ALKPHOS 59 11/06/2022 03:50 PM    AST 17 11/06/2022 03:50 PM    ALT 14 11/06/2022 03:50 PM       ASSESSMENT/PLAN:  Acute hypoxic respiratory failure  NSTEMI  UTI  Atrial fibrillation with RVR  Chronic systolic congestive heart failure  Mild pulmonary hypertension  Mild to moderate mitral regurgitation  Mild to moderate tricuspid regurgitation  Sleep apnea without use of CPAP  History of left-sided breast cancer  History of Stanley-en-Y gastric bypass  Hyperlipidemia  Hypertension  Fibromyalgia  History of tobacco abuse    Patient presented due to weakness and fatigue. She was found to have UTI. During her stay in the ED she did develop chest pain and became short of breath. She was found to be hypoxic and placed on supplemental oxygen. Repeat chest x-ray did not reveal any acute abnormality. However troponin did progressively rise. Patient given aspirin. She was also placed on nitro patch for chest pain. Cardiology has been consulted.       Joyce Pennington  10:25 PM  11/6/2022    Electronically signed by Selin Dias DO on 11/6/22 at 10:25 PM EST

## 2022-11-07 NOTE — ED NOTES
This RN walked into  Room to check on pt and prepare for transport and pt was SOB and complaining of chest pain, pulse ox was 83%, and pt was diaphoretic and nauseous. Pt was placed on 4L NC and pulse ox improved to 92%. Provider notified, repeat EKG completed, pt placed on telemetry, and pt given Zofran. Will continue to monitor.      Kojo Arroyo RN  11/06/22 [id number]

## 2022-11-07 NOTE — PLAN OF CARE
[facility] cardiology consulted for this patient. Upon asking patient she is known to Dr. Alfa Couch. Floor : And confirms Dr. Alfa Couch for seeing his patients inpatient this week. Consult deferred to his practice. Please call with any questions or concerns, thank you.     Electronically signed by HAILEY Loya CNP on 11/7/2022 at 10:51 AM

## 2022-11-07 NOTE — PROGRESS NOTES
Subjective:  Mehran was seen and examined at bedside today. The patient's questions were answered and tests were reviewed. There were no new problems reported overnight. Patient is tolerating current diet. Feels better but remains sob   N/v/dysuria improving    A complete review of systems and social history was completed on admission and remains unchanged unless otherwise noted    Scheduled Meds:   cefTRIAXone (ROCEPHIN) IV  1,000 mg IntraVENous Q24H    levalbuterol  0.63 mg Nebulization Q6H    nitroglycerin  0.5 inch Topical 4 times per day    metoprolol tartrate  25 mg Oral Once    budesonide  0.5 mg Nebulization BID    sodium chloride flush  5-40 mL IntraVENous 2 times per day    apixaban  5 mg Oral BID    amitriptyline  75 mg Oral Nightly    busPIRone  5 mg Oral TID    famotidine  40 mg Oral Nightly    metoprolol tartrate  50 mg Oral BID    montelukast  10 mg Oral Nightly    venlafaxine  75 mg Oral Nightly    pantoprazole  40 mg Oral QAM AC     Continuous Infusions:   dextrose      sodium chloride       PRN Meds:glucose, dextrose bolus **OR** dextrose bolus, glucagon (rDNA), dextrose, sodium chloride flush, sodium chloride, polyethylene glycol, acetaminophen **OR** acetaminophen, metoprolol, nitroGLYCERIN    Objective:  BP (!) 136/97   Pulse 97   Temp 98.5 °F (36.9 °C) (Oral)   Resp 20   Ht 5' 4\" (1.626 m)   Wt 221 lb (100.2 kg)   SpO2 94%   BMI 37.93 kg/m²   In: 0   Out: 350    In: 0   Out: 350 [Urine:350]     AAO x 3, currently in NAD  RRR, pos S1, S2  CTA bilaterally, no wheeze, rales or rhonchi  bowel sounds present, nontender, nondistended  No clubbing, cyanosis, or edema  No neuro changes   No obvious rashes or lesions.     Recent Labs     11/06/22  1550 11/07/22  0527   WBC 10.0 14.9*   HGB 12.7 12.5    183     Recent Labs     11/06/22  1550 11/07/22  0527    139   K 4.1 3.6   CL 95* 98   CO2 28 25   BUN 20 17   CREATININE 1.1* 0.9   GLUCOSE 117* 117*     Recent Labs 11/06/22  1550 11/07/22  0527   BILITOT 0.9 1.2   ALKPHOS 59 56   AST 17 17   ALT 14 11     No results for input(s): INR in the last 72 hours. Invalid input(s): PT  No results for input(s): CKTOTAL, CKMB, CKMBINDEX, TROPONINI in the last 72 hours. CT ABDOMEN PELVIS WO CONTRAST Additional Contrast? None    Result Date: 11/6/2022  EXAMINATION: CT OF THE ABDOMEN AND PELVIS WITHOUT CONTRAST 11/6/2022 8:09 pm TECHNIQUE: CT of the abdomen and pelvis was performed without the administration of intravenous contrast. Multiplanar reformatted images are provided for review. Automated exposure control, iterative reconstruction, and/or weight based adjustment of the mA/kV was utilized to reduce the radiation dose to as low as reasonably achievable. COMPARISON: None. HISTORY: ORDERING SYSTEM PROVIDED HISTORY: abdominal pain LLQ kidney stone TECHNOLOGIST PROVIDED HISTORY: Reason for exam:->abdominal pain LLQ kidney stone Additional Contrast?->None Decision Support Exception - unselect if not a suspected or confirmed emergency medical condition->Emergency Medical Condition (MA) FINDINGS: Lower Chest: The visualized lungs are normal. Organs: Benign liver and splenic calcifications. The adrenal glands and, pancreas are normal.  Cholecystectomy. No urinary tract stones or hydronephrosis. GI/Bowel: Normal large and small bowel. The appendix is not visualized. Pelvis: Normal urinary bladder. Peritoneum/Retroperitoneum: No free fluid or free air. 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 Bones/soft tissues: Grade 1 anterolisthesis L4 over L5. Multilevel degenerative changes thoracolumbar spine. No urinary tract stones or hydronephrosis. No evidence for diverticulitis. Cholecystectomy. 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 XR CHEST PORTABLE    Result Date: 11/7/2022  EXAMINATION: ONE XRAY VIEW OF THE CHEST 11/7/2022 1:10 am COMPARISON: None.  HISTORY: ORDERING SYSTEM PROVIDED HISTORY: sob TECHNOLOGIST PROVIDED HISTORY: Reason for exam:->sob FINDINGS: Normal cardiomediastinal silhouette. Lungs clear. No pneumothorax or effusion. Osseous thorax intact. No acute disease. RECOMMENDATION: Careful clinical correlation and follow up recommended. XR CHEST 1 VIEW    Result Date: 11/6/2022  EXAMINATION: ONE XRAY VIEW OF THE CHEST 11/6/2022 4:38 pm COMPARISON: 08/24/2022 HISTORY: ORDERING SYSTEM PROVIDED HISTORY: chest pain, sob TECHNOLOGIST PROVIDED HISTORY: Reason for exam:->chest pain, sob FINDINGS: The lungs are without acute focal process. There is no effusion or pneumothorax. The cardiomediastinal silhouette is without acute process. The osseous structures are without acute process. No acute process. Assessment:  Alexandra Foley is a 79y.o. year old female who presented on 11/6/2022 and is being treated for:  Principal Problem:    UTI (urinary tract infection)  Active Problems:    Sepsis ([address]    Paroxysmal A-fib ([address]    Acute respiratory failure with hypoxia ([address]    NSTEMI (non-ST elevated myocardial infarction) ([address]  Resolved Problems:    * No resolved hospital problems. *    Plan  Cont IV abx, await cultures  Cardio consulted for NSTEMI  Otherwise continue same meds, wean 02  Please see orders for further management and care. More than 50% of my time was spent at the bedside counseling/coordinating care with the patient and/or family with face to face contact. This time was spent reviewing notes and laboratory data as well as instructing and counseling the patient. Time I spent with the family or surrogate(s) is included only if the patient was incapable of providing the necessary information or participating in medical decisions. I also discussed the differential diagnosis and all of the proposed management plans with the patient and individuals accompanying the patient.  I am readily available for any further decision-making and intervention.      Giovanni Willis MD  9:45 AM  11/7/2022

## 2022-11-08 LAB
ALBUMIN SERPL-MCNC: 3.4 G/DL (ref 3.5–5.2)
ALP BLD-CCNC: 48 U/L (ref 35–104)
ALT SERPL-CCNC: 9 U/L (ref 0–32)
ANION GAP SERPL CALCULATED.3IONS-SCNC: 13 MMOL/L (ref 7–16)
AST SERPL-CCNC: 15 U/L (ref 0–31)
BASOPHILS ABSOLUTE: 0.02 E9/L (ref 0–0.2)
BASOPHILS RELATIVE PERCENT: 0.3 % (ref 0–2)
BILIRUB SERPL-MCNC: 0.8 MG/DL (ref 0–1.2)
BUN BLDV-MCNC: 18 MG/DL (ref 6–23)
CALCIUM SERPL-MCNC: 8.8 MG/DL (ref 8.6–10.2)
CHLORIDE BLD-SCNC: 95 MMOL/L (ref 98–107)
CO2: 24 MMOL/L (ref 22–29)
CREAT SERPL-MCNC: 0.8 MG/DL (ref 0.5–1)
EKG ATRIAL RATE: 312 BPM
EKG ATRIAL RATE: 312 BPM
EKG Q-T INTERVAL: 350 MS
EKG Q-T INTERVAL: 374 MS
EKG QRS DURATION: 70 MS
EKG QRS DURATION: 80 MS
EKG QTC CALCULATION (BAZETT): 486 MS
EKG QTC CALCULATION (BAZETT): 494 MS
EKG R AXIS: -41 DEGREES
EKG R AXIS: 65 DEGREES
EKG T AXIS: 110 DEGREES
EKG T AXIS: 90 DEGREES
EKG VENTRICULAR RATE: 105 BPM
EKG VENTRICULAR RATE: 116 BPM
EOSINOPHILS ABSOLUTE: 0.01 E9/L (ref 0.05–0.5)
EOSINOPHILS RELATIVE PERCENT: 0.2 % (ref 0–6)
GFR SERPL CREATININE-BSD FRML MDRD: >60 ML/MIN/1.73
GLUCOSE BLD-MCNC: 145 MG/DL (ref 74–99)
HCT VFR BLD CALC: 31.2 % (ref 34–48)
HEMOGLOBIN: 10.1 G/DL (ref 11.5–15.5)
IMMATURE GRANULOCYTES #: 0.01 E9/L
IMMATURE GRANULOCYTES %: 0.2 % (ref 0–5)
LACTIC ACID: 1.1 MMOL/L (ref 0.5–2.2)
LACTIC ACID: 1.3 MMOL/L (ref 0.5–2.2)
LACTIC ACID: 1.7 MMOL/L (ref 0.5–2.2)
LYMPHOCYTES ABSOLUTE: 0.88 E9/L (ref 1.5–4)
LYMPHOCYTES RELATIVE PERCENT: 13.4 % (ref 20–42)
MCH RBC QN AUTO: 31 PG (ref 26–35)
MCHC RBC AUTO-ENTMCNC: 32.4 % (ref 32–34.5)
MCV RBC AUTO: 95.7 FL (ref 80–99.9)
MONOCYTES ABSOLUTE: 0.33 E9/L (ref 0.1–0.95)
MONOCYTES RELATIVE PERCENT: 5 % (ref 2–12)
NEUTROPHILS ABSOLUTE: 5.34 E9/L (ref 1.8–7.3)
NEUTROPHILS RELATIVE PERCENT: 80.9 % (ref 43–80)
PDW BLD-RTO: 13 FL (ref 11.5–15)
PLATELET # BLD: 126 E9/L (ref 130–450)
PMV BLD AUTO: 9.4 FL (ref 7–12)
POTASSIUM SERPL-SCNC: 4 MMOL/L (ref 3.5–5)
RBC # BLD: 3.26 E12/L (ref 3.5–5.5)
SODIUM BLD-SCNC: 132 MMOL/L (ref 132–146)
TOTAL PROTEIN: 6.3 G/DL (ref 6.4–8.3)
WBC # BLD: 6.6 E9/L (ref 4.5–11.5)

## 2022-11-08 PROCEDURE — 6360000002 HC RX W HCPCS: Performed by: INTERNAL MEDICINE

## 2022-11-08 PROCEDURE — 6370000000 HC RX 637 (ALT 250 FOR IP): Performed by: INTERNAL MEDICINE

## 2022-11-08 PROCEDURE — 1200000000 HC SEMI PRIVATE

## 2022-11-08 PROCEDURE — 93010 ELECTROCARDIOGRAM REPORT: CPT | Performed by: INTERNAL MEDICINE

## 2022-11-08 PROCEDURE — 6370000000 HC RX 637 (ALT 250 FOR IP): Performed by: SPECIALIST

## 2022-11-08 PROCEDURE — 2580000003 HC RX 258: Performed by: INTERNAL MEDICINE

## 2022-11-08 PROCEDURE — 85025 COMPLETE CBC W/AUTO DIFF WBC: CPT

## 2022-11-08 PROCEDURE — 83605 ASSAY OF LACTIC ACID: CPT

## 2022-11-08 PROCEDURE — 6360000002 HC RX W HCPCS: Performed by: CLINICAL NURSE SPECIALIST

## 2022-11-08 PROCEDURE — 2700000000 HC OXYGEN THERAPY PER DAY

## 2022-11-08 PROCEDURE — 36415 COLL VENOUS BLD VENIPUNCTURE: CPT

## 2022-11-08 PROCEDURE — 2580000003 HC RX 258: Performed by: CLINICAL NURSE SPECIALIST

## 2022-11-08 PROCEDURE — 80053 COMPREHEN METABOLIC PANEL: CPT

## 2022-11-08 PROCEDURE — 94640 AIRWAY INHALATION TREATMENT: CPT

## 2022-11-08 RX ORDER — SOTALOL HYDROCHLORIDE 80 MG/1
80 TABLET ORAL 2 TIMES DAILY
Status: DISCONTINUED | OUTPATIENT
Start: 2022-11-08 | End: 2022-11-11 | Stop reason: HOSPADM

## 2022-11-08 RX ADMIN — PANTOPRAZOLE SODIUM 40 MG: 40 TABLET, DELAYED RELEASE ORAL at 06:21

## 2022-11-08 RX ADMIN — ACETAMINOPHEN 650 MG: 325 TABLET ORAL at 11:12

## 2022-11-08 RX ADMIN — BUSPIRONE HYDROCHLORIDE 5 MG: 10 TABLET ORAL at 20:47

## 2022-11-08 RX ADMIN — METOPROLOL TARTRATE 50 MG: 50 TABLET, FILM COATED ORAL at 08:41

## 2022-11-08 RX ADMIN — VENLAFAXINE HYDROCHLORIDE 75 MG: 37.5 CAPSULE, EXTENDED RELEASE ORAL at 20:45

## 2022-11-08 RX ADMIN — MONTELUKAST 10 MG: 10 TABLET, FILM COATED ORAL at 20:42

## 2022-11-08 RX ADMIN — CEFTRIAXONE 2000 MG: 2 INJECTION, POWDER, FOR SOLUTION INTRAMUSCULAR; INTRAVENOUS at 20:33

## 2022-11-08 RX ADMIN — ACETAMINOPHEN 650 MG: 325 TABLET ORAL at 19:50

## 2022-11-08 RX ADMIN — LEVALBUTEROL 0.63 MG: 0.63 SOLUTION RESPIRATORY (INHALATION) at 17:30

## 2022-11-08 RX ADMIN — HYDROCORTISONE: 1 CREAM TOPICAL at 08:42

## 2022-11-08 RX ADMIN — TRIMETHOBENZAMIDE HYDROCHLORIDE 200 MG: 100 INJECTION INTRAMUSCULAR at 20:02

## 2022-11-08 RX ADMIN — BUDESONIDE 500 MCG: 0.5 SUSPENSION RESPIRATORY (INHALATION) at 06:45

## 2022-11-08 RX ADMIN — SOTALOL HYDROCHLORIDE 80 MG: 80 TABLET ORAL at 10:17

## 2022-11-08 RX ADMIN — Medication 10 ML: at 20:49

## 2022-11-08 RX ADMIN — ACETAMINOPHEN 650 MG: 325 TABLET ORAL at 05:06

## 2022-11-08 RX ADMIN — BUDESONIDE 500 MCG: 0.5 SUSPENSION RESPIRATORY (INHALATION) at 17:30

## 2022-11-08 RX ADMIN — FAMOTIDINE 40 MG: 20 TABLET, FILM COATED ORAL at 20:44

## 2022-11-08 RX ADMIN — NITROGLYCERIN 0.5 INCH: 20 OINTMENT TOPICAL at 06:15

## 2022-11-08 RX ADMIN — Medication 10 ML: at 08:40

## 2022-11-08 RX ADMIN — HYDROCORTISONE: 1 CREAM TOPICAL at 20:51

## 2022-11-08 RX ADMIN — BUSPIRONE HYDROCHLORIDE 5 MG: 10 TABLET ORAL at 08:41

## 2022-11-08 RX ADMIN — LEVALBUTEROL 0.63 MG: 0.63 SOLUTION RESPIRATORY (INHALATION) at 06:45

## 2022-11-08 RX ADMIN — APIXABAN 5 MG: 5 TABLET, FILM COATED ORAL at 20:41

## 2022-11-08 RX ADMIN — BUSPIRONE HYDROCHLORIDE 5 MG: 10 TABLET ORAL at 15:11

## 2022-11-08 RX ADMIN — SOTALOL HYDROCHLORIDE 80 MG: 80 TABLET ORAL at 20:49

## 2022-11-08 RX ADMIN — APIXABAN 5 MG: 5 TABLET, FILM COATED ORAL at 08:41

## 2022-11-08 RX ADMIN — AMITRIPTYLINE HYDROCHLORIDE 75 MG: 25 TABLET, FILM COATED ORAL at 20:46

## 2022-11-08 ASSESSMENT — PAIN SCALES - GENERAL
PAINLEVEL_OUTOF10: 5
PAINLEVEL_OUTOF10: 7

## 2022-11-08 ASSESSMENT — PAIN DESCRIPTION - LOCATION
LOCATION: HEAD

## 2022-11-08 ASSESSMENT — PAIN DESCRIPTION - DESCRIPTORS
DESCRIPTORS: ACHING
DESCRIPTORS: ACHING

## 2022-11-08 NOTE — PROGRESS NOTES
Subjective:  Mehran was seen and examined at bedside today. The patient's questions were answered and tests were reviewed. There were no new problems reported overnight but this morning does not feel as good as yesterday  Patient is tolerating current diet. Blood cultures came back positive yesterday and ID is now following patient    A complete review of systems and social history was completed on admission and remains unchanged unless otherwise noted    Scheduled Meds:   sotalol  80 mg Oral BID    cefTRIAXone (ROCEPHIN) IV  2,000 mg IntraVENous Q24H    levalbuterol  0.63 mg Nebulization Q6H    budesonide  0.5 mg Nebulization BID    hydrocortisone   Topical BID    sodium chloride flush  5-40 mL IntraVENous 2 times per day    apixaban  5 mg Oral BID    amitriptyline  75 mg Oral Nightly    busPIRone  5 mg Oral TID    famotidine  40 mg Oral Nightly    montelukast  10 mg Oral Nightly    venlafaxine  75 mg Oral Nightly    pantoprazole  40 mg Oral QAM AC     Continuous Infusions:   dextrose      sodium chloride       PRN Meds:glucose, dextrose bolus **OR** dextrose bolus, glucagon (rDNA), dextrose, sodium chloride flush, sodium chloride, polyethylene glycol, acetaminophen **OR** acetaminophen, metoprolol, nitroGLYCERIN    Objective:  /63   Pulse (!) 101   Temp 98.5 °F (36.9 °C) (Temporal)   Resp 20   Ht 5' 4\" (1.626 m)   Wt 221 lb (100.2 kg)   SpO2 100%   BMI 37.93 kg/m²   In: 360 [P.O.:360]  Out: 700    In: 360   Out: 700 [Urine:700]     AAO x 3, currently in NAD  Irregular and tachycardic, pos S1, S2  CTA bilaterally, no wheeze, rales or rhonchi  bowel sounds present, nontender, nondistended  No clubbing, cyanosis, or edema  No neuro changes   No obvious rashes or lesions.     Recent Labs     11/06/22  1550 11/07/22  0527 11/08/22  0836   WBC 10.0 14.9* 6.6   HGB 12.7 12.5 10.1*    183 126*       Recent Labs     11/06/22  1550 11/07/22  0527 11/08/22  0836    139 132   K 4.1 3.6 4.0   CL 95* 98 95*   CO2 28 25 24   BUN 20 17 18   CREATININE 1.1* 0.9 0.8   GLUCOSE 117* 117* 145*       Recent Labs     11/06/22  1550 11/07/22  0527 11/08/22  0836   BILITOT 0.9 1.2 0.8   ALKPHOS 59 56 48   AST 17 17 15   ALT 14 11 9       No results for input(s): INR in the last 72 hours. Invalid input(s): PT  No results for input(s): CKTOTAL, CKMB, CKMBINDEX, TROPONINI in the last 72 hours. CT ABDOMEN PELVIS WO CONTRAST Additional Contrast? None    Result Date: 11/6/2022  EXAMINATION: CT OF THE ABDOMEN AND PELVIS WITHOUT CONTRAST 11/6/2022 8:09 pm TECHNIQUE: CT of the abdomen and pelvis was performed without the administration of intravenous contrast. Multiplanar reformatted images are provided for review. Automated exposure control, iterative reconstruction, and/or weight based adjustment of the mA/kV was utilized to reduce the radiation dose to as low as reasonably achievable. COMPARISON: None. HISTORY: ORDERING SYSTEM PROVIDED HISTORY: abdominal pain LLQ kidney stone TECHNOLOGIST PROVIDED HISTORY: Reason for exam:->abdominal pain LLQ kidney stone Additional Contrast?->None Decision Support Exception - unselect if not a suspected or confirmed emergency medical condition->Emergency Medical Condition (MA) FINDINGS: Lower Chest: The visualized lungs are normal. Organs: Benign liver and splenic calcifications. The adrenal glands and, pancreas are normal.  Cholecystectomy. No urinary tract stones or hydronephrosis. GI/Bowel: Normal large and small bowel. The appendix is not visualized. Pelvis: Normal urinary bladder. Peritoneum/Retroperitoneum: No free fluid or free air. 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 Bones/soft tissues: Grade 1 anterolisthesis L4 over L5. Multilevel degenerative changes thoracolumbar spine. No urinary tract stones or hydronephrosis. No evidence for diverticulitis. Cholecystectomy.  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 XR CHEST PORTABLE    Result Date: 11/7/2022  EXAMINATION: ONE XRAY VIEW OF THE CHEST 11/7/2022 1:10 am COMPARISON: None. HISTORY: ORDERING SYSTEM PROVIDED HISTORY: sob TECHNOLOGIST PROVIDED HISTORY: Reason for exam:->sob FINDINGS: Normal cardiomediastinal silhouette. Lungs clear. No pneumothorax or effusion. Osseous thorax intact. No acute disease. RECOMMENDATION: Careful clinical correlation and follow up recommended. XR CHEST 1 VIEW    Result Date: 11/6/2022  EXAMINATION: ONE XRAY VIEW OF THE CHEST 11/6/2022 4:38 pm COMPARISON: 08/24/2022 HISTORY: ORDERING SYSTEM PROVIDED HISTORY: chest pain, sob TECHNOLOGIST PROVIDED HISTORY: Reason for exam:->chest pain, sob FINDINGS: The lungs are without acute focal process. There is no effusion or pneumothorax. The cardiomediastinal silhouette is without acute process. The osseous structures are without acute process. No acute process. Assessment:  Rosmery Bacon is a 79y.o. year old female who presented on 11/6/2022 and is being treated for:  Principal Problem:    UTI (urinary tract infection)  Active Problems:    Sepsis ([address]    Paroxysmal A-fib ([address]    Acute respiratory failure with hypoxia ([address]    NSTEMI (non-ST elevated myocardial infarction) ([address]  Resolved Problems:    * No resolved hospital problems. *    Plan  Cont IV abx, ID following with gram-negative sepsis  Cardio consulted for NSTEMI and A. Fib -started on sotalol  Otherwise continue same meds, wean 02  Please see orders for further management and care. More than 50% of my time was spent at the bedside counseling/coordinating care with the patient and/or family with face to face contact. This time was spent reviewing notes and laboratory data as well as instructing and counseling the patient. Time I spent with the family or surrogate(s) is included only if the patient was incapable of providing the necessary information or participating in medical decisions.  I also discussed the differential diagnosis and all of the proposed management plans with the patient and individuals accompanying the patient. I am readily available for any further decision-making and intervention.      Love Mendez MD  6:09 PM  11/8/2022

## 2022-11-08 NOTE — PLAN OF CARE
Problem: Pain  Goal: Verbalizes/displays adequate comfort level or baseline comfort level  Outcome: Progressing     Problem: Safety - Adult  Goal: Free from fall injury  11/7/2022 1904 by Flynn Quezada RN  Outcome: Progressing     Problem: ABCDS Injury Assessment  Goal: Absence of physical injury  11/7/2022 1904 by Flynn Quezada RN  Outcome: Progressing

## 2022-11-08 NOTE — CARE COORDINATION
SS NOTE: COVID NEGATIVE 11/7. Pt is on 4 liters of O2 here with none at home. Pt has a peripheral line. She is on IV Rocephin and Lopressor. Pt was on Eliquis at home PTA. Pt is being txed for a UTI. SW met with pt today. She relates that she resides in an apt with an elevator. She relates to having been I pta with adls iadls and driving. Pt has 3 dtrs that assist her if and when needed. Pt has no hx of [facility], [facility] , she relates that she has been [facility] [place] many years ago when it was [facility]. Pt's PCP is Dr Pattie Jarvis and her pharmacy is [facility] or [facility] on [id number]. Pt anticipates no needs at [facility] and plans on going home- one of her dtrs will transport her. SS to continue. Falguni Vega. 11/8/2022.9:54 AM.

## 2022-11-08 NOTE — CONSULTS
[address] [facility]                                  CONSULTATION    PATIENT NAME: Jhony Becerra                    :        1955  MED REC NO:   [id number]                            ROOM:       [id number]  ACCOUNT NO:   [de-identified]                           ADMIT DATE: 2022  PROVIDER:     Darylene Casey, MD    CONSULT DATE:  2022    HISTORY OF PRESENT ILLNESS:  The patient is a 40-year-old lady who I saw  in the past.    She was diagnosed with paroxysmal atrial fibrillation three years ago  and she was kept on Eliquis. She had cardiac catheterization done in  showing patent coronary  vessels with normal left ventricular systolic function. She suffered COVID-19 infection in 2022. She was in persistent atrial fibrillation when I saw her in 2022, and  she was having symptoms of exertional fatigue and lack of energy with  some dyspnea. She underwent cardioversion last month at [facility].    I saw the patient for followup after her cardioversion and she was back  in atrial fibrillation. I discussed with her at that time all the  options including just heart rate control and long-term anticoagulation  versus attempting another cardioversion with plan to start her on  antiarrhythmic medication after that versus planned for ablation  procedure. The patient wanted another cardioversion, which she had  about 2 weeks ago. She was put on Multaq 400 mg twice a day right after  the cardioversion to try to maintain her sinus rhythm. The patient was supposed to see me for followup after her procedure on  Thursday of this week on 11/10. She states that she had to stop the Multaq because it gave her rash. She felt her heart was back in atrial fibrillation after that. She can  tell from her symptoms of fatigue and lack of energy with dyspnea.     The patient stated that she got more tired over the weekend. She felt  some dysuria with urinary frequency. She came to the emergency room. She was felt to have urinary tract  infection. She was in atrial fibrillation with heart rate on the fast  side. She was admitted. She was started on antibiotics. Cardiac consult was requested. The patient was lying semiupright in bed when I came to see her. She  did not appear in acute distress. No complaints of chest pain. PAST MEDICAL HISTORY:  As above plus history of hypertension, acid  reflux. Surgery for breast cancer in the past.  Chronic back pain. Fibromyalgia. Hyperlipidemia. History of kidney stone. Appendectomy. Bariatric surgery in 1998. Hernia repair surgery. Joint replacement  surgery in the past.    REVIEW OF SYSTEMS:  CONSTITUTIONAL:  Generalized body weakness. Low-grade fever on  admission with temperature of 99.2. HEENT:  No nosebleed. No hearing problem. No double vision. No  stridor. No hoarseness of the voice. CARDIAC:  No chest pain. Recurrent atrial fibrillation. PULMONARY:  Shortness of breath with activities. GASTROENTEROLOGY:  No abdominal pain. No diarrhea. GENITOURINARY:  Dysuria with urinary frequency. NEURO:  No clear history of seizure or stroke. No syncope. HEMATOLOGY:  No bleeding disorder. No adenopathy. ENDOCRINOLOGY:  No polyuria or polydipsia. SKIN:  No skin disorder. MUSCULOSKELETAL:  The patient has arthritis with joint replacement  surgery in the past.  PSYCH:  No depression or suicidal ideation. PHYSICAL EXAMINATION:  GENERAL:  The patient was sitting up, finished eating breakfast, did not  appear in acute distress. VITAL SIGNS:  Blood pressure 126/89, pulse 90, temperature 97. Her  temperature was 99.2 yesterday. NECK:  No JVD. HEART:  Irregular S1 and S2. No S3. Could not hear murmurs. LUNGS:  Mildly diminished breath sounds in the bases. I could not hear  rales or wheezing. ABDOMEN:  Soft, nontender.   EXTREMITIES: Almost no edema. NEURO:  Alert and awake with no focal deficits. EKG showing atrial fibrillation with rate around 116 per minute with  left axis deviation and nonspecific ST-T wave changes. LABS:  Blood culture is coming positive for Gram-negative rods. Initial  troponin is 13. ProBNP is 5590. Sodium 135, potassium 4.1, BUN 20,  creatinine 1.1. WBC 10, hemoglobin 12.7, platelet count 390. COVID  test is not detected. Repeat troponin is 15. TSH 1.75. IMPRESSION:  1. Symptomatic persistent atrial fibrillation. The patient failed to attempt for cardioversion so far. She could not tolerate Multaq because of rash. I discussed with the patient all the options and the plan for now is to  start her on sotalol. We may consider another cardioversion if she does  not convert on her own on sotalol. We have to monitor the patient for 8 hours on sotalol to check for any  significant QT prolongation. We may consider ablation procedure if patient fail sotalol therapy. 2.  UTI. Blood culture is coming positive for Gram-negative rods  consistent with urosepsis. Some of her symptoms over the weekend were probably also related to  urosepsis. She is on Rocephin.         Travis Rand MD    D: 11/08/2022 9:09:54       T: 11/08/2022 9:14:57     [email or web address]  Job#: [id number]     Doc#: [id number]    CC:

## 2022-11-08 NOTE — ACP (ADVANCE CARE PLANNING)
Advance Care Planning   Healthcare Decision Maker:    Primary Decision Maker: francisco Linh Child - [phone]    Secondary Decision Maker: Lelo Farias [facility] - [phone]    Supplemental (Other) Decision Maker: Ketty Levine [facility] - [phone]    Click here to complete Healthcare Decision Makers including selection of the Healthcare Decision Maker Relationship (ie \"Primary\").

## 2022-11-08 NOTE — CONSULTS
[address]  Phone [phone]   [phone]      Admit Date: 2022  3:26 PM  Pt Name: Stanley Putnam  MRN: [id number]  : 1955  Reason for Consult:    Chief Complaint   Patient presents with    Fever     101 at home     Shortness of Breath     Started yesterday      Requesting Physician:  Tenisha Miner MD  PCP: Tenisha Miner MD  History Obtained From:  patient, chart   ID consulted for UTI (urinary tract infection) [N39.0]  Sepsis ([address] [A41.9]  on hospital day 1  Face to face encounter   [facility]       Chief Complaint   Patient presents with    Fever     101 at home     Shortness of Breath     Started yesterday      Jomar Olivera is a 79 y.o. female who presents with   has a past medical history of Acid reflux, Atrial fibrillation ([address] Cancer ([address] Chronic back pain, CPAP (continuous positive airway pressure) dependence, Fibromyalgia, History of bariatric surgery, History of cardiovascular stress test, Hyperlipidemia, Hypertension, Kidney stones, Low iron, Osteoarthritis, and Unspecified sleep apnea. ED TRIAGEVITALS  BP: 126/89, Temp: 97 °F (36.1 °C), Heart Rate: 90, Resp: 16, SpO2: 97 %  HPI:  ID was consulted on 22 for infection management  Pt presented to ER on 2022 with diagnosis of  UTI (urinary tract infection) [N39.0]  Sepsis ([address] [A41.9]    Patient was admitted to hospital on 2022- for UTI, dysuria. She had been increasingly weak. She had fevers on admission and increased WBC. Patient was also short of breath. She is on nasal canula. Patient had GNR in the blood. Patient is currently on Rocephin IV. She denies any nausea, vomiting, or diarrhea. Her bladder scan was 0. She reports she had a UTI last month and she took AZO and drank cranberry juice. Urine culture is pending. She denies any recent antibiotic use. ID has been asked to evaluate and manage atbs. REVIEW OF SYSTEMS     CONSTITUTIONAL:   + fever,+ chills, weight loss  ALLERGIES:    No rash or itch  EYES:     No visual changes  ENT:      No  hearing loss or sore throat  CARDIOVASCULAR:   No chest pain or palpitations, + Htn, + hyperlipidemia   RESPIRATORY:   + shortness of breath   ENDOCRINE:    No increase thirst, urination   HEME-LYMPH:   No easy bruising or bleeding disorder  GI:     No nausea, vomiting or diarrhea  :     Dysuria   NEURO:    No seizures, stroke, HA  MUSCULOSKELETAL:  No muscle aches or pain, no joint pain, h/o fibromyalgia   SKIN:     No rash, ulcers or wounds  PSYCH:    No depression or anxiety    Medications Prior to Admission: Biotin 5000 MCG TABS, Take 5,000 mcg by mouth daily  albuterol sulfate HFA (VENTOLIN HFA) 108 (90 Base) MCG/ACT inhaler, Inhale 2 puffs into the lungs every 6 hours as needed for Wheezing  fluticasone (FLOVENT HFA) 110 MCG/ACT inhaler, Inhale 1 puff into the lungs in the morning and 1 puff in the evening. amitriptyline (ELAVIL) 75 MG tablet, Take 75 mg by mouth nightly  valsartan-hydroCHLOROthiazide (DIOVAN-HCT) 160-25 MG per tablet, Take 1 tablet by mouth in the morning.   tiZANidine (ZANAFLEX) 4 MG tablet, Take 8 mg by mouth nightly  fluticasone-umeclidin-vilant (TRELEGY ELLIPTA) 100-62.5-25 MCG/INH AEPB, Inhale 1 puff into the lungs daily  furosemide (LASIX) 40 MG tablet, Take 40 mg by mouth daily as needed (Swelling)  montelukast (SINGULAIR) 10 MG tablet, Take 10 mg by mouth nightly  venlafaxine (EFFEXOR XR) 75 MG extended release capsule, Take 75 mg by mouth nightly  Cholecalciferol (VITAMIN D) 50 MCG (2000 UT) CAPS capsule, Take 1 capsule by mouth daily  busPIRone (BUSPAR) 5 MG tablet, Take 5 mg by mouth 3 times daily   omeprazole (PRILOSEC) 40 MG delayed release capsule, Take 40 mg by mouth every morning  famotidine (PEPCID) 40 MG tablet, Take 40 mg by mouth nightly  apixaban (ELIQUIS) 5 MG TABS tablet, Take 5 mg by mouth 2 times daily  metoprolol (LOPRESSOR) 50 MG tablet, Take 1 tablet by mouth 2 times daily  CURRENT MEDICATIONS     Current Facility-Administered Medications:     sotalol (BETAPACE) tablet 80 mg, 80 mg, Oral, BID, Isabella Burroughs MD    cefTRIAXone (ROCEPHIN) 1,000 mg in sterile water 10 mL IV syringe, 1,000 mg, IntraVENous, Q24H, Ismail U Susana, DO, 1,000 mg at 11/07/22 2017    levalbuterol (XOPENEX) nebulization 0.63 mg, 0.63 mg, Nebulization, Q6H, Ismail U Susana, DO, 0.63 mg at 11/08/22 0645    budesonide (PULMICORT) nebulizer suspension 500 mcg, 0.5 mg, Nebulization, BID, Ismail U Susana, DO, 500 mcg at 11/08/22 0645    hydrocortisone 1 % cream, , Topical, BID, Cuong Christy MD, Given at 11/08/22 0842    glucose chewable tablet 16 g, 4 tablet, Oral, PRN, Myrna Moose U Susana, DO    dextrose bolus 10% 125 mL, 125 mL, IntraVENous, PRN **OR** dextrose bolus 10% 250 mL, 250 mL, IntraVENous, PRN, Ismail U Susana, DO    glucagon (rDNA) injection 1 mg, 1 mg, SubCUTAneous, PRN, Ismail U Susana, DO    dextrose 10 % infusion, , IntraVENous, Continuous PRN, Ismail U Susana, DO    sodium chloride flush 0.9 % injection 5-40 mL, 5-40 mL, IntraVENous, 2 times per day, Ismail U Susana, DO, 10 mL at 11/08/22 0840    sodium chloride flush 0.9 % injection 5-40 mL, 5-40 mL, IntraVENous, PRN, Ismail U Susana, DO    0.9 % sodium chloride infusion, , IntraVENous, PRN, Ismail U Susana, DO    polyethylene glycol (GLYCOLAX) packet 17 g, 17 g, Oral, Daily PRN, Jaylyn Purchase, DO    acetaminophen (TYLENOL) tablet 650 mg, 650 mg, Oral, Q6H PRN, 650 mg at 11/08/22 0506 **OR** acetaminophen (TYLENOL) suppository 650 mg, 650 mg, Rectal, Q6H PRN, Jaylyn Purchase, DO    apixaban (ELIQUIS) tablet 5 mg, 5 mg, Oral, BID, Ismail U Susana, DO, 5 mg at 11/08/22 0841    amitriptyline (ELAVIL) tablet 75 mg, 75 mg, Oral, Nightly, Ismail U Susana, DO, 75 mg at 11/07/22 2028    busPIRone (BUSPAR) tablet 5 mg, 5 mg, Oral, TID, Ismail U Susana, DO, 5 mg at 11/08/22 0841    famotidine (PEPCID) tablet 40 mg, 40 mg, Oral, Nightly, Ismail U Susana, DO, 40 mg at 11/07/22 2040    montelukast (SINGULAIR) tablet 10 mg, 10 mg, Oral, Nightly, Ismail U Susana, DO, 10 mg at 11/07/22 2029    venlafaxine (EFFEXOR XR) extended release capsule 75 mg, 75 mg, Oral, Nightly, Ismail U Susana, DO, 75 mg at 11/07/22 2027    metoprolol (LOPRESSOR) injection 5 mg, 5 mg, IntraVENous, Q5 Min PRN, Ismail U Susana, DO, 5 mg at 11/06/22 2352    pantoprazole (PROTONIX) tablet 40 mg, 40 mg, Oral, QAM AC, Ismail U Susana, DO, 40 mg at 11/08/22 4153    nitroGLYCERIN (NITROSTAT) SL tablet 0.4 mg, 0.4 mg, SubLINGual, Q5 Min PRN, Ismail U Susana, DO, 0.4 mg at 11/07/22 0053  ALLERGIES     Morphine and related and Percocet [oxycodone-acetaminophen]    There is no immunization history on file for this patient.    Internal Administration   First Dose      Second Dose           Last COVID Lab SARS-CoV-2 (no units)   Date Value   09/28/2020 Not Detected     SARS-CoV-2, PCR (no units)   Date Value   11/07/2022 Not Detected     SARS-CoV-2, NAAT (no units)   Date Value   11/06/2022 Not Detected          PAST MEDICAL HISTORY     Past Medical History:   Diagnosis Date    Acid reflux     Atrial fibrillation (HCC)     Cancer ([address] 5/19/2005    Breast Cancer (Left Breast)    Chronic back pain     CPAP (continuous positive airway pressure) dependence     not using CPAP    Fibromyalgia     History of bariatric surgery 04/17/2002    Dr. Damien Maxwell RYGB    History of cardiovascular stress test 8/10/2012    LEXISCAN    Hyperlipidemia     Hypertension     Kidney stones     Low iron     Osteoarthritis     Unspecified sleep apnea     Patient does not use C-Pap      SURGICAL HISTORY       Past Surgical History:   Procedure Laterality Date    APPENDECTOMY      BARIATRIC SURGERY  2003    [facility]  2005    Dr. Marty Garcia CHOLECYSTECTOMY      Dr. Vicente Abdalla  2008    ECHO COMPL W DOP COLOR FLOW  8/10/2012         HERNIA REPAIR  2004    Dr. Odette Silver Left 2021    LEFT INGUINAL HERNIA REPAIR WITH MESH, POSSIBLE BILATERAL,  LAPAROSCOPIC ROBOTIC XI performed by Jorge Alex MD at [address] ([address] [facility])      Dr. Narendra Boles - [address] [facility]      Right Knee    NECK SURGERY  2015    fused 4,5,6- screws, in [address]    ASHOK-EN-Y GASTRIC BYPASS  2002    Dr. Jenae Joshi RYGB    SHOULDER ARTHROSCOPY  12    LEFT REPAIR ROTATOR CUFF TEAR SUBACROMIAL DECOMPRESSION    [address] [facility] - Patient states that this was due to her weight prior to [address]       UVULOPALATOPHARYGOPLASTY       FAMILY HISTORY       Family History   Problem Relation Age of Onset    Stroke Mother     High Blood Pressure Mother     Heart Disease Mother     Heart Disease Sister     High Blood Pressure Sister     Heart Disease Maternal Grandmother     Heart Disease Maternal Grandfather     Cancer Paternal Grandfather         Stomach     SOCIAL HISTORY       Social History     Socioeconomic History    Marital status:      Spouse name: None    Number of children: None    Years of education: None    Highest education level: None   Tobacco Use    Smoking status: Former     Packs/day: 0.50     Years: 40.00     Pack years: 20.00     Types: Cigarettes     Quit date: 2020     Years since quittin.8    Smokeless tobacco: Never   Vaping Use    Vaping Use: Never used   Substance and Sexual Activity    Alcohol use: No     Alcohol/week: 0.0 standard drinks     Comment: rarely    Drug use: No    Sexual activity: Yes     PHYSICAL EXAM        Vitals:    Vitals:    22 2036 22 0005 22 0700 11/08/22 0730   BP: 130/81 114/81 137/86 126/89   Pulse: 88 80 87 90   Resp:       Temp:    97 °F (36.1 °C)   TempSrc:       SpO2:    97%   Weight:       Height:         CONSTITUTIONAL:  awake, alert, cooperative, no apparent distress, and appears stated age. Laying on right side. ENT:  Normocephalic, without obvious abnormality, atraumatic. NECK:  Supple, symmetrical, trachea midline. LUNGS:  No increased work of breathing, good air exchange, clear to auscultation bilaterally, no crackles or wheezing. diminished  CARDIOVASCULAR:  Normal apical impulse, regular rate and rhythm  ABDOMEN:  No scars, normal bowel sounds, soft, non-distended, non-tender, large- no flank pain. MUSCULOSKELETAL:  There is no redness, warmth, or swelling of the joints. Full range of motion noted. ++ edema   NEUROLOGIC:  Awake, alert, oriented to name, place and time. SKIN:  no bruising or bleeding and normal skin color, texture, turgor  PIV     Peripheral Intravenous Line:  Peripheral IV 11/06/22 Right Antecubital (Active)   Site Assessment Clean, dry & intact 11/08/22 0840   Line Status Normal saline locked 11/08/22 0840   Line Care Connections checked and tightened 11/08/22 0405   Phlebitis Assessment No symptoms 11/08/22 0405   Infiltration Assessment 0 11/08/22 0405   Alcohol Cap Used Yes 11/08/22 0405   Dressing Status Clean, dry & intact 11/08/22 0840   Dressing Type Transparent 11/08/22 0405   Dressing Intervention Other (Comment) 11/08/22 0405     DIAGNOSTIC RESULTS   RADIOLOGY:   CT ABDOMEN PELVIS WO CONTRAST Additional Contrast? None    Result Date: 11/6/2022  EXAMINATION: CT OF THE ABDOMEN AND PELVIS WITHOUT CONTRAST 11/6/2022 8:09 pm TECHNIQUE: CT of the abdomen and pelvis was performed without the administration of intravenous contrast. Multiplanar reformatted images are provided for review.  Automated exposure control, iterative reconstruction, and/or weight based adjustment of the mA/kV was utilized to reduce the radiation dose to as low as reasonably achievable. COMPARISON: None. HISTORY: ORDERING SYSTEM PROVIDED HISTORY: abdominal pain LLQ kidney stone TECHNOLOGIST PROVIDED HISTORY: Reason for exam:->abdominal pain LLQ kidney stone Additional Contrast?->None Decision Support Exception - unselect if not a suspected or confirmed emergency medical condition->Emergency Medical Condition (MA) FINDINGS: Lower Chest: The visualized lungs are normal. Organs: Benign liver and splenic calcifications. The adrenal glands and, pancreas are normal.  Cholecystectomy. No urinary tract stones or hydronephrosis. GI/Bowel: Normal large and small bowel. The appendix is not visualized. Pelvis: Normal urinary bladder. Peritoneum/Retroperitoneum: No free fluid or free air. 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 Bones/soft tissues: Grade 1 anterolisthesis L4 over L5. Multilevel degenerative changes thoracolumbar spine. No urinary tract stones or hydronephrosis. No evidence for diverticulitis. Cholecystectomy. 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 MRI CERVICAL SPINE W WO CONTRAST    Result Date: 10/17/2022  EXAMINATION: MRI OF THE CERVICAL SPINE WITHOUT AND WITH CONTRAST  10/17/2022 7:57 am: TECHNIQUE: Multiplanar multisequence MRI of the cervical spine was performed without and with the administration of intravenous contrast. COMPARISON: None. HISTORY: ORDERING SYSTEM PROVIDED HISTORY: Spondylosis of cervical region without myelopathy or radiculopathy TECHNOLOGIST PROVIDED HISTORY: STAT Creatinine as needed:->No What is the sedation requirement?->Anesthesia What reading provider will be dictating this exam?->CRC FINDINGS: BONES/ALIGNMENT: There is postsurgical change from C4-C6. There is no evidence for hardware complication. The vertebral body heights are maintained.   There is age-appropriate bone marrow signal.  There is degenerative endplate change at the B6-1 level.  There is multilevel degenerative disc disease with loss of disc signal.  There is disc space narrowing at C3-4 and C6-7. There is no significant spondylolisthesis. SPINAL CORD: The spinal cord is normal in caliber and signal. SOFT TISSUES: The posterior paraspinal soft tissues are unremarkable. The prevertebral soft tissues are unremarkable. There is no abnormal postcontrast enhancement. C2-C3: There is a disc osteophyte complex with uncovertebral and facet hypertrophy. There is no canal stenosis. There is mild-to-moderate bilateral foraminal narrowing. C3-C4: There is a disc osteophyte complex with uncovertebral and facet hypertrophy. There is canal stenosis measuring 8 mm in AP dimension. There is moderate left and severe right foraminal narrowing. C4-C5: There is a disc osteophyte complex with uncovertebral and facet hypertrophy. There is canal stenosis measuring 8 mm in AP dimension. There is moderate bilateral foraminal narrowing. C5-C6: There is osseous fusion across the disc space with uncovertebral hypertrophy. There is canal stenosis measuring 9 mm in AP dimension. There is moderate bilateral foraminal narrowing. C6-C7: There is a disc osteophyte complex with uncovertebral and facet hypertrophy. There is no significant canal stenosis. There is severe bilateral foraminal narrowing. C7-T1: There is a disc osteophyte complex with uncovertebral and facet hypertrophy. There is no canal stenosis. There is moderate to severe bilateral foraminal narrowing. Multilevel degenerative disc disease with uncovertebral and facet hypertrophy resulting in canal stenosis throughout the mid cervical spine. Bilateral foraminal narrowing throughout the cervical spine as described above. Postsurgical change from C4-C6 without evidence for complication. XR CHEST PORTABLE    Result Date: 11/7/2022  EXAMINATION: ONE XRAY VIEW OF THE CHEST 11/7/2022 1:10 am COMPARISON: None.  HISTORY: ORDERING SYSTEM PROVIDED HISTORY: sob TECHNOLOGIST PROVIDED HISTORY: Reason for exam:->sob FINDINGS: Normal cardiomediastinal silhouette. Lungs clear. No pneumothorax or effusion. Osseous thorax intact. No acute disease. RECOMMENDATION: Careful clinical correlation and follow up recommended. XR CHEST 1 VIEW    Result Date: 11/6/2022  EXAMINATION: ONE XRAY VIEW OF THE CHEST 11/6/2022 4:38 pm COMPARISON: 08/24/2022 HISTORY: ORDERING SYSTEM PROVIDED HISTORY: chest pain, sob TECHNOLOGIST PROVIDED HISTORY: Reason for exam:->chest pain, sob FINDINGS: The lungs are without acute focal process. There is no effusion or pneumothorax. The cardiomediastinal silhouette is without acute process. The osseous structures are without acute process. No acute process.      LABS  Recent Labs     11/06/22  1550 11/07/22 0527 11/08/22  0836   WBC 10.0 14.9* 6.6   HGB 12.7 12.5 10.1*   HCT 37.4 39.2 31.2*   MCV 91.2 95.6 95.7    183 126*     Recent Labs     11/06/22  1550 11/07/22 0527 11/08/22  0836    139 132   K 4.1 3.6 4.0   CL 95* 98 95*   CO2 28 25 24   BUN 20 17 18   CREATININE 1.1* 0.9 0.8   LABGLOM 55 >60 >60   GLUCOSE 117* 117* 145*   PROT 7.7 7.1 6.3*   LABALBU 4.5 4.0 3.4*   CALCIUM 9.5 9.1 8.8   BILITOT 0.9 1.2 0.8   ALKPHOS 59 56 48   AST 17 17 15   ALT 14 11 9     Recent Labs     11/07/22 0527   PROCAL 3.85*     Lab Results   Component Value Date    CRP 5.3 (H) 07/27/2022    CRP 5.4 (H) 07/26/2022    CRP 1.3 (H) 07/25/2022     Lab Results   Component Value Date    SEDRATE 24 (H) 03/11/2021    SEDRATE 35 (H) 09/08/2011     No results found for: [id number]  Lab Results   Component Value Date/Time    ADVIRPCR Not Detected 11/07/2022 05:40 AM    BPARAPPCR Not Detected 11/07/2022 05:40 AM    BPPTXPPCR Not Detected 11/07/2022 05:40 AM    CHLPNEPCR Not Detected 11/07/2022 05:40 AM    [id number] Not Detected 11/07/2022 05:40 AM    ESBRXQ8JZZ Not Detected 11/07/2022 05:40 AM    OSKYN36CCW Not Detected 11/07/2022 05:40 AM    EEYME78PKI Not Detected 11/07/2022 05:40 AM    COVID19 Not Detected 11/07/2022 05:40 AM    METAPNEPCR Not Detected 11/07/2022 05:40 AM    RHENTPCR Not Detected 11/07/2022 05:40 AM    FLUBPCR Not Detected 11/07/2022 05:40 AM    MYCPNEPCR Not Detected 11/07/2022 05:40 AM    EYQGCUE0VVZ Not Detected 11/07/2022 05:40 AM    PWBBMNF3HZS Not Detected 11/07/2022 05:40 AM    UDTQTIU0UXZ Not Detected 11/07/2022 05:40 AM    POBDSZG3BYZ Not Detected 11/07/2022 05:40 AM    RSVPCR Not Detected 11/07/2022 05:40 AM        Lab Results   Component Value Date/Time    COVID19 Not Detected 11/07/2022 05:40 AM     COVID-19/TIFFANY-COV2 LABS  Recent Labs     11/06/22  1550 11/07/22  0527 11/08/22  0836   PROCAL  --  3.85*  --    DDIMER  --  405  --    AST 17 17 15   ALT 14 11 9   TRIG  --  90  --      Lab Results   Component Value Date/Time    CHOL 158 11/07/2022 05:27 AM    TRIG 90 11/07/2022 05:27 AM    HDL 53 11/07/2022 05:27 AM    LDLCALC 87 11/07/2022 05:27 AM    LABVLDL 18 11/07/2022 05:27 AM     Lab Results   Component Value Date    HCVABI Non-Reactive 06/28/2021     Hep C Ab Interp   Date Value Ref Range Status   06/28/2021 Non-Reactive Non-Reactive Final     MICROBIOLOGY:  Cultures :   Recent Labs     11/06/22 2054   BLOODCULT2 Gram stain performed from blood culture bottle media  Gram negative rods  *     Urine Culture, Routine   Date Value Ref Range Status   03/26/2014   Final    ,000 CFU/mL  Mixed dav isolated. Further workup and sensitivity testing  is not routinely indicated and will not be performed.   Mixed dav isolated includes:  Mixed gram positive organisms     FINAL IMPRESSION    Patient is a 79 y.o. female who presented with   Chief Complaint   Patient presents with    Fever     101 at home     Shortness of Breath     Started yesterday     and admitted for UTI (urinary tract infection) [N39.0]  Sepsis ([address] [A41.9]  GNR Bacteremia- presumptive E.coli   Complicated UTI  Fevers  Leukocytosis       Plan: Currently on Rocephin IV 1 gram every 24 hours- will increase to 2 grams   Called micro - presumptive E.coli   Await final urine culture  Respiratory panel negative   On nasal canula. Chest x-ray reviewed   Bladder scan - negative   Procal -3.85  Monitor labs       Available labs, imaging studies, microbiologic studies have been reviewed. The patient/FAMILY  was educated about the diagnosis, prognosis, indications, risks and benefits of treatment. An opportunity to ask questions was given to the patient/FAMILY and questions were answered. Thank you for involving me in the [facility]. Please do not hesitate to call [phone]  for any questions or concerns. Electronically signed by HAILEY Conti on 11/8/2022 at 9:48 AM    As above         This is a face to face encounter with [place] on 11/08/22. I discussed the findings and plans with  NURSE PRACTITIONER, HAILEY Conti and agree as documented in her note. See below for additional details and treatment plan. Keaton Stevenson is a 79 y.o. female who presents with   has a past medical history of Acid reflux, Atrial fibrillation ([facility] Cancer ([facility] Chronic back pain, CPAP (continuous positive airway pressure) dependence, Fibromyalgia, History of bariatric surgery, History of cardiovascular stress test, Hyperlipidemia, Hypertension, Kidney stones, Low iron, Osteoarthritis, and Unspecified sleep apnea. ADMITTED FOR  UTI (urinary tract infection) [N39.0]  Sepsis ([address] [A41.9]  -GN E COLI   Id /SENSI PENDING WILL AWAIT FOR FINAL CX   CONT ATBX     Imaging and labs were reviewed. Keaton Stevenson was informed of their diagnosis, indications, risks and benefits of treatment. [facility] had [facility] ask questions. All questions were answered. Thank you for involving me in the [facility].  Please do not hesitate to call for any questions or concerns.     Electronically signed by Nghia Grace MD on 11/8/2022 at 4:27 PM

## 2022-11-09 LAB
ALBUMIN SERPL-MCNC: 3.5 G/DL (ref 3.5–5.2)
ALP BLD-CCNC: 52 U/L (ref 35–104)
ALT SERPL-CCNC: 14 U/L (ref 0–32)
ANION GAP SERPL CALCULATED.3IONS-SCNC: 11 MMOL/L (ref 7–16)
AST SERPL-CCNC: 24 U/L (ref 0–31)
BASOPHILS ABSOLUTE: 0.01 E9/L (ref 0–0.2)
BASOPHILS RELATIVE PERCENT: 0.2 % (ref 0–2)
BILIRUB SERPL-MCNC: 0.7 MG/DL (ref 0–1.2)
BLOOD CULTURE, ROUTINE: ABNORMAL
BLOOD CULTURE, ROUTINE: ABNORMAL
BUN BLDV-MCNC: 18 MG/DL (ref 6–23)
CALCIUM SERPL-MCNC: 9.2 MG/DL (ref 8.6–10.2)
CHLORIDE BLD-SCNC: 93 MMOL/L (ref 98–107)
CO2: 26 MMOL/L (ref 22–29)
CREAT SERPL-MCNC: 0.8 MG/DL (ref 0.5–1)
EOSINOPHILS ABSOLUTE: 0.02 E9/L (ref 0.05–0.5)
EOSINOPHILS RELATIVE PERCENT: 0.3 % (ref 0–6)
GFR SERPL CREATININE-BSD FRML MDRD: >60 ML/MIN/1.73
GLUCOSE BLD-MCNC: 109 MG/DL (ref 74–99)
HCT VFR BLD CALC: 31 % (ref 34–48)
HEMOGLOBIN: 10.3 G/DL (ref 11.5–15.5)
IMMATURE GRANULOCYTES #: 0.03 E9/L
IMMATURE GRANULOCYTES %: 0.5 % (ref 0–5)
LACTIC ACID: 1.1 MMOL/L (ref 0.5–2.2)
LACTIC ACID: 1.1 MMOL/L (ref 0.5–2.2)
LYMPHOCYTES ABSOLUTE: 1.01 E9/L (ref 1.5–4)
LYMPHOCYTES RELATIVE PERCENT: 15.5 % (ref 20–42)
MCH RBC QN AUTO: 31.7 PG (ref 26–35)
MCHC RBC AUTO-ENTMCNC: 33.2 % (ref 32–34.5)
MCV RBC AUTO: 95.4 FL (ref 80–99.9)
MONOCYTES ABSOLUTE: 0.5 E9/L (ref 0.1–0.95)
MONOCYTES RELATIVE PERCENT: 7.7 % (ref 2–12)
NEUTROPHILS ABSOLUTE: 4.93 E9/L (ref 1.8–7.3)
NEUTROPHILS RELATIVE PERCENT: 75.8 % (ref 43–80)
ORGANISM: ABNORMAL
PDW BLD-RTO: 12.8 FL (ref 11.5–15)
PLATELET # BLD: 130 E9/L (ref 130–450)
PMV BLD AUTO: 9.9 FL (ref 7–12)
POTASSIUM SERPL-SCNC: 4.1 MMOL/L (ref 3.5–5)
RBC # BLD: 3.25 E12/L (ref 3.5–5.5)
SODIUM BLD-SCNC: 130 MMOL/L (ref 132–146)
TOTAL PROTEIN: 6.8 G/DL (ref 6.4–8.3)
URINE CULTURE, ROUTINE: ABNORMAL
WBC # BLD: 6.5 E9/L (ref 4.5–11.5)

## 2022-11-09 PROCEDURE — 6360000002 HC RX W HCPCS: Performed by: INTERNAL MEDICINE

## 2022-11-09 PROCEDURE — 6370000000 HC RX 637 (ALT 250 FOR IP): Performed by: CLINICAL NURSE SPECIALIST

## 2022-11-09 PROCEDURE — 85025 COMPLETE CBC W/AUTO DIFF WBC: CPT

## 2022-11-09 PROCEDURE — 6360000002 HC RX W HCPCS: Performed by: CLINICAL NURSE SPECIALIST

## 2022-11-09 PROCEDURE — 6370000000 HC RX 637 (ALT 250 FOR IP): Performed by: INTERNAL MEDICINE

## 2022-11-09 PROCEDURE — 6370000000 HC RX 637 (ALT 250 FOR IP): Performed by: SPECIALIST

## 2022-11-09 PROCEDURE — 94640 AIRWAY INHALATION TREATMENT: CPT

## 2022-11-09 PROCEDURE — 93005 ELECTROCARDIOGRAM TRACING: CPT | Performed by: SPECIALIST

## 2022-11-09 PROCEDURE — 36415 COLL VENOUS BLD VENIPUNCTURE: CPT

## 2022-11-09 PROCEDURE — 2700000000 HC OXYGEN THERAPY PER DAY

## 2022-11-09 PROCEDURE — 2580000003 HC RX 258: Performed by: INTERNAL MEDICINE

## 2022-11-09 PROCEDURE — 2580000003 HC RX 258: Performed by: CLINICAL NURSE SPECIALIST

## 2022-11-09 PROCEDURE — 80053 COMPREHEN METABOLIC PANEL: CPT

## 2022-11-09 PROCEDURE — 1200000000 HC SEMI PRIVATE

## 2022-11-09 PROCEDURE — 83605 ASSAY OF LACTIC ACID: CPT

## 2022-11-09 RX ORDER — DOCUSATE SODIUM 100 MG/1
100 CAPSULE, LIQUID FILLED ORAL DAILY
Status: DISCONTINUED | OUTPATIENT
Start: 2022-11-09 | End: 2022-11-11 | Stop reason: HOSPADM

## 2022-11-09 RX ADMIN — PANTOPRAZOLE SODIUM 40 MG: 40 TABLET, DELAYED RELEASE ORAL at 06:13

## 2022-11-09 RX ADMIN — TRIMETHOBENZAMIDE HYDROCHLORIDE 200 MG: 100 INJECTION INTRAMUSCULAR at 08:48

## 2022-11-09 RX ADMIN — VENLAFAXINE HYDROCHLORIDE 75 MG: 37.5 CAPSULE, EXTENDED RELEASE ORAL at 23:19

## 2022-11-09 RX ADMIN — BUSPIRONE HYDROCHLORIDE 5 MG: 10 TABLET ORAL at 08:43

## 2022-11-09 RX ADMIN — LEVALBUTEROL 0.63 MG: 0.63 SOLUTION RESPIRATORY (INHALATION) at 11:43

## 2022-11-09 RX ADMIN — HYDROCORTISONE: 1 CREAM TOPICAL at 23:37

## 2022-11-09 RX ADMIN — AMITRIPTYLINE HYDROCHLORIDE 75 MG: 25 TABLET, FILM COATED ORAL at 23:34

## 2022-11-09 RX ADMIN — LEVALBUTEROL 0.63 MG: 0.63 SOLUTION RESPIRATORY (INHALATION) at 17:43

## 2022-11-09 RX ADMIN — APIXABAN 5 MG: 5 TABLET, FILM COATED ORAL at 23:18

## 2022-11-09 RX ADMIN — DOCUSATE SODIUM 100 MG: 100 CAPSULE, LIQUID FILLED ORAL at 14:35

## 2022-11-09 RX ADMIN — SOTALOL HYDROCHLORIDE 80 MG: 80 TABLET ORAL at 23:17

## 2022-11-09 RX ADMIN — SOTALOL HYDROCHLORIDE 80 MG: 80 TABLET ORAL at 08:43

## 2022-11-09 RX ADMIN — LEVALBUTEROL 0.63 MG: 0.63 SOLUTION RESPIRATORY (INHALATION) at 07:17

## 2022-11-09 RX ADMIN — FAMOTIDINE 40 MG: 20 TABLET, FILM COATED ORAL at 23:17

## 2022-11-09 RX ADMIN — HYDROCORTISONE: 1 CREAM TOPICAL at 08:44

## 2022-11-09 RX ADMIN — Medication 10 ML: at 23:35

## 2022-11-09 RX ADMIN — BUSPIRONE HYDROCHLORIDE 5 MG: 10 TABLET ORAL at 13:13

## 2022-11-09 RX ADMIN — BUDESONIDE 500 MCG: 0.5 SUSPENSION RESPIRATORY (INHALATION) at 07:17

## 2022-11-09 RX ADMIN — MEROPENEM 1000 MG: 1 INJECTION, POWDER, FOR SOLUTION INTRAVENOUS at 16:49

## 2022-11-09 RX ADMIN — ACETAMINOPHEN 650 MG: 325 TABLET ORAL at 07:03

## 2022-11-09 RX ADMIN — BUDESONIDE 500 MCG: 0.5 SUSPENSION RESPIRATORY (INHALATION) at 17:43

## 2022-11-09 RX ADMIN — Medication 10 ML: at 08:44

## 2022-11-09 RX ADMIN — BUSPIRONE HYDROCHLORIDE 5 MG: 10 TABLET ORAL at 23:18

## 2022-11-09 RX ADMIN — MONTELUKAST 10 MG: 10 TABLET, FILM COATED ORAL at 23:34

## 2022-11-09 RX ADMIN — LEVALBUTEROL 0.63 MG: 0.63 SOLUTION RESPIRATORY (INHALATION) at 01:40

## 2022-11-09 RX ADMIN — APIXABAN 5 MG: 5 TABLET, FILM COATED ORAL at 08:43

## 2022-11-09 RX ADMIN — LEVALBUTEROL 0.63 MG: 0.63 SOLUTION RESPIRATORY (INHALATION) at 22:53

## 2022-11-09 RX ADMIN — MEROPENEM 1000 MG: 1 INJECTION, POWDER, FOR SOLUTION INTRAVENOUS at 11:33

## 2022-11-09 NOTE — PROGRESS NOTES
Cardiology  Progress Note      SUBJECTIVE:  No chest pain. No dyspnea. Less tired.     Current Inpatient Medications  Current Facility-Administered Medications: meropenem (MERREM) 1,000 mg in sodium chloride 0.9 % 100 mL IVPB ([id number]), 1,000 mg, IntraVENous, Q8H  sotalol (BETAPACE) tablet 80 mg, 80 mg, Oral, BID  trimethobenzamide (TIGAN) injection 200 mg, 200 mg, IntraMUSCular, Q6H PRN  levalbuterol (XOPENEX) nebulization 0.63 mg, 0.63 mg, Nebulization, Q6H  budesonide (PULMICORT) nebulizer suspension 500 mcg, 0.5 mg, Nebulization, BID  hydrocortisone 1 % cream, , Topical, BID  glucose chewable tablet 16 g, 4 tablet, Oral, PRN  dextrose bolus 10% 125 mL, 125 mL, IntraVENous, PRN **OR** dextrose bolus 10% 250 mL, 250 mL, IntraVENous, PRN  glucagon (rDNA) injection 1 mg, 1 mg, SubCUTAneous, PRN  dextrose 10 % infusion, , IntraVENous, Continuous PRN  sodium chloride flush 0.9 % injection 5-40 mL, 5-40 mL, IntraVENous, 2 times per day  sodium chloride flush 0.9 % injection 5-40 mL, 5-40 mL, IntraVENous, PRN  0.9 % sodium chloride infusion, , IntraVENous, PRN  polyethylene glycol (GLYCOLAX) packet 17 g, 17 g, Oral, Daily PRN  acetaminophen (TYLENOL) tablet 650 mg, 650 mg, Oral, Q6H PRN **OR** acetaminophen (TYLENOL) suppository 650 mg, 650 mg, Rectal, Q6H PRN  apixaban (ELIQUIS) tablet 5 mg, 5 mg, Oral, BID  amitriptyline (ELAVIL) tablet 75 mg, 75 mg, Oral, Nightly  busPIRone (BUSPAR) tablet 5 mg, 5 mg, Oral, TID  famotidine (PEPCID) tablet 40 mg, 40 mg, Oral, Nightly  montelukast (SINGULAIR) tablet 10 mg, 10 mg, Oral, Nightly  venlafaxine (EFFEXOR XR) extended release capsule 75 mg, 75 mg, Oral, Nightly  metoprolol (LOPRESSOR) injection 5 mg, 5 mg, IntraVENous, Q5 Min PRN  pantoprazole (PROTONIX) tablet 40 mg, 40 mg, Oral, QAM AC  nitroGLYCERIN (NITROSTAT) SL tablet 0.4 mg, 0.4 mg, SubLINGual, Q5 Min PRN      Physical  VITALS:  BP (!) 143/81   Pulse (!) 104   Temp 99.4 °F (37.4 °C) (Oral)   Resp 18   Ht 5' 4\" (1.626 m)   Wt 221 lb (100.2 kg)   SpO2 98%   BMI 37.93 kg/m²   CURRENT TEMPERATURE:  Temp: 99.4 °F (37.4 °C)  CONSTITUTIONAL: No acute distress. EYES: Vision is intact. ENT: No sore throat. No ear drainage. NECK: No JVD. BACK: Symmetric. LUNGS:  diminished breath sounds left base  CARDIOVASCULAR:  irregularly irregular rhythm, no S3, and no S4  ABDOMEN:  non-tender  NEUROLOGIC: No focal deficits. EXTREMITIES: +1 edema In both legs. DATA:      ECG:  I have reviewed EKG with the following interpretation:  atrial fibrillation    Cardiology Labs:  BMP:    Lab Results   Component Value Date/Time     11/09/2022 07:47 AM    K 4.1 11/09/2022 07:47 AM    K 3.3 04/05/2022 09:20 PM    CL 93 11/09/2022 07:47 AM    CO2 26 11/09/2022 07:47 AM    BUN 18 11/09/2022 07:47 AM     CBC:    Lab Results   Component Value Date/Time    WBC 6.5 11/09/2022 07:47 AM    RBC 3.25 11/09/2022 07:47 AM    HGB 10.3 11/09/2022 07:47 AM    HCT 31.0 11/09/2022 07:47 AM    MCV 95.4 11/09/2022 07:47 AM    RDW 12.8 11/09/2022 07:47 AM     11/09/2022 07:47 AM     PT/INR:  No results found for: PTINR  TROPONIN:  No components found for: TROP    ASSESSMENT    1.symptomatic persistent atrial fibrillation. Please refer to consult. Could not tolerate Multaq because of rash. She was started on sotalol. We will monitor QT interval closely. We will consider repeat cardioversion on sotalol to restore sinus rhythm down the road after patient recovers from her urosepsis. 2-UTI.   Cultures showing gram-negative rods  Patient is on antibiotics

## 2022-11-09 NOTE — PROGRESS NOTES
[address] [place] [address] [facility]  DOLORES  Progress Note    CC:complicated UTI  Face to face encounter   SUBJECTIVE:  Patient is tolerating medications. No reported adverse drug reactions. ROS: No nausea, vomiting, diarrhea. No rash. TMAX- 101.7.  on 3 liters nasal canula  Not feeling better this morning  Ambulating to the bathroom   Patient reports she is constipated     Medications:  Scheduled Meds:   meropenem  1,000 mg IntraVENous Q8H    sotalol  80 mg Oral BID    levalbuterol  0.63 mg Nebulization Q6H    budesonide  0.5 mg Nebulization BID    hydrocortisone   Topical BID    sodium chloride flush  5-40 mL IntraVENous 2 times per day    apixaban  5 mg Oral BID    amitriptyline  75 mg Oral Nightly    busPIRone  5 mg Oral TID    famotidine  40 mg Oral Nightly    montelukast  10 mg Oral Nightly    venlafaxine  75 mg Oral Nightly    pantoprazole  40 mg Oral QAM AC     Continuous Infusions:   dextrose      sodium chloride       PRN Meds:trimethobenzamide, glucose, dextrose bolus **OR** dextrose bolus, glucagon (rDNA), dextrose, sodium chloride flush, sodium chloride, polyethylene glycol, acetaminophen **OR** acetaminophen, metoprolol, nitroGLYCERIN  OBJECTIVE:  Patient Vitals for the past 24 hrs:   BP Temp Temp src Pulse Resp SpO2   11/09/22 0834 (!) 143/81 99.4 °F (37.4 °C) Oral (!) 104 18 98 %   11/09/22 0717 -- -- -- -- -- 100 %   11/09/22 0700 -- (!) 101.7 °F (38.7 °C) Oral -- -- --   11/08/22 2300 -- 98.4 °F (36.9 °C) Oral -- -- --   11/08/22 2000 -- -- -- 99 -- --   11/08/22 1944 (!) 140/99 (!) 101.8 °F (38.8 °C) Oral 96 19 100 %   11/08/22 1545 119/63 98.5 °F (36.9 °C) Temporal (!) 101 20 100 %   11/08/22 1500 130/79 (!) 33.8 °F (1 °C) -- -- -- --   11/08/22 1430 -- -- -- -- 18 --     Constitutional: The patient is awake, alert, and oriented. Ambulating in room-   Skin: Warm and dry. No rashes were noted. Head: Eyes show round, and reactive pupils. No jaundice.    Mouth: Moist mucous membranes, no ulcerations, no thrush. Neck: Supple to movements. No lymphadenopathy. Chest: No use of accessory muscles to breathe. Symmetrical expansion. Auscultation reveals no wheezing, crackles, or rhonchi. Diminished- on 3 liters nasal canula. Cardiovascular: S1 and S2 are rhythmic and regular. Abdomen: Positive bowel sounds to auscultation. Benign to palpation. No flank pain. Extremities: No clubbing, no cyanosis, ++ edema.   PIV    Laboratory and Tests Review:  Lab Results   Component Value Date    WBC 6.5 11/09/2022    WBC 6.6 11/08/2022    WBC 14.9 (H) 11/07/2022    HGB 10.3 (L) 11/09/2022    HCT 31.0 (L) 11/09/2022    MCV 95.4 11/09/2022     11/09/2022     Lab Results   Component Value Date    NEUTROABS 4.93 11/09/2022    NEUTROABS 5.34 11/08/2022    NEUTROABS 12.39 (H) 11/07/2022     Lab Results   Component Value Date    CRP 5.3 (H) 07/27/2022    CRP 5.4 (H) 07/26/2022    CRP 1.3 (H) 07/25/2022     Lab Results   Component Value Date    SEDRATE 24 (H) 03/11/2021    SEDRATE 35 (H) 09/08/2011     Lab Results   Component Value Date    ALT 14 11/09/2022    AST 24 11/09/2022    ALKPHOS 52 11/09/2022    BILITOT 0.7 11/09/2022     Lab Results   Component Value Date/Time     11/09/2022 07:47 AM    K 4.1 11/09/2022 07:47 AM    K 3.3 04/05/2022 09:20 PM    CL 93 11/09/2022 07:47 AM    CO2 26 11/09/2022 07:47 AM    BUN 18 11/09/2022 07:47 AM    CREATININE 0.8 11/09/2022 07:47 AM    GFRAA >60 08/24/2022 08:58 AM    LABGLOM >60 11/09/2022 07:47 AM    GLUCOSE 109 11/09/2022 07:47 AM    GLUCOSE 115 02/11/2012 08:15 AM    PROT 6.8 11/09/2022 07:47 AM    LABALBU 3.5 11/09/2022 07:47 AM    LABALBU 4.3 02/11/2012 08:15 AM    CALCIUM 9.2 11/09/2022 07:47 AM    BILITOT 0.7 11/09/2022 07:47 AM    ALKPHOS 52 11/09/2022 07:47 AM    AST 24 11/09/2022 07:47 AM    ALT 14 11/09/2022 07:47 AM     Radiology:  Reviewed     Microbiology:   11/6/2022- urine cx- E.coli  11/6/2022- blood cx- GNR- presumptive E.coli ASSESSMENT:  Sepsis   GNR Bacteremia- presumptive E.coli   Complicated UTI  Fevers  Leukocytosis       Plan:   Stop rocephin   Change to Meropenem   Await final blood culture  On nasal canula. Chest x-ray reviewed   Bladder scan - negative   Procal -3.85  Monitor labs - reviewed   Add colace  Monitor temps       HAILEY Conti  10:55 AM  11/9/2022       As above      This is a face to face encounter with Otis Schroeder on 11/09/22. I discussed the findings and plans with  NURSE PRACTITIONER, HAILEY Conti and agree as documented in her note. See below for additional details and treatment plan. Otis Schroeder is a 79 y.o. female who presents with   has a past medical history of Acid reflux, Atrial fibrillation ([address] Cancer ([address] Chronic back pain, CPAP (continuous positive airway pressure) dependence, Fibromyalgia, History of bariatric surgery, History of cardiovascular stress test, Hyperlipidemia, Hypertension, Kidney stones, Low iron, Osteoarthritis, and Unspecified sleep apnea. Admit DX:  UTI (urinary tract infection) [N39.0]  Sepsis ([address] [A41.9]  ID was consulted for E coli sepsis  Fevers [id number]  Abx adjusted     meropenem (MERREM) 1,000 mg in sodium chloride 0.9 % 100 mL IVPB ([id number]), Q8H         Imaging and labs were reviewed. Otis Schroeder was informed of their diagnosis, indications, risks and benefits of treatment. Otis Schroeder had the opportunity to ask questions. All questions were answered. Thank you for involving me in the care of Otis Schroeder. Please do not hesitate to call for any questions or concerns.     Electronically signed by Candi Tobar MD on 11/9/2022 at 5:06 PM

## 2022-11-09 NOTE — PROGRESS NOTES
Subjective:  Mehran was seen and examined at bedside today. The patient's questions were answered and tests were reviewed. There were no new problems reported overnight   Patient is tolerating current diet. Feels better today and now down to 2 liters    A complete review of systems and social history was completed on admission and remains unchanged unless otherwise noted    Scheduled Meds:   meropenem  1,000 mg IntraVENous Q8H    docusate sodium  100 mg Oral Daily    sotalol  80 mg Oral BID    levalbuterol  0.63 mg Nebulization Q6H    budesonide  0.5 mg Nebulization BID    hydrocortisone   Topical BID    sodium chloride flush  5-40 mL IntraVENous 2 times per day    apixaban  5 mg Oral BID    amitriptyline  75 mg Oral Nightly    busPIRone  5 mg Oral TID    famotidine  40 mg Oral Nightly    montelukast  10 mg Oral Nightly    venlafaxine  75 mg Oral Nightly    pantoprazole  40 mg Oral QAM AC     Continuous Infusions:   dextrose      sodium chloride       PRN Meds:trimethobenzamide, glucose, dextrose bolus **OR** dextrose bolus, glucagon (rDNA), dextrose, sodium chloride flush, sodium chloride, polyethylene glycol, acetaminophen **OR** acetaminophen, metoprolol, nitroGLYCERIN    Objective:  BP (!) 143/81   Pulse (!) 104   Temp 99.4 °F (37.4 °C) (Oral)   Resp 18   Ht 5' 4\" (1.626 m)   Wt 221 lb (100.2 kg)   SpO2 98%   BMI 37.93 kg/m²   In: 400 [P.O.:400]  Out: -    In: 400   Out: -      AAO x 3, currently in NAD  Irregular and tachycardic, pos S1, S2  CTA bilaterally, no wheeze, rales or rhonchi  bowel sounds present, nontender, nondistended  No clubbing, cyanosis, or edema  No neuro changes   No obvious rashes or lesions.     Recent Labs     11/07/22 0527 11/08/22 0836 11/09/22  0747   WBC 14.9* 6.6 6.5   HGB 12.5 10.1* 10.3*    126* 130       Recent Labs     11/07/22 0527 11/08/22 0836 11/09/22 0747    132 130*   K 3.6 4.0 4.1   CL 98 95* 93*   CO2 25 24 26   BUN 17 18 18   CREATININE 0.9 0.8 0. 8   GLUCOSE 117* 145* 109*       Recent Labs     11/07/22  0527 11/08/22  0836 11/09/22  0747   BILITOT 1.2 0.8 0.7   ALKPHOS 56 48 52   AST 17 15 24   ALT 11 9 14       No results for input(s): INR in the last 72 hours. Invalid input(s): PT  No results for input(s): CKTOTAL, CKMB, CKMBINDEX, TROPONINI in the last 72 hours. CT ABDOMEN PELVIS WO CONTRAST Additional Contrast? None    Result Date: 11/6/2022  EXAMINATION: CT OF THE ABDOMEN AND PELVIS WITHOUT CONTRAST 11/6/2022 8:09 pm TECHNIQUE: CT of the abdomen and pelvis was performed without the administration of intravenous contrast. Multiplanar reformatted images are provided for review. Automated exposure control, iterative reconstruction, and/or weight based adjustment of the mA/kV was utilized to reduce the radiation dose to as low as reasonably achievable. COMPARISON: None. HISTORY: ORDERING SYSTEM PROVIDED HISTORY: abdominal pain LLQ kidney stone TECHNOLOGIST PROVIDED HISTORY: Reason for exam:->abdominal pain LLQ kidney stone Additional Contrast?->None Decision Support Exception - unselect if not a suspected or confirmed emergency medical condition->Emergency Medical Condition (MA) FINDINGS: Lower Chest: The visualized lungs are normal. Organs: Benign liver and splenic calcifications. The adrenal glands and, pancreas are normal.  Cholecystectomy. No urinary tract stones or hydronephrosis. GI/Bowel: Normal large and small bowel. The appendix is not visualized. Pelvis: Normal urinary bladder. Peritoneum/Retroperitoneum: No free fluid or free air. 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 Bones/soft tissues: Grade 1 anterolisthesis L4 over L5. Multilevel degenerative changes thoracolumbar spine. No urinary tract stones or hydronephrosis. No evidence for diverticulitis. Cholecystectomy. 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      XR CHEST PORTABLE    Result Date: 11/7/2022  EXAMINATION: ONE XRAY VIEW OF THE CHEST 11/7/2022 1:10 am COMPARISON: None. HISTORY: ORDERING SYSTEM PROVIDED HISTORY: sob TECHNOLOGIST PROVIDED HISTORY: Reason for exam:->sob FINDINGS: Normal cardiomediastinal silhouette. Lungs clear. No pneumothorax or effusion. Osseous thorax intact. No acute disease. RECOMMENDATION: Careful clinical correlation and follow up recommended. XR CHEST 1 VIEW    Result Date: 11/6/2022  EXAMINATION: ONE XRAY VIEW OF THE CHEST 11/6/2022 4:38 pm COMPARISON: 08/24/2022 HISTORY: ORDERING SYSTEM PROVIDED HISTORY: chest pain, sob TECHNOLOGIST PROVIDED HISTORY: Reason for exam:->chest pain, sob FINDINGS: The lungs are without acute focal process. There is no effusion or pneumothorax. The cardiomediastinal silhouette is without acute process. The osseous structures are without acute process. No acute process. Assessment:  Karthikeyan Cuba is a 79y.o. year old female who presented on 11/6/2022 and is being treated for:  Principal Problem:    UTI (urinary tract infection)  Active Problems:    Sepsis ([address]    Paroxysmal A-fib ([address]    Acute respiratory failure with hypoxia ([address]    NSTEMI (non-ST elevated myocardial infarction) ([address]  Resolved Problems:    * No resolved hospital problems. *    Plan  Abx changed by ID who is following for gram-negative sepsis  Cardio consulted for NSTEMI and A. Fib and remains on sotalol  Otherwise continue same meds, cont to wean 02  PT/OT  Please see orders for further management and care. More than 50% of my time was spent at the bedside counseling/coordinating care with the patient and/or family with face to face contact. This time was spent reviewing notes and laboratory data as well as instructing and counseling the patient. Time I spent with the family or surrogate(s) is included only if the patient was incapable of providing the necessary information or participating in medical decisions.  I also discussed the differential diagnosis and all of the proposed management plans with the patient and individuals accompanying the patient. I am readily available for any further decision-making and intervention.      Birdie Tomas MD  4:40 PM  11/9/2022

## 2022-11-09 NOTE — CARE COORDINATION
SS NOTE: COVID NEGATIVE 11/7. Pt is on 3 liters of O2 here with none at home. Pt has a peripheral line. She is on IV Merrem. Pt was on Eliquis at home PTA. Pt has positive blood cultures and ID is on. Pt continues to plan on going home- one of her dtrs will transport her. SS to continue. KATIANA Barth.11/9/2022.12:33PM.

## 2022-11-10 ENCOUNTER — APPOINTMENT (OUTPATIENT)
Dept: GENERAL RADIOLOGY | Age: 67
DRG: 871 | End: 2022-11-10
Payer: MEDICARE

## 2022-11-10 LAB
ALBUMIN SERPL-MCNC: 3.3 G/DL (ref 3.5–5.2)
ALP BLD-CCNC: 50 U/L (ref 35–104)
ALT SERPL-CCNC: 15 U/L (ref 0–32)
ANION GAP SERPL CALCULATED.3IONS-SCNC: 9 MMOL/L (ref 7–16)
AST SERPL-CCNC: 21 U/L (ref 0–31)
BASOPHILS ABSOLUTE: 0.03 E9/L (ref 0–0.2)
BASOPHILS RELATIVE PERCENT: 0.6 % (ref 0–2)
BILIRUB SERPL-MCNC: 0.8 MG/DL (ref 0–1.2)
BUN BLDV-MCNC: 17 MG/DL (ref 6–23)
CALCIUM SERPL-MCNC: 8.9 MG/DL (ref 8.6–10.2)
CHLORIDE BLD-SCNC: 100 MMOL/L (ref 98–107)
CO2: 28 MMOL/L (ref 22–29)
CREAT SERPL-MCNC: 0.7 MG/DL (ref 0.5–1)
EOSINOPHILS ABSOLUTE: 0.07 E9/L (ref 0.05–0.5)
EOSINOPHILS RELATIVE PERCENT: 1.5 % (ref 0–6)
GFR SERPL CREATININE-BSD FRML MDRD: >60 ML/MIN/1.73
GLUCOSE BLD-MCNC: 99 MG/DL (ref 74–99)
HCT VFR BLD CALC: 29.3 % (ref 34–48)
HEMOGLOBIN: 9.8 G/DL (ref 11.5–15.5)
IMMATURE GRANULOCYTES #: 0.01 E9/L
IMMATURE GRANULOCYTES %: 0.2 % (ref 0–5)
LACTIC ACID: 0.8 MMOL/L (ref 0.5–2.2)
LYMPHOCYTES ABSOLUTE: 1.39 E9/L (ref 1.5–4)
LYMPHOCYTES RELATIVE PERCENT: 30 % (ref 20–42)
MCH RBC QN AUTO: 31.5 PG (ref 26–35)
MCHC RBC AUTO-ENTMCNC: 33.4 % (ref 32–34.5)
MCV RBC AUTO: 94.2 FL (ref 80–99.9)
MONOCYTES ABSOLUTE: 0.65 E9/L (ref 0.1–0.95)
MONOCYTES RELATIVE PERCENT: 14 % (ref 2–12)
NEUTROPHILS ABSOLUTE: 2.48 E9/L (ref 1.8–7.3)
NEUTROPHILS RELATIVE PERCENT: 53.7 % (ref 43–80)
PDW BLD-RTO: 12.8 FL (ref 11.5–15)
PLATELET # BLD: 141 E9/L (ref 130–450)
PMV BLD AUTO: 9.7 FL (ref 7–12)
POTASSIUM SERPL-SCNC: 3.8 MMOL/L (ref 3.5–5)
RBC # BLD: 3.11 E12/L (ref 3.5–5.5)
SODIUM BLD-SCNC: 137 MMOL/L (ref 132–146)
TOTAL PROTEIN: 6.3 G/DL (ref 6.4–8.3)
WBC # BLD: 4.6 E9/L (ref 4.5–11.5)

## 2022-11-10 PROCEDURE — 36415 COLL VENOUS BLD VENIPUNCTURE: CPT

## 2022-11-10 PROCEDURE — 02HV33Z INSERTION OF INFUSION DEVICE INTO SUPERIOR VENA CAVA, PERCUTANEOUS APPROACH: ICD-10-PCS | Performed by: INTERNAL MEDICINE

## 2022-11-10 PROCEDURE — C1751 CATH, INF, PER/CENT/MIDLINE: HCPCS

## 2022-11-10 PROCEDURE — 6370000000 HC RX 637 (ALT 250 FOR IP): Performed by: CLINICAL NURSE SPECIALIST

## 2022-11-10 PROCEDURE — 6360000002 HC RX W HCPCS: Performed by: CLINICAL NURSE SPECIALIST

## 2022-11-10 PROCEDURE — 71045 X-RAY EXAM CHEST 1 VIEW: CPT

## 2022-11-10 PROCEDURE — 6360000002 HC RX W HCPCS: Performed by: INTERNAL MEDICINE

## 2022-11-10 PROCEDURE — 80053 COMPREHEN METABOLIC PANEL: CPT

## 2022-11-10 PROCEDURE — 83605 ASSAY OF LACTIC ACID: CPT

## 2022-11-10 PROCEDURE — 97110 THERAPEUTIC EXERCISES: CPT | Performed by: PHYSICAL THERAPIST

## 2022-11-10 PROCEDURE — 94640 AIRWAY INHALATION TREATMENT: CPT

## 2022-11-10 PROCEDURE — 2580000003 HC RX 258: Performed by: INTERNAL MEDICINE

## 2022-11-10 PROCEDURE — 2580000003 HC RX 258: Performed by: CLINICAL NURSE SPECIALIST

## 2022-11-10 PROCEDURE — 93005 ELECTROCARDIOGRAM TRACING: CPT | Performed by: SPECIALIST

## 2022-11-10 PROCEDURE — 36569 INSJ PICC 5 YR+ W/O IMAGING: CPT

## 2022-11-10 PROCEDURE — 6370000000 HC RX 637 (ALT 250 FOR IP): Performed by: INTERNAL MEDICINE

## 2022-11-10 PROCEDURE — 85025 COMPLETE CBC W/AUTO DIFF WBC: CPT

## 2022-11-10 PROCEDURE — 2580000003 HC RX 258: Performed by: SPECIALIST

## 2022-11-10 PROCEDURE — 97161 PT EVAL LOW COMPLEX 20 MIN: CPT | Performed by: PHYSICAL THERAPIST

## 2022-11-10 PROCEDURE — 1200000000 HC SEMI PRIVATE

## 2022-11-10 PROCEDURE — 2720000010 HC SURG SUPPLY STERILE

## 2022-11-10 PROCEDURE — 76937 US GUIDE VASCULAR ACCESS: CPT

## 2022-11-10 PROCEDURE — 6370000000 HC RX 637 (ALT 250 FOR IP): Performed by: SPECIALIST

## 2022-11-10 RX ORDER — SODIUM CHLORIDE 0.9 % (FLUSH) 0.9 %
10 SYRINGE (ML) INJECTION PRN
Status: DISCONTINUED | OUTPATIENT
Start: 2022-11-10 | End: 2022-11-11 | Stop reason: HOSPADM

## 2022-11-10 RX ORDER — LIDOCAINE HYDROCHLORIDE 10 MG/ML
5 INJECTION, SOLUTION INFILTRATION; PERINEURAL ONCE
Status: DISCONTINUED | OUTPATIENT
Start: 2022-11-10 | End: 2022-11-11 | Stop reason: HOSPADM

## 2022-11-10 RX ORDER — SOTALOL HYDROCHLORIDE 80 MG/1
80 TABLET ORAL 2 TIMES DAILY
Qty: 60 TABLET | Refills: 0 | Status: SHIPPED | OUTPATIENT
Start: 2022-11-10

## 2022-11-10 RX ORDER — SODIUM CHLORIDE 0.9 % (FLUSH) 0.9 %
10 SYRINGE (ML) INJECTION EVERY 12 HOURS SCHEDULED
Status: DISCONTINUED | OUTPATIENT
Start: 2022-11-10 | End: 2022-11-11 | Stop reason: HOSPADM

## 2022-11-10 RX ORDER — SODIUM CHLORIDE 9 MG/ML
25 INJECTION, SOLUTION INTRAVENOUS PRN
Status: DISCONTINUED | OUTPATIENT
Start: 2022-11-10 | End: 2022-11-11 | Stop reason: HOSPADM

## 2022-11-10 RX ADMIN — SOTALOL HYDROCHLORIDE 80 MG: 80 TABLET ORAL at 10:06

## 2022-11-10 RX ADMIN — ACETAMINOPHEN 650 MG: 325 TABLET ORAL at 21:14

## 2022-11-10 RX ADMIN — BUDESONIDE 500 MCG: 0.5 SUSPENSION RESPIRATORY (INHALATION) at 17:44

## 2022-11-10 RX ADMIN — MEROPENEM 1000 MG: 1 INJECTION, POWDER, FOR SOLUTION INTRAVENOUS at 03:20

## 2022-11-10 RX ADMIN — APIXABAN 5 MG: 5 TABLET, FILM COATED ORAL at 10:06

## 2022-11-10 RX ADMIN — SODIUM CHLORIDE, PRESERVATIVE FREE 10 ML: 5 INJECTION INTRAVENOUS at 21:03

## 2022-11-10 RX ADMIN — LEVALBUTEROL 0.63 MG: 0.63 SOLUTION RESPIRATORY (INHALATION) at 17:44

## 2022-11-10 RX ADMIN — FAMOTIDINE 40 MG: 20 TABLET, FILM COATED ORAL at 21:02

## 2022-11-10 RX ADMIN — BUSPIRONE HYDROCHLORIDE 5 MG: 10 TABLET ORAL at 14:13

## 2022-11-10 RX ADMIN — Medication 10 ML: at 10:11

## 2022-11-10 RX ADMIN — DOCUSATE SODIUM 100 MG: 100 CAPSULE, LIQUID FILLED ORAL at 10:06

## 2022-11-10 RX ADMIN — MEROPENEM 1000 MG: 1 INJECTION, POWDER, FOR SOLUTION INTRAVENOUS at 10:10

## 2022-11-10 RX ADMIN — APIXABAN 5 MG: 5 TABLET, FILM COATED ORAL at 21:02

## 2022-11-10 RX ADMIN — HYDROCORTISONE: 1 CREAM TOPICAL at 21:04

## 2022-11-10 RX ADMIN — BUSPIRONE HYDROCHLORIDE 5 MG: 10 TABLET ORAL at 10:06

## 2022-11-10 RX ADMIN — Medication 10 ML: at 21:04

## 2022-11-10 RX ADMIN — ACETAMINOPHEN 650 MG: 325 TABLET ORAL at 04:47

## 2022-11-10 RX ADMIN — LEVALBUTEROL 0.63 MG: 0.63 SOLUTION RESPIRATORY (INHALATION) at 12:16

## 2022-11-10 RX ADMIN — LEVALBUTEROL 0.63 MG: 0.63 SOLUTION RESPIRATORY (INHALATION) at 21:36

## 2022-11-10 RX ADMIN — HYDROCORTISONE: 1 CREAM TOPICAL at 10:11

## 2022-11-10 RX ADMIN — PANTOPRAZOLE SODIUM 40 MG: 40 TABLET, DELAYED RELEASE ORAL at 05:48

## 2022-11-10 RX ADMIN — MEROPENEM 1000 MG: 1 INJECTION, POWDER, FOR SOLUTION INTRAVENOUS at 16:57

## 2022-11-10 RX ADMIN — SOTALOL HYDROCHLORIDE 80 MG: 80 TABLET ORAL at 21:02

## 2022-11-10 RX ADMIN — BUSPIRONE HYDROCHLORIDE 5 MG: 10 TABLET ORAL at 21:02

## 2022-11-10 RX ADMIN — VENLAFAXINE HYDROCHLORIDE 75 MG: 37.5 CAPSULE, EXTENDED RELEASE ORAL at 21:02

## 2022-11-10 RX ADMIN — MONTELUKAST 10 MG: 10 TABLET, FILM COATED ORAL at 21:02

## 2022-11-10 RX ADMIN — AMITRIPTYLINE HYDROCHLORIDE 75 MG: 25 TABLET, FILM COATED ORAL at 23:10

## 2022-11-10 RX ADMIN — ACETAMINOPHEN 650 MG: 325 TABLET ORAL at 14:19

## 2022-11-10 ASSESSMENT — PAIN DESCRIPTION - LOCATION: LOCATION: HEAD

## 2022-11-10 ASSESSMENT — PAIN SCALES - GENERAL
PAINLEVEL_OUTOF10: 0
PAINLEVEL_OUTOF10: 3
PAINLEVEL_OUTOF10: 6

## 2022-11-10 ASSESSMENT — PAIN - FUNCTIONAL ASSESSMENT: PAIN_FUNCTIONAL_ASSESSMENT: ACTIVITIES ARE NOT PREVENTED

## 2022-11-10 NOTE — PROCEDURES
SL power PICC Placement 11/10/2022    Product number: [id number]   Lot Number: [id number]   Consult: home abx   Ultrasound: yes   Right Cephalic vein:                Upper Arm Circumference: 37cm    Size: 4.5fr x 43cm    Exposed Length: 0    Internal Length: 43cm   Cut: 43cm   Vein Measurement: 0.5cm    Consent obtained  Risks and benefits explained  Time out prior to procedure  Tolerated well  Pt.  In Afib, unable to obtain VPS bullseye  P wave enlargement seen  CXR obtained  Brisk blood return  Flushed well and capped  RN notified    Rochelle Pineda RN  11/10/2022  4:08 PM    CXR d/w Dr Leticia Samuels who recommends withdrawing PICC 3 cm and re shooting a CXR.    6384 - R sided PICC retracted 3cm and redressedCXR obtained    1922- CXR shows optimal placement of PICC in distal SVC  RN notified

## 2022-11-10 NOTE — PLAN OF CARE
Problem: Pain  Goal: Verbalizes/displays adequate comfort level or baseline comfort level  11/10/2022 1135 by aLvinia Echevarria RN  Outcome: Progressing     Problem: Safety - Adult  Goal: Free from fall injury  11/10/2022 1135 by Lavinia Echevarria RN  Outcome: Progressing

## 2022-11-10 NOTE — PROGRESS NOTES
Physical Therapy  Physical Therapy Initial Evaluation/Plan of Care    Room #:  7122/9362-49  Patient Name: Annita Hashimoto  YOB: 1955  MRN: [id number]    Date of Service: 11/10/2022     Tentative placement recommendation: Home Health Physical Therapy  or Home  Equipment recommendation: None      Evaluating Physical Therapist: Odie Essex, PT, DPT #[id number]      Specific Provider Orders/Date/Referring Provider :     11/09/22 1700    PT eval and treat  Start:  11/09/22 1700,   End:  11/09/22 1700,   ONE TIME,   Standing Count:  1 Occurrences,   R         Kalin Julian MD Acknowledge New     Admitting Diagnosis:   UTI (urinary tract infection) [N39.0]  Sepsis ([address] [A41.9]      Surgery: none  Visit Diagnoses         Codes    Fever, unspecified fever cause     R50.9    E. coli bacteremia     R78.81, B96.20            Patient Active Problem List   Diagnosis    Hip pain    Trochanteric bursitis    Shoulder pain    Rotator cuff tear    Subacromial impingement    Labral tear of shoulder    Primary osteoarthritis of left knee    Iron deficiency anemia, unspecified    Acute respiratory failure due to COVID-19 ([address]    Sepsis ([address]    Paroxysmal A-fib ([address]    Community acquired bacterial pneumonia    UTI (urinary tract infection)    Acute respiratory failure with hypoxia ([address]    NSTEMI (non-ST elevated myocardial infarction) ([address]        ASSESSMENT of Current Deficits Patient exhibits decreased strength, balance, and endurance impairing functional mobility, transfers, gait , gait distance, and tolerance to activity. Pt mildly unsteady with ambulation with assistance needed for O2 line management. Pt did not have any LOB, dizziness, or SOB, and pt was agreeable to seated exercises following ambulation.         PHYSICAL THERAPY  PLAN OF CARE       Physical therapy plan of care is established based on physician order,  patient diagnosis and clinical assessment    Current Treatment Recommendations:    -Bed Mobility: Lower extremity exercises  and Trunk control activities   -Sitting Balance: Incorporate reaching activities to activate trunk muscles , Hands on support to maintain midline , Facilitate active trunk muscle engagement , Facilitate postural control in all planes , and Engage in core activities to allow for movement within base of support   -Standing Balance: Perform strengthening exercises in standing to promote motor control with or without upper extremity support , Instruct patient on adequate base of support to maintain balance, and Challenge balance utilizing reaching  activities beyond center of gravity    -Transfers: Provide instruction on proper hand and foot position for adequate transfer of weight onto lower extremities and use of gait device if needed, Cues for hand placement, technique and safety. Provide stabilization to prevent fall , Facilitate weight shift forward on to lower extremities and provide necessary stabilization of bilateral lower extremities , Support transfer of weight on to lower extremities, and Assist with extension of knees trunk and hip to accept weight transfer   -Gait: Gait training, Standing activities to improve: base of support, weight shift, weight bearing , Exercises to improve trunk control, Exercises to improve hip and knee control, Performance of protected weight bearing activities, and Activities to increase weight bearing   -Endurance: Utilize Supervised activities to increase level of endurance to allow for safe functional mobility including transfers and gait  and Use graduated activities to promote good breathing techniques and provide support and education to maximize respiratory function    PT long term treatment goals are located in below grid    Patient and or family understand(s) diagnosis, prognosis, and plan of care. Frequency of treatments: Patient will be seen  daily.          Prior Level of Function: Patient ambulated independently   Rehab Potential: good  for baseline    Past medical history:   Past Medical History:   Diagnosis Date    Acid reflux     Atrial fibrillation (HCC)     Cancer (HCC) 5/19/2005    Breast Cancer (Left Breast)    Chronic back pain     CPAP (continuous positive airway pressure) dependence     not using CPAP    Fibromyalgia     History of bariatric surgery 04/17/2002    Dr. Kathleen Woodruff RYGB    History of cardiovascular stress test 8/10/2012    LEXISCAN    Hyperlipidemia     Hypertension     Kidney stones     Low iron     Osteoarthritis     Unspecified sleep apnea     Patient does not use C-Pap      Past Surgical History:   Procedure Laterality Date    APPENDECTOMY      BARIATRIC SURGERY  2003    Funmi Ureña  2005    Dr. Balinda Schlatter  2004    Dr. Carlos Cesar  2008    ECHO Filemonmariola Hostetter  8/10/2012         HERNIA REPAIR  2004    Dr. Ivanna Deng Left 11/4/2021    LEFT INGUINAL [address], POSSIBLE BILATERAL,  LAPAROSCOPIC ROBOTIC XI performed by Mary Jo Solo MD at [address] [place] [address] ([address] [place] [address])  1988    [facility]    Right Knee    NECK SURGERY  06/2015    [facility],5,6- screws[facility] [address] [facility]    ASHOK-EN-Y GASTRIC BYPASS  04/17/2002    Dr. Wang Cassette RYGB    SHOULDER ARTHROSCOPY  2/17/12    LEFT REPAIR ROTATOR CUFF TEAR SUBACROMIAL [address] - Patient states that this was due to her weight prior to [address]  2009    UVULOPALATOPHARYGOPLASTY         SUBJECTIVE:    Precautions:  Up with assistance, falls and O2    Social history: Patient lives alone in a apartment     with No steps  to enter without Rail Tub shower grab bars    Equipment owned: None    AM-PAC Basic Mobility       AM-PAC Mobility Inpatient   How much difficulty turning over in bed?: None  How much difficulty sitting down on / standing up from a chair with arms?: A Little  How much difficulty moving from lying on back to sitting on side of bed?: None  How much help from another person moving to and from a bed to a chair?: A Little  How much help from another person needed to walk in hospital room?: A Little  How much help from another person for climbing 3-5 steps with a railing?: A Little  AM-[facility] Inpatient Mobility Raw Score : 20  AM-PAC Inpatient T-Scale Score : 47.67  Mobility Inpatient CMS 0-100% Score: 35.83  Mobility Inpatient CMS G-Code Modifier : [address] cleared patient for PT evaluation. The admitting diagnosis and active problem list as listed above have been reviewed prior to the initiation of this evaluation. OBJECTIVE;   Initial Evaluation  Date: 11/10/2022 Treatment Date:     Short Term/ Long Term   Goals   Was pt agreeable to Eval/treatment? Yes  To be met in 3 days   Pain level   3/10  headache     Bed Mobility    Rolling: Independent    Supine to sit:  Independent    Sit to supine: Independent    Scooting: Independent       Transfers Sit to stand: Supervision    Sit to stand: Independent    Ambulation     75 feet using  no device with SBA/Braden   for balance, upright, weight shift, and safety    > 150 feet using  no device with Independent    Stair negotiation: ascended and descended   Not assessed        ROM Within functional limits    Increase range of motion 10% of affected joints    Strength BUE:  refer to OT eval  RLE:  4/5  LLE:  4/5  Increase strength in affected mm groups by 1/3 grade   Balance Sitting EOB:  good -  Dynamic Standing:  fair + with no AD  Sitting EOB:  good   Dynamic Standing: good      Patient is Alert & Oriented x person, place, time, and situation and follows directions    Sensation:  Patient  denies numbness/tingling   Edema:  no   Endurance: fair      Vitals:  2 liters nasal cannula   Blood Pressure at rest  Blood Pressure during session    Heart Rate at rest  Heart Rate during session    SPO2 at rest %  SPO2 during session %     Patient education  Patient educated on role of Physical Therapy, risks of immobility, safety and plan of care, energy conservation,  importance of mobility while in hospital , ankle pumps, quad set and glut set for edema control, blood clot prevention, importance and purpose of adaptive device and adjusted to proper height for the patient. , safety , O2 line management and safety , and proper use/technique of incentive spirometer     Patient response to education:   Pt verbalized understanding Pt demonstrated skill Pt requires further education in this area   Yes Partial Yes      Treatment:  Patient practiced and was instructed/facilitated in the following treatment: Patient Sat edge of bed 10 minutes with Supervision  to increase dynamic sitting balance and activity tolerance. Pt performed bed mobility, transfers, ambulation in room, seated exercises. Therapeutic Exercises:  ankle pumps, heel raises, long arc quad, and seated marching  x 10 reps x 2 sets. At end of session, patient in chair with     call light and phone within reach,  all lines and tubes intact, nursing notified. Patient would benefit from continued skilled Physical Therapy to improve functional independence and quality of life. Patient's/ family goals   home    Time in  80  Time out  1050    Total Treatment Time  11 minutes    Evaluation time includes thorough review of current medical information, gathering information on past medical history/social history and prior level of function, completion of standardized testing/informal observation of tasks, assessment of data, and development of Plan of care and goals.      CPT codes:  Low Complexity PT evaluation (34393)  Therapeutic exercises (99217)   11 minutes  1 unit(s)    Cristina Lipps, PT

## 2022-11-10 NOTE — PLAN OF CARE
Problem: Pain  Goal: Verbalizes/displays adequate comfort level or baseline comfort level  Outcome: Progressing     Problem: Safety - Adult  Goal: Free from fall injury  Outcome: Progressing     Problem: ABCDS Injury Assessment  Goal: Absence of physical injury  Outcome: Progressing     Problem: Respiratory - Adult  Goal: Achieves optimal ventilation and oxygenation  Outcome: Progressing

## 2022-11-10 NOTE — PROGRESS NOTES
NAME: Otis Schroeder  MR:  [id number]  :   1955  Admit Date:  2022      This is a face to face encounter with Otis Burn 79 y.o. female on 11/10/22  Elements of this note, including Diagnosis,  Interval History, Past Medical/Surgical/Family/Social Histories, ROS, physical exam, and Assessment and Plan were copied and pasted from Previous. Updates have been made where noted and reflect current exam and medical decision making from the DOS of this encounter. CHIEF COMPLAINT     ID following for   Chief Complaint   Patient presents with    Fever     101 at home     Shortness of Breath     Started yesterday      Augie Rios is a 79 y.o. female who presents with   Chief Complaint   Patient presents with    Fever     101 at home     Shortness of Breath     Started yesterday     and has  has a past medical history of Acid reflux, Atrial fibrillation ([address] Cancer ([address] Chronic back pain, CPAP (continuous positive airway pressure) dependence, Fibromyalgia, History of bariatric surgery, History of cardiovascular stress test, Hyperlipidemia, Hypertension, Kidney stones, Low iron, Osteoarthritis, and Unspecified sleep apnea. Assessment & Plan   UTI (urinary tract infection) [N39.0]  Sepsis ([address] [O02.6]  Complicated E COLI SEPSIS   meropenem (MERREM) 1,000 mg in sodium chloride 0.9 % 100 mL IVPB ([id number]), Q8H    MED REC ERTAPENEM  PICC    Pt seen and examined  Feels better no f/c/n/v/d  NO PAIN  getting breathing rx  Patient is tolerating medications. No reported adverse drug reactions. REVIEW OF SYSTEMS     As stated above in the chief complaint, otherwise negative.   CURRENT MEDICATIONS     Current Facility-Administered Medications:     meropenem (MERREM) 1,000 mg in sodium chloride 0.9 % 100 mL IVPB ([id number]), 1,000 mg, IntraVENous, Q8H, Gary Butts, APRN - CNS, Last Rate: 33.3 mL/hr at 11/10/22 1010, 1,000 mg at 11/10/22 1010    docusate sodium (COLACE) capsule 100 mg, 100 mg, Oral, Daily, Bill Abel, APRN - CNS, 100 mg at 11/10/22 1006    sotalol (BETAPACE) tablet 80 mg, 80 mg, Oral, BID, Lorna Wagoner MD, 80 mg at 11/10/22 1006    trimethobenzamide (TIGAN) injection 200 mg, 200 mg, IntraMUSCular, Q6H PRN, Kalin Julian MD, 200 mg at 11/09/22 0848    levalbuterol (XOPENEX) nebulization 0.63 mg, 0.63 mg, Nebulization, Q6H, Ismail U Susana, DO, 0.63 mg at 11/09/22 2253    budesonide (PULMICORT) nebulizer suspension 500 mcg, 0.5 mg, Nebulization, BID, Ismail U Susana, DO, 500 mcg at 11/09/22 1743    hydrocortisone 1 % cream, , Topical, BID, Kalin Julian MD, Given at 11/10/22 1011    glucose chewable tablet 16 g, 4 tablet, Oral, PRN, Sumit Britshu Susana, DO    dextrose bolus 10% 125 mL, 125 mL, IntraVENous, PRN **OR** dextrose bolus 10% 250 mL, 250 mL, IntraVENous, PRN, Ismail U Susana, DO    glucagon (rDNA) injection 1 mg, 1 mg, SubCUTAneous, PRN, Ismail U Susana, DO    dextrose 10 % infusion, , IntraVENous, Continuous PRN, Ismail U Susana, DO    sodium chloride flush 0.9 % injection 5-40 mL, 5-40 mL, IntraVENous, 2 times per day, Ismail U Susana, DO, 10 mL at 11/10/22 1011    sodium chloride flush 0.9 % injection 5-40 mL, 5-40 mL, IntraVENous, PRN, Ismail U Susana, DO    0.9 % sodium chloride infusion, , IntraVENous, PRN, Ismail U Susana, DO    polyethylene glycol (GLYCOLAX) packet 17 g, 17 g, Oral, Daily PRN, Beula Coca, DO    acetaminophen (TYLENOL) tablet 650 mg, 650 mg, Oral, Q6H PRN, 650 mg at 11/10/22 1577 **OR** acetaminophen (TYLENOL) suppository 650 mg, 650 mg, Rectal, Q6H PRN, Beula Coca, DO    apixaban (ELIQUIS) tablet 5 mg, 5 mg, Oral, BID, Ismail U Susana, DO, 5 mg at 11/10/22 1006    amitriptyline (ELAVIL) tablet 75 mg, 75 mg, Oral, Nightly, Ismail U Susana, DO, 75 mg at 11/09/22 2334    busPIRone (BUSPAR) tablet 5 mg, 5 mg, Oral, TID, Ismail U Susana, DO, 5 mg at 11/10/22 1006    famotidine (PEPCID) tablet 40 mg, 40 mg, Oral, Nightly, Joellyn Philadelphia, DO, 40 mg at 11/09/22 2317    montelukast (SINGULAIR) tablet 10 mg, 10 mg, Oral, Nightly, Joellyn Sulaiman, DO, 10 mg at 11/09/22 2334    venlafaxine (EFFEXOR XR) extended release capsule 75 mg, 75 mg, Oral, Nightly, Ismail U Susana, DO, 75 mg at 11/09/22 2319    metoprolol (LOPRESSOR) injection 5 mg, 5 mg, IntraVENous, Q5 Min PRN, Ismail U Susana, DO, 5 mg at 11/06/22 2352    pantoprazole (PROTONIX) tablet 40 mg, 40 mg, Oral, QAM AC, Ismail U Susana, DO, 40 mg at 11/10/22 0548    nitroGLYCERIN (NITROSTAT) SL tablet 0.4 mg, 0.4 mg, SubLINGual, Q5 Min PRN, Joellyn Philadelphia, DO, 0.4 mg at 11/07/22 0053  DIAGNOSTIC RESULTS   Radiology:    Recent Labs     11/08/22  0836 11/09/22  0747 11/10/22  0737   WBC 6.6 6.5 4.6   RBC 3.26* 3.25* 3.11*   HGB 10.1* 10.3* 9.8*   HCT 31.2* 31.0* 29.3*   MCV 95.7 95.4 94.2   MCH 31.0 31.7 31.5   MCHC 32.4 33.2 33.4   RDW 13.0 12.8 12.8   * 130 141   MPV 9.4 9.9 9.7     Recent Labs     11/08/22  0836 11/09/22  0747 11/10/22  0737    130* 137   K 4.0 4.1 3.8   CL 95* 93* 100   CO2 24 26 28   BUN 18 18 17   CREATININE 0.8 0.8 0.7   GLUCOSE 145* 109* 99   PROT 6.3* 6.8 6.3*   LABALBU 3.4* 3.5 3.3*   CALCIUM 8.8 9.2 8.9   BILITOT 0.8 0.7 0.8   ALKPHOS 48 52 50   AST 15 24 21   ALT 9 14 15     Lab Results   Component Value Date    CRP 5.3 (H) 07/27/2022    CRP 5.4 (H) 07/26/2022    CRP 1.3 (H) 07/25/2022     Lab Results   Component Value Date    SEDRATE 24 (H) 03/11/2021    SEDRATE 35 (H) 09/08/2011     Recent Labs     11/08/22  0836 11/09/22  0747 11/10/22  0737   AST 15 24 21   ALT 9 14 15     Lab Results   Component Value Date/Time    CHOL 158 11/07/2022 05:27 AM    TRIG 90 11/07/2022 05:27 AM    HDL 53 11/07/2022 05:27 AM    LDLCALC 87 11/07/2022 05:27 AM    LABVLDL 18 11/07/2022 05:27 AM     Lab Results   Component Value Date/Time    VITD25 82 07/26/2022 07:10 AM       Microbiology:     Lab Results   Component Value Date/Time    BC Previously positive blood culture called 2022 08:53 PM    [facility]  2022 08:53 PM     Refer to previous culture for susceptibility results from CXBL collected  on 22 at 2054. ORG Escherichia coli 2022 08:54 PM    ORG Escherichia coli 2022 08:53 PM    ORG Escherichia coli 2022 03:50 PM     Escherichia coli      BACTERIAL SUSCEPTIBILITY PANEL BY ANIBAL    amoxicillin-clavulanate ^4 mcg/mL Sensitive    ceFAZolin >=^64 mcg/mL Resistant    cefepime ^2 mcg/mL Sensitive    cefotaxime >=^64 mcg/mL Resistant    cefOXitin <=^4 mcg/mL Sensitive    cefTAZidime-avibactam <=^0.12 mcg/mL Sensitive    gentamicin <=^1 mcg/mL Sensitive    levofloxacin >=^8 mcg/mL Resistant    meropenem <=^0.25 mcg/mL Sensitive    piperacillin-tazobactam <=^4 mcg/mL Sensitive    trimethoprim-sulfamethoxazole <=^20 mcg/mL Sensitive              PHYSICAL EXAM     /63   Pulse 88   Temp 99 °F (37.2 °C) (Oral)   Resp 16   Ht 5' 4\" (1.626 m)   Wt 218 lb 6 oz (99.1 kg)   SpO2 98%   BMI 37.48 kg/m²   Temp  Av.9 °F (37.2 °C)  Min: 98.5 °F (36.9 °C)  Max: 99.1 °F (37.3 °C)  CONSTITUTIONAL:  no apparent distress, and appears stated age  ENT:  Normocephalic, atraumatic,  external ears without lesions, oral pharynx with moist mucus membranes,    LUNGS:  No increased work of breathing, clear to auscultation bilaterally, no crackles or wheezing  CARDIOVASCULAR:  Normal apical impulse, regular rate and rhythm, normal S1 and S2,  no murmur noted  ABDOMEN:  normal bowel sounds, soft, non-distended, non-tender  MUSCULOSKELETAL:  There is no redness, warmth, or swelling  BLE. Full range of motion     NEUROLOGIC:  Awake, alert, oriented. Cranial nerves II-XII are grossly intact.      SKIN:  normal skin color, texture, turgor and no rashes  Lines:         Peripheral Intravenous Line:  Peripheral IV 22 Right Antecubital (Active)   Site Assessment Clean, dry & intact 11/10/22 1135   Line Status Infusing 11/10/22 81 [facility] Connections checked and tightened 11/09/22 1313   Phlebitis Assessment No symptoms 11/09/22 1313   Infiltration Assessment 0 11/09/22 1313   Alcohol Cap Used Yes 11/08/22 0405   Dressing Status Clean, dry & intact 11/10/22 1135   Dressing Type Transparent 11/09/22 1313   Dressing Intervention Other (Comment) 11/09/22 1313          Imaging and labs were reviewed per medical records. POC was discussed with Christie Mitchell. The patient was educated about the diagnosis, indications, risks and benefits of treatment. An opportunity to ask questions was given to the patient/FAMILY. Thank you for involving me in the care of Sirena ROCA [address] will continue to follow. Please do not hesitate to call [phone] for any questions or concerns.     Electronically signed by Aletha Swartz MD on 11/10/2022 at 11:51 AM

## 2022-11-10 NOTE — PROGRESS NOTES
Subjective:  Mehran was seen and examined at bedside today. The patient's questions were answered and tests were reviewed. There were no new problems reported overnight   Patient is tolerating current diet. Had picc placed  No other complaints    A complete review of systems and social history was completed on admission and remains unchanged unless otherwise noted    Scheduled Meds:   lidocaine 1 % injection  5 mL IntraDERmal Once    sodium chloride flush  10 mL IntraVENous 2 times per day    meropenem  1,000 mg IntraVENous Q8H    docusate sodium  100 mg Oral Daily    sotalol  80 mg Oral BID    levalbuterol  0.63 mg Nebulization Q6H    budesonide  0.5 mg Nebulization BID    hydrocortisone   Topical BID    sodium chloride flush  5-40 mL IntraVENous 2 times per day    apixaban  5 mg Oral BID    amitriptyline  75 mg Oral Nightly    busPIRone  5 mg Oral TID    famotidine  40 mg Oral Nightly    montelukast  10 mg Oral Nightly    venlafaxine  75 mg Oral Nightly    pantoprazole  40 mg Oral QAM AC     Continuous Infusions:   sodium chloride      dextrose      sodium chloride       PRN Meds:sodium chloride flush, sodium chloride, trimethobenzamide, glucose, dextrose bolus **OR** dextrose bolus, glucagon (rDNA), dextrose, sodium chloride flush, sodium chloride, polyethylene glycol, acetaminophen **OR** acetaminophen, metoprolol, nitroGLYCERIN    Objective:  /66   Pulse 68   Temp 98.4 °F (36.9 °C) (Oral)   Resp 18   Ht 5' 4\" (1.626 m)   Wt 218 lb 6 oz (99.1 kg)   SpO2 95%   BMI 37.48 kg/m²   In: 785 [P.O.:775; I.V.:10]  Out: 0    In: 785   Out: 0      AAO x 3, currently in NAD  Irregular, pos S1, S2  CTA bilaterally, no wheeze, rales or rhonchi  bowel sounds present, nontender, nondistended  No clubbing, cyanosis, or edema  No neuro changes   No obvious rashes or lesions.   RUE Picc    Recent Labs     11/08/22  0836 11/09/22  0747 11/10/22  0737   WBC 6.6 6.5 4.6   HGB 10.1* 10.3* 9.8*   * 130 141 Recent Labs     11/08/22  0836 11/09/22  0747 11/10/22  0737    130* 137   K 4.0 4.1 3.8   CL 95* 93* 100   CO2 24 26 28   BUN 18 18 17   CREATININE 0.8 0.8 0.7   GLUCOSE 145* 109* 99       Recent Labs     11/08/22  0836 11/09/22  0747 11/10/22  0737   BILITOT 0.8 0.7 0.8   ALKPHOS 48 52 50   AST 15 24 21   ALT 9 14 15       No results for input(s): INR in the last 72 hours. Invalid input(s): PT  No results for input(s): CKTOTAL, CKMB, CKMBINDEX, TROPONINI in the last 72 hours. CT ABDOMEN PELVIS WO CONTRAST Additional Contrast? None    Result Date: 11/6/2022  EXAMINATION: CT OF THE ABDOMEN AND PELVIS WITHOUT CONTRAST 11/6/2022 8:09 pm TECHNIQUE: CT of the abdomen and pelvis was performed without the administration of intravenous contrast. Multiplanar reformatted images are provided for review. Automated exposure control, iterative reconstruction, and/or weight based adjustment of the mA/kV was utilized to reduce the radiation dose to as low as reasonably achievable. COMPARISON: None. HISTORY: ORDERING SYSTEM PROVIDED HISTORY: abdominal pain LLQ kidney stone TECHNOLOGIST PROVIDED HISTORY: Reason for exam:->abdominal pain LLQ kidney stone Additional Contrast?->None Decision Support Exception - unselect if not a suspected or confirmed emergency medical condition->Emergency Medical Condition (MA) FINDINGS: Lower Chest: The visualized lungs are normal. Organs: Benign liver and splenic calcifications. The adrenal glands and, pancreas are normal.  Cholecystectomy. No urinary tract stones or hydronephrosis. GI/Bowel: Normal large and small bowel. The appendix is not visualized. Pelvis: Normal urinary bladder. Peritoneum/Retroperitoneum: No free fluid or free air. 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 Bones/soft tissues: Grade 1 anterolisthesis L4 over L5. Multilevel degenerative changes thoracolumbar spine. No urinary tract stones or hydronephrosis.  No evidence for diverticulitis. Cholecystectomy. 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 XR CHEST PORTABLE    Result Date: 11/7/2022  EXAMINATION: ONE XRAY VIEW OF THE CHEST 11/7/2022 1:10 am COMPARISON: None. HISTORY: ORDERING SYSTEM PROVIDED HISTORY: sob TECHNOLOGIST PROVIDED HISTORY: Reason for exam:->sob FINDINGS: Normal cardiomediastinal silhouette. Lungs clear. No pneumothorax or effusion. Osseous thorax intact. No acute disease. RECOMMENDATION: Careful clinical correlation and follow up recommended. XR CHEST 1 VIEW    Result Date: 11/6/2022  EXAMINATION: ONE XRAY VIEW OF THE CHEST 11/6/2022 4:38 pm COMPARISON: 08/24/2022 HISTORY: ORDERING SYSTEM PROVIDED HISTORY: chest pain, sob TECHNOLOGIST PROVIDED HISTORY: Reason for exam:->chest pain, sob FINDINGS: The lungs are without acute focal process. There is no effusion or pneumothorax. The cardiomediastinal silhouette is without acute process. The osseous structures are without acute process. No acute process. Assessment:  Alexis Valerio is a 79y.o. year old female who presented on 11/6/2022 and is being treated for:  Principal Problem:    UTI (urinary tract infection)  Active Problems:    Sepsis ([address]    Paroxysmal A-fib ([address]    Acute respiratory failure with hypoxia ([address]    NSTEMI (non-ST elevated myocardial infarction) ([address]  Resolved Problems:    * No resolved hospital problems. *    Plan  Abx changed by ID who is following for gram-negative sepsis - picc placed for long term abx  Cardio consulted for NSTEMI and A. Fib and remains on sotalol  Otherwise continue same meds, cont to wean 02  PT/OT  Dc home tomorrow    More than 50% of my time was spent at the bedside counseling/coordinating care with the patient and/or family with face to face contact. This time was spent reviewing notes and laboratory data as well as instructing and counseling the patient.  Time I spent with the family or surrogate(s) is included only if the patient was incapable of providing the necessary information or participating in medical decisions. I also discussed the differential diagnosis and all of the proposed management plans with the patient and individuals accompanying the patient. I am readily available for any further decision-making and intervention.      Inderjit Hickey MD  5:19 PM  11/10/2022

## 2022-11-10 NOTE — PROGRESS NOTES
Cardiology  Progress Note      SUBJECTIVE:  No chest pain. No dyspnea. Generalized body fatigue.     Current Inpatient Medications  Current Facility-Administered Medications: meropenem (MERREM) 1,000 mg in sodium chloride 0.9 % 100 mL IVPB ([id number]), 1,000 mg, IntraVENous, Q8H  docusate sodium (COLACE) capsule 100 mg, 100 mg, Oral, Daily  sotalol (BETAPACE) tablet 80 mg, 80 mg, Oral, BID  trimethobenzamide (TIGAN) injection 200 mg, 200 mg, IntraMUSCular, Q6H PRN  levalbuterol (XOPENEX) nebulization 0.63 mg, 0.63 mg, Nebulization, Q6H  budesonide (PULMICORT) nebulizer suspension 500 mcg, 0.5 mg, Nebulization, BID  hydrocortisone 1 % cream, , Topical, BID  glucose chewable tablet 16 g, 4 tablet, Oral, PRN  dextrose bolus 10% 125 mL, 125 mL, IntraVENous, PRN **OR** dextrose bolus 10% 250 mL, 250 mL, IntraVENous, PRN  glucagon (rDNA) injection 1 mg, 1 mg, SubCUTAneous, PRN  dextrose 10 % infusion, , IntraVENous, Continuous PRN  sodium chloride flush 0.9 % injection 5-40 mL, 5-40 mL, IntraVENous, 2 times per day  sodium chloride flush 0.9 % injection 5-40 mL, 5-40 mL, IntraVENous, PRN  0.9 % sodium chloride infusion, , IntraVENous, PRN  polyethylene glycol (GLYCOLAX) packet 17 g, 17 g, Oral, Daily PRN  acetaminophen (TYLENOL) tablet 650 mg, 650 mg, Oral, Q6H PRN **OR** acetaminophen (TYLENOL) suppository 650 mg, 650 mg, Rectal, Q6H PRN  apixaban (ELIQUIS) tablet 5 mg, 5 mg, Oral, BID  amitriptyline (ELAVIL) tablet 75 mg, 75 mg, Oral, Nightly  busPIRone (BUSPAR) tablet 5 mg, 5 mg, Oral, TID  famotidine (PEPCID) tablet 40 mg, 40 mg, Oral, Nightly  montelukast (SINGULAIR) tablet 10 mg, 10 mg, Oral, Nightly  venlafaxine (EFFEXOR XR) extended release capsule 75 mg, 75 mg, Oral, Nightly  metoprolol (LOPRESSOR) injection 5 mg, 5 mg, IntraVENous, Q5 Min PRN  pantoprazole (PROTONIX) tablet 40 mg, 40 mg, Oral, QAM AC  nitroGLYCERIN (NITROSTAT) SL tablet 0.4 mg, 0.4 mg, SubLINGual, Q5 Min PRN      Physical  VITALS:  /63 Pulse 88   Temp 99 °F (37.2 °C) (Oral)   Resp 16   Ht 5' 4\" (1.626 m)   Wt 218 lb 6 oz (99.1 kg)   SpO2 98%   BMI 37.48 kg/m²   CURRENT TEMPERATURE:  Temp: 99 °F (37.2 °C)  CONSTITUTIONAL: No acute distress. EYES: Vision is intact. ENT: No sore throat. No ear drainage. NECK: No JVD. BACK: Symmetric. LUNGS:  diminished breath sounds right base and left base  CARDIOVASCULAR:  irregularly irregular rhythm, no S3, and no S4  ABDOMEN:  non-tender  NEUROLOGIC: No focal deficits. EXTREMITIES: +1 edema in legs. DATA:      ECG:  I have reviewed EKG with the following interpretation:  atrial fibrillation    Cardiology Labs:  BMP:    Lab Results   Component Value Date/Time     11/09/2022 07:47 AM    K 4.1 11/09/2022 07:47 AM    K 3.3 04/05/2022 09:20 PM    CL 93 11/09/2022 07:47 AM    CO2 26 11/09/2022 07:47 AM    BUN 18 11/09/2022 07:47 AM     CBC:    Lab Results   Component Value Date/Time    WBC 4.6 11/10/2022 07:37 AM    RBC 3.11 11/10/2022 07:37 AM    HGB 9.8 11/10/2022 07:37 AM    HCT 29.3 11/10/2022 07:37 AM    MCV 94.2 11/10/2022 07:37 AM    RDW 12.8 11/10/2022 07:37 AM     11/10/2022 07:37 AM     PT/INR:  No results found for: PTINR  TROPONIN:  No components found for: TROP    ASSESSMENT    1.symptomatic persistent atrial fibrillation. Please refer to consult. Could not tolerate Multaq because of rash. She was started on sotalol. We will monitor QT interval closely. For EKG this morning. We will consider repeat cardioversion on sotalol to restore sinus rhythm down the road after patient recovers from her urosepsis. Patient stated that she is still feeling weak and she would rather hold on another cardioversion for at least 2 to 3 weeks. 2-UTI.   Cultures showing gram-negative rods  Patient is on antibiotics    Patient was instructed to call my office for follow-up in 2 to 3 weeks

## 2022-11-11 VITALS
BODY MASS INDEX: 37.28 KG/M2 | DIASTOLIC BLOOD PRESSURE: 63 MMHG | OXYGEN SATURATION: 96 % | SYSTOLIC BLOOD PRESSURE: 139 MMHG | WEIGHT: 218.38 LBS | TEMPERATURE: 98.6 F | HEART RATE: 62 BPM | RESPIRATION RATE: 20 BRPM | HEIGHT: 64 IN

## 2022-11-11 LAB
ALBUMIN SERPL-MCNC: 3.4 G/DL (ref 3.5–5.2)
ALP BLD-CCNC: 52 U/L (ref 35–104)
ALT SERPL-CCNC: 16 U/L (ref 0–32)
ANION GAP SERPL CALCULATED.3IONS-SCNC: 9 MMOL/L (ref 7–16)
AST SERPL-CCNC: 17 U/L (ref 0–31)
BASOPHILS ABSOLUTE: 0.05 E9/L (ref 0–0.2)
BASOPHILS RELATIVE PERCENT: 1.1 % (ref 0–2)
BILIRUB SERPL-MCNC: 0.5 MG/DL (ref 0–1.2)
BUN BLDV-MCNC: 18 MG/DL (ref 6–23)
CALCIUM SERPL-MCNC: 9.2 MG/DL (ref 8.6–10.2)
CHLORIDE BLD-SCNC: 98 MMOL/L (ref 98–107)
CO2: 28 MMOL/L (ref 22–29)
CREAT SERPL-MCNC: 0.7 MG/DL (ref 0.5–1)
EKG ATRIAL RATE: 115 BPM
EKG Q-T INTERVAL: 442 MS
EKG QRS DURATION: 82 MS
EKG QTC CALCULATION (BAZETT): 503 MS
EKG R AXIS: 59 DEGREES
EKG T AXIS: 64 DEGREES
EKG VENTRICULAR RATE: 78 BPM
EOSINOPHILS ABSOLUTE: 0.08 E9/L (ref 0.05–0.5)
EOSINOPHILS RELATIVE PERCENT: 1.7 % (ref 0–6)
GFR SERPL CREATININE-BSD FRML MDRD: >60 ML/MIN/1.73
GLUCOSE BLD-MCNC: 102 MG/DL (ref 74–99)
HCT VFR BLD CALC: 29.3 % (ref 34–48)
HEMOGLOBIN: 9.5 G/DL (ref 11.5–15.5)
IMMATURE GRANULOCYTES #: 0.03 E9/L
IMMATURE GRANULOCYTES %: 0.6 % (ref 0–5)
LYMPHOCYTES ABSOLUTE: 1.56 E9/L (ref 1.5–4)
LYMPHOCYTES RELATIVE PERCENT: 33.1 % (ref 20–42)
MCH RBC QN AUTO: 30.5 PG (ref 26–35)
MCHC RBC AUTO-ENTMCNC: 32.4 % (ref 32–34.5)
MCV RBC AUTO: 94.2 FL (ref 80–99.9)
MONOCYTES ABSOLUTE: 0.55 E9/L (ref 0.1–0.95)
MONOCYTES RELATIVE PERCENT: 11.7 % (ref 2–12)
NEUTROPHILS ABSOLUTE: 2.45 E9/L (ref 1.8–7.3)
NEUTROPHILS RELATIVE PERCENT: 51.8 % (ref 43–80)
PDW BLD-RTO: 12.8 FL (ref 11.5–15)
PLATELET # BLD: 176 E9/L (ref 130–450)
PMV BLD AUTO: 9.4 FL (ref 7–12)
POTASSIUM SERPL-SCNC: 4.4 MMOL/L (ref 3.5–5)
RBC # BLD: 3.11 E12/L (ref 3.5–5.5)
SODIUM BLD-SCNC: 135 MMOL/L (ref 132–146)
TOTAL PROTEIN: 6.5 G/DL (ref 6.4–8.3)
WBC # BLD: 4.7 E9/L (ref 4.5–11.5)

## 2022-11-11 PROCEDURE — 6370000000 HC RX 637 (ALT 250 FOR IP): Performed by: CLINICAL NURSE SPECIALIST

## 2022-11-11 PROCEDURE — 2580000003 HC RX 258: Performed by: SPECIALIST

## 2022-11-11 PROCEDURE — 85025 COMPLETE CBC W/AUTO DIFF WBC: CPT

## 2022-11-11 PROCEDURE — 6360000002 HC RX W HCPCS: Performed by: INTERNAL MEDICINE

## 2022-11-11 PROCEDURE — 36415 COLL VENOUS BLD VENIPUNCTURE: CPT

## 2022-11-11 PROCEDURE — 6370000000 HC RX 637 (ALT 250 FOR IP): Performed by: SPECIALIST

## 2022-11-11 PROCEDURE — 93005 ELECTROCARDIOGRAM TRACING: CPT | Performed by: SPECIALIST

## 2022-11-11 PROCEDURE — 36592 COLLECT BLOOD FROM PICC: CPT

## 2022-11-11 PROCEDURE — 6370000000 HC RX 637 (ALT 250 FOR IP): Performed by: INTERNAL MEDICINE

## 2022-11-11 PROCEDURE — A4216 STERILE WATER/SALINE, 10 ML: HCPCS | Performed by: CLINICAL NURSE SPECIALIST

## 2022-11-11 PROCEDURE — 97530 THERAPEUTIC ACTIVITIES: CPT

## 2022-11-11 PROCEDURE — 2580000003 HC RX 258: Performed by: CLINICAL NURSE SPECIALIST

## 2022-11-11 PROCEDURE — 80053 COMPREHEN METABOLIC PANEL: CPT

## 2022-11-11 PROCEDURE — 94640 AIRWAY INHALATION TREATMENT: CPT

## 2022-11-11 PROCEDURE — 6360000002 HC RX W HCPCS: Performed by: CLINICAL NURSE SPECIALIST

## 2022-11-11 PROCEDURE — 93010 ELECTROCARDIOGRAM REPORT: CPT | Performed by: INTERNAL MEDICINE

## 2022-11-11 PROCEDURE — 97165 OT EVAL LOW COMPLEX 30 MIN: CPT

## 2022-11-11 RX ADMIN — ERTAPENEM SODIUM 1000 MG: 1 INJECTION, POWDER, LYOPHILIZED, FOR SOLUTION INTRAMUSCULAR; INTRAVENOUS at 11:15

## 2022-11-11 RX ADMIN — BUSPIRONE HYDROCHLORIDE 5 MG: 10 TABLET ORAL at 09:15

## 2022-11-11 RX ADMIN — SODIUM CHLORIDE, PRESERVATIVE FREE 10 ML: 5 INJECTION INTRAVENOUS at 09:27

## 2022-11-11 RX ADMIN — MEROPENEM 1000 MG: 1 INJECTION, POWDER, FOR SOLUTION INTRAVENOUS at 02:55

## 2022-11-11 RX ADMIN — HYDROCORTISONE: 1 CREAM TOPICAL at 09:27

## 2022-11-11 RX ADMIN — MEROPENEM 1000 MG: 1 INJECTION, POWDER, FOR SOLUTION INTRAVENOUS at 09:27

## 2022-11-11 RX ADMIN — DOCUSATE SODIUM 100 MG: 100 CAPSULE, LIQUID FILLED ORAL at 09:15

## 2022-11-11 RX ADMIN — BUDESONIDE 500 MCG: 0.5 SUSPENSION RESPIRATORY (INHALATION) at 06:25

## 2022-11-11 RX ADMIN — APIXABAN 5 MG: 5 TABLET, FILM COATED ORAL at 09:15

## 2022-11-11 RX ADMIN — LEVALBUTEROL 0.63 MG: 0.63 SOLUTION RESPIRATORY (INHALATION) at 06:25

## 2022-11-11 RX ADMIN — PANTOPRAZOLE SODIUM 40 MG: 40 TABLET, DELAYED RELEASE ORAL at 06:44

## 2022-11-11 RX ADMIN — SOTALOL HYDROCHLORIDE 80 MG: 80 TABLET ORAL at 09:16

## 2022-11-11 NOTE — PROGRESS NOTES
Cardiology  Progress Note      SUBJECTIVE:  No chest pain. No dyspnea. Feels better overall.     Current Inpatient Medications  Current Facility-Administered Medications: ertapenem (INVanz) 1,000 mg in sodium chloride (PF) 0.9 % 10 mL IV syringe, 1,000 mg, IntraVENous, Once  lidocaine 1 % injection 5 mL, 5 mL, IntraDERmal, Once  sodium chloride flush 0.9 % injection 10 mL, 10 mL, IntraVENous, 2 times per day  sodium chloride flush 0.9 % injection 10 mL, 10 mL, IntraVENous, PRN  0.9 % sodium chloride infusion, 25 mL, IntraVENous, PRN  docusate sodium (COLACE) capsule 100 mg, 100 mg, Oral, Daily  sotalol (BETAPACE) tablet 80 mg, 80 mg, Oral, BID  trimethobenzamide (TIGAN) injection 200 mg, 200 mg, IntraMUSCular, Q6H PRN  levalbuterol (XOPENEX) nebulization 0.63 mg, 0.63 mg, Nebulization, Q6H  budesonide (PULMICORT) nebulizer suspension 500 mcg, 0.5 mg, Nebulization, BID  hydrocortisone 1 % cream, , Topical, BID  glucose chewable tablet 16 g, 4 tablet, Oral, PRN  dextrose bolus 10% 125 mL, 125 mL, IntraVENous, PRN **OR** dextrose bolus 10% 250 mL, 250 mL, IntraVENous, PRN  glucagon (rDNA) injection 1 mg, 1 mg, SubCUTAneous, PRN  dextrose 10 % infusion, , IntraVENous, Continuous PRN  sodium chloride flush 0.9 % injection 5-40 mL, 5-40 mL, IntraVENous, 2 times per day  sodium chloride flush 0.9 % injection 5-40 mL, 5-40 mL, IntraVENous, PRN  0.9 % sodium chloride infusion, , IntraVENous, PRN  polyethylene glycol (GLYCOLAX) packet 17 g, 17 g, Oral, Daily PRN  acetaminophen (TYLENOL) tablet 650 mg, 650 mg, Oral, Q6H PRN **OR** acetaminophen (TYLENOL) suppository 650 mg, 650 mg, Rectal, Q6H PRN  apixaban (ELIQUIS) tablet 5 mg, 5 mg, Oral, BID  amitriptyline (ELAVIL) tablet 75 mg, 75 mg, Oral, Nightly  busPIRone (BUSPAR) tablet 5 mg, 5 mg, Oral, TID  famotidine (PEPCID) tablet 40 mg, 40 mg, Oral, Nightly  montelukast (SINGULAIR) tablet 10 mg, 10 mg, Oral, Nightly  venlafaxine (EFFEXOR XR) extended release capsule 75 mg, 75 mg, Oral, Nightly  metoprolol (LOPRESSOR) injection 5 mg, 5 mg, IntraVENous, Q5 Min PRN  pantoprazole (PROTONIX) tablet 40 mg, 40 mg, Oral, QAM AC  nitroGLYCERIN (NITROSTAT) SL tablet 0.4 mg, 0.4 mg, SubLINGual, Q5 Min PRN      Physical  VITALS:  /63   Pulse 62   Temp 98.6 °F (37 °C) (Oral)   Resp 20   Ht 5' 4\" (1.626 m)   Wt 218 lb 6 oz (99.1 kg)   SpO2 96%   BMI 37.48 kg/m²   CURRENT TEMPERATURE:  Temp: 98.6 °F (37 °C)  CONSTITUTIONAL: No acute distress. EYES: Vision is intact. ENT: No sore throat. No ear drainage. NECK: No JVD. BACK: Symmetric. LUNGS:  diminished breath sounds left base  CARDIOVASCULAR:  normal S1 and S2, no S3, and no S4  ABDOMEN:  non-tender  NEUROLOGIC: No focal deficits. EXTREMITIES: trace edema and legs. DATA:        Cardiology Labs:  BMP:    Lab Results   Component Value Date/Time     11/11/2022 09:21 AM    K 4.4 11/11/2022 09:21 AM    K 3.3 04/05/2022 09:20 PM    CL 98 11/11/2022 09:21 AM    CO2 28 11/11/2022 09:21 AM    BUN 18 11/11/2022 09:21 AM     CBC:    Lab Results   Component Value Date/Time    WBC 4.7 11/11/2022 09:21 AM    RBC 3.11 11/11/2022 09:21 AM    HGB 9.5 11/11/2022 09:21 AM    HCT 29.3 11/11/2022 09:21 AM    MCV 94.2 11/11/2022 09:21 AM    RDW 12.8 11/11/2022 09:21 AM     11/11/2022 09:21 AM     PT/INR:  No results found for: PTINR  TROPONIN:  No components found for: TROP    ASSESSMENT    1.symptomatic persistent atrial fibrillation. Please refer to consult. Could not tolerate Multaq because of rash. She was started on sotalol. Patient converted to sinus rhythm yesterday. She is feeling better overall. No significant QT prolongation. We will do another EKG this morning to reassess QT interval.    2-UTI. Cultures showing gram-negative rods  Patient is on antibiotics  There is apparently planned for discharge today with PICC line to continue antibiotics for 2 weeks.     Patient was instructed to call my office for follow-up in 2 to 3 weeks

## 2022-11-11 NOTE — PROGRESS NOTES
[address] [facility]  [facility]. [place]        Date:2022                                                  Patient Name: Rosmery Bacon    MRN: [id number]    : 1955    [facility]: [facility]      Evaluating OT: Herberth Dominique OTR/L; [id number]     Referring Provider and Specific Provider Orders/Date:      22  OT eval and treat  Start:  22,   End:  22,   ONE TIME,   Standing Count:  1 Occurrences,   Asa Khoury MD      Placement Recommendation: Home with no skilled occupational therapy needed after discharge from inpatient. Diagnosis:   1. Acute cystitis without hematuria    2.  Fever, unspecified fever cause    3. E. coli bacteremia         Surgery: none       Pertinent Medical History:       Past Medical History:   Diagnosis Date    Acid reflux     Atrial fibrillation (HCC)     Cancer ([facility] 2005    Breast Cancer (Left Breast)    Chronic back pain     CPAP (continuous positive airway pressure) dependence     not using CPAP    Fibromyalgia     History of bariatric surgery 2002    Dr. Howie Ray RYGB    History of cardiovascular stress test 8/10/2012    LEXISCAN    Hyperlipidemia     Hypertension     Kidney stones     Low iron     Osteoarthritis     Unspecified sleep apnea     Patient does not use C-Pap          Past Surgical History:   Procedure Laterality Date    APPENDECTOMY      BARIATRIC SURGERY  2003    Mindi Pizarro  2005    Dr. Nely Maldonado      Dr. Su Marino      ECHO Manisha Kiersten  8/10/2012         HERNIA REPAIR  2004    Dr. Antonio Boudreaux Left 2021    LEFT [address] [facility], POSSIBLE BILATERAL,  LAPAROSCOPIC ROBOTIC XI performed by Zoe Henley MD at [address] ([address] [facility]  1988    Dr. Manjarrez [place] - [address] [facility]  2009    Right Knee    NECK SURGERY  06/2015    fused 4,5,6- screws, in [address]    ASHOK-EN-Y GASTRIC BYPASS  04/17/2002    Dr. Landen Meza RYGB    SHOULDER ARTHROSCOPY  2/17/12    LEFT REPAIR ROTATOR CUFF TEAR SUBACROMIAL DECOMPRESSION    TONSILLECTOMY      TRACHEOSTOMY  1999    [place] - Patient states that this was due to her weight prior to [address]   2009    UVULOPALATOPHARYGOPLASTY        Precautions:  Fall Risk     Assessment of current deficits    [x] Functional mobility  [x]ADLs  [x] Strength               []Cognition    [x] Functional transfers   [x] IADLs         [] Safety Awareness   [x]Endurance    [] Fine Coordination              [x] Balance      [] Vision/perception   []Sensation     []Gross Motor Coordination  [] ROM  [] Delirium                   [] Motor Control     OT PLAN OF CARE   OT POC based on physician orders, patient diagnosis and results of clinical assessment    Frequency/Duration 1-3 days/wk for 2 weeks PRN     Specific OT Treatment Interventions to include:   * Instruction/training on adapted ADL techniques and AE recommendations to increase functional independence within precautions       * Training on energy conservation strategies, correct breathing pattern and techniques to improve independence/tolerance for self-care routine  * Functional transfer/mobility training/DME recommendations for increased independence, safety, and fall prevention  * Patient/Family education to increase follow through with safety techniques and functional independence  * Recommendation of environmental modifications for increased safety with functional transfers/mobility and ADLs  * Therapeutic exercise to improve motor endurance, ROM, and functional strength for ADLs/functional transfers  * Therapeutic activities to facilitate/challenge dynamic balance, stand tolerance for increased safety and independence with ADLs  * Positioning to improve skin integrity, interaction with environment and functional independence    Recommended Adaptive Equipment: none      Home Living: alone; apartment, no steps, tub shower. Equipment owned: two grab bars in the shower    Prior Level of Function: Independent with ADLs , Independent with IADLs; ambulated with no device, daughter complete her laundry     Driving: yes    Pain Level: \"achy\"  but wouldn't rate it as painful. Nursing notified. Cognition: A&O: 4/4; Follows 2 step directions   Memory: good    Sequencing: good    Problem solving: good    Judgement/safety: good     [facility]   AM-PAC Daily Activity Inpatient   How much help for putting on and taking off regular lower body clothing?: A Little  How much help for Bathing?: A Little  How much help for Toileting?: A Little  How much help for putting on and taking off regular upper body clothing?: A Little  How much help for taking care of personal grooming?: A Little  How much help for eating meals?: None  AM-[facility] Inpatient Daily Activity Raw Score: 19  AM-PAC Inpatient ADL T-Scale Score : 40.22  ADL Inpatient CMS 0-100% Score: 42.8  ADL Inpatient CMS G-Code Modifier : CK     Functional Assessment:    Initial Eval Status  Date: 11/11/22 Treatment Status  Date: STGs = LTGs  Time frame: 10-14 days   Feeding Independent   Independent    Grooming Supervision   Independent    UB Dressing Supervision   Independent    LB Dressing Supervision   Independent    Bathing Supervision  Independent    Toileting Supervision   Independent    Bed Mobility  Supine to sit: Supervision   Sit to supine: N/T as pt was seated EOB   Supine to sit:  Independent   Sit to supine: Independent    Functional Transfers Supervision from EOB  Supervision for transfer to and from low commode with minimal verbal cues to use grab bar for safe commode transfer. Transfer training with verbal cues for hand placement throughout session to improve safety. Independent    Functional Mobility Supervision with no device to and from bathroom and within the room. Independent    Balance Sitting:     Static: good     Dynamic: fair plus  Standing: fair plus with no device     Activity Tolerance Fair plus  good    Visual/  Perceptual Glasses: yes                Hand Dominance: right      AROM (PROM) Strength Additional Info:    RUE  WFL 5/5 good  and wfl FMC/dexterity noted during ADL tasks     LUE WFL 5/5 good  and wfl FMC/dexterity noted during ADL tasks       Hearing: WFL   Sensation:  No c/o numbness or tingling  Tone: WFL   Edema: yes, B Le's     Comments: Upon arrival the patient was supine. At end of session, patient was seated EOB with call light and phone within reach, all lines and tubes intact. Overall patient demonstrated decreased independence and safety during completion of ADL/functional transfer/mobility tasks. Pt would benefit from continued skilled OT to increase safety and independence with completion of ADL/IADL tasks for functional independence and quality of life. Treatment: OT treatment provided this date includes:   Instruction/training on safety and adapted techniques for completion of ADLs   Instruction/training on safe functional mobility/transfer techniques   Instruction/training on energy conservation/work simplification for completion of ADLs   Proper Positioning/Alignment    Rehab Potential: Good for established goals. Patient / Family Goal: return home today       Patient and/or family were instructed on functional diagnosis, prognosis/goals and OT plan of care. Demonstrated good understanding.      Eval Complexity: Low    Time In: 9:55am   Time Out: 10:25am    Total Treatment Time: 10      Min Units   OT Eval Low 97165  X  1    OT Eval Medium 81551      OT Eval High 17944 OT Re-Eval [id number]            ADL/Self Care 10697     Therapeutic Activities 76406  10  1    Therapeutic Ex 60103       Orthotic Management 31954       Manual 90600     Neuro Re-Ed 50913       Non-Billable Time        Evaluation Time additionally includes thorough review of current medical information, gathering information on past medical history/social history and prior level of function, interpretation of standardized testing/informal observation of tasks, assessment of data and development of plan of care and goals.         Evaluating OT: Filemon Ham OTR/L; [id number]

## 2022-11-11 NOTE — CARE COORDINATION
Ss note:11/11/20228:10 AM Neg Covid Negative 11/7/22. CM/SW made follow up visit to room to discuss home IV. Pt resides home alone has 2 dtrs to assist her at home. Provided [address] choices, no preference. Referral made to meme Rajan at [address] [facility], pt has been accepted, Will need [facility] for nursing only. Referral to Josué Ford at [facility] infusion, pt has 100% IV coverage. Need signed IV script to fax to [employer] [phone]. Pt will need IV dose today and HHC can open tomorrow morning. Pt does not want to attend infusion center. Awaiting pulse ox testing prior to discharge to determine if pt need home 02, no DME preference if needed.  KATIANA Heath

## 2022-11-11 NOTE — PROGRESS NOTES
Pulse ox was 98% on room air at rest.  Ambulated patient on room air. Oxygen saturation was 95% on room air while ambulating for 6 minutes.

## 2022-11-11 NOTE — DISCHARGE INSTRUCTIONS
Your information:  Name: Beverly Rausch  : 1955    Your instructions: Follow-up with ID in 10-14 days Call office for appt [phone]    You are being discharged home with [place] choice. Please follow up with your physician at discharge and take your medications as prescribed. IF YOU EXPERIENCE ANY OF THE FOLLOWING SYMPTOMS, CHEST PAIN, SHORTNESS OF BREATH, COUGHING UP BLOOD OR BLOODY SPUTUM, STOMACH PAIN OR CRAMPING, DARK, TARRY STOOLS, LOSS OF APPETITE, GENERAL NOT FEELING WELL, SIGNS AND SYMPTOMS OF INFECTION LIKE FEVER AND OR CHILLS, PLEASE CALL DR AYON OR RETURN TO THE EMERGENCY ROOM. What to do after you leave the hospital:    Recommended diet: regular diet    Recommended activity: activity as tolerated        The following personal items were collected during your admission and were returned to you:    Belongings  Dental Appliances: Uppers, Lowers  Vision - Corrective Lenses: None  Hearing Aid: None  Clothing: Slippers, Shirt, Pants, Jacket/Coat, Undergarments  Jewelry: None  Electronic Devices: Cell Phone,   Weapons (Notify Protective Services/Security): None  Home Medications: None  Valuables Given To: Patient  Provide Name(s) of Who Valuable(s) Were Given To: patient    Information obtained by:  By signing below, I understand that if any problems occur once I leave the hospital I am to contact PCP. I understand and acknowledge receipt of the instructions indicated above.

## 2022-11-11 NOTE — DISCHARGE SUMMARY
Discharge Summary    Guilherme Tsang  :    MRN:  [id number]    Admit date:  2022  Discharge date:  2022    Admitting Physician:    Love Mendez MD    Discharge Diagnoses:    Principal Problem:    UTI (urinary tract infection)  Active Problems:    Sepsis ([address]    Paroxysmal A-fib ([address]    Acute respiratory failure with hypoxia ([facility]    NSTEMI (non-ST elevated myocardial infarction) ([facility] [address]  Resolved Problems:    * No resolved hospital problems. *    Consults:   IP CONSULT TO CARDIOLOGY  IP CONSULT TO CARDIOLOGY  IP CONSULT TO INFECTIOUS DISEASES  IP CONSULT TO SOCIAL WORK  IP CONSULT TO SOCIAL WORK     Condition at Discharge:  Stable    HPI/Hospital Course: 71-year-old female presented to the ED due to malaise. Patient had not been feeling well for the last few days. She has been increasingly  weaker. She does admit to subjective fever and chills. She does complain of dysuria and urinary frequency. In the ED she did develop chest pressure. She is also became short of breath and hypoxic. Patient was placed on supplemental oxygen via nasal cannula. Pt was found to have urosepsis so ID was consulted and PICC line was placed for long-term IV antibiotics. Cardiology was also consulted regarding patient's uncontrolled A. fib and medications were adjusted. Today on day of discharge pt feels better with no further complaints. Vitals and Labs are stable as patient was able to wean off oxygen. All consultants involved during this admission are agreeable to d/c.     Physical Exam  /63   Pulse 62   Temp 98.6 °F (37 °C) (Oral)   Resp 20   Ht 5' 4\" (1.626 m)   Wt 218 lb 6 oz (99.1 kg)   SpO2 96%   BMI 37.48 kg/m²   RRR   CTA bilaterally, no wheeze, rales or rhonchi   bowel sounds present, nontender, nondistended   No clubbing, cyanosis, or edema   Neuro - at baseline     Pertinent Results during this Hospital Course:  CT ABDOMEN PELVIS WO CONTRAST Additional Contrast? None    Result Date: 11/6/2022  EXAMINATION: CT OF THE ABDOMEN AND PELVIS WITHOUT CONTRAST 11/6/2022 8:09 pm TECHNIQUE: CT of the abdomen and pelvis was performed without the administration of intravenous contrast. Multiplanar reformatted images are provided for review. Automated exposure control, iterative reconstruction, and/or weight based adjustment of the mA/kV was utilized to reduce the radiation dose to as low as reasonably achievable. COMPARISON: None. HISTORY: ORDERING SYSTEM PROVIDED HISTORY: abdominal pain LLQ kidney stone TECHNOLOGIST PROVIDED HISTORY: Reason for exam:->abdominal pain LLQ kidney stone Additional Contrast?->None Decision Support Exception - unselect if not a suspected or confirmed emergency medical condition->Emergency Medical Condition (MA) FINDINGS: Lower Chest: The visualized lungs are normal. Organs: Benign liver and splenic calcifications. The adrenal glands and, pancreas are normal.  Cholecystectomy. No urinary tract stones or hydronephrosis. GI/Bowel: Normal large and small bowel. The appendix is not visualized. Pelvis: Normal urinary bladder. Peritoneum/Retroperitoneum: No free fluid or free air. 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 Bones/soft tissues: Grade 1 anterolisthesis L4 over L5. Multilevel degenerative changes thoracolumbar spine. No urinary tract stones or hydronephrosis. No evidence for diverticulitis. Cholecystectomy. 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 MRI CERVICAL SPINE W WO CONTRAST    Result Date: 10/17/2022  EXAMINATION: MRI OF THE CERVICAL SPINE WITHOUT AND WITH CONTRAST  10/17/2022 7:57 am: TECHNIQUE: Multiplanar multisequence MRI of the cervical spine was performed without and with the administration of intravenous contrast. COMPARISON: None.  HISTORY: ORDERING SYSTEM PROVIDED HISTORY: Spondylosis of cervical region without myelopathy or radiculopathy TECHNOLOGIST PROVIDED HISTORY: STAT Creatinine as needed:->No What is the sedation requirement?->Anesthesia What reading provider will be dictating this exam?->CRC FINDINGS: BONES/ALIGNMENT: There is postsurgical change from C4-C6. There is no evidence for hardware complication. The vertebral body heights are maintained. There is age-appropriate bone marrow signal.  There is degenerative endplate change at the G4-8 level. There is multilevel degenerative disc disease with loss of disc signal.  There is disc space narrowing at C3-4 and C6-7. There is no significant spondylolisthesis. SPINAL CORD: The spinal cord is normal in caliber and signal. SOFT TISSUES: The posterior paraspinal soft tissues are unremarkable. The prevertebral soft tissues are unremarkable. There is no abnormal postcontrast enhancement. C2-C3: There is a disc osteophyte complex with uncovertebral and facet hypertrophy. There is no canal stenosis. There is mild-to-moderate bilateral foraminal narrowing. C3-C4: There is a disc osteophyte complex with uncovertebral and facet hypertrophy. There is canal stenosis measuring 8 mm in AP dimension. There is moderate left and severe right foraminal narrowing. C4-C5: There is a disc osteophyte complex with uncovertebral and facet hypertrophy. There is canal stenosis measuring 8 mm in AP dimension. There is moderate bilateral foraminal narrowing. C5-C6: There is osseous fusion across the disc space with uncovertebral hypertrophy. There is canal stenosis measuring 9 mm in AP dimension. There is moderate bilateral foraminal narrowing. C6-C7: There is a disc osteophyte complex with uncovertebral and facet hypertrophy. There is no significant canal stenosis. There is severe bilateral foraminal narrowing. C7-T1: There is a disc osteophyte complex with uncovertebral and facet hypertrophy. There is no canal stenosis. There is moderate to severe bilateral foraminal narrowing.      Multilevel degenerative disc disease with uncovertebral and facet hypertrophy resulting in canal stenosis throughout the mid cervical spine. Bilateral foraminal narrowing throughout the cervical spine as described above. Postsurgical change from C4-C6 without evidence for complication. XR CHEST PORTABLE    Result Date: 11/10/2022  EXAMINATION: ONE XRAY VIEW OF THE CHEST 11/10/2022 3:32 pm COMPARISON: 11/10/2022 at 15:34 HISTORY: ORDERING SYSTEM PROVIDED HISTORY: picc line tip retraction/placement TECHNOLOGIST PROVIDED HISTORY: Reason for exam:->picc line tip retraction/placement FINDINGS: There has been interval retraction of the right PICC line, which is now adequately positioned in the distal SVC. Lungs and pleural spaces are clear. Heart is normal in size. Adequately positioned right PICC line. XR CHEST PORTABLE    Result Date: 11/10/2022  EXAMINATION: ONE XRAY VIEW OF THE CHEST 11/10/2022 3:44 pm COMPARISON: 11/07/2022. HISTORY: ORDERING SYSTEM PROVIDED HISTORY: afib/picc line placement TECHNOLOGIST PROVIDED HISTORY: Reason for exam:->afib/picc line placement FINDINGS: There is a right upper extremity PICC terminating in the right atrium. The cardiac silhouette is enlarged. No pulmonary consolidation or collapse is identified. No pneumothorax or pleural effusion is seen. Right upper extremity PICC terminating in the right atrium. Enlarged cardiac silhouette. No acute pulmonary disease. XR CHEST PORTABLE    Result Date: 11/7/2022  EXAMINATION: ONE XRAY VIEW OF THE CHEST 11/7/2022 1:10 am COMPARISON: None. HISTORY: ORDERING SYSTEM PROVIDED HISTORY: sob TECHNOLOGIST PROVIDED HISTORY: Reason for exam:->sob FINDINGS: Normal cardiomediastinal silhouette. Lungs clear. No pneumothorax or effusion. Osseous thorax intact. No acute disease. RECOMMENDATION: Careful clinical correlation and follow up recommended.      XR CHEST 1 VIEW    Result Date: 11/6/2022  EXAMINATION: ONE XRAY VIEW OF THE CHEST 11/6/2022 4:38 pm COMPARISON: 08/24/2022 HISTORY: ORDERING SYSTEM PROVIDED HISTORY: chest pain, sob TECHNOLOGIST PROVIDED HISTORY: Reason for exam:->chest pain, sob FINDINGS: The lungs are without acute focal process. There is no effusion or pneumothorax. The cardiomediastinal silhouette is without acute process. The osseous structures are without acute process. No acute process.      Lab Results   Component Value Date/Time    WBC 4.7 11/11/2022 09:21 AM    HGB 9.5 11/11/2022 09:21 AM    HCT 29.3 11/11/2022 09:21 AM    MCV 94.2 11/11/2022 09:21 AM     11/11/2022 09:21 AM     11/11/2022 09:21 AM    K 4.4 11/11/2022 09:21 AM    K 3.3 04/05/2022 09:20 PM    CL 98 11/11/2022 09:21 AM    CO2 28 11/11/2022 09:21 AM    BUN 18 11/11/2022 09:21 AM    CREATININE 0.7 11/11/2022 09:21 AM    CALCIUM 9.2 11/11/2022 09:21 AM    PHOS 3.7 11/07/2022 05:27 AM    ALKPHOS 52 11/11/2022 09:21 AM    ALT 16 11/11/2022 09:21 AM    AST 17 11/11/2022 09:21 AM    BILITOT 0.5 11/11/2022 09:21 AM    LABALBU 3.4 11/11/2022 09:21 AM    LABALBU 4.3 02/11/2012 08:15 AM    LDLCALC 87 11/07/2022 05:27 AM    TRIG 90 11/07/2022 05:27 AM     Lab Results   Component Value Date    INR 1.6 07/26/2022    INR 1.1 06/21/2016    INR 1.0 11/09/2010     Discharge Medications:    Discharge Medication List as of 11/11/2022 12:38 PM             Details   sotalol (BETAPACE) 80 MG tablet Take 1 tablet by mouth 2 times daily, Disp-60 tablet, R-0Normal                Details   ertapenem (INVANZ) infusion Infuse 1,000 mg intravenously every 24 hours for 12 days Compound per protocol., Disp-12 g, R-0Print                Details   Biotin 5000 MCG TABS Take 5,000 mcg by mouth dailyHistorical Med      albuterol sulfate HFA (VENTOLIN HFA) 108 (90 Base) MCG/ACT inhaler Inhale 2 puffs into the lungs every 6 hours as needed for WheezingHistorical Med      fluticasone (FLOVENT HFA) 110 MCG/ACT inhaler Inhale 1 puff into the lungs in the morning and 1 puff in the evening., Disp-12 g, R-3Normal      amitriptyline (ELAVIL) 75 MG tablet Take 75 mg by mouth nightlyHistorical Med      tiZANidine (ZANAFLEX) 4 MG tablet Take 8 mg by mouth nightlyHistorical Med      fluticasone-umeclidin-vilant (TRELEGY ELLIPTA) 100-62.5-25 MCG/INH AEPB Inhale 1 puff into the lungs dailyHistorical Med      furosemide (LASIX) 40 MG tablet Take 40 mg by mouth daily as needed (Swelling)Historical Med      montelukast (SINGULAIR) 10 MG tablet Take 10 mg by mouth nightlyHistorical Med      venlafaxine (EFFEXOR XR) 75 MG extended release capsule Take 75 mg by mouth nightlyHistorical Med      Cholecalciferol (VITAMIN D) 50 MCG (2000 UT) CAPS capsule Take 1 capsule by mouth dailyHistorical Med      busPIRone (BUSPAR) 5 MG tablet Take 5 mg by mouth 3 times daily Historical Med      omeprazole (PRILOSEC) 40 MG delayed release capsule Take 40 mg by mouth every morningHistorical Med      famotidine (PEPCID) 40 MG tablet Take 40 mg by mouth nightlyHistorical Med      apixaban (ELIQUIS) 5 MG TABS tablet Take 5 mg by mouth 2 times dailyHistorical Med                        Discharge Medication List as of 11/11/2022 12:38 PM        STOP taking these medications       valsartan-hydroCHLOROthiazide (DIOVAN-HCT) 160-25 MG per tablet Comments:   Reason for Stopping:         metoprolol (LOPRESSOR) 50 MG tablet Comments:   Reason for Stopping:             Disposition:   home on sotalol as per cardiology and IV antibiotics as per ID through PICC line    Follow-up in office in next 1-2 weeks. Patient advised to bring all meds to appointment.     Note that over 30 minutes was spent in preparing discharge papers, discussing discharge with patient, nursing and ancillary staff, medication review, etc.    Signed:  Ozzy Marie MD  11/11/2022, 5:13 PM

## 2022-11-11 NOTE — PLAN OF CARE
Problem: Safety - Adult  Goal: Free from fall injury  11/10/2022 2101 by Corrie Mays RN  Outcome: Progressing     Problem: ABCDS Injury Assessment  Goal: Absence of physical injury  11/10/2022 2101 by Corrie Mays RN  Outcome: Progressing

## 2022-11-11 NOTE — PROGRESS NOTES
[address] [place] [address] [facility]  [facility]  Progress Note    CC:complicated UTI  Face to face encounter   SUBJECTIVE:  Patient is tolerating medications. No reported adverse drug reactions. ROS: No nausea, vomiting, diarrhea. No rash. Has been afebrile. On room air. Working with therapy  Feeling better today       Medications:  Scheduled Meds:   lidocaine 1 % injection  5 mL IntraDERmal Once    sodium chloride flush  10 mL IntraVENous 2 times per day    meropenem  1,000 mg IntraVENous Q8H    docusate sodium  100 mg Oral Daily    sotalol  80 mg Oral BID    levalbuterol  0.63 mg Nebulization Q6H    budesonide  0.5 mg Nebulization BID    hydrocortisone   Topical BID    sodium chloride flush  5-40 mL IntraVENous 2 times per day    apixaban  5 mg Oral BID    amitriptyline  75 mg Oral Nightly    busPIRone  5 mg Oral TID    famotidine  40 mg Oral Nightly    montelukast  10 mg Oral Nightly    venlafaxine  75 mg Oral Nightly    pantoprazole  40 mg Oral QAM AC     Continuous Infusions:   sodium chloride      dextrose      sodium chloride       PRN Meds:sodium chloride flush, sodium chloride, trimethobenzamide, glucose, dextrose bolus **OR** dextrose bolus, glucagon (rDNA), dextrose, sodium chloride flush, sodium chloride, polyethylene glycol, acetaminophen **OR** acetaminophen, metoprolol, nitroGLYCERIN  OBJECTIVE:  Patient Vitals for the past 24 hrs:   BP Temp Temp src Pulse Resp SpO2   11/11/22 0900 139/63 98.6 °F (37 °C) Oral 62 20 96 %   11/10/22 2100 135/66 -- -- 83 -- 96 %   11/10/22 1705 -- -- -- -- -- 95 %   11/10/22 1645 126/66 98.4 °F (36.9 °C) Oral 68 18 100 %       Constitutional: The patient is awake, alert, and oriented. Skin: Warm and dry. No rashes were noted. Head: Eyes show round, and reactive pupils. No jaundice. Mouth: Moist mucous membranes, no ulcerations, no thrush. Neck: Supple to movements. No lymphadenopathy. Chest: No use of accessory muscles to breathe.  Symmetrical expansion. Auscultation reveals no wheezing, crackles, or rhonchi. Diminished- on room air. Cardiovascular: S1 and S2 are rhythmic and regular. Abdomen: Positive bowel sounds to auscultation. Benign to palpation. No flank pain. Extremities: No clubbing, no cyanosis, ++ edema.   Right arm with line     Laboratory and Tests Review:  Lab Results   Component Value Date    WBC 4.7 11/11/2022    WBC 4.6 11/10/2022    WBC 6.5 11/09/2022    HGB 9.5 (L) 11/11/2022    HCT 29.3 (L) 11/11/2022    MCV 94.2 11/11/2022     11/11/2022     Lab Results   Component Value Date    NEUTROABS 2.45 11/11/2022    NEUTROABS 2.48 11/10/2022    NEUTROABS 4.93 11/09/2022     Lab Results   Component Value Date    CRP 5.3 (H) 07/27/2022    CRP 5.4 (H) 07/26/2022    CRP 1.3 (H) 07/25/2022     Lab Results   Component Value Date    SEDRATE 24 (H) 03/11/2021    SEDRATE 35 (H) 09/08/2011     Lab Results   Component Value Date    ALT 15 11/10/2022    AST 21 11/10/2022    ALKPHOS 50 11/10/2022    BILITOT 0.8 11/10/2022     Lab Results   Component Value Date/Time     11/10/2022 07:37 AM    K 3.8 11/10/2022 07:37 AM    K 3.3 04/05/2022 09:20 PM     11/10/2022 07:37 AM    CO2 28 11/10/2022 07:37 AM    BUN 17 11/10/2022 07:37 AM    CREATININE 0.7 11/10/2022 07:37 AM    GFRAA >60 08/24/2022 08:58 AM    LABGLOM >60 11/10/2022 07:37 AM    GLUCOSE 99 11/10/2022 07:37 AM    GLUCOSE 115 02/11/2012 08:15 AM    PROT 6.3 11/10/2022 07:37 AM    LABALBU 3.3 11/10/2022 07:37 AM    LABALBU 4.3 02/11/2012 08:15 AM    CALCIUM 8.9 11/10/2022 07:37 AM    BILITOT 0.8 11/10/2022 07:37 AM    ALKPHOS 50 11/10/2022 07:37 AM    AST 21 11/10/2022 07:37 AM    ALT 15 11/10/2022 07:37 AM     Radiology:  Reviewed     Microbiology:   11/6/2022- urine cx- E.coli  11/6/2022- blood cx- GNR- E.coli      ASSESSMENT:  Sepsis   GNR Bacteremia- presumptive E.coli   Complicated UTI  Fevers- improved  Leukocytosis - improved       Plan:   On Meropenem - will have pharmacy dose invanz   Bladder scan - negative   Procal -3.85  Monitor labs - reviewed   Has been afebrile- patient is feeling better today   Med rec done  Follow-up with ID in 10-12 days   Can d/c from ID POV    HAILEY Conti - CNS  9:41 AM  11/11/2022     Pt was d/c discussed with NP   Cont atbx  F/u 1-2 weeks  Candi Tobar MD

## 2022-11-11 NOTE — CARE COORDINATION
Ss note:11/11/202211:27 AM Neg Covid on 11-7-22. Signed IV script obtained and faxed to Joselyn Maynard at [facility], pt has 100 % coverage. Tomas Spicer orders noted and pt accepted [facility], liaison notified of orders. Plan is home alone, has 2 dtrs for support. Anticipate discharge home today, will need IV dose prior to discharge. Awaiting nursing to indicate if pt needs 02 at discharge, if so sw will need to set up, no dme preference.  KATIANA Echeverria

## 2022-11-13 LAB
EKG ATRIAL RATE: 340 BPM
EKG ATRIAL RATE: 70 BPM
EKG P AXIS: 86 DEGREES
EKG P-R INTERVAL: 172 MS
EKG Q-T INTERVAL: 432 MS
EKG Q-T INTERVAL: 480 MS
EKG QRS DURATION: 78 MS
EKG QRS DURATION: 78 MS
EKG QTC CALCULATION (BAZETT): 504 MS
EKG QTC CALCULATION (BAZETT): 518 MS
EKG R AXIS: 38 DEGREES
EKG R AXIS: 64 DEGREES
EKG T AXIS: 55 DEGREES
EKG T AXIS: 69 DEGREES
EKG VENTRICULAR RATE: 70 BPM
EKG VENTRICULAR RATE: 82 BPM

## 2022-12-06 PROBLEM — N39.0 UTI (URINARY TRACT INFECTION): Status: RESOLVED | Noted: 2022-11-06 | Resolved: 2022-12-06

## 2023-01-23 ENCOUNTER — HOSPITAL ENCOUNTER (OUTPATIENT)
Age: 68
Discharge: HOME | End: 2023-01-23
Payer: MEDICARE

## 2023-01-23 LAB
ALBUMIN SERPL-MCNC: 4.5 G/DL (ref 3.5–5.2)
ALP BLD-CCNC: 62 U/L (ref 35–104)
ALT SERPL-CCNC: 10 U/L (ref 0–32)
ANION GAP SERPL CALCULATED.3IONS-SCNC: 11 MMOL/L (ref 7–16)
AST SERPL-CCNC: 15 U/L (ref 0–31)
BACTERIA: NORMAL /HPF
BASOPHILS ABSOLUTE: 0.05 E9/L (ref 0–0.2)
BASOPHILS RELATIVE PERCENT: 0.8 % (ref 0–2)
BILIRUB SERPL-MCNC: 0.5 MG/DL (ref 0–1.2)
BILIRUBIN URINE: NEGATIVE
BLOOD, URINE: NEGATIVE
BUN BLDV-MCNC: 15 MG/DL (ref 6–23)
CALCIUM SERPL-MCNC: 9.3 MG/DL (ref 8.6–10.2)
CHLORIDE BLD-SCNC: 100 MMOL/L (ref 98–107)
CHOLESTEROL, FASTING: 204 MG/DL (ref 0–199)
CLARITY: CLEAR
CO2: 27 MMOL/L (ref 22–29)
COLOR: YELLOW
CREAT SERPL-MCNC: 0.8 MG/DL (ref 0.5–1)
EOSINOPHILS ABSOLUTE: 0.07 E9/L (ref 0.05–0.5)
EOSINOPHILS RELATIVE PERCENT: 1.1 % (ref 0–6)
EPITHELIAL CELLS, UA: NORMAL /HPF
FERRITIN: 62 NG/ML
GFR SERPL CREATININE-BSD FRML MDRD: >60 ML/MIN/1.73
GLUCOSE FASTING: 95 MG/DL (ref 74–99)
GLUCOSE URINE: NEGATIVE MG/DL
HCT VFR BLD CALC: 39 % (ref 34–48)
HDLC SERPL-MCNC: 51 MG/DL
HEMOGLOBIN: 12.3 G/DL (ref 11.5–15.5)
IMMATURE GRANULOCYTES #: 0.02 E9/L
IMMATURE GRANULOCYTES %: 0.3 % (ref 0–5)
IMMATURE RETIC FRACT: 19.8 % (ref 3–15.9)
IRON SATURATION: 18 % (ref 15–50)
IRON: 64 MCG/DL (ref 37–145)
KETONES, URINE: NEGATIVE MG/DL
LDL CHOLESTEROL CALCULATED: 108 MG/DL (ref 0–99)
LEUKOCYTE ESTERASE, URINE: ABNORMAL
LYMPHOCYTES ABSOLUTE: 2.64 E9/L (ref 1.5–4)
LYMPHOCYTES RELATIVE PERCENT: 43.1 % (ref 20–42)
MCH RBC QN AUTO: 28.8 PG (ref 26–35)
MCHC RBC AUTO-ENTMCNC: 31.5 % (ref 32–34.5)
MCV RBC AUTO: 91.3 FL (ref 80–99.9)
MONOCYTES ABSOLUTE: 0.65 E9/L (ref 0.1–0.95)
MONOCYTES RELATIVE PERCENT: 10.6 % (ref 2–12)
NEUTROPHILS ABSOLUTE: 2.69 E9/L (ref 1.8–7.3)
NEUTROPHILS RELATIVE PERCENT: 44.1 % (ref 43–80)
NITRITE, URINE: NEGATIVE
PDW BLD-RTO: 14.1 FL (ref 11.5–15)
PH UA: 6.5 (ref 5–9)
PLATELET # BLD: 232 E9/L (ref 130–450)
PMV BLD AUTO: 9.6 FL (ref 7–12)
POTASSIUM SERPL-SCNC: 4.1 MMOL/L (ref 3.5–5)
PROTEIN UA: NEGATIVE MG/DL
RBC # BLD: 4.27 E12/L (ref 3.5–5.5)
RBC UA: NORMAL /HPF (ref 0–2)
RETIC HGB EQUIVALENT: 34.1 PG (ref 28.2–36.6)
RETICULOCYTE ABSOLUTE COUNT: 0.11 E12/L
RETICULOCYTE COUNT PCT: 2.7 % (ref 0.4–1.9)
SODIUM BLD-SCNC: 138 MMOL/L (ref 132–146)
SPECIFIC GRAVITY UA: 1.01 (ref 1–1.03)
TOTAL IRON BINDING CAPACITY: 354 MCG/DL (ref 250–450)
TOTAL PROTEIN: 7.4 G/DL (ref 6.4–8.3)
TRIGLYCERIDE, FASTING: 225 MG/DL (ref 0–149)
TSH SERPL DL<=0.05 MIU/L-ACNC: 4.02 UIU/ML (ref 0.27–4.2)
UROBILINOGEN, URINE: 0.2 E.U./DL
VITAMIN D 25-HYDROXY: 61 NG/ML (ref 30–100)
VLDLC SERPL CALC-MCNC: 45 MG/DL
WBC # BLD: 6.1 E9/L (ref 4.5–11.5)
WBC UA: NORMAL /HPF (ref 0–5)

## 2023-01-23 PROCEDURE — 85025 COMPLETE CBC W/AUTO DIFF WBC: CPT

## 2023-01-23 PROCEDURE — 80053 COMPREHEN METABOLIC PANEL: CPT

## 2023-01-23 PROCEDURE — 82306 VITAMIN D 25 HYDROXY: CPT

## 2023-01-23 PROCEDURE — 84443 ASSAY THYROID STIM HORMONE: CPT

## 2023-01-23 PROCEDURE — 85045 AUTOMATED RETICULOCYTE COUNT: CPT

## 2023-01-23 PROCEDURE — 81001 URINALYSIS AUTO W/SCOPE: CPT

## 2023-01-23 PROCEDURE — 83540 ASSAY OF IRON: CPT

## 2023-01-23 PROCEDURE — 83550 IRON BINDING TEST: CPT

## 2023-01-23 PROCEDURE — 82728 ASSAY OF FERRITIN: CPT

## 2023-01-23 PROCEDURE — 80061 LIPID PANEL: CPT

## 2023-01-23 PROCEDURE — 36415 COLL VENOUS BLD VENIPUNCTURE: CPT

## 2023-02-07 ENCOUNTER — APPOINTMENT (OUTPATIENT)
Dept: GENERAL RADIOLOGY | Age: 68
End: 2023-02-07
Payer: MEDICARE

## 2023-02-07 ENCOUNTER — APPOINTMENT (OUTPATIENT)
Dept: CT IMAGING | Age: 68
End: 2023-02-07
Payer: MEDICARE

## 2023-02-07 ENCOUNTER — HOSPITAL ENCOUNTER (EMERGENCY)
Age: 68
Discharge: HOME | End: 2023-02-07
Attending: EMERGENCY MEDICINE
Payer: MEDICARE

## 2023-02-07 VITALS
SYSTOLIC BLOOD PRESSURE: 169 MMHG | TEMPERATURE: 99.1 F | WEIGHT: 216.13 LBS | RESPIRATION RATE: 22 BRPM | BODY MASS INDEX: 37.1 KG/M2 | OXYGEN SATURATION: 95 % | HEART RATE: 64 BPM | DIASTOLIC BLOOD PRESSURE: 89 MMHG

## 2023-02-07 DIAGNOSIS — I50.9 CONGESTIVE HEART FAILURE, UNSPECIFIED HF CHRONICITY, UNSPECIFIED HEART FAILURE TYPE (HCC): Primary | ICD-10-CM

## 2023-02-07 LAB
ALBUMIN SERPL-MCNC: 4.4 G/DL (ref 3.5–5.2)
ALP BLD-CCNC: 64 U/L (ref 35–104)
ALT SERPL-CCNC: 7 U/L (ref 0–32)
ANION GAP SERPL CALCULATED.3IONS-SCNC: 12 MMOL/L (ref 7–16)
AST SERPL-CCNC: 14 U/L (ref 0–31)
BILIRUB SERPL-MCNC: 0.5 MG/DL (ref 0–1.2)
BILIRUBIN DIRECT: <0.2 MG/DL (ref 0–0.3)
BILIRUBIN, INDIRECT: NORMAL MG/DL (ref 0–1)
BUN BLDV-MCNC: 11 MG/DL (ref 6–23)
CALCIUM SERPL-MCNC: 9.5 MG/DL (ref 8.6–10.2)
CHLORIDE BLD-SCNC: 99 MMOL/L (ref 98–107)
CO2: 26 MMOL/L (ref 22–29)
CREAT SERPL-MCNC: 0.8 MG/DL (ref 0.5–1)
D DIMER: 236 NG/ML DDU
GFR SERPL CREATININE-BSD FRML MDRD: >60 ML/MIN/1.73
GLUCOSE BLD-MCNC: 100 MG/DL (ref 74–99)
HCT VFR BLD CALC: 37.3 % (ref 34–48)
HEMOGLOBIN: 12 G/DL (ref 11.5–15.5)
INFLUENZA A BY PCR: NOT DETECTED
INFLUENZA B BY PCR: NOT DETECTED
MAGNESIUM: 1.6 MG/DL (ref 1.6–2.6)
MCH RBC QN AUTO: 28.6 PG (ref 26–35)
MCHC RBC AUTO-ENTMCNC: 32.2 % (ref 32–34.5)
MCV RBC AUTO: 88.8 FL (ref 80–99.9)
PDW BLD-RTO: 14.1 FL (ref 11.5–15)
PLATELET # BLD: 238 E9/L (ref 130–450)
PMV BLD AUTO: 9.3 FL (ref 7–12)
POTASSIUM SERPL-SCNC: 3.8 MMOL/L (ref 3.5–5)
PRO-BNP: 2976 PG/ML (ref 0–125)
RBC # BLD: 4.2 E12/L (ref 3.5–5.5)
SARS-COV-2, NAAT: NOT DETECTED
SODIUM BLD-SCNC: 137 MMOL/L (ref 132–146)
TOTAL PROTEIN: 7.5 G/DL (ref 6.4–8.3)
TROPONIN, HIGH SENSITIVITY: 12 NG/L (ref 0–9)
WBC # BLD: 6.1 E9/L (ref 4.5–11.5)

## 2023-02-07 PROCEDURE — 87635 SARS-COV-2 COVID-19 AMP PRB: CPT

## 2023-02-07 PROCEDURE — 93005 ELECTROCARDIOGRAM TRACING: CPT | Performed by: EMERGENCY MEDICINE

## 2023-02-07 PROCEDURE — 84484 ASSAY OF TROPONIN QUANT: CPT

## 2023-02-07 PROCEDURE — 85027 COMPLETE CBC AUTOMATED: CPT

## 2023-02-07 PROCEDURE — 71046 X-RAY EXAM CHEST 2 VIEWS: CPT

## 2023-02-07 PROCEDURE — 80048 BASIC METABOLIC PNL TOTAL CA: CPT

## 2023-02-07 PROCEDURE — 99285 EMERGENCY DEPT VISIT HI MDM: CPT

## 2023-02-07 PROCEDURE — 80076 HEPATIC FUNCTION PANEL: CPT

## 2023-02-07 PROCEDURE — 87502 INFLUENZA DNA AMP PROBE: CPT

## 2023-02-07 PROCEDURE — 83735 ASSAY OF MAGNESIUM: CPT

## 2023-02-07 PROCEDURE — 85378 FIBRIN DEGRADE SEMIQUANT: CPT

## 2023-02-07 PROCEDURE — 70450 CT HEAD/BRAIN W/O DYE: CPT

## 2023-02-07 PROCEDURE — 83880 ASSAY OF NATRIURETIC PEPTIDE: CPT

## 2023-02-07 RX ORDER — SODIUM CHLORIDE 0.9 % (FLUSH) 0.9 %
10 SYRINGE (ML) INJECTION PRN
Status: DISCONTINUED | OUTPATIENT
Start: 2023-02-07 | End: 2023-02-08 | Stop reason: HOSPADM

## 2023-02-07 ASSESSMENT — ENCOUNTER SYMPTOMS
DIARRHEA: 0
VOMITING: 0
ABDOMINAL PAIN: 0
COUGH: 0
SINUS PRESSURE: 0
ABDOMINAL DISTENTION: 0
SPUTUM PRODUCTION: 0
SORE THROAT: 0
EYE REDNESS: 0
SHORTNESS OF BREATH: 1
EYE DISCHARGE: 0
WHEEZING: 0
EYE PAIN: 0
BACK PAIN: 0
NAUSEA: 0

## 2023-02-08 LAB
EKG ATRIAL RATE: 62 BPM
EKG P AXIS: 104 DEGREES
EKG P-R INTERVAL: 176 MS
EKG Q-T INTERVAL: 482 MS
EKG QRS DURATION: 74 MS
EKG QTC CALCULATION (BAZETT): 489 MS
EKG R AXIS: -13 DEGREES
EKG T AXIS: 37 DEGREES
EKG VENTRICULAR RATE: 62 BPM

## 2023-02-08 PROCEDURE — 93010 ELECTROCARDIOGRAM REPORT: CPT | Performed by: INTERNAL MEDICINE

## 2023-02-08 NOTE — ED PROVIDER NOTES
Patient reports about 1 week of shortness of breath, general malaise, intermittent headache, and edema to her lower extremities. She reports the shortness of breath is most notable if she exerts herself at all. Even simple walking across her house causes significant shortness of breath. The history is provided by the patient. Shortness of Breath  Severity:  Mild  Onset quality:  Gradual  Duration:  1 week  Timing:  Constant  Progression:  Worsening  Chronicity:  New  Relieved by:  None tried  Worsened by:  Nothing  Ineffective treatments:  None tried  Associated symptoms: headaches    Associated symptoms: no abdominal pain, no chest pain, no claudication, no cough, no fever, no neck pain, no rash, no sore throat, no sputum production, no vomiting and no wheezing       Review of Systems   Constitutional:  Negative for chills and fever. HENT:  Negative for sinus pressure and sore throat. Eyes:  Negative for pain, discharge and redness. Respiratory:  Positive for shortness of breath. Negative for cough, sputum production and wheezing. Cardiovascular:  Positive for leg swelling. Negative for chest pain and claudication. Gastrointestinal:  Negative for abdominal distention, abdominal pain, diarrhea, nausea and vomiting. Genitourinary:  Negative for dysuria and frequency. Musculoskeletal:  Negative for arthralgias, back pain and neck pain. Skin:  Negative for rash and wound. Neurological:  Positive for headaches. Negative for weakness. Hematological:  Negative for adenopathy. All other systems reviewed and are negative. Physical Exam  Vitals and nursing note reviewed. Constitutional:       Appearance: She is well-developed. HENT:      Head: Normocephalic and atraumatic. Eyes:      Pupils: Pupils are equal, round, and reactive to light. Cardiovascular:      Rate and Rhythm: Normal rate and regular rhythm. Heart sounds: Normal heart sounds. No murmur heard.   Pulmonary: Effort: Pulmonary effort is normal. No respiratory distress. Breath sounds: Normal breath sounds. No wheezing or rales. Abdominal:      General: Bowel sounds are normal.      Palpations: Abdomen is soft. Tenderness: There is no abdominal tenderness. There is no guarding or rebound. Musculoskeletal:      Cervical back: Normal range of motion and neck supple. Right lower leg: No tenderness. Edema present. Left lower leg: No tenderness. Edema present. Comments: 1+ bilateral lower extremity edema that is symmetrical.  No palpable venous cord. No pain to palpation. Skin:     General: Skin is warm and dry. Neurological:      Mental Status: She is alert and oriented to person, place, and time. Cranial Nerves: No cranial nerve deficit. Coordination: Coordination normal.        Procedures     MDM     Today's evaluation was designed to evaluate the patient's chief complaint of shortness of breath based on multiple differential diagnosis. The goal being to differentiate between emergent, urgent, and non urgent etiologies. The patient understands that not all possibilities can be completely evaluated or ruled out during this visit and require further evaluation by a primary care physician or specialist who can coordinate and evaluate further testing and treatments.   Some of the differentials considered today include (but are not limited to):    High Risk   Asthma, pulmonary edema, myocardiac ischemia, cardiac tamponade, pulmonary embolus, metabolic acidosis, ARDS acute respiratory distress syndrome, panic attack  Pulmonary   Airflow obstruction (asthma, COPD, upper airway obstruction), restrictive lung disease (interstitial lung disease, pleural thickening or effusion, respiratory muscle weakness, obesity), pneumonia, pneumothorax, aspiration  Cardiac   CHF, valvular obstruction, arrhythmia  Metabolic   Acidosis, hypercapnia, sepsis  Hematologic   Anemia, methemoglobinemia  Psychiatric   Anxiety / panic syndromes    X-ray rules out pulmonary edema. EKG and labs rule out myocardial ischemia. X-ray fails to show any sign of tamponade. No pneumonia, pneumothorax or obvious aspiration. Labs suggestive of mild exacerbation of congestive heart failure. X-ray is clear. No anemia. Discussed dose of Lasix here and the patient to resume her normal dose of Lasix tomorrow. However, patient does not want to be up all night using the bathroom. Therefore, she will take 2 tablets of her Lasix in the morning. Patient's chart has been reviewed, last echo (7/27/22): Summary   Left ventricular size is grossly normal.   Borderline concentric left ventricular hypertrophy. Ejection fraction is measured at 42%. No evidence of left ventricular mass or thrombus noted. The left atrium is mildly dilated. Interatrial septum appears intact. Mildly dilated right ventricle. Mildly enlarged right atrium size. Mild to moderate mitral regurgitation is present. No evidence of mitral valve stenosis. Mild to moderate tricuspid regurgitation. RVSP is 38.90 mmHg. Pulmonary hypertension is mild . Irregular rhythm--atrial fibrillation. Limited due to Covid      EKG: This EKG is signed and interpreted by me. Rate: 62  Rhythm: Sinus  Interpretation: no acute changes and non-specific EKG  Comparison: stable as compared to patient's most recent EKG             --------------------------------------------- PAST HISTORY ---------------------------------------------  Past Medical History:  has a past medical history of Acid reflux, Atrial fibrillation ([address] Cancer (HCC), Chronic back pain, CPAP (continuous positive airway pressure) dependence, Fibromyalgia, History of bariatric surgery, History of cardiovascular stress test, Hyperlipidemia, Hypertension, Kidney stones, Low iron, Osteoarthritis, and Unspecified sleep apnea.     Past Surgical History:  has a past surgical history that includes tracheostomy (1999); Stanley-en-Y Gastric Bypass (04/17/2002); Cholecystectomy (2004); hernia repair (2004); Hysterectomy (1988); Appendectomy; Tonsillectomy; bladder suspension (1998); joint replacement (2009); Upper gastrointestinal endoscopy (2009); Colonoscopy (2008); Bariatric Surgery (2003); Breast surgery (2005); Shoulder arthroscopy (2/17/12); ECHO Compl W Dop Color Flow (8/10/2012); Uvulopalatopharygoplasty; Neck surgery (06/2015); and hernia repair (Left, 11/4/2021). Social History:  reports that she quit smoking about 3 years ago. Her smoking use included cigarettes. She has a 20.00 pack-year smoking history. She has never used smokeless tobacco. She reports that she does not drink alcohol and does not use drugs. Family History: family history includes Cancer in her paternal grandfather; Heart Disease in her maternal grandfather, maternal grandmother, mother, and sister; High Blood Pressure in her mother and sister; Stroke in her mother. The patients home medications have been reviewed.     Allergies: Morphine and related and Percocet [oxycodone-acetaminophen]    -------------------------------------------------- RESULTS -------------------------------------------------  Labs:  Results for orders placed or performed during the hospital encounter of 02/07/23   COVID-19, Rapid    Specimen: Nasopharyngeal Swab   Result Value Ref Range    SARS-CoV-2, NAAT Not Detected Not Detected   Rapid influenza A/B antigens    Specimen: Nasopharyngeal   Result Value Ref Range    Influenza A by PCR Not Detected Not Detected    Influenza B by PCR Not Detected Not Detected   Basic metabolic panel   Result Value Ref Range    Sodium 137 132 - 146 mmol/L    Potassium 3.8 3.5 - 5.0 mmol/L    Chloride 99 98 - 107 mmol/L    CO2 26 22 - 29 mmol/L    Anion Gap 12 7 - 16 mmol/L    Glucose 100 (H) 74 - 99 mg/dL    BUN 11 6 - 23 mg/dL    Creatinine 0.8 0.5 - 1.0 mg/dL    Est, Glom Filt Rate >60 >=60 mL/min/1.73    Calcium 9.5 8.6 - 10.2 mg/dL   CBC   Result Value Ref Range    WBC 6.1 4.5 - 11.5 E9/L    RBC 4.20 3.50 - 5.50 E12/L    Hemoglobin 12.0 11.5 - 15.5 g/dL    Hematocrit 37.3 34.0 - 48.0 %    MCV 88.8 80.0 - 99.9 fL    MCH 28.6 26.0 - 35.0 pg    MCHC 32.2 32.0 - 34.5 %    RDW 14.1 11.5 - 15.0 fL    Platelets 206 113 - 112 E9/L    MPV 9.3 7.0 - 12.0 fL   Troponin   Result Value Ref Range    Troponin, High Sensitivity 12 (H) 0 - 9 ng/L   Brain Natriuretic Peptide   Result Value Ref Range    Pro-BNP 2,976 (H) 0 - 125 pg/mL   Magnesium   Result Value Ref Range    Magnesium 1.6 1.6 - 2.6 mg/dL   D-Dimer, Quantitative   Result Value Ref Range    D-Dimer, Quant 236 ng/mL DDU   Hepatic Function Panel   Result Value Ref Range    Total Protein 7.5 6.4 - 8.3 g/dL    Albumin 4.4 3.5 - 5.2 g/dL    Alkaline Phosphatase 64 35 - 104 U/L    ALT 7 0 - 32 U/L    AST 14 0 - 31 U/L    Total Bilirubin 0.5 0.0 - 1.2 mg/dL    Bilirubin, Direct <0.2 0.0 - 0.3 mg/dL    Bilirubin, Indirect see below 0.0 - 1.0 mg/dL       Radiology:  XR CHEST (2 VW)   Final Result   No acute process. CT HEAD WO CONTRAST   Final Result   No acute intracranial abnormality.             ------------------------- NURSING NOTES AND VITALS REVIEWED ---------------------------  Date / Time Roomed:  2/7/2023  6:57 PM  ED Bed Assignment:  25/25    The nursing notes within the ED encounter and vital signs as below have been reviewed. BP (!) 169/89   Pulse 64   Temp 99.1 °F (37.3 °C)   Resp 22   Wt 216 lb 2 oz (98 kg)   SpO2 95%   BMI 37.10 kg/m²   Oxygen Saturation Interpretation: Normal      ------------------------------------------ PROGRESS NOTES ------------------------------------------  10:22 PM EST  I have spoken with the patient and discussed todays results, in addition to providing specific details for the plan of care and counseling regarding the diagnosis and prognosis.   Their questions are answered at this time and they are agreeable with the plan. I discussed at length with them reasons for immediate return here for re evaluation. They will followup with their primary care physician by calling their office tomorrow. --------------------------------- ADDITIONAL PROVIDER NOTES ---------------------------------  At this time the patient is without objective evidence of an acute process requiring hospitalization or inpatient management. They have remained hemodynamically stable throughout their entire ED visit and are stable for discharge with outpatient follow-up. The plan has been discussed in detail and they are aware of the specific conditions for emergent return, as well as the importance of follow-up. New Prescriptions    No medications on file       Diagnosis:  1. Congestive heart failure, unspecified HF chronicity, unspecified heart failure type ([facility] [address]        Disposition:  Patient's disposition: Discharge to home  Patient's condition is stable.           Pepper Spaulding DO  02/07/23 2228

## 2023-03-30 ENCOUNTER — HOSPITAL ENCOUNTER (EMERGENCY)
Age: 68
Discharge: HOME | End: 2023-03-31
Attending: EMERGENCY MEDICINE
Payer: MEDICARE

## 2023-03-30 DIAGNOSIS — R21 RASH AND NONSPECIFIC SKIN ERUPTION: Primary | ICD-10-CM

## 2023-03-30 PROCEDURE — 85651 RBC SED RATE NONAUTOMATED: CPT

## 2023-03-30 PROCEDURE — 80053 COMPREHEN METABOLIC PANEL: CPT

## 2023-03-30 PROCEDURE — 6370000000 HC RX 637 (ALT 250 FOR IP): Performed by: STUDENT IN AN ORGANIZED HEALTH CARE EDUCATION/TRAINING PROGRAM

## 2023-03-30 PROCEDURE — 85025 COMPLETE CBC W/AUTO DIFF WBC: CPT

## 2023-03-30 PROCEDURE — 99283 EMERGENCY DEPT VISIT LOW MDM: CPT

## 2023-03-30 PROCEDURE — 86140 C-REACTIVE PROTEIN: CPT

## 2023-03-30 RX ORDER — FUROSEMIDE 40 MG/1
40 TABLET ORAL ONCE
Status: COMPLETED | OUTPATIENT
Start: 2023-03-30 | End: 2023-03-30

## 2023-03-30 RX ADMIN — FUROSEMIDE 40 MG: 40 TABLET ORAL at 23:47

## 2023-03-30 ASSESSMENT — PAIN - FUNCTIONAL ASSESSMENT: PAIN_FUNCTIONAL_ASSESSMENT: NONE - DENIES PAIN

## 2023-03-30 ASSESSMENT — ENCOUNTER SYMPTOMS
SHORTNESS OF BREATH: 0
COLOR CHANGE: 1
BACK PAIN: 0
VOMITING: 0
NAUSEA: 0
SORE THROAT: 0
COUGH: 0
ABDOMINAL PAIN: 0
RHINORRHEA: 0
DIARRHEA: 0

## 2023-03-31 VITALS
BODY MASS INDEX: 37.05 KG/M2 | DIASTOLIC BLOOD PRESSURE: 84 MMHG | SYSTOLIC BLOOD PRESSURE: 151 MMHG | TEMPERATURE: 99.4 F | OXYGEN SATURATION: 96 % | HEART RATE: 70 BPM | HEIGHT: 64 IN | RESPIRATION RATE: 20 BRPM | WEIGHT: 217 LBS

## 2023-03-31 LAB
ALBUMIN SERPL-MCNC: 4.1 G/DL (ref 3.5–5.2)
ALP SERPL-CCNC: 66 U/L (ref 35–104)
ALT SERPL-CCNC: 9 U/L (ref 0–32)
ANION GAP SERPL CALCULATED.3IONS-SCNC: 10 MMOL/L (ref 7–16)
AST SERPL-CCNC: 13 U/L (ref 0–31)
BASOPHILS # BLD: 0.05 E9/L (ref 0–0.2)
BASOPHILS NFR BLD: 0.8 % (ref 0–2)
BILIRUB SERPL-MCNC: 0.6 MG/DL (ref 0–1.2)
BUN SERPL-MCNC: 10 MG/DL (ref 6–23)
CALCIUM SERPL-MCNC: 9.4 MG/DL (ref 8.6–10.2)
CHLORIDE SERPL-SCNC: 99 MMOL/L (ref 98–107)
CO2 SERPL-SCNC: 29 MMOL/L (ref 22–29)
CREAT SERPL-MCNC: 0.7 MG/DL (ref 0.5–1)
CRP SERPL HS-MCNC: 0.8 MG/DL (ref 0–0.4)
EOSINOPHIL # BLD: 0.1 E9/L (ref 0.05–0.5)
EOSINOPHIL NFR BLD: 1.7 % (ref 0–6)
ERYTHROCYTE [DISTWIDTH] IN BLOOD BY AUTOMATED COUNT: 13.1 FL (ref 11.5–15)
ERYTHROCYTE [SEDIMENTATION RATE] IN BLOOD BY WESTERGREN METHOD: 19 MM/HR (ref 0–20)
GLUCOSE SERPL-MCNC: 115 MG/DL (ref 74–99)
HCT VFR BLD AUTO: 35.5 % (ref 34–48)
HGB BLD-MCNC: 11.6 G/DL (ref 11.5–15.5)
IMM GRANULOCYTES # BLD: 0.01 E9/L
IMM GRANULOCYTES NFR BLD: 0.2 % (ref 0–5)
LYMPHOCYTES # BLD: 2.14 E9/L (ref 1.5–4)
LYMPHOCYTES NFR BLD: 36.1 % (ref 20–42)
MCH RBC QN AUTO: 29.9 PG (ref 26–35)
MCHC RBC AUTO-ENTMCNC: 32.7 % (ref 32–34.5)
MCV RBC AUTO: 91.5 FL (ref 80–99.9)
MONOCYTES # BLD: 0.49 E9/L (ref 0.1–0.95)
MONOCYTES NFR BLD: 8.3 % (ref 2–12)
NEUTROPHILS # BLD: 3.13 E9/L (ref 1.8–7.3)
NEUTS SEG NFR BLD: 52.9 % (ref 43–80)
PLATELET # BLD AUTO: 190 E9/L (ref 130–450)
PMV BLD AUTO: 9.7 FL (ref 7–12)
POTASSIUM SERPL-SCNC: 3.9 MMOL/L (ref 3.5–5)
PROT SERPL-MCNC: 7 G/DL (ref 6.4–8.3)
RBC # BLD AUTO: 3.88 E12/L (ref 3.5–5.5)
SODIUM SERPL-SCNC: 138 MMOL/L (ref 132–146)
WBC # BLD: 5.9 E9/L (ref 4.5–11.5)

## 2023-03-31 PROCEDURE — 6370000000 HC RX 637 (ALT 250 FOR IP)

## 2023-03-31 RX ORDER — ACETAMINOPHEN 500 MG
TABLET ORAL
Status: COMPLETED
Start: 2023-03-31 | End: 2023-03-31

## 2023-03-31 RX ORDER — ACETAMINOPHEN 500 MG
1000 TABLET ORAL ONCE
Status: DISCONTINUED | OUTPATIENT
Start: 2023-03-31 | End: 2023-03-31 | Stop reason: ALTCHOICE

## 2023-03-31 RX ADMIN — ACETAMINOPHEN 1000 MG: 500 TABLET, FILM COATED ORAL at 00:46

## 2023-03-31 RX ADMIN — Medication 1000 MG: at 00:46

## 2023-03-31 NOTE — DISCHARGE INSTRUCTIONS
Please return to the ER for any new or worsening symptoms including but not limited to pain of your leg, worsening redness of your leg or fever

## 2023-03-31 NOTE — ED PROVIDER NOTES
bilateral lower extremities. Denies any pain anywhere. She is concerned she might have cellulitis and a family member recommended she come to the ER for evaluation. Please see HPI for further details, additional history and chart review. On my evaluation today, patient is alert, oriented, nontoxic in appearance. Vitals are initially significant for HTN; later improved. Patient was given a dose of p.o. Lasix 40 mg which she takes at home and did not take her dose today because she was babysitting her granddaughter and did not want have to urinate very often. Was also given a dose of 1 g p.o. Tylenol which she takes at home episodically for pain/headaches. Exam findings are as documented above; she has some redness of her left distal anterior lower extremity which appears more consistent with venous stasis dermatitis; there are a few spots at her distal right lower extremity as well. It is nontender, blanching, and is not warm to the touch. Does not appear infectious in nature. I did obtain lab work which was overall reassuring, no elevation of her inflammatory markers, no leukocytosis. I discussed results with the patient; I feel she is stable and appropriate for discharge. Recommended compression stockings and to adhere to her prescribed p.o. Lasix and to see her primary doctor. She voiced understanding and is amenable to this plan. Strict return precautions were discussed. While not exhaustive, the following diagnoses and their severity were considered: Venous stasis dermatitis, lymphedema, cellulitis. Independent interpretation of Laboratory tests by Vincent Garcia DO: Sed rate 19 CRP 0.8. CBC and CMP WNLs.     Independent interpretation of Radiology tests by Vincent Garcia DO: none         Non-plain film images such as CT, Ultrasound and MRI are read by the radiologist. Plain radiographic images are visualized and preliminarily interpreted by the ED Provider with the above-noted findings. Interpretation per the Radiologist below, if available at the time of this note are included below. EKG reviewed and interpreted by me, TIME:  This EKG is signed by emergency department physician. An EKG was not performed during this encounter. Labs & imaging were reviewed and interpreted, see RESULTS. I have personally reviewed all laboratory and imaging results for this patient. Are there any additional factors to consider that affect care (uninsured, homeless, illiterate, history from another source, etc.) (If yes, which ones). No        Re-Evaluations:           Please see ED course for any additional MDM documentation. I have discussed this patient with my attending, who has seen the patient and agrees with this disposition. Patient was seen and evaluated by myself and my attending William Osgood, DO. Assessment and Plan discussed with attending provider, please see attestation for final plan of care.         --------------------------------------------- PAST HISTORY ---------------------------------------------  Past Medical History:  has a past medical history of Acid reflux, Atrial fibrillation ([facility] [address] Cancer ([facility] [address] Chronic back pain, CPAP (continuous positive airway pressure) dependence, Fibromyalgia, History of bariatric surgery, History of cardiovascular stress test, Hyperlipidemia, Hypertension, Kidney stones, Low iron, Osteoarthritis, and Unspecified sleep apnea. Past Surgical History:  has a past surgical history that includes tracheostomy (1999); Stanley-en-Y Gastric Bypass (04/17/2002); Cholecystectomy (2004); hernia repair (2004); Hysterectomy (1988); Appendectomy; Tonsillectomy; bladder suspension (1998); joint replacement (2009); Upper gastrointestinal endoscopy (2009); Colonoscopy (2008); Bariatric Surgery (2003); Breast surgery (2005); Shoulder arthroscopy (2/17/12); ECHO Compl W Dop Color Flow (8/10/2012); Uvulopalatopharygoplasty;  Neck

## 2023-07-06 ENCOUNTER — HOSPITAL ENCOUNTER (OUTPATIENT)
Age: 68
Discharge: HOME | End: 2023-07-06
Attending: INTERNAL MEDICINE
Payer: COMMERCIAL

## 2023-07-06 ENCOUNTER — HOSPITAL ENCOUNTER (OUTPATIENT)
Dept: MAMMOGRAPHY | Age: 68
Discharge: HOME | End: 2023-07-08
Attending: INTERNAL MEDICINE
Payer: COMMERCIAL

## 2023-07-06 DIAGNOSIS — Z12.31 VISIT FOR SCREENING MAMMOGRAM: ICD-10-CM

## 2023-07-06 LAB
CRP SERPL HS-MCNC: 0.8 MG/DL (ref 0–0.4)
ERYTHROCYTE [SEDIMENTATION RATE] IN BLOOD BY WESTERGREN METHOD: 33 MM/HR (ref 0–20)
RHEUMATOID FACT SER NEPH-ACNC: <10 IU/ML (ref 0–13)
URATE SERPL-MCNC: 7.7 MG/DL (ref 2.4–5.7)

## 2023-07-06 PROCEDURE — 86140 C-REACTIVE PROTEIN: CPT

## 2023-07-06 PROCEDURE — 84550 ASSAY OF BLOOD/URIC ACID: CPT

## 2023-07-06 PROCEDURE — 86431 RHEUMATOID FACTOR QUANT: CPT

## 2023-07-06 PROCEDURE — 36415 COLL VENOUS BLD VENIPUNCTURE: CPT

## 2023-07-06 PROCEDURE — 77063 BREAST TOMOSYNTHESIS BI: CPT

## 2023-07-06 PROCEDURE — 85651 RBC SED RATE NONAUTOMATED: CPT

## 2023-07-06 PROCEDURE — 86038 ANTINUCLEAR ANTIBODIES: CPT

## 2023-07-07 LAB — ANA SER QL: NEGATIVE

## 2023-08-16 ENCOUNTER — HOSPITAL ENCOUNTER (OUTPATIENT)
Age: 68
Discharge: HOME | End: 2023-08-16
Payer: COMMERCIAL

## 2023-08-16 LAB
BILIRUB UR QL STRIP: NEGATIVE
CLARITY UR: CLEAR
COLOR UR: YELLOW
GLUCOSE UR STRIP-MCNC: NEGATIVE MG/DL
HGB UR QL STRIP.AUTO: NEGATIVE
KETONES UR STRIP-MCNC: NEGATIVE MG/DL
LEUKOCYTE ESTERASE UR QL STRIP: ABNORMAL
NITRITE UR QL STRIP: NEGATIVE
PH UR STRIP: 5.5 [PH] (ref 5–9)
PROT UR STRIP-MCNC: NEGATIVE MG/DL
RBC #/AREA URNS HPF: ABNORMAL /HPF
SP GR UR STRIP: 1.01 (ref 1–1.03)
UROBILINOGEN UR STRIP-ACNC: 0.2 EU/DL (ref 0–1)
WBC #/AREA URNS HPF: ABNORMAL /HPF

## 2023-08-16 PROCEDURE — 87077 CULTURE AEROBIC IDENTIFY: CPT

## 2023-08-16 PROCEDURE — 81001 URINALYSIS AUTO W/SCOPE: CPT

## 2023-08-16 PROCEDURE — 87086 URINE CULTURE/COLONY COUNT: CPT

## 2023-08-18 LAB
MICROORGANISM SPEC CULT: ABNORMAL
SPECIMEN DESCRIPTION: ABNORMAL

## 2023-10-27 ENCOUNTER — HOSPITAL ENCOUNTER (OUTPATIENT)
Age: 68
Discharge: HOME | End: 2023-10-27
Payer: COMMERCIAL

## 2023-10-27 LAB
25(OH)D3 SERPL-MCNC: 62.7 NG/ML (ref 30–100)
ALBUMIN SERPL-MCNC: 4.2 G/DL (ref 3.5–5.2)
ALP SERPL-CCNC: 81 U/L (ref 35–104)
ALT SERPL-CCNC: 8 U/L (ref 0–32)
ANION GAP SERPL CALCULATED.3IONS-SCNC: 9 MMOL/L (ref 7–16)
AST SERPL-CCNC: 14 U/L (ref 0–31)
BASOPHILS # BLD: 0.04 K/UL (ref 0–0.2)
BASOPHILS NFR BLD: 1 % (ref 0–2)
BILIRUB SERPL-MCNC: 0.6 MG/DL (ref 0–1.2)
BILIRUB UR QL STRIP: ABNORMAL
BUN SERPL-MCNC: 13 MG/DL (ref 6–23)
CALCIUM SERPL-MCNC: 9.4 MG/DL (ref 8.6–10.2)
CHLORIDE SERPL-SCNC: 102 MMOL/L (ref 98–107)
CHOLEST SERPL-MCNC: 201 MG/DL
CLARITY UR: CLEAR
CO2 SERPL-SCNC: 27 MMOL/L (ref 22–29)
COLOR UR: ABNORMAL
CREAT SERPL-MCNC: 0.9 MG/DL (ref 0.5–1)
EOSINOPHIL # BLD: 0.14 K/UL (ref 0.05–0.5)
EOSINOPHILS RELATIVE PERCENT: 3 % (ref 0–6)
ERYTHROCYTE [DISTWIDTH] IN BLOOD BY AUTOMATED COUNT: 13.4 % (ref 11.5–15)
GFR SERPL CREATININE-BSD FRML MDRD: >60 ML/MIN/1.73M2
GLUCOSE SERPL-MCNC: 92 MG/DL (ref 74–99)
GLUCOSE UR STRIP-MCNC: NEGATIVE MG/DL
HCT VFR BLD AUTO: 32.2 % (ref 34–48)
HDLC SERPL-MCNC: 52 MG/DL
HGB BLD-MCNC: 10.4 G/DL (ref 11.5–15.5)
HGB UR QL STRIP.AUTO: NEGATIVE
IMM GRANULOCYTES # BLD AUTO: <0.03 K/UL (ref 0–0.58)
IMM GRANULOCYTES NFR BLD: 0 % (ref 0–5)
KETONES UR STRIP-MCNC: ABNORMAL MG/DL
LDLC SERPL CALC-MCNC: 127 MG/DL
LEUKOCYTE ESTERASE UR QL STRIP: ABNORMAL
LYMPHOCYTES NFR BLD: 2.07 K/UL (ref 1.5–4)
LYMPHOCYTES RELATIVE PERCENT: 37 % (ref 20–42)
MCH RBC QN AUTO: 29.2 PG (ref 26–35)
MCHC RBC AUTO-ENTMCNC: 32.3 G/DL (ref 32–34.5)
MCV RBC AUTO: 90.4 FL (ref 80–99.9)
MONOCYTES NFR BLD: 0.44 K/UL (ref 0.1–0.95)
MONOCYTES NFR BLD: 8 % (ref 2–12)
NEUTROPHILS NFR BLD: 51 % (ref 43–80)
NEUTS SEG NFR BLD: 2.86 K/UL (ref 1.8–7.3)
NITRITE UR QL STRIP: NEGATIVE
PH UR STRIP: 5.5 [PH] (ref 5–9)
PLATELET # BLD AUTO: 197 K/UL (ref 130–450)
PMV BLD AUTO: 8.7 FL (ref 7–12)
POTASSIUM SERPL-SCNC: 4.3 MMOL/L (ref 3.5–5)
PROT SERPL-MCNC: 7.4 G/DL (ref 6.4–8.3)
PROT UR STRIP-MCNC: NEGATIVE MG/DL
RBC # BLD AUTO: 3.56 M/UL (ref 3.5–5.5)
RBC #/AREA URNS HPF: NORMAL /HPF
SODIUM SERPL-SCNC: 138 MMOL/L (ref 132–146)
SP GR UR STRIP: >1.03 (ref 1–1.03)
TRIGL SERPL-MCNC: 108 MG/DL
TSH SERPL DL<=0.05 MIU/L-ACNC: 2.96 UIU/ML (ref 0.27–4.2)
URATE SERPL-MCNC: 5.5 MG/DL (ref 2.4–5.7)
UROBILINOGEN UR STRIP-ACNC: 1 EU/DL (ref 0–1)
VLDLC SERPL CALC-MCNC: 22 MG/DL
WBC #/AREA URNS HPF: NORMAL /HPF
WBC OTHER # BLD: 5.6 K/UL (ref 4.5–11.5)

## 2023-10-27 PROCEDURE — 84550 ASSAY OF BLOOD/URIC ACID: CPT

## 2023-10-27 PROCEDURE — 80053 COMPREHEN METABOLIC PANEL: CPT

## 2023-10-27 PROCEDURE — 80061 LIPID PANEL: CPT

## 2023-10-27 PROCEDURE — 82306 VITAMIN D 25 HYDROXY: CPT

## 2023-10-27 PROCEDURE — 81001 URINALYSIS AUTO W/SCOPE: CPT

## 2023-10-27 PROCEDURE — 85025 COMPLETE CBC W/AUTO DIFF WBC: CPT

## 2023-10-27 PROCEDURE — 36415 COLL VENOUS BLD VENIPUNCTURE: CPT

## 2023-10-27 PROCEDURE — 84443 ASSAY THYROID STIM HORMONE: CPT

## 2023-10-30 RX ORDER — SODIUM CHLORIDE 9 MG/ML
5-250 INJECTION, SOLUTION INTRAVENOUS PRN
OUTPATIENT
Start: 2023-10-31

## 2023-10-30 RX ORDER — DIPHENHYDRAMINE HYDROCHLORIDE 50 MG/ML
50 INJECTION INTRAMUSCULAR; INTRAVENOUS
OUTPATIENT
Start: 2023-10-31

## 2023-10-30 RX ORDER — HEPARIN 100 UNIT/ML
500 SYRINGE INTRAVENOUS PRN
OUTPATIENT
Start: 2023-10-31

## 2023-10-30 RX ORDER — ACETAMINOPHEN 325 MG/1
650 TABLET ORAL
OUTPATIENT
Start: 2023-10-31

## 2023-10-30 RX ORDER — SODIUM CHLORIDE 0.9 % (FLUSH) 0.9 %
5-40 SYRINGE (ML) INJECTION PRN
OUTPATIENT
Start: 2023-10-31

## 2023-10-30 RX ORDER — SODIUM CHLORIDE 9 MG/ML
INJECTION, SOLUTION INTRAVENOUS CONTINUOUS
OUTPATIENT
Start: 2023-10-31

## 2023-10-30 RX ORDER — EPINEPHRINE 1 MG/ML
0.3 INJECTION, SOLUTION, CONCENTRATE INTRAVENOUS PRN
OUTPATIENT
Start: 2023-10-31

## 2023-10-30 RX ORDER — ONDANSETRON 2 MG/ML
8 INJECTION INTRAMUSCULAR; INTRAVENOUS
OUTPATIENT
Start: 2023-10-31

## 2023-11-06 ENCOUNTER — HOSPITAL ENCOUNTER (EMERGENCY)
Age: 68
Discharge: HOME | End: 2023-11-06
Attending: STUDENT IN AN ORGANIZED HEALTH CARE EDUCATION/TRAINING PROGRAM
Payer: COMMERCIAL

## 2023-11-06 ENCOUNTER — APPOINTMENT (OUTPATIENT)
Dept: CT IMAGING | Age: 68
End: 2023-11-06
Payer: COMMERCIAL

## 2023-11-06 VITALS
OXYGEN SATURATION: 98 % | HEART RATE: 80 BPM | TEMPERATURE: 98.7 F | RESPIRATION RATE: 20 BRPM | BODY MASS INDEX: 36.9 KG/M2 | WEIGHT: 215 LBS | SYSTOLIC BLOOD PRESSURE: 175 MMHG | DIASTOLIC BLOOD PRESSURE: 96 MMHG

## 2023-11-06 DIAGNOSIS — N30.01 ACUTE CYSTITIS WITH HEMATURIA: Primary | ICD-10-CM

## 2023-11-06 DIAGNOSIS — K57.32 DIVERTICULITIS OF COLON: ICD-10-CM

## 2023-11-06 LAB
ALBUMIN SERPL-MCNC: 4.5 G/DL (ref 3.5–5.2)
ALP SERPL-CCNC: 94 U/L (ref 35–104)
ALT SERPL-CCNC: 12 U/L (ref 0–32)
ANION GAP SERPL CALCULATED.3IONS-SCNC: 12 MMOL/L (ref 7–16)
AST SERPL-CCNC: 14 U/L (ref 0–31)
BACTERIA URNS QL MICRO: ABNORMAL
BASOPHILS # BLD: 0.07 K/UL (ref 0–0.2)
BASOPHILS NFR BLD: 1 % (ref 0–2)
BILIRUB SERPL-MCNC: 0.8 MG/DL (ref 0–1.2)
BILIRUB UR QL STRIP: NEGATIVE
BUN SERPL-MCNC: 12 MG/DL (ref 6–23)
CALCIUM SERPL-MCNC: 9.4 MG/DL (ref 8.6–10.2)
CHLORIDE SERPL-SCNC: 99 MMOL/L (ref 98–107)
CLARITY UR: CLEAR
CO2 SERPL-SCNC: 27 MMOL/L (ref 22–29)
COLOR UR: YELLOW
CREAT SERPL-MCNC: 0.8 MG/DL (ref 0.5–1)
EOSINOPHIL # BLD: 0.05 K/UL (ref 0.05–0.5)
EOSINOPHILS RELATIVE PERCENT: 1 % (ref 0–6)
EPI CELLS #/AREA URNS HPF: ABNORMAL /HPF
ERYTHROCYTE [DISTWIDTH] IN BLOOD BY AUTOMATED COUNT: 13.4 % (ref 11.5–15)
GFR SERPL CREATININE-BSD FRML MDRD: >60 ML/MIN/1.73M2
GLUCOSE SERPL-MCNC: 103 MG/DL (ref 74–99)
GLUCOSE UR STRIP-MCNC: NEGATIVE MG/DL
HCT VFR BLD AUTO: 33.6 % (ref 34–48)
HGB BLD-MCNC: 11.2 G/DL (ref 11.5–15.5)
HGB UR QL STRIP.AUTO: ABNORMAL
IMM GRANULOCYTES # BLD AUTO: 0.03 K/UL (ref 0–0.58)
IMM GRANULOCYTES NFR BLD: 0 % (ref 0–5)
KETONES UR STRIP-MCNC: NEGATIVE MG/DL
LACTATE BLDV-SCNC: 1 MMOL/L (ref 0.5–2.2)
LEUKOCYTE ESTERASE UR QL STRIP: ABNORMAL
LIPASE SERPL-CCNC: 35 U/L (ref 13–60)
LYMPHOCYTES NFR BLD: 1.58 K/UL (ref 1.5–4)
LYMPHOCYTES RELATIVE PERCENT: 16 % (ref 20–42)
MCH RBC QN AUTO: 29.4 PG (ref 26–35)
MCHC RBC AUTO-ENTMCNC: 33.3 G/DL (ref 32–34.5)
MCV RBC AUTO: 88.2 FL (ref 80–99.9)
MONOCYTES NFR BLD: 0.76 K/UL (ref 0.1–0.95)
MONOCYTES NFR BLD: 8 % (ref 2–12)
NEUTROPHILS NFR BLD: 75 % (ref 43–80)
NEUTS SEG NFR BLD: 7.3 K/UL (ref 1.8–7.3)
NITRITE UR QL STRIP: NEGATIVE
PH UR STRIP: 5.5 [PH] (ref 5–9)
PLATELET # BLD AUTO: 239 K/UL (ref 130–450)
PMV BLD AUTO: 9.3 FL (ref 7–12)
POTASSIUM SERPL-SCNC: 3.8 MMOL/L (ref 3.5–5)
PROT SERPL-MCNC: 7.8 G/DL (ref 6.4–8.3)
PROT UR STRIP-MCNC: NEGATIVE MG/DL
RBC # BLD AUTO: 3.81 M/UL (ref 3.5–5.5)
RBC #/AREA URNS HPF: ABNORMAL /HPF
SODIUM SERPL-SCNC: 138 MMOL/L (ref 132–146)
SP GR UR STRIP: 1.02 (ref 1–1.03)
UROBILINOGEN UR STRIP-ACNC: 0.2 EU/DL (ref 0–1)
WBC #/AREA URNS HPF: ABNORMAL /HPF
WBC OTHER # BLD: 9.8 K/UL (ref 4.5–11.5)

## 2023-11-06 PROCEDURE — 85025 COMPLETE CBC W/AUTO DIFF WBC: CPT

## 2023-11-06 PROCEDURE — 96365 THER/PROPH/DIAG IV INF INIT: CPT

## 2023-11-06 PROCEDURE — 2580000003 HC RX 258: Performed by: PHYSICIAN ASSISTANT

## 2023-11-06 PROCEDURE — 6370000000 HC RX 637 (ALT 250 FOR IP): Performed by: PHYSICIAN ASSISTANT

## 2023-11-06 PROCEDURE — 96375 TX/PRO/DX INJ NEW DRUG ADDON: CPT

## 2023-11-06 PROCEDURE — 87086 URINE CULTURE/COLONY COUNT: CPT

## 2023-11-06 PROCEDURE — 6360000002 HC RX W HCPCS: Performed by: PHYSICIAN ASSISTANT

## 2023-11-06 PROCEDURE — 80053 COMPREHEN METABOLIC PANEL: CPT

## 2023-11-06 PROCEDURE — 6360000004 HC RX CONTRAST MEDICATION: Performed by: RADIOLOGY

## 2023-11-06 PROCEDURE — 83690 ASSAY OF LIPASE: CPT

## 2023-11-06 PROCEDURE — 99285 EMERGENCY DEPT VISIT HI MDM: CPT

## 2023-11-06 PROCEDURE — 83605 ASSAY OF LACTIC ACID: CPT

## 2023-11-06 PROCEDURE — 81001 URINALYSIS AUTO W/SCOPE: CPT

## 2023-11-06 PROCEDURE — 74177 CT ABD & PELVIS W/CONTRAST: CPT

## 2023-11-06 RX ORDER — ONDANSETRON 4 MG/1
4 TABLET, FILM COATED ORAL EVERY 8 HOURS PRN
Qty: 21 TABLET | Refills: 0 | Status: ON HOLD | OUTPATIENT
Start: 2023-11-06 | End: 2023-11-13

## 2023-11-06 RX ORDER — CEFDINIR 300 MG/1
300 CAPSULE ORAL 2 TIMES DAILY
Qty: 14 CAPSULE | Refills: 0 | Status: ON HOLD | OUTPATIENT
Start: 2023-11-06 | End: 2023-11-13

## 2023-11-06 RX ORDER — HYDROCODONE BITARTRATE AND ACETAMINOPHEN 5; 325 MG/1; MG/1
1 TABLET ORAL ONCE
Status: COMPLETED | OUTPATIENT
Start: 2023-11-06 | End: 2023-11-06

## 2023-11-06 RX ORDER — ACETAMINOPHEN 500 MG
1000 TABLET ORAL ONCE
Status: DISCONTINUED | OUTPATIENT
Start: 2023-11-06 | End: 2023-11-06

## 2023-11-06 RX ORDER — METRONIDAZOLE 500 MG/100ML
500 INJECTION, SOLUTION INTRAVENOUS ONCE
Status: COMPLETED | OUTPATIENT
Start: 2023-11-06 | End: 2023-11-06

## 2023-11-06 RX ORDER — MORPHINE SULFATE 10 MG/ML
8 INJECTION, SOLUTION INTRAMUSCULAR; INTRAVENOUS ONCE
Status: DISCONTINUED | OUTPATIENT
Start: 2023-11-06 | End: 2023-11-06

## 2023-11-06 RX ORDER — HYDROCODONE BITARTRATE AND ACETAMINOPHEN 5; 325 MG/1; MG/1
1 TABLET ORAL EVERY 6 HOURS PRN
Qty: 20 TABLET | Refills: 0 | Status: ON HOLD | OUTPATIENT
Start: 2023-11-06 | End: 2023-11-12

## 2023-11-06 RX ORDER — METRONIDAZOLE 500 MG/1
500 TABLET ORAL 2 TIMES DAILY
Qty: 14 TABLET | Refills: 0 | Status: ON HOLD | OUTPATIENT
Start: 2023-11-06 | End: 2023-11-13

## 2023-11-06 RX ORDER — 0.9 % SODIUM CHLORIDE 0.9 %
1000 INTRAVENOUS SOLUTION INTRAVENOUS ONCE
Status: COMPLETED | OUTPATIENT
Start: 2023-11-06 | End: 2023-11-06

## 2023-11-06 RX ORDER — LISINOPRIL 20 MG/1
20 TABLET ORAL DAILY
Status: ON HOLD | COMMUNITY

## 2023-11-06 RX ORDER — KETOROLAC TROMETHAMINE 15 MG/ML
15 INJECTION, SOLUTION INTRAMUSCULAR; INTRAVENOUS ONCE
Status: COMPLETED | OUTPATIENT
Start: 2023-11-06 | End: 2023-11-06

## 2023-11-06 RX ADMIN — METRONIDAZOLE 500 MG: 500 INJECTION, SOLUTION INTRAVENOUS at 16:07

## 2023-11-06 RX ADMIN — WATER 1000 MG: 1 INJECTION INTRAMUSCULAR; INTRAVENOUS; SUBCUTANEOUS at 16:00

## 2023-11-06 RX ADMIN — IOPAMIDOL 75 ML: 755 INJECTION, SOLUTION INTRAVENOUS at 14:13

## 2023-11-06 RX ADMIN — KETOROLAC TROMETHAMINE 15 MG: 15 INJECTION, SOLUTION INTRAMUSCULAR; INTRAVENOUS at 12:41

## 2023-11-06 RX ADMIN — HYDROCODONE BITARTRATE AND ACETAMINOPHEN 1 TABLET: 5; 325 TABLET ORAL at 16:01

## 2023-11-06 RX ADMIN — SODIUM CHLORIDE 1000 ML: 9 INJECTION, SOLUTION INTRAVENOUS at 12:40

## 2023-11-06 ASSESSMENT — PAIN SCALES - GENERAL
PAINLEVEL_OUTOF10: 8
PAINLEVEL_OUTOF10: 7

## 2023-11-06 ASSESSMENT — PATIENT HEALTH QUESTIONNAIRE - PHQ9
SUM OF ALL RESPONSES TO PHQ QUESTIONS 1-9: 0
SUM OF ALL RESPONSES TO PHQ9 QUESTIONS 1 & 2: 0
2. FEELING DOWN, DEPRESSED OR HOPELESS: 0
1. LITTLE INTEREST OR PLEASURE IN DOING THINGS: 0
SUM OF ALL RESPONSES TO PHQ QUESTIONS 1-9: 0

## 2023-11-06 ASSESSMENT — PAIN DESCRIPTION - LOCATION
LOCATION: ABDOMEN
LOCATION: ABDOMEN

## 2023-11-06 ASSESSMENT — PAIN DESCRIPTION - DESCRIPTORS: DESCRIPTORS: TIGHTNESS

## 2023-11-06 ASSESSMENT — PAIN DESCRIPTION - ORIENTATION
ORIENTATION: LOWER
ORIENTATION: LOWER

## 2023-11-06 ASSESSMENT — LIFESTYLE VARIABLES: HOW OFTEN DO YOU HAVE A DRINK CONTAINING ALCOHOL: NEVER

## 2023-11-06 NOTE — ED PROVIDER NOTES
Independent [facility]HE[facility] Visit. Department of Emergency Medicine   ED  Encounter Note  Admit Date/RoomTime: 2023 11:49 AM  ED Room: Internal Waiting/IntWait    NAME: Kole Sue  : 1955  MRN: [id number]     Chief Complaint:  Dysuria (Difficulty urinating x 2 days, lower abd pain)    History of Present Illness       Mehran Rousseau is a 76 y.o. old female who presents to the emergency department by private vehicle, for gradual onset cramping pain suprapubic area without radiation which began 1 day(s) prior to arrival.  There has been no similar episodes in the past.  Since onset the symptoms have been remaining constant. The pain is associated with chills, sweating, and decreased urinary output. The pain is aggravated by nothing in particular and relieved by nothing. There has been NO back pain, chest pain, shortness of breath, fever, nausea, vomiting, diarrhea, constipation, dark/black stools, blood in stool, blood in emesis, cloudy urine, urinary frequency, dysuria, hematuria, urinary urgency, urinary incontinence, vaginal discharge, vaginal itching, vaginal spotting, or vaginal bleeding. ROS   Pertinent positives and negatives are stated within HPI, all other systems reviewed and are negative. Past Medical History:  has a past medical history of Acid reflux, Atrial fibrillation ([address]), Cancer ([address]), Chronic back pain, CPAP (continuous positive airway pressure) dependence, Fibromyalgia, History of bariatric surgery, History of cardiovascular stress test, Hyperlipidemia, Hypertension, Kidney stones, Low iron, Osteoarthritis, and Unspecified sleep apnea. Surgical History has a past surgical history that includes tracheostomy (); Stanley-en-Y Gastric Bypass (2002); Cholecystectomy (); hernia repair (); Hysterectomy (); Appendectomy; Tonsillectomy; bladder suspension (); joint replacement (); Upper gastrointestinal endoscopy (); Colonoscopy ();  Bariatric Surgery CONSULT TO GENERAL SURGERY I spoke with Dr. Alexander Callahan and discussed the patient's labs and CT results. He states she may be discharged to f/u with PCP and/or himself. Procedures:   none    MDM: Patient presents to the emergency department for lower abdominal pain and also with some complaints of decreased urinary output over the last 2 days. History obtained by patient. She has no social detriments of health. Differential diagnosis includes but is not limited to acute cystitis, pyelonephritis, diverticulitis, small bowel obstruction, to name a few. Labs were obtained and are unremarkable. Urinalysis suggestive of acute cystitis, however patient has 10-20 epithelial cells and contamination is suspected. CT abdomen pelvis with IV contrast was obtained and interpreted by the radiologist as:  CT ABDOMEN PELVIS W IV CONTRAST Additional Contrast? None   Final Result   Acute sigmoid diverticulitis with evidence of a micro perforation in which   the extraluminal air is well contained within the inflammatory process in   surrounding stranding. There is some free fluid in the pelvis with no   contained fluid collection at this time to suggest an abscess. Did discuss the patient's labs and imaging with the on-call general surgeon who recommends antibiotics and discharged with close follow-up. Patient did improve her symptoms medications above. She was given the prescriptions below. Patient educated on new meds. Very strict return precautions were discussed and she should come back immediately for new or worsening symptoms. Plan of Care/Counseling:  Harinder Jay PA-C reviewed today's visit with the patient in addition to providing specific details for the plan of care and counseling regarding the diagnosis and prognosis. Questions are answered at this time and are agreeable with the plan. Assessment      1. Acute cystitis with hematuria    2.  Diverticulitis of colon      This patient's ED course included:

## 2023-11-07 LAB
MICROORGANISM SPEC CULT: ABNORMAL
SPECIMEN DESCRIPTION: ABNORMAL

## 2023-11-08 ENCOUNTER — APPOINTMENT (OUTPATIENT)
Dept: GENERAL RADIOLOGY | Age: 68
End: 2023-11-08
Payer: COMMERCIAL

## 2023-11-08 ENCOUNTER — APPOINTMENT (OUTPATIENT)
Dept: CT IMAGING | Age: 68
End: 2023-11-08
Payer: COMMERCIAL

## 2023-11-08 ENCOUNTER — TELEPHONE (OUTPATIENT)
Dept: ENT CLINIC | Age: 68
End: 2023-11-08

## 2023-11-08 ENCOUNTER — HOSPITAL ENCOUNTER (INPATIENT)
Age: 68
LOS: 6 days | Discharge: NURSING, REHAB OR LONG-TERM CARE | End: 2023-11-15
Attending: EMERGENCY MEDICINE | Admitting: INTERNAL MEDICINE
Payer: COMMERCIAL
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
## 2023-11-08 DIAGNOSIS — K57.32 DIVERTICULITIS OF COLON: Primary | ICD-10-CM

## 2023-11-08 LAB
ALBUMIN SERPL-MCNC: 4.2 G/DL (ref 3.5–5.2)
ALP SERPL-CCNC: 84 U/L (ref 35–104)
ALT SERPL-CCNC: 7 U/L (ref 0–32)
ANION GAP SERPL CALCULATED.3IONS-SCNC: 11 MMOL/L (ref 7–16)
AST SERPL-CCNC: 9 U/L (ref 0–31)
BACTERIA URNS QL MICRO: ABNORMAL
BASOPHILS # BLD: 0.04 K/UL (ref 0–0.2)
BASOPHILS NFR BLD: 1 % (ref 0–2)
BILIRUB SERPL-MCNC: 0.7 MG/DL (ref 0–1.2)
BILIRUB UR QL STRIP: ABNORMAL
BUN SERPL-MCNC: 14 MG/DL (ref 6–23)
CALCIUM SERPL-MCNC: 9.1 MG/DL (ref 8.6–10.2)
CHLORIDE SERPL-SCNC: 98 MMOL/L (ref 98–107)
CLARITY UR: CLEAR
CO2 SERPL-SCNC: 27 MMOL/L (ref 22–29)
COLOR UR: ABNORMAL
CREAT SERPL-MCNC: 0.8 MG/DL (ref 0.5–1)
EOSINOPHIL # BLD: 0.02 K/UL (ref 0.05–0.5)
EOSINOPHILS RELATIVE PERCENT: 0 % (ref 0–6)
EPI CELLS #/AREA URNS HPF: ABNORMAL /HPF
ERYTHROCYTE [DISTWIDTH] IN BLOOD BY AUTOMATED COUNT: 13.2 % (ref 11.5–15)
GFR SERPL CREATININE-BSD FRML MDRD: >60 ML/MIN/1.73M2
GLUCOSE SERPL-MCNC: 115 MG/DL (ref 74–99)
GLUCOSE UR STRIP-MCNC: NEGATIVE MG/DL
HCT VFR BLD AUTO: 29.6 % (ref 34–48)
HGB BLD-MCNC: 9.7 G/DL (ref 11.5–15.5)
HGB UR QL STRIP.AUTO: NEGATIVE
IMM GRANULOCYTES # BLD AUTO: <0.03 K/UL (ref 0–0.58)
IMM GRANULOCYTES NFR BLD: 0 % (ref 0–5)
KETONES UR STRIP-MCNC: NEGATIVE MG/DL
LACTATE BLDV-SCNC: 0.7 MMOL/L (ref 0.5–2.2)
LEUKOCYTE ESTERASE UR QL STRIP: NEGATIVE
LYMPHOCYTES NFR BLD: 1.56 K/UL (ref 1.5–4)
LYMPHOCYTES RELATIVE PERCENT: 19 % (ref 20–42)
MCH RBC QN AUTO: 29.3 PG (ref 26–35)
MCHC RBC AUTO-ENTMCNC: 32.8 G/DL (ref 32–34.5)
MCV RBC AUTO: 89.4 FL (ref 80–99.9)
MONOCYTES NFR BLD: 0.8 K/UL (ref 0.1–0.95)
MONOCYTES NFR BLD: 10 % (ref 2–12)
NEUTROPHILS NFR BLD: 71 % (ref 43–80)
NEUTS SEG NFR BLD: 5.97 K/UL (ref 1.8–7.3)
NITRITE UR QL STRIP: POSITIVE
PH UR STRIP: 5.5 [PH] (ref 5–9)
PLATELET # BLD AUTO: 236 K/UL (ref 130–450)
PMV BLD AUTO: 9.3 FL (ref 7–12)
POTASSIUM SERPL-SCNC: 3.8 MMOL/L (ref 3.5–5)
PROT SERPL-MCNC: 7.7 G/DL (ref 6.4–8.3)
PROT UR STRIP-MCNC: 30 MG/DL
RBC # BLD AUTO: 3.31 M/UL (ref 3.5–5.5)
RBC #/AREA URNS HPF: ABNORMAL /HPF
SARS-COV-2 RDRP RESP QL NAA+PROBE: NOT DETECTED
SODIUM SERPL-SCNC: 136 MMOL/L (ref 132–146)
SP GR UR STRIP: 1.02 (ref 1–1.03)
SPECIMEN DESCRIPTION: NORMAL
UROBILINOGEN UR STRIP-ACNC: 1 EU/DL (ref 0–1)
WBC #/AREA URNS HPF: ABNORMAL /HPF
WBC OTHER # BLD: 8.4 K/UL (ref 4.5–11.5)

## 2023-11-08 PROCEDURE — 73700 CT LOWER EXTREMITY W/O DYE: CPT

## 2023-11-08 PROCEDURE — 73502 X-RAY EXAM HIP UNI 2-3 VIEWS: CPT

## 2023-11-08 PROCEDURE — 87040 BLOOD CULTURE FOR BACTERIA: CPT

## 2023-11-08 PROCEDURE — 81001 URINALYSIS AUTO W/SCOPE: CPT

## 2023-11-08 PROCEDURE — 71045 X-RAY EXAM CHEST 1 VIEW: CPT

## 2023-11-08 PROCEDURE — 85025 COMPLETE CBC W/AUTO DIFF WBC: CPT

## 2023-11-08 PROCEDURE — 6360000002 HC RX W HCPCS: Performed by: EMERGENCY MEDICINE

## 2023-11-08 PROCEDURE — 73560 X-RAY EXAM OF KNEE 1 OR 2: CPT

## 2023-11-08 PROCEDURE — 96365 THER/PROPH/DIAG IV INF INIT: CPT

## 2023-11-08 PROCEDURE — 6370000000 HC RX 637 (ALT 250 FOR IP): Performed by: EMERGENCY MEDICINE

## 2023-11-08 PROCEDURE — 6360000004 HC RX CONTRAST MEDICATION: Performed by: RADIOLOGY

## 2023-11-08 PROCEDURE — 80053 COMPREHEN METABOLIC PANEL: CPT

## 2023-11-08 PROCEDURE — 99285 EMERGENCY DEPT VISIT HI MDM: CPT

## 2023-11-08 PROCEDURE — 74177 CT ABD & PELVIS W/CONTRAST: CPT

## 2023-11-08 PROCEDURE — 96375 TX/PRO/DX INJ NEW DRUG ADDON: CPT

## 2023-11-08 PROCEDURE — 83605 ASSAY OF LACTIC ACID: CPT

## 2023-11-08 PROCEDURE — 87635 SARS-COV-2 COVID-19 AMP PRB: CPT

## 2023-11-08 RX ORDER — METRONIDAZOLE 500 MG/100ML
500 INJECTION, SOLUTION INTRAVENOUS ONCE
Status: COMPLETED | OUTPATIENT
Start: 2023-11-08 | End: 2023-11-09

## 2023-11-08 RX ORDER — CIPROFLOXACIN 2 MG/ML
400 INJECTION, SOLUTION INTRAVENOUS ONCE
Status: COMPLETED | OUTPATIENT
Start: 2023-11-08 | End: 2023-11-09

## 2023-11-08 RX ORDER — HYDROMORPHONE HYDROCHLORIDE 1 MG/ML
0.25 INJECTION, SOLUTION INTRAMUSCULAR; INTRAVENOUS; SUBCUTANEOUS ONCE
Status: COMPLETED | OUTPATIENT
Start: 2023-11-08 | End: 2023-11-08

## 2023-11-08 RX ORDER — MORPHINE SULFATE 4 MG/ML
4 INJECTION, SOLUTION INTRAMUSCULAR; INTRAVENOUS ONCE
Status: DISCONTINUED | OUTPATIENT
Start: 2023-11-08 | End: 2023-11-15 | Stop reason: HOSPADM

## 2023-11-08 RX ORDER — FENTANYL CITRATE 0.05 MG/ML
50 INJECTION, SOLUTION INTRAMUSCULAR; INTRAVENOUS ONCE
Status: COMPLETED | OUTPATIENT
Start: 2023-11-08 | End: 2023-11-08

## 2023-11-08 RX ORDER — ACETAMINOPHEN 500 MG
1000 TABLET ORAL ONCE
Status: COMPLETED | OUTPATIENT
Start: 2023-11-08 | End: 2023-11-08

## 2023-11-08 RX ADMIN — METRONIDAZOLE 500 MG: 500 INJECTION, SOLUTION INTRAVENOUS at 23:10

## 2023-11-08 RX ADMIN — FENTANYL CITRATE 50 MCG: 0.05 INJECTION, SOLUTION INTRAMUSCULAR; INTRAVENOUS at 16:39

## 2023-11-08 RX ADMIN — IOPAMIDOL 75 ML: 755 INJECTION, SOLUTION INTRAVENOUS at 20:44

## 2023-11-08 RX ADMIN — ACETAMINOPHEN 1000 MG: 500 TABLET ORAL at 19:34

## 2023-11-08 RX ADMIN — HYDROMORPHONE HYDROCHLORIDE 0.25 MG: 1 INJECTION, SOLUTION INTRAMUSCULAR; INTRAVENOUS; SUBCUTANEOUS at 23:06

## 2023-11-08 ASSESSMENT — PAIN SCALES - GENERAL
PAINLEVEL_OUTOF10: 10
PAINLEVEL_OUTOF10: 9
PAINLEVEL_OUTOF10: 8

## 2023-11-08 ASSESSMENT — LIFESTYLE VARIABLES
HOW MANY STANDARD DRINKS CONTAINING ALCOHOL DO YOU HAVE ON A TYPICAL DAY: PATIENT DOES NOT DRINK
HOW OFTEN DO YOU HAVE A DRINK CONTAINING ALCOHOL: NEVER

## 2023-11-08 NOTE — TELEPHONE ENCOUNTER
Called Dr. Dontae Mckenna office and spoke to staff. I asked if pt has had a sleep study performed. I was told she was unsure. I advised staff that pt needs to be referred to [address] [place] [address] or to [facility] sleep department due to them knowing Dr. Yris Melissa for the [facility] surgery. She understood and stated she will discuss with Dr. Dontae Mckenna and send a new referral after sleep study is complete.

## 2023-11-09 PROBLEM — K57.92 ACUTE DIVERTICULITIS: Status: ACTIVE | Noted: 2023-11-09

## 2023-11-09 PROCEDURE — 6360000002 HC RX W HCPCS: Performed by: INTERNAL MEDICINE

## 2023-11-09 PROCEDURE — 1200000000 HC SEMI PRIVATE

## 2023-11-09 PROCEDURE — 99222 1ST HOSP IP/OBS MODERATE 55: CPT | Performed by: SURGERY

## 2023-11-09 PROCEDURE — 2500000003 HC RX 250 WO HCPCS: Performed by: INTERNAL MEDICINE

## 2023-11-09 PROCEDURE — 6370000000 HC RX 637 (ALT 250 FOR IP): Performed by: INTERNAL MEDICINE

## 2023-11-09 PROCEDURE — 6360000002 HC RX W HCPCS: Performed by: EMERGENCY MEDICINE

## 2023-11-09 PROCEDURE — 94640 AIRWAY INHALATION TREATMENT: CPT

## 2023-11-09 PROCEDURE — 2580000003 HC RX 258: Performed by: INTERNAL MEDICINE

## 2023-11-09 RX ORDER — BUSPIRONE HYDROCHLORIDE 5 MG/1
5 TABLET ORAL 3 TIMES DAILY
Status: DISCONTINUED | OUTPATIENT
Start: 2023-11-09 | End: 2023-11-15 | Stop reason: HOSPADM

## 2023-11-09 RX ORDER — LISINOPRIL 20 MG/1
20 TABLET ORAL DAILY
Status: DISCONTINUED | OUTPATIENT
Start: 2023-11-09 | End: 2023-11-15 | Stop reason: HOSPADM

## 2023-11-09 RX ORDER — FAMOTIDINE 20 MG/1
40 TABLET, FILM COATED ORAL NIGHTLY
Status: DISCONTINUED | OUTPATIENT
Start: 2023-11-09 | End: 2023-11-15 | Stop reason: HOSPADM

## 2023-11-09 RX ORDER — SOTALOL HYDROCHLORIDE 80 MG/1
80 TABLET ORAL 2 TIMES DAILY
Status: DISCONTINUED | OUTPATIENT
Start: 2023-11-09 | End: 2023-11-09

## 2023-11-09 RX ORDER — ANTIARTHRITIC COMBINATION NO.2 900 MG
5000 TABLET ORAL DAILY
Status: DISCONTINUED | OUTPATIENT
Start: 2023-11-09 | End: 2023-11-09

## 2023-11-09 RX ORDER — VITAMIN B COMPLEX
2 TABLET ORAL DAILY
Status: DISCONTINUED | OUTPATIENT
Start: 2023-11-09 | End: 2023-11-15 | Stop reason: HOSPADM

## 2023-11-09 RX ORDER — FUROSEMIDE 40 MG/1
40 TABLET ORAL DAILY PRN
Status: DISCONTINUED | OUTPATIENT
Start: 2023-11-09 | End: 2023-11-15 | Stop reason: HOSPADM

## 2023-11-09 RX ORDER — AMITRIPTYLINE HYDROCHLORIDE 25 MG/1
75 TABLET, FILM COATED ORAL NIGHTLY
Status: DISCONTINUED | OUTPATIENT
Start: 2023-11-09 | End: 2023-11-15 | Stop reason: HOSPADM

## 2023-11-09 RX ORDER — CIPROFLOXACIN 2 MG/ML
400 INJECTION, SOLUTION INTRAVENOUS EVERY 12 HOURS
Status: DISCONTINUED | OUTPATIENT
Start: 2023-11-09 | End: 2023-11-11

## 2023-11-09 RX ORDER — METRONIDAZOLE 500 MG/100ML
500 INJECTION, SOLUTION INTRAVENOUS EVERY 8 HOURS
Status: DISCONTINUED | OUTPATIENT
Start: 2023-11-09 | End: 2023-11-11

## 2023-11-09 RX ORDER — PANTOPRAZOLE SODIUM 40 MG/1
40 TABLET, DELAYED RELEASE ORAL
Status: DISCONTINUED | OUTPATIENT
Start: 2023-11-09 | End: 2023-11-15 | Stop reason: HOSPADM

## 2023-11-09 RX ORDER — VENLAFAXINE HYDROCHLORIDE 75 MG/1
75 CAPSULE, EXTENDED RELEASE ORAL NIGHTLY
Status: DISCONTINUED | OUTPATIENT
Start: 2023-11-09 | End: 2023-11-15 | Stop reason: HOSPADM

## 2023-11-09 RX ORDER — TIZANIDINE 4 MG/1
8 TABLET ORAL NIGHTLY
Status: DISCONTINUED | OUTPATIENT
Start: 2023-11-09 | End: 2023-11-09

## 2023-11-09 RX ORDER — HYDROCODONE BITARTRATE AND ACETAMINOPHEN 5; 325 MG/1; MG/1
1 TABLET ORAL EVERY 6 HOURS PRN
Status: DISCONTINUED | OUTPATIENT
Start: 2023-11-09 | End: 2023-11-10

## 2023-11-09 RX ORDER — MONTELUKAST SODIUM 10 MG/1
10 TABLET ORAL NIGHTLY
Status: DISCONTINUED | OUTPATIENT
Start: 2023-11-09 | End: 2023-11-15 | Stop reason: HOSPADM

## 2023-11-09 RX ORDER — ALBUTEROL SULFATE 2.5 MG/3ML
2.5 SOLUTION RESPIRATORY (INHALATION) EVERY 4 HOURS PRN
Status: DISCONTINUED | OUTPATIENT
Start: 2023-11-09 | End: 2023-11-15 | Stop reason: HOSPADM

## 2023-11-09 RX ORDER — ACETAMINOPHEN 500 MG
1000 TABLET ORAL EVERY 6 HOURS PRN
COMMUNITY

## 2023-11-09 RX ORDER — FLUTICASONE PROPIONATE 110 UG/1
1 AEROSOL, METERED RESPIRATORY (INHALATION)
Status: DISCONTINUED | OUTPATIENT
Start: 2023-11-09 | End: 2023-11-09 | Stop reason: SDUPTHER

## 2023-11-09 RX ORDER — ONDANSETRON 4 MG/1
4 TABLET, FILM COATED ORAL EVERY 8 HOURS PRN
Status: DISCONTINUED | OUTPATIENT
Start: 2023-11-09 | End: 2023-11-10

## 2023-11-09 RX ORDER — ARFORMOTEROL TARTRATE 15 UG/2ML
15 SOLUTION RESPIRATORY (INHALATION)
Status: DISCONTINUED | OUTPATIENT
Start: 2023-11-09 | End: 2023-11-15 | Stop reason: HOSPADM

## 2023-11-09 RX ORDER — BUDESONIDE 0.25 MG/2ML
0.25 INHALANT ORAL
Status: DISCONTINUED | OUTPATIENT
Start: 2023-11-09 | End: 2023-11-15 | Stop reason: HOSPADM

## 2023-11-09 RX ORDER — SODIUM CHLORIDE 450 MG/100ML
INJECTION, SOLUTION INTRAVENOUS CONTINUOUS
Status: DISCONTINUED | OUTPATIENT
Start: 2023-11-09 | End: 2023-11-10

## 2023-11-09 RX ADMIN — IPRATROPIUM BROMIDE 0.5 MG: 0.5 SOLUTION RESPIRATORY (INHALATION) at 05:24

## 2023-11-09 RX ADMIN — VENLAFAXINE HYDROCHLORIDE 75 MG: 75 CAPSULE, EXTENDED RELEASE ORAL at 20:37

## 2023-11-09 RX ADMIN — APIXABAN 5 MG: 5 TABLET, FILM COATED ORAL at 03:35

## 2023-11-09 RX ADMIN — CIPROFLOXACIN 400 MG: 400 INJECTION, SOLUTION INTRAVENOUS at 13:54

## 2023-11-09 RX ADMIN — BUSPIRONE HYDROCHLORIDE 5 MG: 5 TABLET ORAL at 20:37

## 2023-11-09 RX ADMIN — IPRATROPIUM BROMIDE 0.5 MG: 0.5 SOLUTION RESPIRATORY (INHALATION) at 14:20

## 2023-11-09 RX ADMIN — HYDROCODONE BITARTRATE AND ACETAMINOPHEN 1 TABLET: 5; 325 TABLET ORAL at 21:33

## 2023-11-09 RX ADMIN — ARFORMOTEROL TARTRATE 15 MCG: 15 SOLUTION RESPIRATORY (INHALATION) at 05:24

## 2023-11-09 RX ADMIN — HYDROCODONE BITARTRATE AND ACETAMINOPHEN 1 TABLET: 5; 325 TABLET ORAL at 15:12

## 2023-11-09 RX ADMIN — CIPROFLOXACIN 400 MG: 400 INJECTION, SOLUTION INTRAVENOUS at 00:53

## 2023-11-09 RX ADMIN — ANTI-FUNGAL POWDER MICONAZOLE NITRATE TALC FREE: 1.42 POWDER TOPICAL at 20:46

## 2023-11-09 RX ADMIN — FAMOTIDINE 40 MG: 20 TABLET, FILM COATED ORAL at 20:37

## 2023-11-09 RX ADMIN — AMITRIPTYLINE HYDROCHLORIDE 75 MG: 25 TABLET, FILM COATED ORAL at 20:37

## 2023-11-09 RX ADMIN — HYDROCODONE BITARTRATE AND ACETAMINOPHEN 1 TABLET: 5; 325 TABLET ORAL at 03:36

## 2023-11-09 RX ADMIN — PANTOPRAZOLE SODIUM 40 MG: 40 TABLET, DELAYED RELEASE ORAL at 06:49

## 2023-11-09 RX ADMIN — APIXABAN 5 MG: 5 TABLET, FILM COATED ORAL at 20:37

## 2023-11-09 RX ADMIN — LISINOPRIL 20 MG: 20 TABLET ORAL at 09:25

## 2023-11-09 RX ADMIN — SODIUM CHLORIDE: 4.5 INJECTION, SOLUTION INTRAVENOUS at 09:40

## 2023-11-09 RX ADMIN — FUROSEMIDE 40 MG: 40 TABLET ORAL at 09:26

## 2023-11-09 RX ADMIN — METRONIDAZOLE 500 MG: 500 INJECTION, SOLUTION INTRAVENOUS at 12:17

## 2023-11-09 RX ADMIN — MONTELUKAST 10 MG: 10 TABLET, FILM COATED ORAL at 20:37

## 2023-11-09 RX ADMIN — Medication 2000 UNITS: at 09:25

## 2023-11-09 RX ADMIN — BUSPIRONE HYDROCHLORIDE 5 MG: 5 TABLET ORAL at 09:26

## 2023-11-09 RX ADMIN — IPRATROPIUM BROMIDE 0.5 MG: 0.5 SOLUTION RESPIRATORY (INHALATION) at 08:39

## 2023-11-09 RX ADMIN — ONDANSETRON HYDROCHLORIDE 4 MG: 4 TABLET, FILM COATED ORAL at 15:13

## 2023-11-09 RX ADMIN — BUDESONIDE 250 MCG: 0.25 SUSPENSION RESPIRATORY (INHALATION) at 05:24

## 2023-11-09 RX ADMIN — HYDROCODONE BITARTRATE AND ACETAMINOPHEN 1 TABLET: 5; 325 TABLET ORAL at 09:22

## 2023-11-09 RX ADMIN — METRONIDAZOLE 500 MG: 500 INJECTION, SOLUTION INTRAVENOUS at 20:36

## 2023-11-09 RX ADMIN — BUSPIRONE HYDROCHLORIDE 5 MG: 5 TABLET ORAL at 13:53

## 2023-11-09 ASSESSMENT — PAIN DESCRIPTION - FREQUENCY
FREQUENCY: CONTINUOUS
FREQUENCY: CONTINUOUS

## 2023-11-09 ASSESSMENT — PAIN DESCRIPTION - ORIENTATION
ORIENTATION: LEFT
ORIENTATION: LEFT;LOWER
ORIENTATION: RIGHT
ORIENTATION: POSTERIOR

## 2023-11-09 ASSESSMENT — PAIN SCALES - GENERAL
PAINLEVEL_OUTOF10: 10
PAINLEVEL_OUTOF10: 7
PAINLEVEL_OUTOF10: 8
PAINLEVEL_OUTOF10: 10
PAINLEVEL_OUTOF10: 6
PAINLEVEL_OUTOF10: 3
PAINLEVEL_OUTOF10: 10
PAINLEVEL_OUTOF10: 7

## 2023-11-09 ASSESSMENT — PAIN DESCRIPTION - PAIN TYPE
TYPE: ACUTE PAIN
TYPE: ACUTE PAIN

## 2023-11-09 ASSESSMENT — PAIN DESCRIPTION - LOCATION
LOCATION: KNEE
LOCATION: KNEE;ABDOMEN
LOCATION: LEG;KNEE
LOCATION: KNEE

## 2023-11-09 ASSESSMENT — PAIN - FUNCTIONAL ASSESSMENT
PAIN_FUNCTIONAL_ASSESSMENT: PREVENTS OR INTERFERES SOME ACTIVE ACTIVITIES AND ADLS
PAIN_FUNCTIONAL_ASSESSMENT: PREVENTS OR INTERFERES SOME ACTIVE ACTIVITIES AND ADLS

## 2023-11-09 ASSESSMENT — PAIN DESCRIPTION - DESCRIPTORS
DESCRIPTORS: ACHING;CRAMPING;DISCOMFORT
DESCRIPTORS: CRAMPING
DESCRIPTORS: TEARING
DESCRIPTORS: ACHING;DISCOMFORT;SORE

## 2023-11-09 ASSESSMENT — PAIN SCALES - WONG BAKER: WONGBAKER_NUMERICALRESPONSE: 0

## 2023-11-09 ASSESSMENT — PAIN DESCRIPTION - ONSET: ONSET: ON-GOING

## 2023-11-09 NOTE — H&P
demonstrate no acute abnormality. No bowel dilatation is identified. Acute mid sigmoid colon diverticulitis is again identified. Previously seen extraluminal gas consistent with contained perforation has resolved. No new extraluminal gas is identified. No loculated enhancing fluid collection is seen to suggest abscess. Pelvis:  Bladder is unremarkable in appearance. There has been prior hysterectomy. No adnexal masses seen. Peritoneum/Retroperitoneum: No evidence of ascites or free air. No evidence of lymphadenopathy. Aorta is normal in caliber. Bones/Soft Tissues:  No acute abnormality of the visualized osseous structures. Acute/subacute mid sigmoid diverticulitis with interval resolution of the extraluminal gas secondary to contained perforation consistent with interval improvement. No evidence of intraperitoneal abscess or free air identified. Chronic findings are stable, as described above. CT KNEE LEFT WO CONTRAST    Result Date: 11/8/2023  EXAMINATION: CT OF THE LEFT KNEE WITHOUT CONTRAST 11/8/2023 7:08 pm TECHNIQUE: CT of the left knee was performed without the administration of intravenous contrast.  Multiplanar reformatted images are provided for review. Automated exposure control, iterative reconstruction, and/or weight based adjustment of the mA/kV was utilized to reduce the radiation dose to as low as reasonably achievable. COMPARISON: None. HISTORY ORDERING SYSTEM PROVIDED HISTORY: pain, fall TECHNOLOGIST PROVIDED HISTORY: Reason for exam:->pain, fall Decision Support Exception - unselect if not a suspected or confirmed emergency medical condition->Emergency Medical Condition (MA) FINDINGS: Presence of osteopenia visualized bones structures. Moderate advanced osteoarthritic changes are seen in all 3 compartments of the left knee more severe in the lateral compartment and in the patellar compartment. There are narrowing of the joint space, marginal spur formations.   There is a lateral Change: N/A      Inferior Lateral Ventricle Volume Change: N/A Mean hippocampal volume loss among normal elderly: 0.7% per year, (-0.3 to 1.7;  Gem 2008; also Hussein 2010). IMPRESSION: *  No evidence of an acute intracranial process or intracranial mass. *  No significant parenchymal volume loss by visual inspection or quantitative analysis *  Hippocampal volumes at the 86th percentile when compared to age matched normal controls by quantitative analysis. *  Small remote lacunar infarcts and moderate white matter disease which is nonspecific but likely reflective of chronic microvascular ischemia. Mild interval progression since 2014. *  Less than 5 parenchymal microhemorrhages by MRI. REFERENCES: White Matter Lesions:      0 = No lesions, including symmetrical, well-defined caps or bands      1 = Focal Lesions      2 = Beginning of [place]      3 = Diffuse Involvement of Entire Region Basal Ganglia Lesions:      0 = No Lesions      1 = 1 Focal Lesion (>5mm)      2 = >1 Focal Lesion (>5mm)      3 = Confluent Lesions Barbara HEATH, et al. The clinical use of structural MRI in Alzheimer disease. Nature Reviews Neurology 1;88 (2010). Gem et al. Validation of a fully automated 3D hippocampal segmentation method using subjects with Alzheimer's disease mild cognitive impairment, and elderly controls. Neuroimage  (2008). Wahlund et al. A New Rating Scale for Age-Related White Matter Changes Applicable to MRI and CT. Stroke. 64:1153 (2001). *  Asymmetry index defined as difference between left and right volumes divided by mean or [(L-R/Mean) x 100] (%). ** Age-matched reference charts measure total hippocampal volume (% of intracranial volume). See results from the analysis charts for details.  Transcribed Using Voice Recognition Transcribe Date/Time: Oct 19 2023 12:58P Dictated by: Slim Goddard MD This examination was interpreted and the report reviewed and electronically signed by: Slim Goddard MD on

## 2023-11-09 NOTE — ED PROVIDER NOTES
[address]        Pt Name: Lisa Enrique  MRN: [id number]  [address] 1955  Date of evaluation: 11/8/2023  Provider: Poncho Cyr DO  PCP: Bridgett Mcclure MD  Note Started: 10:45 PM EST 11/8/23    CHIEF COMPLAINT       Chief Complaint   Patient presents with    Fall     Pt fell last night and hit left hip and knee. Pt denies LOC, currently on thinners       HISTORY OF PRESENT ILLNESS: 1 or more Elements        Limitations to history : None    Mehran Alegria is a 76 y.o. female brought in by EMS for evaluation of left hip and knee pain after a fall. She states she was walking during the night to go the bathroom and fell. She states she lost her balance. She was initially able to get up and go back to bed. Denies head injury or LOC. She states she landed on her left hip. Denies any other injuries. Denies any numbness or tingling. Nursing Notes were all reviewed and agreed with or any disagreements were addressed in the HPI. REVIEW OF EXTERNAL NOTE :       Reviewed previous ER visit on 11/6/2023 with diagnosis of acute diverticulitis, discharged on cefdinir, metronidazole and hydrocodone. Chart Review/External Note Review    Last Echo reviewed by Me:  Lab Results   Component Value Date    LVEF 42 07/27/2022             Controlled Substance Monitoring:    Acute and Chronic Pain Monitoring:   RX Monitoring Attestation   7/1/2016   2:15 PM The Prescription Monitoring Report for this patient was reviewed today. REVIEW OF SYSTEMS :      Positives and Pertinent negatives as per HPI.      SURGICAL HISTORY     Past Surgical History:   Procedure Laterality Date    APPENDECTOMY      BARIATRIC SURGERY  2003    Noel Guidry  2005    Dr. Oscar Irby  2004    Dr. Landon Nixon pain after a fall. She states she was walking during the night to go the bathroom and fell. She states she lost her balance. She was initially able to get up and go back to bed. Denies head injury or LOC. She states she landed on her left hip. Denies any other injuries. Denies any numbness or tingling. Laboratory evaluation shows a CBC that is notable for hemoglobin 9.7 which is decreased from 11.22 days ago. CMP is notable for glucose of 115, lactic 0.7, urinalysis shows positive nitrates but 0-5 whites and 0-2 reds, COVID was negative, initial x-rays of the left hip and knee were obtained that were negative. She was then sent for a CT of the left hip and knee that were both negative for traumatic pathology. Chest x-ray was negative. The patient was then noted to be febrile. She has been on antibiotics for 2 days and remains febrile. CT of the abdomen pelvis was then performed that shows acute to subacute diverticulitis no longer containing free air consistent with a microperforation, overall improved. Discussed case with Dr. Mitchell Mcgovern and he reports the patient can be admitted to hospitalist as there is no further intervention from his standpoint. She was given IV fluids, fentanyl, Dilaudid, Cipro and Flagyl. Case was discussed with Dr. Taryn Yu will admit the patient for further evaluation and treatment    CBC is ordered to evaluate for any signs of infection or inflammation by obtaining a WBC count, or any signs of acute anemia by interpreting hemoglobin. CMP was ordered to evaluate for any electrolyte imbalances, kidney function, hepatic injury or any elevations in anion gap. Urinalysis ordered to evaluate for a UTI and/or hematuria. Lactic acid level obtained to evaluate for signs of organ hypoperfusion or ischemia. Viral swabs are ordered to evaluate possible viral etiology of symptoms.  A CT abdomen with IV contrast was ordered to evaluate for, but without limitation to, ureterolithiasis,

## 2023-11-09 NOTE — CONSULTS
General Surgery   Consult Note      Patient's Name/Date of Birth: Anna Landry [birth year]    Date: November 9, 2023     PCP: Rubén Bojorquez MD     Chief Complaint: Left knee pain, fall    HPI:   Anna Landry is a 76 y.o. female who presents for evaluation of ground-level fall, left lower extremity pain. Timing is constant, radiation to left lower leg, alleviated by rest and pain medications and started immediately following a fall yesterday and severity is moderate to severe. General surgery consulted for diverticulitis. Patient was recently seen in the hospital for an episode of lower abdominal pain. 11/6 patient underwent CT scan which was consistent with diverticulitis. She was started on oral antibiotics and discharged. She has been tolerating a regular diet, having mild constipation but her abdominal pain has resolved and she is feeling better. She is only here for a new pain in her left knee and lower extremity after her fall.      Labs were reviewed: Creatinine 0.8, lactic acid 0.7, white blood cell count 8.4      Patient Active Problem List   Diagnosis    Hip pain    Trochanteric bursitis    Shoulder pain    Rotator cuff tear    Subacromial impingement    Labral tear of shoulder    Primary osteoarthritis of left knee    Iron deficiency anemia, unspecified    Acute respiratory failure due to COVID-19 ([address])    Sepsis ([address])    Paroxysmal A-fib ([address])    Community acquired bacterial pneumonia    Acute respiratory failure with hypoxia (HCC)    NSTEMI (non-ST elevated myocardial infarction) ([address])    Acute diverticulitis       Allergies   Allergen Reactions    Morphine And Related Nausea And Vomiting    Percocet [Oxycodone-Acetaminophen] Nausea And Vomiting       Past Medical History:   Diagnosis Date    Acid reflux     Atrial fibrillation (HCC)     Cancer ([address]) 5/19/2005    Breast Cancer (Left Breast)    Chronic back pain     CPAP (continuous positive airway pressure) dependence     not using CPAP fall and resolving diverticulitis    Patient Active Problem List   Diagnosis    Hip pain    Trochanteric bursitis    Shoulder pain    Rotator cuff tear    Subacromial impingement    Labral tear of shoulder    Primary osteoarthritis of left knee    Iron deficiency anemia, unspecified    Acute respiratory failure due to COVID-19 ([address])    Sepsis (HCC)    Paroxysmal A-fib ([address])    Community acquired bacterial pneumonia    Acute respiratory failure with hypoxia (HCC)    NSTEMI (non-ST elevated myocardial infarction) ([address])    Acute diverticulitis       Plan:  -Patient is continue her current oral antibiotic regimen  -No inpatient general surgery needs  -She is to follow-up as previously instructed  -Please reach out for any questions or concerns    Selena Logan DO  PGY-2 General Surgery Resident     General Surgery Assessment/Plan [facility]/Surgical Weight Loss Joseluis Arias MD, MS, [place]    Patient's Name/Date of Birth: Kevyn Malhotra / 1955    Date: November 9, 2023     Surgeon: Juan Hodges MD    Requesting Physician: Discussed with consulting physician and nursing, family and specialists    Chief Complaint: fall, diverticulitis    Assessment/Plan:  Kevyn Malhotra is a 76 y.o. female with diverticulitis, improved on imaging and clinically, dehydration and fall, fevers  Patient Active Problem List   Diagnosis    Hip pain    Trochanteric bursitis    Shoulder pain    Rotator cuff tear    Subacromial impingement    Labral tear of shoulder    Primary osteoarthritis of left knee    Iron deficiency anemia, unspecified    Acute respiratory failure due to COVID-19 ([address])    Sepsis ([address])    Paroxysmal A-fib ([address])    Community acquired bacterial pneumonia    Acute respiratory failure with hypoxia ([address])    NSTEMI (non-ST elevated myocardial infarction) ([address])    Acute diverticulitis       Fevers seem daily and may be related to CT showing diverticulitis  IV abx for 24 hrs

## 2023-11-09 NOTE — PROGRESS NOTES
4 Eyes Skin Assessment     NAME:  Apurva Schultz  YOB: 1955  MEDICAL RECORD NUMBER:  [id number]    The patient is being assessed for  Admission    I agree that at least one RN has performed a thorough Head to Toe Skin Assessment on the patient. ALL assessment sites listed below have been assessed. Areas assessed by both nurses:    Head, Face, Ears, Shoulders, Back, Chest, Arms, Elbows, Hands, Sacrum. Buttock, Coccyx, Ischium, and Legs. Feet and Heels        Does the Patient have a Wound? Yes wound(s) were present on assessment.  LDA wound assessment was Initiated and completed by RN rash under abdominal fold       Cedrick Prevention initiated by RN: No  Wound Care Orders initiated by RN: No    Pressure Injury (Stage 3,4, Unstageable, DTI, NWPT, and Complex wounds) if present, place Wound referral order by RN under : No    New Ostomies, if present place, Ostomy referral order under : No     Nurse 1 eSignature: Electronically signed by Terence Richard RN on 11/9/23 at 5:35 AM EST    **SHARE this note so that the co-signing nurse can place an eSignature**    Nurse 2 eSignature: Electronically signed by José Valle RN on 11/9/23 at 5:38 AM EST

## 2023-11-10 ENCOUNTER — APPOINTMENT (OUTPATIENT)
Dept: GENERAL RADIOLOGY | Age: 68
End: 2023-11-10
Payer: COMMERCIAL

## 2023-11-10 LAB
ANION GAP SERPL CALCULATED.3IONS-SCNC: 9 MMOL/L (ref 7–16)
BASOPHILS # BLD: 0.03 K/UL (ref 0–0.2)
BASOPHILS NFR BLD: 0 % (ref 0–2)
BUN SERPL-MCNC: 7 MG/DL (ref 6–23)
CALCIUM SERPL-MCNC: 8.9 MG/DL (ref 8.6–10.2)
CHLORIDE SERPL-SCNC: 95 MMOL/L (ref 98–107)
CO2 SERPL-SCNC: 28 MMOL/L (ref 22–29)
CREAT SERPL-MCNC: 0.6 MG/DL (ref 0.5–1)
EOSINOPHIL # BLD: 0.03 K/UL (ref 0.05–0.5)
EOSINOPHILS RELATIVE PERCENT: 0 % (ref 0–6)
ERYTHROCYTE [DISTWIDTH] IN BLOOD BY AUTOMATED COUNT: 12.8 % (ref 11.5–15)
GFR SERPL CREATININE-BSD FRML MDRD: >60 ML/MIN/1.73M2
GLUCOSE SERPL-MCNC: 108 MG/DL (ref 74–99)
HCT VFR BLD AUTO: 30 % (ref 34–48)
HGB BLD-MCNC: 9.9 G/DL (ref 11.5–15.5)
IMM GRANULOCYTES # BLD AUTO: 0.08 K/UL (ref 0–0.58)
IMM GRANULOCYTES NFR BLD: 1 % (ref 0–5)
LYMPHOCYTES NFR BLD: 1.33 K/UL (ref 1.5–4)
LYMPHOCYTES RELATIVE PERCENT: 19 % (ref 20–42)
MCH RBC QN AUTO: 29.6 PG (ref 26–35)
MCHC RBC AUTO-ENTMCNC: 33 G/DL (ref 32–34.5)
MCV RBC AUTO: 89.8 FL (ref 80–99.9)
MONOCYTES NFR BLD: 0.75 K/UL (ref 0.1–0.95)
MONOCYTES NFR BLD: 11 % (ref 2–12)
NEUTROPHILS NFR BLD: 69 % (ref 43–80)
NEUTS SEG NFR BLD: 4.89 K/UL (ref 1.8–7.3)
PLATELET # BLD AUTO: 250 K/UL (ref 130–450)
PMV BLD AUTO: 9.2 FL (ref 7–12)
POTASSIUM SERPL-SCNC: 3.5 MMOL/L (ref 3.5–5)
RBC # BLD AUTO: 3.34 M/UL (ref 3.5–5.5)
SODIUM SERPL-SCNC: 132 MMOL/L (ref 132–146)
WBC OTHER # BLD: 7.1 K/UL (ref 4.5–11.5)

## 2023-11-10 PROCEDURE — 97530 THERAPEUTIC ACTIVITIES: CPT | Performed by: PHYSICAL THERAPIST

## 2023-11-10 PROCEDURE — 99232 SBSQ HOSP IP/OBS MODERATE 35: CPT | Performed by: SURGERY

## 2023-11-10 PROCEDURE — 85025 COMPLETE CBC W/AUTO DIFF WBC: CPT

## 2023-11-10 PROCEDURE — 6360000002 HC RX W HCPCS: Performed by: INTERNAL MEDICINE

## 2023-11-10 PROCEDURE — 36415 COLL VENOUS BLD VENIPUNCTURE: CPT

## 2023-11-10 PROCEDURE — 1200000000 HC SEMI PRIVATE

## 2023-11-10 PROCEDURE — 6370000000 HC RX 637 (ALT 250 FOR IP): Performed by: INTERNAL MEDICINE

## 2023-11-10 PROCEDURE — 80048 BASIC METABOLIC PNL TOTAL CA: CPT

## 2023-11-10 PROCEDURE — 73552 X-RAY EXAM OF FEMUR 2/>: CPT

## 2023-11-10 PROCEDURE — 97161 PT EVAL LOW COMPLEX 20 MIN: CPT | Performed by: PHYSICAL THERAPIST

## 2023-11-10 RX ORDER — ONDANSETRON 4 MG/1
4 TABLET, FILM COATED ORAL EVERY 6 HOURS PRN
Status: DISCONTINUED | OUTPATIENT
Start: 2023-11-10 | End: 2023-11-15 | Stop reason: HOSPADM

## 2023-11-10 RX ORDER — HYDROCODONE BITARTRATE AND ACETAMINOPHEN 5; 325 MG/1; MG/1
1 TABLET ORAL EVERY 4 HOURS PRN
Status: DISCONTINUED | OUTPATIENT
Start: 2023-11-10 | End: 2023-11-15 | Stop reason: HOSPADM

## 2023-11-10 RX ORDER — SODIUM CHLORIDE 450 MG/100ML
INJECTION, SOLUTION INTRAVENOUS ONCE
Status: DISCONTINUED | OUTPATIENT
Start: 2023-11-10 | End: 2023-11-15 | Stop reason: HOSPADM

## 2023-11-10 RX ADMIN — APIXABAN 5 MG: 5 TABLET, FILM COATED ORAL at 10:28

## 2023-11-10 RX ADMIN — LISINOPRIL 20 MG: 20 TABLET ORAL at 10:28

## 2023-11-10 RX ADMIN — PANTOPRAZOLE SODIUM 40 MG: 40 TABLET, DELAYED RELEASE ORAL at 06:27

## 2023-11-10 RX ADMIN — HYDROCODONE BITARTRATE AND ACETAMINOPHEN 1 TABLET: 5; 325 TABLET ORAL at 03:46

## 2023-11-10 RX ADMIN — DICLOFENAC SODIUM 4 G: 10 GEL TOPICAL at 21:04

## 2023-11-10 RX ADMIN — BUSPIRONE HYDROCHLORIDE 5 MG: 5 TABLET ORAL at 13:06

## 2023-11-10 RX ADMIN — HYDROCODONE BITARTRATE AND ACETAMINOPHEN 1 TABLET: 5; 325 TABLET ORAL at 18:48

## 2023-11-10 RX ADMIN — HYDROCODONE BITARTRATE AND ACETAMINOPHEN 1 TABLET: 5; 325 TABLET ORAL at 10:27

## 2023-11-10 RX ADMIN — HYDROCODONE BITARTRATE AND ACETAMINOPHEN 1 TABLET: 5; 325 TABLET ORAL at 22:48

## 2023-11-10 RX ADMIN — BUSPIRONE HYDROCHLORIDE 5 MG: 5 TABLET ORAL at 21:04

## 2023-11-10 RX ADMIN — VENLAFAXINE HYDROCHLORIDE 75 MG: 75 CAPSULE, EXTENDED RELEASE ORAL at 21:05

## 2023-11-10 RX ADMIN — METRONIDAZOLE 500 MG: 500 INJECTION, SOLUTION INTRAVENOUS at 19:54

## 2023-11-10 RX ADMIN — BUSPIRONE HYDROCHLORIDE 5 MG: 5 TABLET ORAL at 10:27

## 2023-11-10 RX ADMIN — AMITRIPTYLINE HYDROCHLORIDE 75 MG: 25 TABLET, FILM COATED ORAL at 21:05

## 2023-11-10 RX ADMIN — CIPROFLOXACIN 400 MG: 400 INJECTION, SOLUTION INTRAVENOUS at 13:08

## 2023-11-10 RX ADMIN — CIPROFLOXACIN 400 MG: 400 INJECTION, SOLUTION INTRAVENOUS at 00:51

## 2023-11-10 RX ADMIN — Medication 2000 UNITS: at 10:28

## 2023-11-10 RX ADMIN — METRONIDAZOLE 500 MG: 500 INJECTION, SOLUTION INTRAVENOUS at 03:45

## 2023-11-10 RX ADMIN — METRONIDAZOLE 500 MG: 500 INJECTION, SOLUTION INTRAVENOUS at 10:34

## 2023-11-10 RX ADMIN — FAMOTIDINE 40 MG: 20 TABLET, FILM COATED ORAL at 21:05

## 2023-11-10 RX ADMIN — HYDROCODONE BITARTRATE AND ACETAMINOPHEN 1 TABLET: 5; 325 TABLET ORAL at 14:49

## 2023-11-10 RX ADMIN — APIXABAN 5 MG: 5 TABLET, FILM COATED ORAL at 21:05

## 2023-11-10 RX ADMIN — ANTI-FUNGAL POWDER MICONAZOLE NITRATE TALC FREE: 1.42 POWDER TOPICAL at 10:31

## 2023-11-10 RX ADMIN — ONDANSETRON HYDROCHLORIDE 4 MG: 4 TABLET, FILM COATED ORAL at 16:40

## 2023-11-10 ASSESSMENT — PAIN DESCRIPTION - LOCATION
LOCATION: LEG;HEAD
LOCATION: LEG;KNEE
LOCATION: KNEE;LEG
LOCATION: NECK;BACK;LEG
LOCATION: KNEE

## 2023-11-10 ASSESSMENT — PAIN DESCRIPTION - ORIENTATION
ORIENTATION: LEFT

## 2023-11-10 ASSESSMENT — PAIN SCALES - GENERAL
PAINLEVEL_OUTOF10: 9
PAINLEVEL_OUTOF10: 9
PAINLEVEL_OUTOF10: 10
PAINLEVEL_OUTOF10: 5
PAINLEVEL_OUTOF10: 3
PAINLEVEL_OUTOF10: 8
PAINLEVEL_OUTOF10: 0
PAINLEVEL_OUTOF10: 4
PAINLEVEL_OUTOF10: 10

## 2023-11-10 ASSESSMENT — PAIN DESCRIPTION - DESCRIPTORS
DESCRIPTORS: THROBBING
DESCRIPTORS: SHARP;STABBING;SQUEEZING
DESCRIPTORS: ACHING

## 2023-11-10 ASSESSMENT — PAIN - FUNCTIONAL ASSESSMENT
PAIN_FUNCTIONAL_ASSESSMENT: PREVENTS OR INTERFERES SOME ACTIVE ACTIVITIES AND ADLS
PAIN_FUNCTIONAL_ASSESSMENT: PREVENTS OR INTERFERES WITH ALL ACTIVE AND SOME PASSIVE ACTIVITIES
PAIN_FUNCTIONAL_ASSESSMENT: PREVENTS OR INTERFERES WITH ALL ACTIVE AND SOME PASSIVE ACTIVITIES
PAIN_FUNCTIONAL_ASSESSMENT: PREVENTS OR INTERFERES SOME ACTIVE ACTIVITIES AND ADLS

## 2023-11-10 ASSESSMENT — PAIN DESCRIPTION - FREQUENCY: FREQUENCY: CONTINUOUS

## 2023-11-10 ASSESSMENT — PAIN DESCRIPTION - PAIN TYPE: TYPE: ACUTE PAIN

## 2023-11-10 NOTE — PROGRESS NOTES
Physical Therapy Initial Evaluation/Plan of Care    Room #:  0336/0336-01  Patient Name: Tj Rios  YOB: 1955  MRN: [id number]    Date of Service: 11/10/2023     Tentative placement recommendation: Subacute unless patient meets goals then Home Health Physical Therapy  Equipment recommendation: Patient has needed equipment       Evaluating Physical Therapist: Woodrow Addison PT Lic. #[id number]      Specific Provider Orders/Date/Referring Provider : 11/10/23 0730    PT eval and treat  Start:  11/10/23 0730,   End:  11/10/23 0730,   ONE TIME,   Standing Count:  1 Occurrences,   R         Simona, Zully Steele MD     Admitting Diagnosis:   Diverticulitis of colon [K57.32]  Acute diverticulitis [K57.92]  Fall, initial encounter [W19. XXXA]       Surgery: none  Visit Diagnoses         Codes    Diverticulitis of colon    -  Primary K57.32    Fall, initial encounter     W19. XXXA            Patient Active Problem List   Diagnosis    Hip pain    Trochanteric bursitis    Shoulder pain    Rotator cuff tear    Subacromial impingement    Labral tear of shoulder    Primary osteoarthritis of left knee    Iron deficiency anemia, unspecified    Acute respiratory failure due to COVID-19 (HCC)    Sepsis (HCC)    Paroxysmal A-fib ([address])    Community acquired bacterial pneumonia    Acute respiratory failure with hypoxia (HCC)    NSTEMI (non-ST elevated myocardial infarction) ([address])    Acute diverticulitis        ASSESSMENT of Current Deficits Patient exhibits decreased strength, balance, endurance, and pain LLE  impairing functional mobility, transfers, gait , gait distance, tolerance to activity, and participation . Patient was slow and presented strength and endurance deficits and severe LLE pain  that limited his/her ability to ambulate . Patient requires continued skilled physical therapy to address concerns listed above for increased safety and function at discharge, and for decreased future fall risk.   Pt unable to sit Cancer - Left Breast    CHOLECYSTECTOMY  2004    Dr. Cat Mauricio  2008    ECHO COMPL W DOP COLOR FLOW  8/10/2012         HERNIA REPAIR  2004    Dr. Nic Garcia Left 11/4/2021    LEFT INGUINAL HERNIA REPAIR WITH MESH, POSSIBLE BILATERAL,  LAPAROSCOPIC ROBOTIC XI performed by Ирина Mccullough MD at [address] ([facility])  1988    Dr. Fred Runner [place] [address]    Right Knee    NECK SURGERY  06/2015    f[facility] 4,5,6- screws[facility]    ASHOK-EN-Y GASTRIC BYPASS  04/17/2002    Dr. Rachel Rodrigues RYGB    SHOULDER ARTHROSCOPY  2/17/12    LEFT REPAIR ROTATOR CUFF TEAR SUBACROMIAL [address] - Patient states that this was due to her weight prior to [address] [address]   2009    UVULOPALATOPHARYGOPLASTY         SUBJECTIVE:    Precautions: Up with assistance, falls ,      Social history: Patient lives alone in a apartment     with elevator  to enter home. Tub shower       Equipment owned: Rollator,       [facility] Mobility - Inpatient   How much help is needed turning from your back to your side while in a flat bed without using bedrails?: A Little  How much help is needed moving from lying on your back to sitting on the side of a flat bed without using bedrails?: A Lot  How much help is needed moving to and from a bed to a chair?: Total  How much help is needed standing up from a chair using your arms?: Total  How much help is needed walking in hospital room?: Total  How much help is needed climbing 3-5 steps with a railing?: Total  AM-PAC Inpatient Mobility Raw Score : 9  AM-PAC Inpatient T-Scale Score : 30.55  Mobility Inpatient CMS 0-100% Score: 81.38  Mobility Inpatient CMS G-Code Modifier : CM    Nursing cleared patient for PT evaluation.

## 2023-11-10 NOTE — CARE COORDINATION
Case Management Assessment  Initial Evaluation    Date/Time of Evaluation: 11/10/2023 1:44 PM  Assessment Completed by: Vida Watson RN    If patient is discharged prior to next notation, then this note serves as note for discharge by case management. Patient Name: Oleg Ventura                   YOB: 1955  Diagnosis: Diverticulitis of colon [K57.32]  Acute diverticulitis [K57.92]  Fall, initial encounter [W19. XXXA]                   Date / Time: 11/8/2023  3:20 PM    Patient Admission Status: Inpatient   Readmission Risk (Low < 19, Mod (19-27), High > 27): Readmission Risk Score: 14.5    Current PCP: Noel Pérez MD  PCP verified by CM? Yes    Chart Reviewed: Yes      History Provided by: Patient  Patient Orientation: Alert and Oriented, Person, Place, Situation    Patient Cognition: Alert    Hospitalization in the last 30 days (Readmission):  No    If yes, Readmission Assessment in  Navigator will be completed. Advance Directives:      Code Status: Full Code   Patient's Primary Decision Maker is: Named in Scanned ACP Document    Primary Decision MakerTeresa Cargo - Child - [phone]    Secondary Decision Maker: Lelo Farias [address] [facility] - [phone]    Supplemental (Other) Decision Maker: Junius Claude [facility] - [phone]    Discharge Planning:    Patient lives with: Alone Type of Home: House  Primary Care Giver: Self  Patient Support Systems include: Children   Current Financial resources:  Medicare/Medicaid  Current community resources:    Current services prior to admission: None            Current DME:  rollator            Type of Home Care services:  None    ADLS  Prior functional level:  Independent in ADLs/IADLs  Current functional level: Mobility, Toileting, Dressing, Bathing, Shopping, Housework, Cooking, Assistance with the following:    PT AM-PAC: 9 /24  OT AM-PAC:   /24    Family can provide assistance at DC: No  Would you like Case

## 2023-11-10 NOTE — PLAN OF CARE
Problem: Discharge Planning  Goal: Discharge to home or other facility with appropriate resources  11/10/2023 0308 by Marissa Urbina RN  Outcome: Progressing  11/9/2023 1600 by Sandoval Mackey RN  Outcome: Progressing     Problem: Safety - Adult  Goal: Free from fall injury  11/10/2023 0308 by Marissa Urbina RN  Outcome: Progressing  11/9/2023 1600 by Sandoval Mackey RN  Outcome: Progressing     Problem: ABCDS Injury Assessment  Goal: Absence of physical injury  11/10/2023 0308 by Marissa Urbina RN  Outcome: Progressing  11/9/2023 1600 by Sandoval Mackey RN  Outcome: Progressing     Problem: Pain  Goal: Verbalizes/displays adequate comfort level or baseline comfort level  Outcome: Progressing     Problem: Skin/Tissue Integrity  Goal: Absence of new skin breakdown  Description: 1. Monitor for areas of redness and/or skin breakdown  2. Assess vascular access sites hourly  3. Every 4-6 hours minimum:  Change oxygen saturation probe site  4. Every 4-6 hours:  If on nasal continuous positive airway pressure, respiratory therapy assess nares and determine need for appliance change or resting period.   Outcome: Progressing

## 2023-11-11 PROCEDURE — 6360000002 HC RX W HCPCS: Performed by: INTERNAL MEDICINE

## 2023-11-11 PROCEDURE — 1200000000 HC SEMI PRIVATE

## 2023-11-11 PROCEDURE — 94640 AIRWAY INHALATION TREATMENT: CPT

## 2023-11-11 PROCEDURE — 6370000000 HC RX 637 (ALT 250 FOR IP): Performed by: INTERNAL MEDICINE

## 2023-11-11 PROCEDURE — 99231 SBSQ HOSP IP/OBS SF/LOW 25: CPT | Performed by: ORTHOPAEDIC SURGERY

## 2023-11-11 RX ORDER — TIZANIDINE 4 MG/1
8 TABLET ORAL NIGHTLY
Status: DISCONTINUED | OUTPATIENT
Start: 2023-11-11 | End: 2023-11-15 | Stop reason: HOSPADM

## 2023-11-11 RX ORDER — METRONIDAZOLE 500 MG/1
500 TABLET ORAL EVERY 8 HOURS SCHEDULED
Status: DISCONTINUED | OUTPATIENT
Start: 2023-11-11 | End: 2023-11-15 | Stop reason: HOSPADM

## 2023-11-11 RX ORDER — LORAZEPAM 0.5 MG/1
0.5 TABLET ORAL ONCE
Status: COMPLETED | OUTPATIENT
Start: 2023-11-12 | End: 2023-11-12

## 2023-11-11 RX ORDER — CIPROFLOXACIN 500 MG/1
500 TABLET, FILM COATED ORAL EVERY 12 HOURS SCHEDULED
Status: DISCONTINUED | OUTPATIENT
Start: 2023-11-11 | End: 2023-11-15 | Stop reason: HOSPADM

## 2023-11-11 RX ORDER — POLYETHYLENE GLYCOL 3350 17 G/17G
17 POWDER, FOR SOLUTION ORAL DAILY
Status: DISCONTINUED | OUTPATIENT
Start: 2023-11-11 | End: 2023-11-13

## 2023-11-11 RX ADMIN — CIPROFLOXACIN 400 MG: 400 INJECTION, SOLUTION INTRAVENOUS at 00:49

## 2023-11-11 RX ADMIN — HYDROCODONE BITARTRATE AND ACETAMINOPHEN 1 TABLET: 5; 325 TABLET ORAL at 21:21

## 2023-11-11 RX ADMIN — BUSPIRONE HYDROCHLORIDE 5 MG: 5 TABLET ORAL at 08:53

## 2023-11-11 RX ADMIN — BUSPIRONE HYDROCHLORIDE 5 MG: 5 TABLET ORAL at 21:22

## 2023-11-11 RX ADMIN — FUROSEMIDE 40 MG: 40 TABLET ORAL at 08:49

## 2023-11-11 RX ADMIN — ARFORMOTEROL TARTRATE 15 MCG: 15 SOLUTION RESPIRATORY (INHALATION) at 17:28

## 2023-11-11 RX ADMIN — FAMOTIDINE 40 MG: 20 TABLET, FILM COATED ORAL at 21:21

## 2023-11-11 RX ADMIN — IPRATROPIUM BROMIDE 0.5 MG: 0.5 SOLUTION RESPIRATORY (INHALATION) at 14:34

## 2023-11-11 RX ADMIN — METRONIDAZOLE 500 MG: 500 TABLET ORAL at 21:22

## 2023-11-11 RX ADMIN — HYDROCODONE BITARTRATE AND ACETAMINOPHEN 1 TABLET: 5; 325 TABLET ORAL at 04:21

## 2023-11-11 RX ADMIN — POLYETHYLENE GLYCOL 3350 17 G: 17 POWDER, FOR SOLUTION ORAL at 18:25

## 2023-11-11 RX ADMIN — Medication 2000 UNITS: at 08:53

## 2023-11-11 RX ADMIN — HYDROCODONE BITARTRATE AND ACETAMINOPHEN 1 TABLET: 5; 325 TABLET ORAL at 08:52

## 2023-11-11 RX ADMIN — APIXABAN 5 MG: 5 TABLET, FILM COATED ORAL at 21:22

## 2023-11-11 RX ADMIN — VENLAFAXINE HYDROCHLORIDE 75 MG: 75 CAPSULE, EXTENDED RELEASE ORAL at 21:21

## 2023-11-11 RX ADMIN — IPRATROPIUM BROMIDE 0.5 MG: 0.5 SOLUTION RESPIRATORY (INHALATION) at 09:01

## 2023-11-11 RX ADMIN — CIPROFLOXACIN 500 MG: 500 TABLET, FILM COATED ORAL at 21:22

## 2023-11-11 RX ADMIN — METRONIDAZOLE 500 MG: 500 INJECTION, SOLUTION INTRAVENOUS at 04:17

## 2023-11-11 RX ADMIN — METRONIDAZOLE 500 MG: 500 INJECTION, SOLUTION INTRAVENOUS at 12:01

## 2023-11-11 RX ADMIN — APIXABAN 5 MG: 5 TABLET, FILM COATED ORAL at 08:53

## 2023-11-11 RX ADMIN — PANTOPRAZOLE SODIUM 40 MG: 40 TABLET, DELAYED RELEASE ORAL at 06:40

## 2023-11-11 RX ADMIN — HYDROCODONE BITARTRATE AND ACETAMINOPHEN 1 TABLET: 5; 325 TABLET ORAL at 15:56

## 2023-11-11 RX ADMIN — IPRATROPIUM BROMIDE 0.5 MG: 0.5 SOLUTION RESPIRATORY (INHALATION) at 17:29

## 2023-11-11 RX ADMIN — DICLOFENAC SODIUM 4 G: 10 GEL TOPICAL at 08:54

## 2023-11-11 RX ADMIN — CIPROFLOXACIN 400 MG: 400 INJECTION, SOLUTION INTRAVENOUS at 13:35

## 2023-11-11 RX ADMIN — BUDESONIDE 250 MCG: 0.25 SUSPENSION RESPIRATORY (INHALATION) at 17:29

## 2023-11-11 RX ADMIN — ANTI-FUNGAL POWDER MICONAZOLE NITRATE TALC FREE: 1.42 POWDER TOPICAL at 08:54

## 2023-11-11 RX ADMIN — LISINOPRIL 20 MG: 20 TABLET ORAL at 08:53

## 2023-11-11 RX ADMIN — AMITRIPTYLINE HYDROCHLORIDE 75 MG: 25 TABLET, FILM COATED ORAL at 21:22

## 2023-11-11 RX ADMIN — TIZANIDINE 8 MG: 4 TABLET ORAL at 21:21

## 2023-11-11 RX ADMIN — ANTI-FUNGAL POWDER MICONAZOLE NITRATE TALC FREE: 1.42 POWDER TOPICAL at 21:24

## 2023-11-11 RX ADMIN — BUSPIRONE HYDROCHLORIDE 5 MG: 5 TABLET ORAL at 15:56

## 2023-11-11 ASSESSMENT — PAIN SCALES - GENERAL
PAINLEVEL_OUTOF10: 10
PAINLEVEL_OUTOF10: 6
PAINLEVEL_OUTOF10: 8
PAINLEVEL_OUTOF10: 7
PAINLEVEL_OUTOF10: 10
PAINLEVEL_OUTOF10: 6

## 2023-11-11 ASSESSMENT — PAIN DESCRIPTION - ORIENTATION
ORIENTATION: LEFT

## 2023-11-11 ASSESSMENT — PAIN DESCRIPTION - LOCATION
LOCATION: KNEE
LOCATION: KNEE
LOCATION: NECK
LOCATION: KNEE

## 2023-11-11 ASSESSMENT — PAIN DESCRIPTION - DESCRIPTORS
DESCRIPTORS: ACHING;DISCOMFORT;SORE
DESCRIPTORS: ACHING;HEAVINESS;THROBBING

## 2023-11-11 ASSESSMENT — PAIN DESCRIPTION - FREQUENCY: FREQUENCY: INTERMITTENT

## 2023-11-11 ASSESSMENT — PAIN DESCRIPTION - PAIN TYPE: TYPE: CHRONIC PAIN

## 2023-11-11 ASSESSMENT — PAIN DESCRIPTION - ONSET: ONSET: ON-GOING

## 2023-11-11 NOTE — PLAN OF CARE
Problem: Discharge Planning  Goal: Discharge to home or other facility with appropriate resources  Outcome: Progressing     Problem: Safety - Adult  Goal: Free from fall injury  Outcome: Progressing     Problem: ABCDS Injury Assessment  Goal: Absence of physical injury  Outcome: Progressing     Problem: Pain  Goal: Verbalizes/displays adequate comfort level or baseline comfort level  Outcome: Progressing     Problem: Skin/Tissue Integrity  Goal: Absence of new skin breakdown  Outcome: Progressing

## 2023-11-11 NOTE — PROGRESS NOTES
Patient's cardiac monitor was ordered and placed by Dr. Celestino Pisano at the [facility], phone #[phone]. Attempted to reach physician regarding removal of the cardiac monitor for MRI but office is closed during the weekend. Monitor can be removed for MRI per Dr. Jenny Hummel. Dr. Sg Peterson office will need to be notified on Monday regarding a new monitor. Patient updated on situation. MRI notified.

## 2023-11-11 NOTE — CONSULTS
Department of Orthopedic Surgery  Resident Consult Note          Reason for Consult: Left hip pain, left knee pain    HISTORY OF PRESENT ILLNESS:       Patient is a 76 y.o. female who presents with left hip and left knee pain. Patient stated that they fell on Wednesday 11/8. They noticed immediate pain to the left hip and left knee. Patient stated that they tried to ambulate but were unable to and pain became unbearable. Patient then presented to [facility]. Orthopedics was then consulted for management of left hip and left knee pain. Upon initial presentation patient is resting comfortably in bed. Patient does state that they have left hip pain that starts on the outside of the left hip and radiates into the groin slightly. Patient also states that they have left knee pain that hurts anytime a try to actively flex or extend at the knee. Patient states they have been unable to walk since the time of the injury and have not walked since coming into the hospital.  Patient does deny any new numbness or tingling down the left lower extremity. Patient denies any other orthopedic complaints at this time.     Past Medical History:        Diagnosis Date    Acid reflux     Atrial fibrillation (HCC)     Cancer (HCC) 5/19/2005    Breast Cancer (Left Breast)    Chronic back pain     CPAP (continuous positive airway pressure) dependence     not using CPAP    Fibromyalgia     History of bariatric surgery 04/17/2002    Dr. Tad Stevens RYGB    History of cardiovascular stress test 8/10/2012    LEXISCAN    Hyperlipidemia     Hypertension     Kidney stones     Low iron     Osteoarthritis     Unspecified sleep apnea     Patient does not use C-Pap      Past Surgical History:        Procedure Laterality Date    APPENDECTOMY      BARIATRIC SURGERY  2003    [address] [facility]  2005    Dr. Alvin Holter 05:30 AM    HGB 9.9 11/10/2023 05:30 AM    HCT 30.0 11/10/2023 05:30 AM    MCV 89.8 11/10/2023 05:30 AM    MCH 29.6 11/10/2023 05:30 AM    MCHC 33.0 11/10/2023 05:30 AM    RDW 12.8 11/10/2023 05:30 AM     11/10/2023 05:30 AM    MPV 9.2 11/10/2023 05:30 AM     PT/INR:    Lab Results   Component Value Date/Time    PROTIME 18.0 07/26/2022 07:10 AM    PROTIME 12.5 11/09/2010 04:30 PM    INR 1.6 07/26/2022 07:10 AM       Radiology Review:  X-ray left hip: Demonstrates no acute fractures or abnormalities    CT left hip: Demonstrates mild arthritic changes, no acute fractures or abnormalities. X-ray left knee: Demonstrates osteoarthritic changes with no acute fractures or acute changes. CT left knee: Demonstrates same noted above osteoarthritic changes to the left knee with no acute fractures. MRI left hip: Pending    IMPRESSION:  Left hip pain after fall  Left knee pain after fall    PLAN:  Plan discussed with patient all patient's questions answered to their satisfaction  MRI left hip-pending  PT/OT recs appreciated  Medical management appreciated  Patient is weightbearing as tolerated to left lower extremity once MRI of left hip is done and if it shows no fractures to the left hip  Plan: MRI pending, orthopedics will continue to follow and provide recommendations following MRI. D/W attending  I was present with the resident during the history and exam. I discussed the case with the resident and agree with the findings and plan as documented in the resident's note.      Electronically signed by Yuly Che DO on 11/11/2023 at 9:00 AM

## 2023-11-12 ENCOUNTER — APPOINTMENT (OUTPATIENT)
Dept: MRI IMAGING | Age: 68
End: 2023-11-12
Payer: COMMERCIAL

## 2023-11-12 PROCEDURE — 6360000002 HC RX W HCPCS: Performed by: INTERNAL MEDICINE

## 2023-11-12 PROCEDURE — 94640 AIRWAY INHALATION TREATMENT: CPT

## 2023-11-12 PROCEDURE — 73721 MRI JNT OF LWR EXTRE W/O DYE: CPT

## 2023-11-12 PROCEDURE — 1200000000 HC SEMI PRIVATE

## 2023-11-12 PROCEDURE — 6370000000 HC RX 637 (ALT 250 FOR IP): Performed by: INTERNAL MEDICINE

## 2023-11-12 RX ORDER — HYDROCODONE BITARTRATE AND ACETAMINOPHEN 5; 325 MG/1; MG/1
1 TABLET ORAL EVERY 6 HOURS PRN
Qty: 20 TABLET | Refills: 0 | Status: SHIPPED | DISCHARGE
Start: 2023-11-12 | End: 2023-11-17

## 2023-11-12 RX ADMIN — METRONIDAZOLE 500 MG: 500 TABLET ORAL at 06:06

## 2023-11-12 RX ADMIN — VENLAFAXINE HYDROCHLORIDE 75 MG: 75 CAPSULE, EXTENDED RELEASE ORAL at 20:35

## 2023-11-12 RX ADMIN — BUSPIRONE HYDROCHLORIDE 5 MG: 5 TABLET ORAL at 20:36

## 2023-11-12 RX ADMIN — Medication 2000 UNITS: at 09:09

## 2023-11-12 RX ADMIN — TIZANIDINE 8 MG: 4 TABLET ORAL at 20:34

## 2023-11-12 RX ADMIN — POLYETHYLENE GLYCOL 3350 17 G: 17 POWDER, FOR SOLUTION ORAL at 09:12

## 2023-11-12 RX ADMIN — MONTELUKAST 10 MG: 10 TABLET, FILM COATED ORAL at 20:35

## 2023-11-12 RX ADMIN — DICLOFENAC SODIUM 4 G: 10 GEL TOPICAL at 11:50

## 2023-11-12 RX ADMIN — AMITRIPTYLINE HYDROCHLORIDE 75 MG: 25 TABLET, FILM COATED ORAL at 20:34

## 2023-11-12 RX ADMIN — METRONIDAZOLE 500 MG: 500 TABLET ORAL at 14:25

## 2023-11-12 RX ADMIN — BUDESONIDE 250 MCG: 0.25 SUSPENSION RESPIRATORY (INHALATION) at 16:21

## 2023-11-12 RX ADMIN — FAMOTIDINE 40 MG: 20 TABLET, FILM COATED ORAL at 20:33

## 2023-11-12 RX ADMIN — METRONIDAZOLE 500 MG: 500 TABLET ORAL at 20:35

## 2023-11-12 RX ADMIN — APIXABAN 5 MG: 5 TABLET, FILM COATED ORAL at 09:09

## 2023-11-12 RX ADMIN — LORAZEPAM 0.5 MG: 0.5 TABLET ORAL at 06:46

## 2023-11-12 RX ADMIN — ANTI-FUNGAL POWDER MICONAZOLE NITRATE TALC FREE: 1.42 POWDER TOPICAL at 09:14

## 2023-11-12 RX ADMIN — IPRATROPIUM BROMIDE 0.5 MG: 0.5 SOLUTION RESPIRATORY (INHALATION) at 16:20

## 2023-11-12 RX ADMIN — CIPROFLOXACIN 500 MG: 500 TABLET, FILM COATED ORAL at 09:09

## 2023-11-12 RX ADMIN — ARFORMOTEROL TARTRATE 15 MCG: 15 SOLUTION RESPIRATORY (INHALATION) at 16:21

## 2023-11-12 RX ADMIN — HYDROCODONE BITARTRATE AND ACETAMINOPHEN 1 TABLET: 5; 325 TABLET ORAL at 01:33

## 2023-11-12 RX ADMIN — ANTI-FUNGAL POWDER MICONAZOLE NITRATE TALC FREE: 1.42 POWDER TOPICAL at 20:38

## 2023-11-12 RX ADMIN — HYDROCODONE BITARTRATE AND ACETAMINOPHEN 1 TABLET: 5; 325 TABLET ORAL at 20:34

## 2023-11-12 RX ADMIN — PANTOPRAZOLE SODIUM 40 MG: 40 TABLET, DELAYED RELEASE ORAL at 06:06

## 2023-11-12 RX ADMIN — CIPROFLOXACIN 500 MG: 500 TABLET, FILM COATED ORAL at 20:36

## 2023-11-12 RX ADMIN — BUSPIRONE HYDROCHLORIDE 5 MG: 5 TABLET ORAL at 09:09

## 2023-11-12 RX ADMIN — ONDANSETRON HYDROCHLORIDE 4 MG: 4 TABLET, FILM COATED ORAL at 19:39

## 2023-11-12 RX ADMIN — DICLOFENAC SODIUM 4 G: 10 GEL TOPICAL at 20:37

## 2023-11-12 RX ADMIN — IPRATROPIUM BROMIDE 0.5 MG: 0.5 SOLUTION RESPIRATORY (INHALATION) at 13:01

## 2023-11-12 RX ADMIN — APIXABAN 5 MG: 5 TABLET, FILM COATED ORAL at 20:35

## 2023-11-12 ASSESSMENT — PAIN DESCRIPTION - LOCATION: LOCATION: NECK

## 2023-11-12 ASSESSMENT — PAIN DESCRIPTION - DESCRIPTORS: DESCRIPTORS: TENDER;SORE;ACHING

## 2023-11-12 ASSESSMENT — PAIN SCALES - GENERAL: PAINLEVEL_OUTOF10: 8

## 2023-11-12 NOTE — PROGRESS NOTES
[facility] regarding MRI results. Representative stated that she would have the Radiologist read the imaging as soon as possible.

## 2023-11-12 NOTE — PROGRESS NOTES
Date:2023  Patient Name: Caron Ricketts  MRN: [id number]  : 1955  ROOM #: [id number]    Occupational Therapy order received, chart reviewed and evaluation attempted this date. Patient is unavailable for OT evaluation due to awaiting MRI results. Orthopedics states patient is weightbearing as tolerated to left lower extremity once MRI of left hip is done and if it shows no fractures to the left hip. Will attempt OT evaluation at a later time. Thank you.    Vesna Gotti OTR/L #[id number]

## 2023-11-12 NOTE — PLAN OF CARE
Problem: Discharge Planning  Goal: Discharge to home or other facility with appropriate resources  11/11/2023 2251 by Andres Matos RN  Outcome: Progressing     Problem: Safety - Adult  Goal: Free from fall injury  11/11/2023 2251 by Andres Matos RN  Outcome: Progressing     Problem: ABCDS Injury Assessment  Goal: Absence of physical injury  11/11/2023 2251 by Andres Matos RN  Outcome: Progressing     Problem: Pain  Goal: Verbalizes/displays adequate comfort level or baseline comfort level  11/11/2023 2251 by Andres Matos RN  Outcome: Progressing     Problem: Skin/Tissue Integrity  Goal: Absence of new skin breakdown  Description: 1. Monitor for areas of redness and/or skin breakdown  2. Assess vascular access sites hourly  3. Every 4-6 hours minimum:  Change oxygen saturation probe site  4. Every 4-6 hours:  If on nasal continuous positive airway pressure, respiratory therapy assess nares and determine need for appliance change or resting period.   11/11/2023 2251 by Andres Matos RN  Outcome: Progressing

## 2023-11-13 PROCEDURE — 6370000000 HC RX 637 (ALT 250 FOR IP): Performed by: INTERNAL MEDICINE

## 2023-11-13 PROCEDURE — 97535 SELF CARE MNGMENT TRAINING: CPT

## 2023-11-13 PROCEDURE — 97110 THERAPEUTIC EXERCISES: CPT

## 2023-11-13 PROCEDURE — 97530 THERAPEUTIC ACTIVITIES: CPT

## 2023-11-13 PROCEDURE — 94640 AIRWAY INHALATION TREATMENT: CPT

## 2023-11-13 PROCEDURE — 6360000002 HC RX W HCPCS: Performed by: INTERNAL MEDICINE

## 2023-11-13 PROCEDURE — 1200000000 HC SEMI PRIVATE

## 2023-11-13 PROCEDURE — 97165 OT EVAL LOW COMPLEX 30 MIN: CPT

## 2023-11-13 RX ORDER — POLYETHYLENE GLYCOL 3350 17 G/17G
17 POWDER, FOR SOLUTION ORAL 2 TIMES DAILY
Status: DISCONTINUED | OUTPATIENT
Start: 2023-11-13 | End: 2023-11-15 | Stop reason: HOSPADM

## 2023-11-13 RX ADMIN — BUSPIRONE HYDROCHLORIDE 5 MG: 5 TABLET ORAL at 20:03

## 2023-11-13 RX ADMIN — BUDESONIDE 250 MCG: 0.25 SUSPENSION RESPIRATORY (INHALATION) at 05:42

## 2023-11-13 RX ADMIN — CIPROFLOXACIN 500 MG: 500 TABLET, FILM COATED ORAL at 20:03

## 2023-11-13 RX ADMIN — METRONIDAZOLE 500 MG: 500 TABLET ORAL at 13:58

## 2023-11-13 RX ADMIN — ARFORMOTEROL TARTRATE 15 MCG: 15 SOLUTION RESPIRATORY (INHALATION) at 18:41

## 2023-11-13 RX ADMIN — HYDROCODONE BITARTRATE AND ACETAMINOPHEN 1 TABLET: 5; 325 TABLET ORAL at 13:58

## 2023-11-13 RX ADMIN — METRONIDAZOLE 500 MG: 500 TABLET ORAL at 22:26

## 2023-11-13 RX ADMIN — MONTELUKAST 10 MG: 10 TABLET, FILM COATED ORAL at 20:03

## 2023-11-13 RX ADMIN — TIZANIDINE 8 MG: 4 TABLET ORAL at 20:05

## 2023-11-13 RX ADMIN — HYDROCODONE BITARTRATE AND ACETAMINOPHEN 1 TABLET: 5; 325 TABLET ORAL at 06:25

## 2023-11-13 RX ADMIN — BUDESONIDE 250 MCG: 0.25 SUSPENSION RESPIRATORY (INHALATION) at 18:41

## 2023-11-13 RX ADMIN — ANTI-FUNGAL POWDER MICONAZOLE NITRATE TALC FREE: 1.42 POWDER TOPICAL at 20:07

## 2023-11-13 RX ADMIN — PANTOPRAZOLE SODIUM 40 MG: 40 TABLET, DELAYED RELEASE ORAL at 06:25

## 2023-11-13 RX ADMIN — ARFORMOTEROL TARTRATE 15 MCG: 15 SOLUTION RESPIRATORY (INHALATION) at 05:41

## 2023-11-13 RX ADMIN — IPRATROPIUM BROMIDE 0.5 MG: 0.5 SOLUTION RESPIRATORY (INHALATION) at 13:20

## 2023-11-13 RX ADMIN — LISINOPRIL 20 MG: 20 TABLET ORAL at 07:51

## 2023-11-13 RX ADMIN — ALBUTEROL SULFATE 2.5 MG: 2.5 SOLUTION RESPIRATORY (INHALATION) at 05:42

## 2023-11-13 RX ADMIN — AMITRIPTYLINE HYDROCHLORIDE 75 MG: 25 TABLET, FILM COATED ORAL at 20:03

## 2023-11-13 RX ADMIN — APIXABAN 5 MG: 5 TABLET, FILM COATED ORAL at 07:51

## 2023-11-13 RX ADMIN — METRONIDAZOLE 500 MG: 500 TABLET ORAL at 06:25

## 2023-11-13 RX ADMIN — IPRATROPIUM BROMIDE 0.5 MG: 0.5 SOLUTION RESPIRATORY (INHALATION) at 05:42

## 2023-11-13 RX ADMIN — IPRATROPIUM BROMIDE 0.5 MG: 0.5 SOLUTION RESPIRATORY (INHALATION) at 09:34

## 2023-11-13 RX ADMIN — Medication 2000 UNITS: at 07:51

## 2023-11-13 RX ADMIN — POLYETHYLENE GLYCOL 3350 17 G: 17 POWDER, FOR SOLUTION ORAL at 20:05

## 2023-11-13 RX ADMIN — FAMOTIDINE 40 MG: 20 TABLET, FILM COATED ORAL at 20:04

## 2023-11-13 RX ADMIN — POLYETHYLENE GLYCOL 3350 17 G: 17 POWDER, FOR SOLUTION ORAL at 07:52

## 2023-11-13 RX ADMIN — CIPROFLOXACIN 500 MG: 500 TABLET, FILM COATED ORAL at 07:51

## 2023-11-13 RX ADMIN — APIXABAN 5 MG: 5 TABLET, FILM COATED ORAL at 20:04

## 2023-11-13 RX ADMIN — DICLOFENAC SODIUM 4 G: 10 GEL TOPICAL at 07:55

## 2023-11-13 RX ADMIN — VENLAFAXINE HYDROCHLORIDE 75 MG: 75 CAPSULE, EXTENDED RELEASE ORAL at 20:04

## 2023-11-13 RX ADMIN — ANTI-FUNGAL POWDER MICONAZOLE NITRATE TALC FREE: 1.42 POWDER TOPICAL at 07:55

## 2023-11-13 RX ADMIN — DICLOFENAC SODIUM 4 G: 10 GEL TOPICAL at 20:06

## 2023-11-13 RX ADMIN — IPRATROPIUM BROMIDE 0.5 MG: 0.5 SOLUTION RESPIRATORY (INHALATION) at 18:41

## 2023-11-13 RX ADMIN — BUSPIRONE HYDROCHLORIDE 5 MG: 5 TABLET ORAL at 13:58

## 2023-11-13 RX ADMIN — HYDROCODONE BITARTRATE AND ACETAMINOPHEN 1 TABLET: 5; 325 TABLET ORAL at 18:19

## 2023-11-13 RX ADMIN — BUSPIRONE HYDROCHLORIDE 5 MG: 5 TABLET ORAL at 07:51

## 2023-11-13 ASSESSMENT — PAIN SCALES - WONG BAKER
WONGBAKER_NUMERICALRESPONSE: 0
WONGBAKER_NUMERICALRESPONSE: 0

## 2023-11-13 ASSESSMENT — PAIN SCALES - GENERAL
PAINLEVEL_OUTOF10: 6
PAINLEVEL_OUTOF10: 0
PAINLEVEL_OUTOF10: 8

## 2023-11-13 NOTE — CARE COORDINATION
CM note: Pt was originally accepted at [address] [place] [address] but after running [employer] they have found that she has a plan that they are not in network with. Discussed this with patient and she would like a referral made to [facility] Message out to liaison.

## 2023-11-13 NOTE — CARE COORDINATION
CM note: No beds available at [facility] however gave referral to liaison for [address] [place] [address] and we await her review/determination. Will need updated PT note today for pre-cert submission. For SNF patient will NEED PRECERT, a HENS and a CAROL completed/signed by physician.

## 2023-11-13 NOTE — CARE COORDINATION
CM note; Met with patient in follow up from Friday re:SNF choices. She would like a referral to 1) [facility] the [address] [place] [address] and 2) [address] [place] [address]. Message out to liaison for [address] to check bed availability. For SNF patient will NEED PRECERT, a HENS and a CAROL completed/signed by physician.

## 2023-11-13 NOTE — PLAN OF CARE
Problem: Discharge Planning  Goal: Discharge to home or other facility with appropriate resources  11/12/2023 1946 by Yany Clark RN  Outcome: Progressing  11/12/2023 1533 by Lesley Robb RN  Outcome: Progressing     Problem: Safety - Adult  Goal: Free from fall injury  11/12/2023 1946 by Ynay Clark RN  Outcome: Progressing  11/12/2023 1533 by Lesley Robb RN  Outcome: Progressing     Problem: ABCDS Injury Assessment  Goal: Absence of physical injury  11/12/2023 1946 by Duke Ohara RN  Outcome: Progressing  11/12/2023 1533 by Lesley Robb RN  Outcome: Progressing     Problem: Pain  Goal: Verbalizes/displays adequate comfort level or baseline comfort level  11/12/2023 1946 by Duke Ohara RN  Outcome: Progressing  11/12/2023 1533 by Lesley Robb RN  Outcome: Progressing     Problem: Skin/Tissue Integrity  Goal: Absence of new skin breakdown  Description: 1. Monitor for areas of redness and/or skin breakdown  2. Assess vascular access sites hourly  3. Every 4-6 hours minimum:  Change oxygen saturation probe site  4. Every 4-6 hours:  If on nasal continuous positive airway pressure, respiratory therapy assess nares and determine need for appliance change or resting period.   11/12/2023 1946 by Duke Ohara RN  Outcome: Progressing  11/12/2023 1533 by Lesley Robb RN  Outcome: Progressing

## 2023-11-13 NOTE — DISCHARGE INSTR - COC
Continuity of Care Form    Patient Name: Mima Medeiros   :  54[birth year]  MRN:  [id number]    Admit date:  2023  Discharge date:  11/15/2023    Code Status Order: Full Code   Advance Directives:     Admitting Physician:  Dorita Bryan MD  PCP: Dorita Bryan MD    Discharging Nurse: [facility] Unit/Room#: 6029/5678-64  Discharging Unit Phone Number: [phone]    Emergency Contact:   Extended Emergency Contact Information  Primary Emergency Contact: Jacqui Swartz-DARWIN  Home Phone: [phone]  Relation: Child  Secondary Emergency Contact: Lelo Farias [address]A  Mobile Phone: [phone]  Relation: Child  Preferred language: English   needed?  No    Past Surgical History:  Past Surgical History:   Procedure Laterality Date    APPENDECTOMY      BARIATRIC SURGERY      [facility]      Dr. Juan Mitchell      Dr. Austin Mcmahon      EFREM [place]  8/10/2012         HERNIA REPAIR  2004    Dr. Susan Mora Left 2021    LEFT INGUINAL HERNIA REPAIR WITH MESH, POSSIBLE BILATERAL,  LAPAROSCOPIC ROBOTIC XI performed by Andria Bowers MD at [address] (Dorinda Getting)      Dr. Emily Sin - [place]      Right Knee    NECK SURGERY  2015    fused 4,5,6- screws, in [facility]    ASHOK-EN-Y GASTRIC BYPASS  2002    Dr. Katie Schmitt RYGB    SHOULDER ARTHROSCOPY  12    LEFT REPAIR ROTATOR CUFF TEAR SUBACROMIAL [address] - Patient states that this was due to her weight prior to [address]         Immunization History:   Immunization History ***    Intake/Output Summary (Last 24 hours) at 11/12/2023 2257  Last data filed at 11/12/2023 1814  Gross per 24 hour   Intake 180 ml   Output 800 ml   Net -620 ml     I/O last 3 completed shifts: In: 1529.8 [P.O.:1220; IV Piggyback:309.8]  Out: 1200 [Urine:1200]    Safety Concerns: At Risk for Falls    Impairments/Disabilities:      Vision    Nutrition Therapy:  Current Nutrition Therapy:   - Oral Diet:  Low Fiber    Routes of Feeding: Oral  Liquids: Thin Liquids  Daily Fluid Restriction: no  Last Modified Barium Swallow with Video (Video Swallowing Test): not done    Treatments at the Time of Hospital Discharge:   Respiratory Treatments: ***  Oxygen Therapy:  is not on home oxygen therapy.   Ventilator:    - No ventilator support    Rehab Therapies: Physical Therapy and Occupational Therapy  Weight Bearing Status/Restrictions: No weight bearing restrictions  Other Medical Equipment (for information only, NOT a DME order):  walker  Other Treatments: ***    Patient's personal belongings (please select all that are sent with patient):  Glasses, Dentures upper and lower    RN SIGNATURE:  Electronically signed by Ag Trejo RN on 11/15/23 at 10:59 AM EST    CASE MANAGEMENT/SOCIAL WORK SECTION    Inpatient Status Date: 11/9/2023    Readmission Risk Assessment Score:  Readmission Risk              Risk of Unplanned Readmission:  14           Discharging to Facility/ Agency   Name: [address] [place] [address]  Address:  Phone:  [phone]       Dialysis Facility (if applicable)   Name:  Address:  Dialysis Schedule:  Phone:  Fax:    / signature: Electronically signed by Salvador Corrales RN on 11/13/23 at 11:01 AM EST    PHYSICIAN SECTION    Prognosis: Good    Condition at Discharge: Stable    Rehab Potential (if transferring to Rehab): Good    Recommended Labs or Other Treatments After Discharge: cbc and bmp in 3 days    Physician Certification: I certify the above

## 2023-11-13 NOTE — DISCHARGE SUMMARY
hysterectomy. No adnexal masses seen. Peritoneum/Retroperitoneum: No evidence of ascites or free air. No evidence of lymphadenopathy. Aorta is normal in caliber. Bones/Soft Tissues:  No acute abnormality of the visualized osseous structures. Acute/subacute mid sigmoid diverticulitis with interval resolution of the extraluminal gas secondary to contained perforation consistent with interval improvement. No evidence of intraperitoneal abscess or free air identified. Chronic findings are stable, as described above. CT KNEE LEFT WO CONTRAST    Result Date: 11/8/2023  EXAMINATION: CT OF THE LEFT KNEE WITHOUT CONTRAST 11/8/2023 7:08 pm TECHNIQUE: CT of the left knee was performed without the administration of intravenous contrast.  Multiplanar reformatted images are provided for review. Automated exposure control, iterative reconstruction, and/or weight based adjustment of the mA/kV was utilized to reduce the radiation dose to as low as reasonably achievable. COMPARISON: None. HISTORY ORDERING SYSTEM PROVIDED HISTORY: pain, fall TECHNOLOGIST PROVIDED HISTORY: Reason for exam:->pain, fall Decision Support Exception - unselect if not a suspected or confirmed emergency medical condition->Emergency Medical Condition (MA) FINDINGS: Presence of osteopenia visualized bones structures. Moderate advanced osteoarthritic changes are seen in all 3 compartments of the left knee more severe in the lateral compartment and in the patellar compartment. There are narrowing of the joint space, marginal spur formations. There is a lateral miss tracking of the patella articular surface with reshaping of peripheral articular surface of the trochlea with more prominent the spur formation in that area. There is a moderate right knee joint effusion. There is no fat fluid levels identified the in the joint effusion which is located predominant to into the suprapatellar recess.  The degenerative process implies in extensive meniscal adjustment of the mA/kV was utilized to reduce the radiation dose to as low as reasonably achievable. COMPARISON: 11/06/2022 HISTORY: ORDERING SYSTEM PROVIDED HISTORY: lower abd pain TECHNOLOGIST PROVIDED HISTORY: Additional Contrast?->None Reason for exam:->lower abd pain Decision Support Exception - unselect if not a suspected or confirmed emergency medical condition->Emergency Medical Condition (MA) FINDINGS: Lower Chest: The lung bases are grossly clear. Organs: Liver is homogeneous in appearance. Multiple punctate calcifications seen within the liver and spleen suggesting old healed granulomas disease. There is a thin wall calcified splenic artery aneurysm at the hilum measuring 1.8 cm. Pancreas is homogeneous. Gallbladder is been surgically removed. No biliary ductal dilatation. The adrenal glands are within normal limits. Symmetric enhancement of the renal parenchyma. No stones or distension seen in the renal collecting system. GI/Bowel: Stomach is unremarkable in appearance. No wall thickening. Fluid seen throughout the small bowel. There is short segment of abnormal wall thickening of the sigmoid colon with adjacent surrounding stranding in fluid to suggest an acute sigmoid diverticulitis. There is an extraluminal air collections seen along the left pelvic sidewall representing a micro perforation. The air is well contained in the inflammatory process. The collection of air measures 3.0 x 1.3 cm. No significant fluid collection identified to suggest an abscess at this time. Pelvis: The bladder is mildly distended. Small amount of free fluid seen in the pelvis. No pelvic adenopathy. The uterus has been surgically removed. Peritoneum/Retroperitoneum: No abdominal retroperitoneal lymphadenopathy. Some fluid in the pelvis. No pelvic adenopathy. Bones/Soft Tissues: Bony structures reveal no evidence of acute or aggressive osseous abnormality. No ventral abdominal wall mass or defect.      Acute

## 2023-11-13 NOTE — PLAN OF CARE
Problem: Discharge Planning  Goal: Discharge to home or other facility with appropriate resources  11/13/2023 0814 by Luanna Spatz, RN  Outcome: Progressing     Problem: Safety - Adult  Goal: Free from fall injury  11/13/2023 0814 by Luanna Spatz, RN  Outcome: Progressing     Problem: ABCDS Injury Assessment  Goal: Absence of physical injury  11/13/2023 0814 by Luanna Spatz, RN  Outcome: Progressing     Problem: Pain  Goal: Verbalizes/displays adequate comfort level or baseline comfort level  11/13/2023 0814 by Luanna Spatz, RN  Outcome: Progressing     Problem: Skin/Tissue Integrity  Goal: Absence of new skin breakdown  Description: 1. Monitor for areas of redness and/or skin breakdown  2. Assess vascular access sites hourly  3. Every 4-6 hours minimum:  Change oxygen saturation probe site  4. Every 4-6 hours:  If on nasal continuous positive airway pressure, respiratory therapy assess nares and determine need for appliance change or resting period.   11/13/2023 0814 by Luanna Spatz, RN  Outcome: Progressing     Problem: Gastrointestinal - Adult  Goal: Minimal or absence of nausea and vomiting  Outcome: Progressing  Goal: Maintains or returns to baseline bowel function  Outcome: Progressing     Problem: Infection - Adult  Goal: Absence of infection at discharge  Outcome: Progressing

## 2023-11-13 NOTE — PROGRESS NOTES
Physical Therapy Treatment Note/Plan of Care    Room #:  9868/1973-33  Patient Name: Jaret Beyer  YOB: 1955  MRN: [id number]    Date of Service: 11/13/2023     Tentative placement recommendation: Subacute Rehab  Equipment recommendation: Patient has needed equipment       Evaluating Physical Therapist: Venkat Mejia PT Lic. #[id number]      Specific Provider Orders/Date/Referring Provider : 11/10/23 0730    PT eval and treat  Start:  11/10/23 0730,   End:  11/10/23 0730,   ONE TIME,   Standing Count:  1 Occurrences,   R         Carli Jarvis MD     Admitting Diagnosis:   Diverticulitis of colon [K57.32]  Acute diverticulitis [K57.92]  Fall, initial encounter [W19. XXXA]       Surgery: none  Visit Diagnoses         Codes    Diverticulitis of colon    -  Primary K57.32    Fall, initial encounter     W19. XXXA            Patient Active Problem List   Diagnosis    Hip pain    Trochanteric bursitis    Shoulder pain    Rotator cuff tear    Subacromial impingement    Labral tear of shoulder    Primary osteoarthritis of left knee    Iron deficiency anemia, unspecified    Acute respiratory failure due to COVID-19 (HCC)    Sepsis (HCC)    Paroxysmal A-fib ([address])    Community acquired bacterial pneumonia    Acute respiratory failure with hypoxia (HCC)    NSTEMI (non-ST elevated myocardial infarction) ([address])    Acute diverticulitis        ASSESSMENT of Current Deficits Patient exhibits decreased strength, balance, endurance, and pain LLE  impairing functional mobility, transfers, gait , gait distance, tolerance to activity, and participation. Patient already sitting at edge of bed upon entry. Patient with 8/10 pain, wanting to get up and more towards the commode d/t stating she hasn't had a bowel movement in 5 or 6 days. Patient required cues for walker control, walker approximation, sequencing, and hand placement.   Patient requires continued skilled physical therapy to address concerns listed above for increased Nina Avilez - Left Breast    CHOLECYSTECTOMY  2004    Dr. Jearlean Sacks  2008    ECHO COMPL W DOP COLOR FLOW  8/10/2012         HERNIA REPAIR  2004    Dr. Char Valadez Left 11/4/2021    LEFT INGUINAL HERNIA REPAIR WITH MESH, POSSIBLE BILATERAL,  LAPAROSCOPIC ROBOTIC XI performed by Estella Couch MD at [address] [facility] ([address] [facility])  1988    Dr. Saundra Lawrence - [place]  2009    Right Knee    NECK SURGERY  06/2015    fused 4,5,6- screws[facility]    ASHOK-EN-Y GASTRIC BYPASS  04/17/2002    Dr. Guillaume Hernández RYGB    SHOULDER ARTHROSCOPY  2/17/12    LEFT REPAIR ROTATOR CUFF TEAR SUBACROMIAL [address] - Patient states that this was due to her weight prior to [address]  2009    UVULOPALATOPHARYGOPLASTY         SUBJECTIVE:    Precautions: Up with assistance, falls ,      Social history: Patient lives alone in a apartment     with elevator  to enter home.   Tub shower       Equipment owned: Rollator,       [address] [facility] Mobility - Inpatient   How much help is needed turning from your back to your side while in a flat bed without using bedrails?: A Lot  How much help is needed moving from lying on your back to sitting on the side of a flat bed without using bedrails?: A Lot  How much help is needed moving to and from a bed to a chair?: A Lot  How much help is needed standing up from a chair using your arms?: A Lot  How much help is needed walking in hospital room?: Total  How much help is needed climbing 3-5 steps with a railing?: Total  AM-PAC Inpatient Mobility Raw Score : 10  AM-PAC Inpatient T-Scale Score : 32.29  Mobility Inpatient CMS 0-100% Score: 76.75  Mobility Inpatient CMS G-Code Modifier : CL    Nursing cleared patient for

## 2023-11-14 LAB
MICROORGANISM SPEC CULT: NORMAL
MICROORGANISM SPEC CULT: NORMAL
SERVICE CMNT-IMP: NORMAL
SERVICE CMNT-IMP: NORMAL
SPECIMEN DESCRIPTION: NORMAL
SPECIMEN DESCRIPTION: NORMAL

## 2023-11-14 PROCEDURE — 94640 AIRWAY INHALATION TREATMENT: CPT

## 2023-11-14 PROCEDURE — 6360000002 HC RX W HCPCS: Performed by: INTERNAL MEDICINE

## 2023-11-14 PROCEDURE — 97110 THERAPEUTIC EXERCISES: CPT

## 2023-11-14 PROCEDURE — 97530 THERAPEUTIC ACTIVITIES: CPT

## 2023-11-14 PROCEDURE — 1200000000 HC SEMI PRIVATE

## 2023-11-14 PROCEDURE — 6370000000 HC RX 637 (ALT 250 FOR IP): Performed by: INTERNAL MEDICINE

## 2023-11-14 RX ADMIN — AMITRIPTYLINE HYDROCHLORIDE 75 MG: 25 TABLET, FILM COATED ORAL at 21:25

## 2023-11-14 RX ADMIN — METRONIDAZOLE 500 MG: 500 TABLET ORAL at 21:34

## 2023-11-14 RX ADMIN — TIZANIDINE 8 MG: 4 TABLET ORAL at 21:26

## 2023-11-14 RX ADMIN — DICLOFENAC SODIUM 4 G: 10 GEL TOPICAL at 08:20

## 2023-11-14 RX ADMIN — HYDROCODONE BITARTRATE AND ACETAMINOPHEN 1 TABLET: 5; 325 TABLET ORAL at 13:12

## 2023-11-14 RX ADMIN — ARFORMOTEROL TARTRATE 15 MCG: 15 SOLUTION RESPIRATORY (INHALATION) at 05:23

## 2023-11-14 RX ADMIN — ANTI-FUNGAL POWDER MICONAZOLE NITRATE TALC FREE: 1.42 POWDER TOPICAL at 08:20

## 2023-11-14 RX ADMIN — FUROSEMIDE 40 MG: 40 TABLET ORAL at 08:15

## 2023-11-14 RX ADMIN — APIXABAN 5 MG: 5 TABLET, FILM COATED ORAL at 21:27

## 2023-11-14 RX ADMIN — IPRATROPIUM BROMIDE 0.5 MG: 0.5 SOLUTION RESPIRATORY (INHALATION) at 10:03

## 2023-11-14 RX ADMIN — BUSPIRONE HYDROCHLORIDE 5 MG: 5 TABLET ORAL at 08:14

## 2023-11-14 RX ADMIN — ARFORMOTEROL TARTRATE 15 MCG: 15 SOLUTION RESPIRATORY (INHALATION) at 19:37

## 2023-11-14 RX ADMIN — POLYETHYLENE GLYCOL 3350 17 G: 17 POWDER, FOR SOLUTION ORAL at 21:29

## 2023-11-14 RX ADMIN — FAMOTIDINE 40 MG: 20 TABLET, FILM COATED ORAL at 21:34

## 2023-11-14 RX ADMIN — BUDESONIDE 250 MCG: 0.25 SUSPENSION RESPIRATORY (INHALATION) at 19:36

## 2023-11-14 RX ADMIN — IPRATROPIUM BROMIDE 0.5 MG: 0.5 SOLUTION RESPIRATORY (INHALATION) at 19:37

## 2023-11-14 RX ADMIN — IPRATROPIUM BROMIDE 0.5 MG: 0.5 SOLUTION RESPIRATORY (INHALATION) at 13:31

## 2023-11-14 RX ADMIN — Medication 2000 UNITS: at 08:15

## 2023-11-14 RX ADMIN — BUDESONIDE 250 MCG: 0.25 SUSPENSION RESPIRATORY (INHALATION) at 05:23

## 2023-11-14 RX ADMIN — HYDROCODONE BITARTRATE AND ACETAMINOPHEN 1 TABLET: 5; 325 TABLET ORAL at 23:37

## 2023-11-14 RX ADMIN — VENLAFAXINE HYDROCHLORIDE 75 MG: 75 CAPSULE, EXTENDED RELEASE ORAL at 21:25

## 2023-11-14 RX ADMIN — ANTI-FUNGAL POWDER MICONAZOLE NITRATE TALC FREE: 1.42 POWDER TOPICAL at 21:31

## 2023-11-14 RX ADMIN — POLYETHYLENE GLYCOL 3350 17 G: 17 POWDER, FOR SOLUTION ORAL at 08:19

## 2023-11-14 RX ADMIN — LISINOPRIL 20 MG: 20 TABLET ORAL at 08:19

## 2023-11-14 RX ADMIN — BUSPIRONE HYDROCHLORIDE 5 MG: 5 TABLET ORAL at 14:41

## 2023-11-14 RX ADMIN — PANTOPRAZOLE SODIUM 40 MG: 40 TABLET, DELAYED RELEASE ORAL at 06:09

## 2023-11-14 RX ADMIN — BUSPIRONE HYDROCHLORIDE 5 MG: 5 TABLET ORAL at 21:26

## 2023-11-14 RX ADMIN — MONTELUKAST 10 MG: 10 TABLET, FILM COATED ORAL at 21:25

## 2023-11-14 RX ADMIN — DICLOFENAC SODIUM 4 G: 10 GEL TOPICAL at 21:30

## 2023-11-14 RX ADMIN — CIPROFLOXACIN 500 MG: 500 TABLET, FILM COATED ORAL at 21:26

## 2023-11-14 RX ADMIN — METRONIDAZOLE 500 MG: 500 TABLET ORAL at 14:41

## 2023-11-14 RX ADMIN — HYDROCODONE BITARTRATE AND ACETAMINOPHEN 1 TABLET: 5; 325 TABLET ORAL at 17:46

## 2023-11-14 RX ADMIN — IPRATROPIUM BROMIDE 0.5 MG: 0.5 SOLUTION RESPIRATORY (INHALATION) at 05:23

## 2023-11-14 RX ADMIN — HYDROCODONE BITARTRATE AND ACETAMINOPHEN 1 TABLET: 5; 325 TABLET ORAL at 08:14

## 2023-11-14 RX ADMIN — CIPROFLOXACIN 500 MG: 500 TABLET, FILM COATED ORAL at 08:14

## 2023-11-14 RX ADMIN — APIXABAN 5 MG: 5 TABLET, FILM COATED ORAL at 08:14

## 2023-11-14 RX ADMIN — METRONIDAZOLE 500 MG: 500 TABLET ORAL at 06:09

## 2023-11-14 ASSESSMENT — PAIN DESCRIPTION - ONSET
ONSET: GRADUAL
ONSET: GRADUAL

## 2023-11-14 ASSESSMENT — PAIN DESCRIPTION - ORIENTATION
ORIENTATION: LEFT

## 2023-11-14 ASSESSMENT — PAIN DESCRIPTION - FREQUENCY
FREQUENCY: INTERMITTENT
FREQUENCY: INTERMITTENT

## 2023-11-14 ASSESSMENT — PAIN DESCRIPTION - PAIN TYPE
TYPE: CHRONIC PAIN;ACUTE PAIN
TYPE: ACUTE PAIN;CHRONIC PAIN

## 2023-11-14 ASSESSMENT — PAIN DESCRIPTION - DESCRIPTORS
DESCRIPTORS: DISCOMFORT;THROBBING;SORE
DESCRIPTORS: ACHING;DISCOMFORT;SORE
DESCRIPTORS: ACHING;DISCOMFORT;SORE

## 2023-11-14 ASSESSMENT — PAIN SCALES - GENERAL
PAINLEVEL_OUTOF10: 6
PAINLEVEL_OUTOF10: 7
PAINLEVEL_OUTOF10: 9
PAINLEVEL_OUTOF10: 7
PAINLEVEL_OUTOF10: 8
PAINLEVEL_OUTOF10: 10

## 2023-11-14 ASSESSMENT — PAIN DESCRIPTION - LOCATION
LOCATION: KNEE;HIP
LOCATION: KNEE
LOCATION: KNEE
LOCATION: LEG
LOCATION: KNEE

## 2023-11-14 NOTE — PROGRESS NOTES
Physical Therapy Treatment Note/Plan of Care    Room #:  8847/8620-81  Patient Name: Kourtney Lara  YOB: 1955  MRN: [id number]    Date of Service: 11/14/2023     Tentative placement recommendation: Subacute Rehab  Equipment recommendation: Patient has needed equipment       Evaluating Physical Therapist: Bakari Gamboa, PT Lic. #[id number]      Specific Provider Orders/Date/Referring Provider : 11/10/23 0730    PT eval and treat  Start:  11/10/23 0730,   End:  11/10/23 0730,   ONE TIME,   Standing Count:  1 Occurrences,   R         Willie Jarvis MD     Admitting Diagnosis:   Diverticulitis of colon [K57.32]  Acute diverticulitis [K57.92]  Fall, initial encounter [W19. XXXA]       Surgery: none  Visit Diagnoses         Codes    Diverticulitis of colon    -  Primary K57.32    Fall, initial encounter     W19. XXXA            Patient Active Problem List   Diagnosis    Hip pain    Trochanteric bursitis    Shoulder pain    Rotator cuff tear    Subacromial impingement    Labral tear of shoulder    Primary osteoarthritis of left knee    Iron deficiency anemia, unspecified    Acute respiratory failure due to COVID-19 (HCC)    Sepsis (HCC)    Paroxysmal A-fib ([address])    Community acquired bacterial pneumonia    Acute respiratory failure with hypoxia (HCC)    NSTEMI (non-ST elevated myocardial infarction) ([address])    Acute diverticulitis        ASSESSMENT of Current Deficits Patient exhibits decreased strength, balance, endurance, and pain LLE  impairing functional mobility, transfers, gait , gait distance, tolerance to activity, and participation. Light exercise to BLE d/t increased pain on LLE. Patient with urinary incontinence when standing. Patient able to perform short gait distances. Cues for direction, walker control, sequencing. Complaints of still not having a bowel movement.   Patient requires continued skilled physical therapy to address concerns listed above for increased safety and function at discharge, and for decreased future fall risk. PHYSICAL THERAPY  PLAN OF CARE       Physical therapy plan of care is established based on physician order,  patient diagnosis and clinical assessment    Current Treatment Recommendations:    -Bed Mobility: Lower extremity exercises   -Sitting Balance: Incorporate reaching activities to activate trunk muscles   -Standing Balance: Perform strengthening exercises in standing to promote motor control with or without upper extremity support   -Transfers: Provide instruction on proper hand and foot position for adequate transfer of weight onto lower extremities and use of gait device if needed and Cues for hand placement, technique and safety. Provide stabilization to prevent fall   -Gait: Gait training and Standing activities to improve: base of support, weight shift, weight bearing      PT long term treatment goals are located in below grid    Patient and or family understand(s) diagnosis, prognosis, and plan of care. Frequency of treatments: Patient will be seen  daily.          Prior Level of Function: Patient ambulated with rollator    Rehab Potential: good   for baseline    Past medical history:   Past Medical History:   Diagnosis Date    Acid reflux     Atrial fibrillation (HCC)     Cancer (HCC) 5/19/2005    Breast Cancer (Left Breast)    Chronic back pain     CPAP (continuous positive airway pressure) dependence     not using CPAP    Fibromyalgia     History of bariatric surgery 04/17/2002    Dr. Candie Priest RYGB    History of cardiovascular stress test 8/10/2012    LEXISCAN    Hyperlipidemia     Hypertension     Kidney stones     Low iron     Osteoarthritis     Unspecified sleep apnea     Patient does not use C-Pap      Past Surgical History:   Procedure Laterality Date    APPENDECTOMY      BARIATRIC SURGERY  2003    [address] [facility]  2005    Dr. Marquita Garcia

## 2023-11-14 NOTE — CARE COORDINATION
CM note; Pt has been accepted by [facility] and bed will be available tomorrow 11/15. Facility to submit for pre-cert today in anticipation of receiving pre-cert tomorrow. HENS initiated. CAROL is completed/signed by physician.

## 2023-11-14 NOTE — PLAN OF CARE
Problem: Discharge Planning  Goal: Discharge to home or other facility with appropriate resources  Outcome: Progressing     Problem: Safety - Adult  Goal: Free from fall injury  Outcome: Progressing     Problem: ABCDS Injury Assessment  Goal: Absence of physical injury  Outcome: Progressing     Problem: Pain  Goal: Verbalizes/displays adequate comfort level or baseline comfort level  Outcome: Progressing     Problem: Skin/Tissue Integrity  Goal: Absence of new skin breakdown  Description: 1. Monitor for areas of redness and/or skin breakdown  2. Assess vascular access sites hourly  3. Every 4-6 hours minimum:  Change oxygen saturation probe site  4. Every 4-6 hours:  If on nasal continuous positive airway pressure, respiratory therapy assess nares and determine need for appliance change or resting period.   Outcome: Progressing     Problem: Gastrointestinal - Adult  Goal: Minimal or absence of nausea and vomiting  Outcome: Progressing  Goal: Maintains or returns to baseline bowel function  Outcome: Progressing     Problem: Infection - Adult  Goal: Absence of infection at discharge  Outcome: Progressing

## 2023-11-14 NOTE — PLAN OF CARE
Problem: Discharge Planning  Goal: Discharge to home or other facility with appropriate resources  11/14/2023 1157 by Joanna Castle RN  Outcome: Progressing     Problem: Safety - Adult  Goal: Free from fall injury  11/14/2023 1157 by Joanna Castle RN  Outcome: Progressing     Problem: ABCDS Injury Assessment  Goal: Absence of physical injury  11/14/2023 1157 by Joanna Castle RN  Outcome: Progressing     Problem: Pain  Goal: Verbalizes/displays adequate comfort level or baseline comfort level  11/14/2023 1157 by Joanna Castle RN  Outcome: Progressing     Problem: Skin/Tissue Integrity  Goal: Absence of new skin breakdown  11/14/2023 1157 by Joanna Castle RN  Outcome: Progressing     Problem: Gastrointestinal - Adult  Goal: Minimal or absence of nausea and vomiting  11/14/2023 1157 by Joanna Castle RN  Outcome: Progressing     Problem: Gastrointestinal - Adult  Goal: Maintains or returns to baseline bowel function  11/14/2023 1157 by Joanna Castle RN  Outcome: Progressing     Problem: Infection - Adult  Goal: Absence of infection at discharge  11/14/2023 1157 by Joanna Castle RN  Outcome: Progressing

## 2023-11-14 NOTE — PROGRESS NOTES
OCCUPATIONAL THERAPY BEDSIDE TREATMENT NOTE  [facility]  [address] [facility]    Date:2023  Patient Name: Nargis Garcia  MRN: [id number]  : 1955  Room: [address] [place] [address]       Evaluating OT: Algis Cough OTR/L; [id number]      Referring Provider and Specific Provider Orders/Date:      11/10/23 0730   OT eval and treat  Start:  11/10/23 0730,   End:  11/10/23 0730,   ONE TIME,   Standing Count:  1 Occurrences,   Car Busch MD       Placement Recommendation: Subacute         Diagnosis:   1. Diverticulitis of colon    2.  Fall, initial encounter         Surgery: none        Pertinent Medical History:       Past Medical History        Past Medical History:   Diagnosis Date    Acid reflux      Atrial fibrillation ([address])      Cancer ([address]) 2005     Breast Cancer (Left Breast)    Chronic back pain      CPAP (continuous positive airway pressure) dependence       not using CPAP    Fibromyalgia      History of bariatric surgery 2002     Dr. Angeline Walker R[facility]GB    History of cardiovascular stress test 8/10/2012     LEXISCAN    Hyperlipidemia      Hypertension      Kidney stones      Low iron      Osteoarthritis      Unspecified sleep apnea       Patient does not use C-Pap             Past Surgical History         Past Surgical History:   Procedure Laterality Date    APPENDECTOMY        BARIATRIC SURGERY       Bess Henderson        Dr. Timi Salgado        Dr. Marcel Anderson       EFREM [place]   8/10/2012          HERNIA REPAIR   2004     Dr. Rama Joseph Left 2021     LEFT INGUINAL [address], POSSIBLE BILATERAL,  LAPAROSCOPIC ROBOTIC XI performed by Samantha Gill Isabella Graves MD at [address] [facility] [facility] ([facility])   1988     Dr. Karolyn Jeffers [facility]     JOINT REPLACEMENT   2009     Right Knee    NECK SURGERY   06/2015     fused 4,5,6- screws, [place] [facility] [place]    ASHOK-EN-Y GASTRIC BYPASS   04/17/2002     Dr. Shira Elena [facility]    SHOULDER ARTHROSCOPY   2/17/12     LEFT REPAIR ROTATOR CUFF TEAR SUBACROMIAL DECOMPRESSION    TONSILLECTOMY        TRACHEOSTOMY   1999     [place] - Patient states that this was due to her weight prior to [address]   2009    UVULOPALATOPHARYGOPLASTY              Precautions:  Fall Risk, no fx of dislocation of L hip but MRI is suggesting muscular strain injury. MRI HIP LEFT WO CONTRAST 11/12/23  1. No evidence of acute hip fracture   2. Small hip joint effusion   3. Edema within the proximal vastus intermedius muscle at its attachment site on the anterior proximal femur suggesting muscular strain injury.        Assessment of current deficits:     [x] Functional mobility            [x]ADLs           [x] Strength                   []Cognition    [x] Functional transfers          [x] IADLs          [] Safety Awareness   [x]Endurance    [] Fine Coordination              [x] Balance      [] Vision/perception    []Sensation      []Gross Motor Coordination  [x] ROM           [] Delirium                   [] Motor Control      OT PLAN OF CARE   OT POC based on physician orders, patient diagnosis and results of clinical assessment     Frequency/Duration 1-3 days/wk for 2 weeks PRN      Specific OT Treatment Interventions to include:   * Instruction/training on adapted ADL techniques and AE recommendations to increase functional independence within precautions       * Training on energy conservation strategies, correct breathing pattern and techniques to improve independence/tolerance for self-care routine  * Functional transfer/mobility training/DME recommendations for increased

## 2023-11-15 ENCOUNTER — APPOINTMENT (OUTPATIENT)
Dept: GENERAL RADIOLOGY | Age: 68
End: 2023-11-15
Payer: COMMERCIAL

## 2023-11-15 VITALS
OXYGEN SATURATION: 98 % | HEART RATE: 92 BPM | TEMPERATURE: 96.8 F | BODY MASS INDEX: 36.19 KG/M2 | WEIGHT: 212 LBS | RESPIRATION RATE: 18 BRPM | SYSTOLIC BLOOD PRESSURE: 177 MMHG | DIASTOLIC BLOOD PRESSURE: 93 MMHG | HEIGHT: 64 IN

## 2023-11-15 PROCEDURE — 72100 X-RAY EXAM L-S SPINE 2/3 VWS: CPT

## 2023-11-15 PROCEDURE — 94640 AIRWAY INHALATION TREATMENT: CPT

## 2023-11-15 PROCEDURE — 6370000000 HC RX 637 (ALT 250 FOR IP): Performed by: INTERNAL MEDICINE

## 2023-11-15 PROCEDURE — 6360000002 HC RX W HCPCS: Performed by: INTERNAL MEDICINE

## 2023-11-15 RX ORDER — BISACODYL 5 MG/1
5 TABLET, DELAYED RELEASE ORAL DAILY PRN
Status: DISCONTINUED | OUTPATIENT
Start: 2023-11-15 | End: 2023-11-15 | Stop reason: HOSPADM

## 2023-11-15 RX ADMIN — POLYETHYLENE GLYCOL 3350 17 G: 17 POWDER, FOR SOLUTION ORAL at 08:51

## 2023-11-15 RX ADMIN — BUSPIRONE HYDROCHLORIDE 5 MG: 5 TABLET ORAL at 08:51

## 2023-11-15 RX ADMIN — METRONIDAZOLE 500 MG: 500 TABLET ORAL at 05:46

## 2023-11-15 RX ADMIN — PANTOPRAZOLE SODIUM 40 MG: 40 TABLET, DELAYED RELEASE ORAL at 05:46

## 2023-11-15 RX ADMIN — DICLOFENAC SODIUM 4 G: 10 GEL TOPICAL at 08:54

## 2023-11-15 RX ADMIN — APIXABAN 5 MG: 5 TABLET, FILM COATED ORAL at 08:51

## 2023-11-15 RX ADMIN — ARFORMOTEROL TARTRATE 15 MCG: 15 SOLUTION RESPIRATORY (INHALATION) at 06:09

## 2023-11-15 RX ADMIN — HYDROCODONE BITARTRATE AND ACETAMINOPHEN 1 TABLET: 5; 325 TABLET ORAL at 18:52

## 2023-11-15 RX ADMIN — HYDROCODONE BITARTRATE AND ACETAMINOPHEN 1 TABLET: 5; 325 TABLET ORAL at 14:27

## 2023-11-15 RX ADMIN — ANTI-FUNGAL POWDER MICONAZOLE NITRATE TALC FREE: 1.42 POWDER TOPICAL at 08:53

## 2023-11-15 RX ADMIN — HYDROCODONE BITARTRATE AND ACETAMINOPHEN 1 TABLET: 5; 325 TABLET ORAL at 08:50

## 2023-11-15 RX ADMIN — FUROSEMIDE 40 MG: 40 TABLET ORAL at 08:52

## 2023-11-15 RX ADMIN — CIPROFLOXACIN 500 MG: 500 TABLET, FILM COATED ORAL at 08:51

## 2023-11-15 RX ADMIN — METRONIDAZOLE 500 MG: 500 TABLET ORAL at 14:34

## 2023-11-15 RX ADMIN — BUDESONIDE 250 MCG: 0.25 SUSPENSION RESPIRATORY (INHALATION) at 06:09

## 2023-11-15 RX ADMIN — LISINOPRIL 20 MG: 20 TABLET ORAL at 08:51

## 2023-11-15 RX ADMIN — IPRATROPIUM BROMIDE 0.5 MG: 0.5 SOLUTION RESPIRATORY (INHALATION) at 13:22

## 2023-11-15 RX ADMIN — BISACODYL 5 MG: 5 TABLET, COATED ORAL at 14:28

## 2023-11-15 RX ADMIN — BUSPIRONE HYDROCHLORIDE 5 MG: 5 TABLET ORAL at 14:34

## 2023-11-15 RX ADMIN — IPRATROPIUM BROMIDE 0.5 MG: 0.5 SOLUTION RESPIRATORY (INHALATION) at 06:09

## 2023-11-15 RX ADMIN — Medication 2000 UNITS: at 08:52

## 2023-11-15 ASSESSMENT — PAIN SCALES - GENERAL
PAINLEVEL_OUTOF10: 10
PAINLEVEL_OUTOF10: 6
PAINLEVEL_OUTOF10: 10
PAINLEVEL_OUTOF10: 0
PAINLEVEL_OUTOF10: 5
PAINLEVEL_OUTOF10: 10
PAINLEVEL_OUTOF10: 6
PAINLEVEL_OUTOF10: 5

## 2023-11-15 ASSESSMENT — PAIN DESCRIPTION - ONSET: ONSET: GRADUAL

## 2023-11-15 ASSESSMENT — PAIN DESCRIPTION - FREQUENCY: FREQUENCY: INTERMITTENT

## 2023-11-15 ASSESSMENT — PAIN DESCRIPTION - LOCATION
LOCATION: KNEE

## 2023-11-15 ASSESSMENT — PAIN DESCRIPTION - PAIN TYPE: TYPE: ACUTE PAIN

## 2023-11-15 ASSESSMENT — PAIN DESCRIPTION - DESCRIPTORS
DESCRIPTORS: ACHING;DISCOMFORT;SORE

## 2023-11-15 ASSESSMENT — PAIN DESCRIPTION - ORIENTATION
ORIENTATION: LEFT
ORIENTATION: LEFT

## 2023-11-15 ASSESSMENT — PAIN - FUNCTIONAL ASSESSMENT
PAIN_FUNCTIONAL_ASSESSMENT: PREVENTS OR INTERFERES SOME ACTIVE ACTIVITIES AND ADLS

## 2023-11-15 NOTE — PLAN OF CARE
Problem: Discharge Planning  Goal: Discharge to home or other facility with appropriate resources  11/15/2023 0152 by Ansley Montgomery RN  Outcome: Progressing  11/14/2023 1157 by Alejo Blakely RN  Outcome: Progressing     Problem: Safety - Adult  Goal: Free from fall injury  11/15/2023 0152 by Ansley Montgomery RN  Outcome: Progressing  11/14/2023 1157 by Alejo Blakely RN  Outcome: Progressing     Problem: ABCDS Injury Assessment  Goal: Absence of physical injury  11/15/2023 0152 by Ansley Montgomery RN  Outcome: Progressing  11/14/2023 1157 by Alejo Blakely RN  Outcome: Progressing     Problem: Pain  Goal: Verbalizes/displays adequate comfort level or baseline comfort level  11/15/2023 0152 by Ansley Montgomery RN  Outcome: Progressing  11/14/2023 1157 by Alejo Blakely RN  Outcome: Progressing     Problem: Skin/Tissue Integrity  Goal: Absence of new skin breakdown  Description: 1. Monitor for areas of redness and/or skin breakdown  2. Assess vascular access sites hourly  3. Every 4-6 hours minimum:  Change oxygen saturation probe site  4. Every 4-6 hours:  If on nasal continuous positive airway pressure, respiratory therapy assess nares and determine need for appliance change or resting period.   11/15/2023 0152 by Ansley Montgomery RN  Outcome: Progressing  11/14/2023 1157 by Alejo Blakely RN  Outcome: Progressing     Problem: Gastrointestinal - Adult  Goal: Minimal or absence of nausea and vomiting  11/15/2023 0152 by Ansley Montgomery RN  Outcome: Progressing  11/14/2023 1157 by Alejo Blakely RN  Outcome: Progressing  Goal: Maintains or returns to baseline bowel function  11/15/2023 0152 by Ansley Montgomery RN  Outcome: Progressing  11/14/2023 1157 by Alejo Blakely RN  Outcome: Progressing     Problem: Infection - Adult  Goal: Absence of infection at discharge  11/15/2023 0152 by Ansley Montgomery RN  Outcome: Progressing  11/14/2023 1157 by Alejo Blakely RN  Outcome: Progressing     Problem: Discharge Planning  Goal:

## 2023-11-15 NOTE — CARE COORDINATION
CM note: discharge order noted. Transportation arranged with [facility] for 6 PM wheelchair . [facility] liaison notified and floor will notify patient.

## 2023-11-15 NOTE — CARE COORDINATION
CM note: Per [facility] they have received pre-cert and patient can discharge there today. Will follow for discharge order to complete [facility] and arrange transportation. Pt informed that she will discharge there today. Per sticky notes, patient's daughter Andria Escamilla works at [facility] and is working with the administration for admission there however they are out of network. If is highly doubtful that patient's insurance will provide and out of network contract when there is already an [facility] for an in network facility. Explained this to the patient and she told CM \"you do your thing\", she will speak with her daughter. CM informed patient that if daughter wishes to call and discuss this she could call the CM. Pt is alert and oriented and is able to make her own decisions regarding her discharge plan at this time.

## 2023-11-15 NOTE — PLAN OF CARE
Problem: Discharge Planning  Goal: Discharge to home or other facility with appropriate resources  11/15/2023 1020 by Yon Swartz RN  Outcome: Completed     Problem: Safety - Adult  Goal: Free from fall injury  11/15/2023 1020 by Yon Swartz RN  Outcome: Completed     Problem: ABCDS Injury Assessment  Goal: Absence of physical injury  11/15/2023 1020 by Yon Swartz RN  Outcome: Completed     Problem: Pain  Goal: Verbalizes/displays adequate comfort level or baseline comfort level  11/15/2023 1020 by Yon Swartz RN  Outcome: Completed     Problem: Skin/Tissue Integrity  Goal: Absence of new skin breakdown  11/15/2023 1020 by Yon Swartz RN  Outcome: Completed     Problem: Gastrointestinal - Adult  Goal: Minimal or absence of nausea and vomiting  11/15/2023 1020 by Yon Swartz RN  Outcome: Completed     Problem: Infection - Adult  Goal: Absence of infection at discharge  11/15/2023 1020 by Yon Swartz RN  Outcome: Completed

## 2023-11-15 NOTE — DISCHARGE INSTRUCTIONS
Your information:  Name: Caron Ricketts  : 1955    Your instructions:  Discharge to [facility]  Call Dr. Edwin Hull at the [facility]note 3], phone #[phone], to schedule replacement of cardiac event monitor if not already done. Signs and symptoms to watch out for:  Call your doctor now or seek immediate medical care if:    You pass maroon or very bloody stools. You have new or worse belly pain. You have a fever. You have nausea or vomiting. You have diarrhea or constipation. You have unusual changes in your bowel movements. You have bloating. You cannot pass stools or gas. Watch closely for changes in your health, and be sure to contact your doctor if you have any problems. What to do after you leave the hospital:    Recommended diet:  Low Fiber    Recommended activity: activity as tolerated        The following personal items were collected during your admission and were returned to you:    Belongings  Dental Appliances: Uppers, Lowers  Vision - Corrective Lenses: Eyeglasses  Hearing Aid: None  Clothing: Socks, Shorts, Pajamas, Undergarments  Jewelry: None  Electronic Devices: Cell Phone,   Weapons (Notify Protective Services/Security): None  Home Medications: None  Valuables Given To: Patient  Provide Name(s) of Who Valuable(s) Were Given To: nazario    Information obtained by:  By signing below, I understand that if any problems occur once I leave the hospital I am to contact Dr. Elzbieta Gilliam. I understand and acknowledge receipt of the instructions indicated above.

## 2023-11-21 ENCOUNTER — OUTSIDE SERVICES (OUTPATIENT)
Dept: INTERNAL MEDICINE CLINIC | Age: 68
End: 2023-11-21
Payer: COMMERCIAL

## 2023-11-21 DIAGNOSIS — M62.81 MUSCLE WEAKNESS (GENERALIZED): ICD-10-CM

## 2023-11-21 DIAGNOSIS — R41.841 COGNITIVE COMMUNICATION DEFICIT: ICD-10-CM

## 2023-11-21 DIAGNOSIS — M79.7 FIBROMYALGIA: ICD-10-CM

## 2023-11-21 DIAGNOSIS — I10 ESSENTIAL (PRIMARY) HYPERTENSION: ICD-10-CM

## 2023-11-21 DIAGNOSIS — D50.9 IRON DEFICIENCY ANEMIA, UNSPECIFIED IRON DEFICIENCY ANEMIA TYPE: ICD-10-CM

## 2023-11-21 DIAGNOSIS — M17.12 UNILATERAL PRIMARY OSTEOARTHRITIS, LEFT KNEE: ICD-10-CM

## 2023-11-21 DIAGNOSIS — Z74.1 NEED FOR ASSISTANCE WITH PERSONAL CARE: ICD-10-CM

## 2023-11-21 DIAGNOSIS — M25.562 LEFT KNEE PAIN, UNSPECIFIED CHRONICITY: ICD-10-CM

## 2023-11-21 DIAGNOSIS — I48.0 PAROXYSMAL ATRIAL FIBRILLATION (HCC): ICD-10-CM

## 2023-11-21 DIAGNOSIS — M25.462 EFFUSION, LEFT KNEE: Primary | ICD-10-CM

## 2023-11-21 DIAGNOSIS — M16.0 BILATERAL PRIMARY OSTEOARTHRITIS OF HIP: ICD-10-CM

## 2023-11-21 DIAGNOSIS — K57.92 DIVERTICULITIS OF INTESTINE WITHOUT PERFORATION OR ABSCESS WITHOUT BLEEDING, UNSPECIFIED PART OF INTESTINAL TRACT: ICD-10-CM

## 2023-11-21 PROCEDURE — 99305 1ST NF CARE MODERATE MDM 35: CPT | Performed by: INTERNAL MEDICINE

## 2024-01-02 VITALS
OXYGEN SATURATION: 98 % | SYSTOLIC BLOOD PRESSURE: 138 MMHG | TEMPERATURE: 97.7 F | WEIGHT: 245 LBS | RESPIRATION RATE: 18 BRPM | DIASTOLIC BLOOD PRESSURE: 84 MMHG | BODY MASS INDEX: 42.05 KG/M2 | HEART RATE: 78 BPM

## 2024-01-02 ASSESSMENT — ENCOUNTER SYMPTOMS
NAUSEA: 0
VOMITING: 0
SHORTNESS OF BREATH: 0
ABDOMINAL PAIN: 0
DIARRHEA: 0

## 2024-01-02 NOTE — PROGRESS NOTES
Visit Date: 11/21/2023 - [facility]  Mehran Blakely  1955  female 68 y.o.      Subjective:    CC: Patient presents stating that she is feeling well. She has no complaints at this time and her appetite is good. She denies any abdominal pain or urinary complaints.   This is an initial nursing home visit.     HPI: Patient is a 68 year old lady who presented to the ER after falling. She struck her left hip and knee. This was an accidental fall. She was in the ED two days ago after being diagnosed with acute diverticulitis with small perforation and she was discharged on po antibiotics. She had a CT scan of the hips which did not reveal any evidence of fracture. She was admitted for acute diverticulitis and fall. Patient was started on IV antibiotics and IV fluids. She was discharged to rehab facility for PT/OT.  Patient is seen and examined. Medications on chart reviewed. Labs are reviewed. Discussed with nursing staff.       ROS:  Review of Systems   Constitutional:  Negative for chills and fever.   Respiratory:  Negative for shortness of breath.    Cardiovascular:  Negative for chest pain.   Gastrointestinal:  Negative for abdominal pain, diarrhea, nausea and vomiting.   Genitourinary:  Negative for dysuria and frequency.   Musculoskeletal:  Positive for gait problem.   Neurological:  Positive for weakness. Negative for dizziness and headaches.        Current Meds: Refer to nursing home record    PMH:    Medical Problems: reviewed and updated       Diagnosis Date    Acid reflux     Atrial fibrillation (HCC)     Cancer (HCC) 5/19/2005    Breast Cancer (Left Breast)    Chronic back pain     CPAP (continuous positive airway pressure) dependence     not using CPAP    Fibromyalgia     History of bariatric surgery 04/17/2002    Dr. Sommer - [facility]    History of cardiovascular stress test 8/10/2012    LEXISCAN    Hyperlipidemia     Hypertension     Kidney stones     Low iron

## 2024-01-08 ENCOUNTER — HOSPITAL ENCOUNTER (OUTPATIENT)
Dept: INFUSION THERAPY | Age: 69
Setting detail: INFUSION SERIES
Discharge: HOME | End: 2024-01-08
Payer: COMMERCIAL

## 2024-01-08 VITALS
RESPIRATION RATE: 24 BRPM | SYSTOLIC BLOOD PRESSURE: 127 MMHG | TEMPERATURE: 96 F | HEART RATE: 77 BPM | OXYGEN SATURATION: 97 % | DIASTOLIC BLOOD PRESSURE: 60 MMHG

## 2024-01-08 DIAGNOSIS — D50.9 IRON DEFICIENCY ANEMIA, UNSPECIFIED IRON DEFICIENCY ANEMIA TYPE: Primary | ICD-10-CM

## 2024-01-08 PROCEDURE — 6360000002 HC RX W HCPCS: Performed by: INTERNAL MEDICINE

## 2024-01-08 PROCEDURE — 96365 THER/PROPH/DIAG IV INF INIT: CPT

## 2024-01-08 PROCEDURE — 2580000003 HC RX 258: Performed by: INTERNAL MEDICINE

## 2024-01-08 PROCEDURE — 96361 HYDRATE IV INFUSION ADD-ON: CPT

## 2024-01-08 RX ORDER — SODIUM CHLORIDE 0.9 % (FLUSH) 0.9 %
5-40 SYRINGE (ML) INJECTION PRN
Status: CANCELLED | OUTPATIENT
Start: 2024-01-15

## 2024-01-08 RX ORDER — ACETAMINOPHEN 325 MG/1
650 TABLET ORAL
Status: CANCELLED | OUTPATIENT
Start: 2024-01-15

## 2024-01-08 RX ORDER — SODIUM CHLORIDE 9 MG/ML
INJECTION, SOLUTION INTRAVENOUS CONTINUOUS
Status: CANCELLED | OUTPATIENT
Start: 2024-01-15

## 2024-01-08 RX ORDER — SODIUM CHLORIDE 0.9 % (FLUSH) 0.9 %
5-40 SYRINGE (ML) INJECTION PRN
Status: DISCONTINUED | OUTPATIENT
Start: 2024-01-08 | End: 2024-01-09 | Stop reason: HOSPADM

## 2024-01-08 RX ORDER — SODIUM CHLORIDE 9 MG/ML
5-250 INJECTION, SOLUTION INTRAVENOUS PRN
Status: DISCONTINUED | OUTPATIENT
Start: 2024-01-08 | End: 2024-01-09 | Stop reason: HOSPADM

## 2024-01-08 RX ORDER — ONDANSETRON 2 MG/ML
8 INJECTION INTRAMUSCULAR; INTRAVENOUS
Status: CANCELLED | OUTPATIENT
Start: 2024-01-15

## 2024-01-08 RX ORDER — HEPARIN 100 UNIT/ML
500 SYRINGE INTRAVENOUS PRN
Status: CANCELLED | OUTPATIENT
Start: 2024-01-15

## 2024-01-08 RX ORDER — DIPHENHYDRAMINE HYDROCHLORIDE 50 MG/ML
50 INJECTION INTRAMUSCULAR; INTRAVENOUS
Status: CANCELLED | OUTPATIENT
Start: 2024-01-15

## 2024-01-08 RX ORDER — EPINEPHRINE 1 MG/ML
0.3 INJECTION, SOLUTION, CONCENTRATE INTRAVENOUS PRN
Status: CANCELLED | OUTPATIENT
Start: 2024-01-15

## 2024-01-08 RX ORDER — SODIUM CHLORIDE 9 MG/ML
5-250 INJECTION, SOLUTION INTRAVENOUS PRN
Status: CANCELLED | OUTPATIENT
Start: 2024-01-15

## 2024-01-08 RX ADMIN — SODIUM CHLORIDE 20 ML/HR: 900 INJECTION, SOLUTION INTRAVENOUS at 09:55

## 2024-01-08 RX ADMIN — FERUMOXYTOL 510 MG: 510 INJECTION INTRAVENOUS at 10:09

## 2024-01-08 RX ADMIN — Medication 10 ML: at 10:43

## 2024-01-08 RX ADMIN — Medication 10 ML: at 09:54

## 2024-01-18 ENCOUNTER — HOSPITAL ENCOUNTER (OUTPATIENT)
Dept: INFUSION THERAPY | Age: 69
Setting detail: INFUSION SERIES
Discharge: HOME | End: 2024-01-18
Payer: COMMERCIAL

## 2024-01-18 VITALS
TEMPERATURE: 98 F | HEART RATE: 90 BPM | SYSTOLIC BLOOD PRESSURE: 151 MMHG | OXYGEN SATURATION: 99 % | RESPIRATION RATE: 24 BRPM | DIASTOLIC BLOOD PRESSURE: 67 MMHG

## 2024-01-18 DIAGNOSIS — D50.9 IRON DEFICIENCY ANEMIA, UNSPECIFIED IRON DEFICIENCY ANEMIA TYPE: Primary | ICD-10-CM

## 2024-01-18 PROCEDURE — 96361 HYDRATE IV INFUSION ADD-ON: CPT

## 2024-01-18 PROCEDURE — 6360000002 HC RX W HCPCS: Performed by: INTERNAL MEDICINE

## 2024-01-18 PROCEDURE — 2580000003 HC RX 258: Performed by: INTERNAL MEDICINE

## 2024-01-18 PROCEDURE — 96365 THER/PROPH/DIAG IV INF INIT: CPT

## 2024-01-18 RX ORDER — SODIUM CHLORIDE 9 MG/ML
5-250 INJECTION, SOLUTION INTRAVENOUS PRN
OUTPATIENT
Start: 2024-01-22

## 2024-01-18 RX ORDER — ACETAMINOPHEN 325 MG/1
650 TABLET ORAL
OUTPATIENT
Start: 2024-01-22

## 2024-01-18 RX ORDER — EPINEPHRINE 1 MG/ML
0.3 INJECTION, SOLUTION, CONCENTRATE INTRAVENOUS PRN
OUTPATIENT
Start: 2024-01-22

## 2024-01-18 RX ORDER — SODIUM CHLORIDE 9 MG/ML
5-250 INJECTION, SOLUTION INTRAVENOUS PRN
Status: DISCONTINUED | OUTPATIENT
Start: 2024-01-18 | End: 2024-01-19 | Stop reason: HOSPADM

## 2024-01-18 RX ORDER — SODIUM CHLORIDE 0.9 % (FLUSH) 0.9 %
5-40 SYRINGE (ML) INJECTION PRN
OUTPATIENT
Start: 2024-01-22

## 2024-01-18 RX ORDER — SODIUM CHLORIDE 9 MG/ML
INJECTION, SOLUTION INTRAVENOUS CONTINUOUS
OUTPATIENT
Start: 2024-01-22

## 2024-01-18 RX ORDER — SODIUM CHLORIDE 0.9 % (FLUSH) 0.9 %
5-40 SYRINGE (ML) INJECTION PRN
Status: DISCONTINUED | OUTPATIENT
Start: 2024-01-18 | End: 2024-01-19 | Stop reason: HOSPADM

## 2024-01-18 RX ORDER — HEPARIN 100 UNIT/ML
500 SYRINGE INTRAVENOUS PRN
OUTPATIENT
Start: 2024-01-22

## 2024-01-18 RX ORDER — DIPHENHYDRAMINE HYDROCHLORIDE 50 MG/ML
50 INJECTION INTRAMUSCULAR; INTRAVENOUS
OUTPATIENT
Start: 2024-01-22

## 2024-01-18 RX ORDER — ONDANSETRON 2 MG/ML
8 INJECTION INTRAMUSCULAR; INTRAVENOUS
OUTPATIENT
Start: 2024-01-22

## 2024-01-18 RX ORDER — SODIUM CHLORIDE 9 MG/ML
5-250 INJECTION, SOLUTION INTRAVENOUS PRN
Status: CANCELLED | OUTPATIENT
Start: 2024-01-22

## 2024-01-18 RX ADMIN — Medication 10 ML: at 11:02

## 2024-01-18 RX ADMIN — SODIUM CHLORIDE 20 ML/HR: 900 INJECTION, SOLUTION INTRAVENOUS at 11:02

## 2024-01-18 RX ADMIN — Medication 10 ML: at 11:58

## 2024-01-18 RX ADMIN — FERUMOXYTOL 510 MG: 510 INJECTION INTRAVENOUS at 11:29

## 2024-02-03 ENCOUNTER — HOSPITAL ENCOUNTER (EMERGENCY)
Age: 69
Discharge: HOME | End: 2024-02-03
Payer: COMMERCIAL

## 2024-02-03 VITALS
WEIGHT: 197.6 LBS | DIASTOLIC BLOOD PRESSURE: 87 MMHG | HEART RATE: 65 BPM | TEMPERATURE: 97.3 F | RESPIRATION RATE: 20 BRPM | OXYGEN SATURATION: 97 % | HEIGHT: 64 IN | BODY MASS INDEX: 33.73 KG/M2 | SYSTOLIC BLOOD PRESSURE: 146 MMHG

## 2024-02-03 DIAGNOSIS — J06.9 ACUTE UPPER RESPIRATORY INFECTION: Primary | ICD-10-CM

## 2024-02-03 LAB
INFLUENZA A BY PCR: NOT DETECTED
INFLUENZA B BY PCR: NOT DETECTED
SARS-COV-2 RDRP RESP QL NAA+PROBE: NOT DETECTED
SPECIMEN DESCRIPTION: NORMAL

## 2024-02-03 PROCEDURE — 99211 OFF/OP EST MAY X REQ PHY/QHP: CPT

## 2024-02-03 PROCEDURE — 87635 SARS-COV-2 COVID-19 AMP PRB: CPT

## 2024-02-03 PROCEDURE — 87502 INFLUENZA DNA AMP PROBE: CPT

## 2024-02-03 RX ORDER — GUAIFENESIN/DEXTROMETHORPHAN 100-10MG/5
10 SYRUP ORAL 3 TIMES DAILY PRN
Qty: 120 ML | Refills: 0 | Status: SHIPPED | OUTPATIENT
Start: 2024-02-03 | End: 2024-02-13

## 2024-02-03 RX ORDER — DOXYCYCLINE HYCLATE 100 MG
100 TABLET ORAL 2 TIMES DAILY
Qty: 20 TABLET | Refills: 0 | Status: SHIPPED | OUTPATIENT
Start: 2024-02-03 | End: 2024-02-13

## 2024-02-03 ASSESSMENT — PAIN DESCRIPTION - LOCATION: LOCATION: CHEST

## 2024-02-03 ASSESSMENT — PAIN - FUNCTIONAL ASSESSMENT: PAIN_FUNCTIONAL_ASSESSMENT: 0-10

## 2024-02-03 ASSESSMENT — PAIN SCALES - GENERAL: PAINLEVEL_OUTOF10: 6

## 2024-02-03 ASSESSMENT — PAIN DESCRIPTION - DESCRIPTORS: DESCRIPTORS: BURNING

## 2024-02-03 NOTE — ED PROVIDER NOTES
ago. She has a 20.0 pack-year smoking history. She has never used smokeless tobacco. She reports that she does not drink alcohol and does not use drugs.    Family History: family history includes Cancer in her paternal grandfather; Heart Disease in her maternal grandfather, maternal grandmother, mother, and sister; High Blood Pressure in her mother and sister; Stroke in her mother.     Allergies: Morphine and related and Percocet [oxycodone-acetaminophen]    Physical Exam   Oxygen Saturation Interpretation: Normal on room air analysis.        ED Triage Vitals [02/03/24 1138]   BP Temp Temp src Pulse Respirations SpO2 Height Weight - Scale   (!) 146/87 97.3 °F (36.3 °C) -- 65 20 97 % 1.626 m (5' 4\") 89.6 kg (197 lb 9.6 oz)         Constitutional:  Alert, development consistent with age.  Ears:  External Ears: Bilateral normal.               TM's & External Canals: normal TM's and external ear canals bilaterally.  Nose:   There is clear rhinorrhea and mucosal edema.  Sinuses: no Bilateral maxillary sinus tenderness.                    no Bilateral frontal sinus tenderness.  Mouth:  normal tongue and buccal mucosa.   Throat: no erythema or exudates noted. Teeth and gums normal..  Airway Patent.  Neck:  Supple. No meningeal signs. There is no  preauricular, submental, and anterior cervical node tenderness.  Respiratory:   Breath sounds: Bilateral normal.  Lung sounds: normal.   CV:  Regular rate and rhythm, normal heart sounds, without pathological murmurs, ectopy, gallops, or rubs.  GI:  Abdomen Soft, nontender, good bowel sounds.  No firm or pulsatile mass.  Integument:  Normal turgor.  Warm, dry, without visible rash.  Neurological:  Oriented.  Motor functions intact.       Lab / Imaging Results   (All laboratory and radiology results have been personally reviewed by myself)  Labs:  Results for orders placed or performed during the hospital encounter of 02/03/24   COVID-19, Rapid    Specimen: Nasopharyngeal Swab    Result Value Ref Range    Specimen Description .NASOPHARYNGEAL SWAB     SARS-CoV-2, Rapid Not Detected Not Detected   Influenza A+B, PCR    Specimen: Nasal   Result Value Ref Range    Influenza A by PCR Not Detected Not Detected    Influenza B by PCR Not Detected Not Detected       Imaging:  All Radiology results interpreted by Radiologist unless otherwise noted.  No orders to display       ED Course / Medical Decision Making   Medications - No data to display     Procedures:   none    Medical Decision Making:   Mehran Blakely presented to ED with complaints of Cough (Pt states she has a productive cough with green phlegm, chest congestion, chills, body aches, that started about 2 days ago.)    With low suspicion for pneumonia as per history/physical findings, imaging was not done.      With moderate suspicion for COVID-19/influenza, testing was obtained and were negative.     Based on history and examination findings of Mehran Blakely, the causative nature of illness may be Bacterial in etiology. Therefore, antibiotics and symptomatic control with supportive meds is considered appropriate at this time. She is not hypoxic.  Patient is well appearing, non toxic, meets criteria for and is appropriate for outpatient management.    Normal progression of disease discussed.  Extra fluids  Analgesics as needed, dosages and times reviewed.  Follow up as needed should symptoms fail to improve.    Assessment     1. Acute upper respiratory infection      Plan   Discharged home.  Patient condition is good    New Medications     Discharge Medication List as of 2/3/2024 12:27 PM        START taking these medications    Details   doxycycline hyclate (VIBRA-TABS) 100 MG tablet Take 1 tablet by mouth 2 times daily for 10 days, Disp-20 tablet, R-0Normal      guaiFENesin-dextromethorphan (ROBITUSSIN DM) 100-10 MG/5ML syrup Take 10 mLs by mouth 3 times daily as needed for Cough, Disp-120 mL, R-0Normal           Electronically

## 2024-02-06 ENCOUNTER — OFFICE VISIT (OUTPATIENT)
Dept: ORTHOPEDIC SURGERY | Age: 69
End: 2024-02-06

## 2024-02-06 ENCOUNTER — PREP FOR PROCEDURE (OUTPATIENT)
Dept: ORTHOPEDIC SURGERY | Age: 69
End: 2024-02-06

## 2024-02-06 ENCOUNTER — TELEPHONE (OUTPATIENT)
Dept: ORTHOPEDIC SURGERY | Age: 69
End: 2024-02-06

## 2024-02-06 VITALS — HEIGHT: 64 IN | BODY MASS INDEX: 33.63 KG/M2 | TEMPERATURE: 98 F | WEIGHT: 197 LBS

## 2024-02-06 DIAGNOSIS — M17.12 PRIMARY OSTEOARTHRITIS OF LEFT KNEE: Primary | ICD-10-CM

## 2024-02-06 NOTE — TELEPHONE ENCOUNTER
Prior Authorization Form:      DEMOGRAPHICS:                     Patient Name:  Radhika Yates  Patient :  1955            Insurance:  Payor: [employer] / Plan: [employer] / Product Type: *No Product type* /   Insurance ID Number:    Payer/Plan Subscr  Sex Relation Sub. Ins. ID Effective Group Num   1. [employer]* RADHIKA YATES 1955 Female Self [id number]                                   [address]   2. [facility]* RADHIKA YATES 1955 Female Self [id number]                                   [address]         DIAGNOSIS & PROCEDURE:                       Procedure/Operation: TOTAL LEFT KNEE REPLACEMENT ARTHROPLASTY           CPT Code: 02244    Diagnosis:  DEGENERATION LEFT KNEE    ICD10 Code: M17.12    Location:  [facility]    Surgeon:  HUNTER DUGAN D.O.    SCHEDULING INFORMATION:                          Date: 2024    Time: TBA              Anesthesia:  Spinal                                                       Status:  Outpatient        Special Comments:  [employer]       Electronically signed by Madison Mcnamara on 2024 at 9:22 AM

## 2024-02-06 NOTE — PROGRESS NOTES
1988    Dr. Chacon - [facility]     JOINT REPLACEMENT  2009    Right Knee    NECK SURGERY  06/2015    fused 4,5,6- screws, in [place]    ASHOK-EN-Y GASTRIC BYPASS  04/17/2002    Dr. Sommer - [facility]    SHOULDER ARTHROSCOPY  2/17/12    LEFT REPAIR ROTATOR CUFF TEAR SUBACROMIAL DECOMPRESSION    TONSILLECTOMY      TRACHEOSTOMY  1999    [place] - Patient states that this was due to her weight prior to WLS    UPPER GASTROINTESTINAL ENDOSCOPY  2009    UVULOPALATOPHARYGOPLASTY         Current Outpatient Medications:     vibegron (GEMTESA) 75 MG TABS tablet, Take 1 tablet by mouth daily, Disp: , Rfl:     D-MANNOSE PO, Take by mouth, Disp: , Rfl:     doxycycline hyclate (VIBRA-TABS) 100 MG tablet, Take 1 tablet by mouth 2 times daily for 10 days, Disp: 20 tablet, Rfl: 0    guaiFENesin-dextromethorphan (ROBITUSSIN DM) 100-10 MG/5ML syrup, Take 10 mLs by mouth 3 times daily as needed for Cough, Disp: 120 mL, Rfl: 0    acetaminophen (TYLENOL) 500 MG tablet, Take 2 tablets by mouth every 6 hours as needed for Pain, Disp: , Rfl:     lisinopril (PRINIVIL;ZESTRIL) 20 MG tablet, Take 1 tablet by mouth daily, Disp: , Rfl:     Biotin 5000 MCG TABS, Take 5,000 mcg by mouth daily, Disp: , Rfl:     amitriptyline (ELAVIL) 75 MG tablet, Take 1 tablet by mouth nightly, Disp: , Rfl:     tiZANidine (ZANAFLEX) 4 MG tablet, Take 2 tablets by mouth nightly, Disp: , Rfl:     furosemide (LASIX) 40 MG tablet, Take 1 tablet by mouth daily as needed (Swelling), Disp: , Rfl:     venlafaxine (EFFEXOR XR) 75 MG extended release capsule, Take 1 capsule by mouth nightly, Disp: , Rfl:     Cholecalciferol (VITAMIN D) 50 MCG (2000 UT) CAPS capsule, Take 1 capsule by mouth daily, Disp: , Rfl:     busPIRone (BUSPAR) 5 MG tablet, Take 1 tablet by mouth 3 times daily, Disp: , Rfl:     omeprazole (PRILOSEC) 40 MG delayed release capsule, Take 1 capsule by mouth every morning, Disp: , Rfl:     famotidine (PEPCID) 40 MG tablet,

## 2024-03-08 ENCOUNTER — HOSPITAL ENCOUNTER (OUTPATIENT)
Age: 69
Discharge: HOME | End: 2024-03-08
Payer: COMMERCIAL

## 2024-03-08 LAB
BASOPHILS # BLD: 0.04 K/UL (ref 0–0.2)
BASOPHILS NFR BLD: 1 % (ref 0–2)
EOSINOPHIL # BLD: 0.06 K/UL (ref 0.05–0.5)
EOSINOPHILS RELATIVE PERCENT: 1 % (ref 0–6)
ERYTHROCYTE [DISTWIDTH] IN BLOOD BY AUTOMATED COUNT: 14 % (ref 11.5–15)
HCT VFR BLD AUTO: 35.4 % (ref 34–48)
HGB BLD-MCNC: 12 G/DL (ref 11.5–15.5)
IMM GRANULOCYTES # BLD AUTO: <0.03 K/UL (ref 0–0.58)
IMM GRANULOCYTES NFR BLD: 0 % (ref 0–5)
LYMPHOCYTES NFR BLD: 2.08 K/UL (ref 1.5–4)
LYMPHOCYTES RELATIVE PERCENT: 36 % (ref 20–42)
MCH RBC QN AUTO: 31.1 PG (ref 26–35)
MCHC RBC AUTO-ENTMCNC: 33.9 G/DL (ref 32–34.5)
MCV RBC AUTO: 91.7 FL (ref 80–99.9)
MONOCYTES NFR BLD: 0.43 K/UL (ref 0.1–0.95)
MONOCYTES NFR BLD: 8 % (ref 2–12)
NEUTROPHILS NFR BLD: 55 % (ref 43–80)
NEUTS SEG NFR BLD: 3.15 K/UL (ref 1.8–7.3)
PLATELET # BLD AUTO: 204 K/UL (ref 130–450)
PMV BLD AUTO: 8.4 FL (ref 7–12)
RBC # BLD AUTO: 3.86 M/UL (ref 3.5–5.5)
WBC OTHER # BLD: 5.8 K/UL (ref 4.5–11.5)

## 2024-03-08 PROCEDURE — 36415 COLL VENOUS BLD VENIPUNCTURE: CPT

## 2024-03-08 PROCEDURE — 85025 COMPLETE CBC W/AUTO DIFF WBC: CPT

## 2024-03-13 RX ORDER — SODIUM CHLORIDE, SODIUM LACTATE, POTASSIUM CHLORIDE, CALCIUM CHLORIDE 600; 310; 30; 20 MG/100ML; MG/100ML; MG/100ML; MG/100ML
INJECTION, SOLUTION INTRAVENOUS CONTINUOUS
OUTPATIENT
Start: 2024-03-20

## 2024-03-15 ENCOUNTER — ANESTHESIA EVENT (OUTPATIENT)
Dept: OPERATING ROOM | Age: 69
End: 2024-03-15
Payer: COMMERCIAL

## 2024-03-15 ENCOUNTER — HOSPITAL ENCOUNTER (OUTPATIENT)
Dept: PREADMISSION TESTING | Age: 69
Discharge: HOME | End: 2024-03-15
Payer: COMMERCIAL

## 2024-03-15 VITALS
SYSTOLIC BLOOD PRESSURE: 147 MMHG | TEMPERATURE: 97.9 F | OXYGEN SATURATION: 98 % | BODY MASS INDEX: 33.46 KG/M2 | RESPIRATION RATE: 18 BRPM | DIASTOLIC BLOOD PRESSURE: 90 MMHG | HEART RATE: 58 BPM | WEIGHT: 196 LBS | HEIGHT: 64 IN

## 2024-03-15 DIAGNOSIS — Z01.818 PRE-OP EVALUATION: ICD-10-CM

## 2024-03-15 LAB
ALBUMIN SERPL-MCNC: 4.3 G/DL (ref 3.5–5.2)
ALP SERPL-CCNC: 74 U/L (ref 35–104)
ALT SERPL-CCNC: 8 U/L (ref 0–32)
ANION GAP SERPL CALCULATED.3IONS-SCNC: 10 MMOL/L (ref 7–16)
AST SERPL-CCNC: 12 U/L (ref 0–31)
BASOPHILS # BLD: 0.05 K/UL (ref 0–0.2)
BASOPHILS NFR BLD: 1 % (ref 0–2)
BILIRUB SERPL-MCNC: 0.4 MG/DL (ref 0–1.2)
BILIRUB UR QL STRIP: NEGATIVE
BUN SERPL-MCNC: 23 MG/DL (ref 6–23)
CALCIUM SERPL-MCNC: 9.5 MG/DL (ref 8.6–10.2)
CHLORIDE SERPL-SCNC: 99 MMOL/L (ref 98–107)
CLARITY UR: CLEAR
CO2 SERPL-SCNC: 27 MMOL/L (ref 22–29)
COLOR UR: YELLOW
COMMENT: ABNORMAL
CREAT SERPL-MCNC: 1.2 MG/DL (ref 0.5–1)
EOSINOPHIL # BLD: 0.07 K/UL (ref 0.05–0.5)
EOSINOPHILS RELATIVE PERCENT: 1 % (ref 0–6)
ERYTHROCYTE [DISTWIDTH] IN BLOOD BY AUTOMATED COUNT: 13.6 % (ref 11.5–15)
GFR SERPL CREATININE-BSD FRML MDRD: 51 ML/MIN/1.73M2
GLUCOSE SERPL-MCNC: 97 MG/DL (ref 74–99)
GLUCOSE UR STRIP-MCNC: NEGATIVE MG/DL
HBA1C MFR BLD: 5.1 % (ref 4–5.6)
HCT VFR BLD AUTO: 36.1 % (ref 34–48)
HGB BLD-MCNC: 11.9 G/DL (ref 11.5–15.5)
HGB UR QL STRIP.AUTO: NEGATIVE
IMM GRANULOCYTES # BLD AUTO: <0.03 K/UL (ref 0–0.58)
IMM GRANULOCYTES NFR BLD: 0 % (ref 0–5)
INR PPP: 1.2
KETONES UR STRIP-MCNC: ABNORMAL MG/DL
LEUKOCYTE ESTERASE UR QL STRIP: NEGATIVE
LYMPHOCYTES NFR BLD: 1.95 K/UL (ref 1.5–4)
LYMPHOCYTES RELATIVE PERCENT: 35 % (ref 20–42)
MCH RBC QN AUTO: 30.3 PG (ref 26–35)
MCHC RBC AUTO-ENTMCNC: 33 G/DL (ref 32–34.5)
MCV RBC AUTO: 91.9 FL (ref 80–99.9)
MONOCYTES NFR BLD: 0.48 K/UL (ref 0.1–0.95)
MONOCYTES NFR BLD: 9 % (ref 2–12)
NEUTROPHILS NFR BLD: 54 % (ref 43–80)
NEUTS SEG NFR BLD: 3.05 K/UL (ref 1.8–7.3)
NITRITE UR QL STRIP: NEGATIVE
PARTIAL THROMBOPLASTIN TIME: 33.8 SEC (ref 24.5–35.1)
PH UR STRIP: 6 [PH] (ref 5–9)
PLATELET # BLD AUTO: 210 K/UL (ref 130–450)
PMV BLD AUTO: 9.2 FL (ref 7–12)
POTASSIUM SERPL-SCNC: 4.3 MMOL/L (ref 3.5–5)
PREALB SERPL-MCNC: 24 MG/DL (ref 20–40)
PROT SERPL-MCNC: 7.5 G/DL (ref 6.4–8.3)
PROT UR STRIP-MCNC: NEGATIVE MG/DL
PROTHROMBIN TIME: 13.1 SEC (ref 9.3–12.4)
RBC # BLD AUTO: 3.93 M/UL (ref 3.5–5.5)
SODIUM SERPL-SCNC: 136 MMOL/L (ref 132–146)
SP GR UR STRIP: 1.02 (ref 1–1.03)
UROBILINOGEN UR STRIP-ACNC: 0.2 EU/DL (ref 0–1)
WBC OTHER # BLD: 5.6 K/UL (ref 4.5–11.5)

## 2024-03-15 PROCEDURE — 80053 COMPREHEN METABOLIC PANEL: CPT

## 2024-03-15 PROCEDURE — 85025 COMPLETE CBC W/AUTO DIFF WBC: CPT

## 2024-03-15 PROCEDURE — 84134 ASSAY OF PREALBUMIN: CPT

## 2024-03-15 PROCEDURE — 87081 CULTURE SCREEN ONLY: CPT

## 2024-03-15 PROCEDURE — 85610 PROTHROMBIN TIME: CPT

## 2024-03-15 PROCEDURE — 87086 URINE CULTURE/COLONY COUNT: CPT

## 2024-03-15 PROCEDURE — 83036 HEMOGLOBIN GLYCOSYLATED A1C: CPT

## 2024-03-15 PROCEDURE — 81003 URINALYSIS AUTO W/O SCOPE: CPT

## 2024-03-15 PROCEDURE — 85730 THROMBOPLASTIN TIME PARTIAL: CPT

## 2024-03-15 RX ORDER — FENTANYL CITRATE 50 UG/ML
50 INJECTION, SOLUTION INTRAMUSCULAR; INTRAVENOUS ONCE
OUTPATIENT
Start: 2024-03-20

## 2024-03-15 RX ORDER — ROPIVACAINE HYDROCHLORIDE 5 MG/ML
30 INJECTION, SOLUTION EPIDURAL; INFILTRATION; PERINEURAL ONCE
OUTPATIENT
Start: 2024-03-20

## 2024-03-15 RX ORDER — MIDAZOLAM HYDROCHLORIDE 1 MG/ML
2 INJECTION INTRAMUSCULAR; INTRAVENOUS ONCE
OUTPATIENT
Start: 2024-03-20

## 2024-03-15 ASSESSMENT — PAIN DESCRIPTION - DESCRIPTORS: DESCRIPTORS: ACHING;SORE

## 2024-03-15 ASSESSMENT — PAIN SCALES - GENERAL: PAINLEVEL_OUTOF10: 8

## 2024-03-15 ASSESSMENT — PAIN DESCRIPTION - ORIENTATION: ORIENTATION: LEFT

## 2024-03-15 ASSESSMENT — PAIN DESCRIPTION - LOCATION: LOCATION: KNEE

## 2024-03-15 NOTE — PROGRESS NOTES
Patient attended preoperative Total Joint Camp on 3/15/2024.  Patient is scheduled to have an elective knee replacement.  Patient was educated regarding Disease Process, Medications, Smoking Cessation, Oxygenation, Incentive Spirometry and Deep Breath and Cough, signs and symptoms of postoperative joint infection that include: Fever, Chills, Pain Control, Drainage and Redness, post-op follow up with orthopaedic surgeon, dressing removal, staple removal, ambulatory devices which include a wheeled walker and cane, bed mobility, correct anatomical alignment, active range of motion, proper transferring technique, incision care, infection prevention measures, non-pharmacologic comfort measures, notification of inadequate pain control measures, pain scale for assessing level of pain, pharmacologic pain management, relaxation techniques.

## 2024-03-15 NOTE — ACP (ADVANCE CARE PLANNING)
Advance Care Planning   Healthcare Decision Maker:    Primary Decision Maker: Jaqcui Swartz-POA - Child - [phone]    Secondary Decision Maker: Lelo Farias [facility] - [phone]    Supplemental (Other) Decision Maker: Kenyatta Vergara 2nd [facility] - [phone]    Click here to complete Healthcare Decision Makers including selection of the Healthcare Decision Maker Relationship (ie \"Primary\").  Today we documented Decision Maker(s) consistent with ACP documents on file.

## 2024-03-15 NOTE — PROGRESS NOTES
[facility]                                                                                                                    PRE OP INSTRUCTIONS FOR  Mehran Blakely        Date: 3/15/2024    Date of surgery: 3/20/24   Arrival Time: Hospital will call you between 5pm and 7pm with your final arrival time for surgery. Go to     front of hospital and check in at information desk.    Nothing by mouth (NPO) as instructed. May have clear liquids up to 2 hours prior to surgery. Nothing solid after midnight. Examples: water, apple juice, black coffee, plain tea    Take the following medications with a small sip of water on the morning of Surgery: buspar,, prilosec , bladder meds      Aspirin, Ibuprofen, Advil, Naproxen, other Anti-inflammatory products should be stopped before surgery as directed by your surgeon, cardiologist, or primary care Doctor. Herbal supplements and Vitamin E should be stopped five days prior.  May take Tylenol unless instructed otherwise by your surgeon.    Check with your Doctor regarding stopping Plavix, Coumadin, Lovenox, Eliquis, Effient, or other blood thinners such as, pradaxa, lixiana, xaralto and savaysa.    Do not smoke, vape, or use illicit drugs and do not drink any alcoholic beverages 24 hours prior to surgery.    You may brush your teeth the morning of surgery.      You MUST make arrangements for a responsible adult, 18 and over, to take you home after your surgery. You will not be allowed to leave alone or drive yourself home.  You will need someone stay with you the first 24 hrs. Your surgery will be cancelled if you do not have a ride home or someone to stay with you.      Please wear simple, loose fitting clothing to the hospital.  Do not bring valuables (money, credit cards, checkbooks, etc.) Do not wear any makeup (including no eye makeup) or nail polish on your fingers or toes.    DO NOT wear any jewelry or piercings on day of surgery.  All body

## 2024-03-15 NOTE — CARE COORDINATION
03/15/24 1240   Social/Functional History   Lives With Daughter;Other (comment)  (son-in-law)   Home Layout One level   Home Access Stairs to enter without rails   Entrance Stairs - Number of Steps 1 step to enter   Bathroom Shower/Tub Tub/Shower unit   Bathroom Equipment 3-in-1 commode   Home Equipment Walker, rolling;Walker, 4 wheeled   Receives Help From Family   Condition of Participation: Discharge Planning   The Plan for Transition of Care is related to the following treatment goals: HHC/DME   The Patient and/or Patient Representative was provided with a Choice of Provider? Patient   The Patient and/Or Patient Representative agree with the Discharge Plan? Yes   Freedom of Choice list was provided with basic dialogue that supports the patient's individualized plan of care/goals, treatment preferences, and shares the quality data associated with the providers?  Yes     3/19/2024: SS NOTE:  Met with pt in PAT, pt having surgery for a elective total knee arthroplasty rescheduled for 4/4, explained sw role for transition of care and reviewed rehab needs and providers for [employer] insurance, pt plans to return home day of surgery from [facility] , pt will have help from from her daughter who will transport pt home, pt prefers [employer] [facility]. Referrals made to [facility] liaison who will follow post-op for home PT orders for anticipated start of care on 4/5 and to Tania at [facility] for a ttb to be delivered to [facility] for pt to take home prior to discharge, Nursing informed.Electronically signed by KATIANA Mclaughlin on 3/19/2024 at 1:40 PM

## 2024-03-15 NOTE — ANESTHESIA PRE PROCEDURE
Department of Anesthesiology  Preprocedure Note       Name:  Mehran Blakely   Age:  68 y.o.  :  1955                                          MRN:  [id number]         Date:  3/15/2024      Surgeon: Surgeon(s):  Asa Inman DO    Procedure: Procedure(s):  LEFT KNEE TOTAL ARTHROPLASTY **LUTHER AND NEPHEW**    Medications prior to admission:   Prior to Admission medications    Medication Sig Start Date End Date Taking? Authorizing Provider   vibegron (GEMTESA) 75 MG TABS tablet Take 1 tablet by mouth daily    Sultana Morales MD   D-MANNOSE PO Take by mouth    ProviderSultana MD   acetaminophen (TYLENOL) 500 MG tablet Take 2 tablets by mouth every 6 hours as needed for Pain    Sultana Morales MD   lisinopril (PRINIVIL;ZESTRIL) 20 MG tablet Take 1 tablet by mouth daily    Sultana Morales MD   Biotin 5000 MCG TABS Take 5,000 mcg by mouth daily    Sultana Morales MD   amitriptyline (ELAVIL) 75 MG tablet Take 1 tablet by mouth nightly    Sultana Morales MD   tiZANidine (ZANAFLEX) 4 MG tablet Take 2 tablets by mouth nightly    Sultana Morales MD   furosemide (LASIX) 40 MG tablet Take 1 tablet by mouth daily as needed (Swelling)    Sultana Morales MD   venlafaxine (EFFEXOR XR) 75 MG extended release capsule Take 1 capsule by mouth nightly    Sultana Morales MD   Cholecalciferol (VITAMIN D) 50 MCG (2000) CAPS capsule Take 1 capsule by mouth daily    Sultana Morales MD   busPIRone (BUSPAR) 5 MG tablet Take 1 tablet by mouth 3 times daily 21   Sultana Morales MD   omeprazole (PRILOSEC) 40 MG delayed release capsule Take 1 capsule by mouth every morning 21   Sultana Morales MD   famotidine (PEPCID) 40 MG tablet Take 1 tablet by mouth nightly    Sultana Morales MD   apixaban (ELIQUIS) 5 MG TABS tablet Take 1 tablet by mouth 2 times daily    Sultana Morales MD       Current medications:    Current Outpatient Medications

## 2024-03-16 LAB
MICROORGANISM SPEC CULT: ABNORMAL
MICROORGANISM SPEC CULT: NORMAL
SPECIMEN DESCRIPTION: ABNORMAL
SPECIMEN DESCRIPTION: NORMAL

## 2024-04-04 ENCOUNTER — HOSPITAL ENCOUNTER (OUTPATIENT)
Age: 69
Setting detail: OUTPATIENT SURGERY
Discharge: HOME | End: 2024-04-04
Attending: ORTHOPAEDIC SURGERY | Admitting: ORTHOPAEDIC SURGERY
Payer: COMMERCIAL

## 2024-04-04 ENCOUNTER — APPOINTMENT (OUTPATIENT)
Dept: GENERAL RADIOLOGY | Age: 69
End: 2024-04-04
Attending: ORTHOPAEDIC SURGERY
Payer: COMMERCIAL

## 2024-04-04 ENCOUNTER — ANESTHESIA (OUTPATIENT)
Dept: OPERATING ROOM | Age: 69
End: 2024-04-04
Payer: COMMERCIAL

## 2024-04-04 VITALS
BODY MASS INDEX: 33.46 KG/M2 | RESPIRATION RATE: 15 BRPM | TEMPERATURE: 97.4 F | SYSTOLIC BLOOD PRESSURE: 150 MMHG | HEIGHT: 64 IN | WEIGHT: 196 LBS | HEART RATE: 80 BPM | OXYGEN SATURATION: 93 % | DIASTOLIC BLOOD PRESSURE: 79 MMHG

## 2024-04-04 DIAGNOSIS — Z96.652 S/P TOTAL KNEE ARTHROPLASTY, LEFT: ICD-10-CM

## 2024-04-04 DIAGNOSIS — M17.12 DEGENERATIVE ARTHRITIS OF LEFT KNEE: ICD-10-CM

## 2024-04-04 DIAGNOSIS — G89.18 POST-OP PAIN: ICD-10-CM

## 2024-04-04 DIAGNOSIS — Z01.818 PRE-OP EVALUATION: Primary | ICD-10-CM

## 2024-04-04 PROCEDURE — 6360000002 HC RX W HCPCS: Performed by: ANESTHESIOLOGY

## 2024-04-04 PROCEDURE — 97110 THERAPEUTIC EXERCISES: CPT | Performed by: PHYSICAL THERAPIST

## 2024-04-04 PROCEDURE — 2580000003 HC RX 258

## 2024-04-04 PROCEDURE — 6360000002 HC RX W HCPCS

## 2024-04-04 PROCEDURE — 27447 TOTAL KNEE ARTHROPLASTY: CPT | Performed by: ORTHOPAEDIC SURGERY

## 2024-04-04 PROCEDURE — 6370000000 HC RX 637 (ALT 250 FOR IP)

## 2024-04-04 PROCEDURE — 2580000003 HC RX 258: Performed by: NURSE ANESTHETIST, CERTIFIED REGISTERED

## 2024-04-04 PROCEDURE — 3600000015 HC SURGERY LEVEL 5 ADDTL 15MIN: Performed by: ORTHOPAEDIC SURGERY

## 2024-04-04 PROCEDURE — 7100000010 HC PHASE II RECOVERY - FIRST 15 MIN: Performed by: ORTHOPAEDIC SURGERY

## 2024-04-04 PROCEDURE — 7100000001 HC PACU RECOVERY - ADDTL 15 MIN: Performed by: ORTHOPAEDIC SURGERY

## 2024-04-04 PROCEDURE — 2580000003 HC RX 258: Performed by: ORTHOPAEDIC SURGERY

## 2024-04-04 PROCEDURE — 3700000001 HC ADD 15 MINUTES (ANESTHESIA): Performed by: ORTHOPAEDIC SURGERY

## 2024-04-04 PROCEDURE — 97161 PT EVAL LOW COMPLEX 20 MIN: CPT | Performed by: PHYSICAL THERAPIST

## 2024-04-04 PROCEDURE — 73560 X-RAY EXAM OF KNEE 1 OR 2: CPT

## 2024-04-04 PROCEDURE — 97530 THERAPEUTIC ACTIVITIES: CPT | Performed by: PHYSICAL THERAPIST

## 2024-04-04 PROCEDURE — C1776 JOINT DEVICE (IMPLANTABLE): HCPCS | Performed by: ORTHOPAEDIC SURGERY

## 2024-04-04 PROCEDURE — 6360000002 HC RX W HCPCS: Performed by: ORTHOPAEDIC SURGERY

## 2024-04-04 PROCEDURE — 3600000005 HC SURGERY LEVEL 5 BASE: Performed by: ORTHOPAEDIC SURGERY

## 2024-04-04 PROCEDURE — 7100000000 HC PACU RECOVERY - FIRST 15 MIN: Performed by: ORTHOPAEDIC SURGERY

## 2024-04-04 PROCEDURE — 2580000003 HC RX 258: Performed by: ANESTHESIOLOGY

## 2024-04-04 PROCEDURE — C1713 ANCHOR/SCREW BN/BN,TIS/BN: HCPCS | Performed by: ORTHOPAEDIC SURGERY

## 2024-04-04 PROCEDURE — 97116 GAIT TRAINING THERAPY: CPT | Performed by: PHYSICAL THERAPIST

## 2024-04-04 PROCEDURE — 6370000000 HC RX 637 (ALT 250 FOR IP): Performed by: ORTHOPAEDIC SURGERY

## 2024-04-04 PROCEDURE — 64447 NJX AA&/STRD FEMORAL NRV IMG: CPT | Performed by: ANESTHESIOLOGY

## 2024-04-04 PROCEDURE — 2500000003 HC RX 250 WO HCPCS

## 2024-04-04 PROCEDURE — 7100000011 HC PHASE II RECOVERY - ADDTL 15 MIN: Performed by: ORTHOPAEDIC SURGERY

## 2024-04-04 PROCEDURE — 3700000000 HC ANESTHESIA ATTENDED CARE: Performed by: ORTHOPAEDIC SURGERY

## 2024-04-04 PROCEDURE — 6360000002 HC RX W HCPCS: Performed by: NURSE ANESTHETIST, CERTIFIED REGISTERED

## 2024-04-04 PROCEDURE — 2709999900 HC NON-CHARGEABLE SUPPLY: Performed by: ORTHOPAEDIC SURGERY

## 2024-04-04 DEVICE — LEGION NARROW POSTERIOR STABILIZED                                    OXINIUM SIZE 5N LEFT
Type: IMPLANTABLE DEVICE | Site: KNEE | Status: FUNCTIONAL
Brand: LEGION

## 2024-04-04 DEVICE — GEN II 7.5MM RESUR PAT 29MM
Type: IMPLANTABLE DEVICE | Site: KNEE | Status: FUNCTIONAL
Brand: GENESIS II

## 2024-04-04 DEVICE — GENESIS II NON-POROUS TIBIAL                                    BASEPLATE SIZE 5 LEFT
Type: IMPLANTABLE DEVICE | Site: KNEE | Status: FUNCTIONAL
Brand: GENESIS II

## 2024-04-04 DEVICE — GENESIS II LONG STEM 10MM X 70MM
Type: IMPLANTABLE DEVICE | Site: KNEE | Status: FUNCTIONAL
Brand: GENESIS II

## 2024-04-04 DEVICE — LEGION POSTERIOR STABILIZED HIGH                                    FLEX HIGHLY CROSS LINKED                                    POLYETHYLENE SIZE 5-6 11MM
Type: IMPLANTABLE DEVICE | Site: KNEE | Status: FUNCTIONAL
Brand: LEGION

## 2024-04-04 DEVICE — FULL DOSE BONE CEMENT, 10 PACK CATALOG NUMBER IS 6191-1-010
Type: IMPLANTABLE DEVICE | Status: FUNCTIONAL
Brand: SIMPLEX

## 2024-04-04 DEVICE — KNEE K1 TOT HEMI STD CEM IMPL CAPPED K1 SN: Type: IMPLANTABLE DEVICE | Status: FUNCTIONAL

## 2024-04-04 RX ORDER — ACETAMINOPHEN 500 MG
1000 TABLET ORAL EVERY 6 HOURS PRN
Status: CANCELLED | OUTPATIENT
Start: 2024-04-04

## 2024-04-04 RX ORDER — LABETALOL HYDROCHLORIDE 5 MG/ML
10 INJECTION, SOLUTION INTRAVENOUS
Status: DISCONTINUED | OUTPATIENT
Start: 2024-04-04 | End: 2024-04-04 | Stop reason: HOSPADM

## 2024-04-04 RX ORDER — PROPOFOL 10 MG/ML
INJECTION, EMULSION INTRAVENOUS PRN
Status: DISCONTINUED | OUTPATIENT
Start: 2024-04-04 | End: 2024-04-04 | Stop reason: SDUPTHER

## 2024-04-04 RX ORDER — TROSPIUM CHLORIDE 20 MG/1
20 TABLET, FILM COATED ORAL
Status: CANCELLED | OUTPATIENT
Start: 2024-04-04

## 2024-04-04 RX ORDER — SODIUM CHLORIDE 9 MG/ML
INJECTION, SOLUTION INTRAVENOUS PRN
Status: DISCONTINUED | OUTPATIENT
Start: 2024-04-04 | End: 2024-04-04 | Stop reason: HOSPADM

## 2024-04-04 RX ORDER — GABAPENTIN 100 MG/1
100 CAPSULE ORAL 3 TIMES DAILY
Qty: 90 CAPSULE | Refills: 0 | Status: SHIPPED | OUTPATIENT
Start: 2024-04-04 | End: 2024-05-04

## 2024-04-04 RX ORDER — LIDOCAINE HYDROCHLORIDE 20 MG/ML
INJECTION, SOLUTION INTRAVENOUS PRN
Status: DISCONTINUED | OUTPATIENT
Start: 2024-04-04 | End: 2024-04-04 | Stop reason: SDUPTHER

## 2024-04-04 RX ORDER — SODIUM CHLORIDE 0.9 % (FLUSH) 0.9 %
5-40 SYRINGE (ML) INJECTION EVERY 12 HOURS SCHEDULED
Status: DISCONTINUED | OUTPATIENT
Start: 2024-04-04 | End: 2024-04-04 | Stop reason: HOSPADM

## 2024-04-04 RX ORDER — BUPIVACAINE HYDROCHLORIDE 7.5 MG/ML
INJECTION, SOLUTION INTRASPINAL PRN
Status: DISCONTINUED | OUTPATIENT
Start: 2024-04-04 | End: 2024-04-04 | Stop reason: SDUPTHER

## 2024-04-04 RX ORDER — SODIUM CHLORIDE 0.9 % (FLUSH) 0.9 %
5-40 SYRINGE (ML) INJECTION PRN
Status: DISCONTINUED | OUTPATIENT
Start: 2024-04-04 | End: 2024-04-04 | Stop reason: HOSPADM

## 2024-04-04 RX ORDER — ROPIVACAINE HYDROCHLORIDE 5 MG/ML
30 INJECTION, SOLUTION EPIDURAL; INFILTRATION; PERINEURAL ONCE
Status: COMPLETED | OUTPATIENT
Start: 2024-04-04 | End: 2024-04-04

## 2024-04-04 RX ORDER — IPRATROPIUM BROMIDE AND ALBUTEROL SULFATE 2.5; .5 MG/3ML; MG/3ML
1 SOLUTION RESPIRATORY (INHALATION)
Status: DISCONTINUED | OUTPATIENT
Start: 2024-04-04 | End: 2024-04-04 | Stop reason: HOSPADM

## 2024-04-04 RX ORDER — HYDROCODONE BITARTRATE AND ACETAMINOPHEN 10; 325 MG/1; MG/1
1 TABLET ORAL EVERY 6 HOURS PRN
Status: DISCONTINUED | OUTPATIENT
Start: 2024-04-04 | End: 2024-04-04 | Stop reason: HOSPADM

## 2024-04-04 RX ORDER — MORPHINE SULFATE 10 MG/ML
2 INJECTION, SOLUTION INTRAMUSCULAR; INTRAVENOUS
Status: DISCONTINUED | OUTPATIENT
Start: 2024-04-04 | End: 2024-04-04 | Stop reason: HOSPADM

## 2024-04-04 RX ORDER — PANTOPRAZOLE SODIUM 40 MG/1
40 TABLET, DELAYED RELEASE ORAL
Status: CANCELLED | OUTPATIENT
Start: 2024-04-05

## 2024-04-04 RX ORDER — HYDRALAZINE HYDROCHLORIDE 20 MG/ML
10 INJECTION INTRAMUSCULAR; INTRAVENOUS
Status: DISCONTINUED | OUTPATIENT
Start: 2024-04-04 | End: 2024-04-04 | Stop reason: HOSPADM

## 2024-04-04 RX ORDER — DEXTROSE AND SODIUM CHLORIDE 5; .45 G/100ML; G/100ML
INJECTION, SOLUTION INTRAVENOUS CONTINUOUS
Status: DISCONTINUED | OUTPATIENT
Start: 2024-04-04 | End: 2024-04-04 | Stop reason: HOSPADM

## 2024-04-04 RX ORDER — ASPIRIN 325 MG
325 TABLET, DELAYED RELEASE (ENTERIC COATED) ORAL 2 TIMES DAILY
Status: DISCONTINUED | OUTPATIENT
Start: 2024-04-05 | End: 2024-04-04 | Stop reason: HOSPADM

## 2024-04-04 RX ORDER — DEXAMETHASONE SODIUM PHOSPHATE 10 MG/ML
8 INJECTION, SOLUTION INTRAMUSCULAR; INTRAVENOUS ONCE
Status: COMPLETED | OUTPATIENT
Start: 2024-04-04 | End: 2024-04-04

## 2024-04-04 RX ORDER — ONDANSETRON 4 MG/1
4 TABLET, ORALLY DISINTEGRATING ORAL EVERY 8 HOURS PRN
Status: DISCONTINUED | OUTPATIENT
Start: 2024-04-04 | End: 2024-04-04 | Stop reason: HOSPADM

## 2024-04-04 RX ORDER — FENTANYL CITRATE 50 UG/ML
50 INJECTION, SOLUTION INTRAMUSCULAR; INTRAVENOUS ONCE
Status: COMPLETED | OUTPATIENT
Start: 2024-04-04 | End: 2024-04-04

## 2024-04-04 RX ORDER — DEXAMETHASONE SODIUM PHOSPHATE 10 MG/ML
INJECTION, SOLUTION INTRAMUSCULAR; INTRAVENOUS
Status: COMPLETED | OUTPATIENT
Start: 2024-04-04 | End: 2024-04-04

## 2024-04-04 RX ORDER — TIZANIDINE 4 MG/1
8 TABLET ORAL NIGHTLY
Status: CANCELLED | OUTPATIENT
Start: 2024-04-04

## 2024-04-04 RX ORDER — ONDANSETRON 2 MG/ML
4 INJECTION INTRAMUSCULAR; INTRAVENOUS EVERY 6 HOURS PRN
Status: DISCONTINUED | OUTPATIENT
Start: 2024-04-04 | End: 2024-04-04 | Stop reason: HOSPADM

## 2024-04-04 RX ORDER — LISINOPRIL 20 MG/1
20 TABLET ORAL DAILY
Status: DISCONTINUED | OUTPATIENT
Start: 2024-04-04 | End: 2024-04-04 | Stop reason: HOSPADM

## 2024-04-04 RX ORDER — CELECOXIB 200 MG/1
200 CAPSULE ORAL DAILY
Qty: 60 CAPSULE | Refills: 3 | Status: SHIPPED | OUTPATIENT
Start: 2024-04-04

## 2024-04-04 RX ORDER — FAMOTIDINE 40 MG/1
40 TABLET, FILM COATED ORAL NIGHTLY
Status: CANCELLED | OUTPATIENT
Start: 2024-04-04

## 2024-04-04 RX ORDER — SODIUM CHLORIDE, SODIUM LACTATE, POTASSIUM CHLORIDE, CALCIUM CHLORIDE 600; 310; 30; 20 MG/100ML; MG/100ML; MG/100ML; MG/100ML
INJECTION, SOLUTION INTRAVENOUS CONTINUOUS PRN
Status: DISCONTINUED | OUTPATIENT
Start: 2024-04-04 | End: 2024-04-04 | Stop reason: SDUPTHER

## 2024-04-04 RX ORDER — HYDROCODONE BITARTRATE AND ACETAMINOPHEN 5; 325 MG/1; MG/1
1 TABLET ORAL EVERY 6 HOURS PRN
Qty: 28 TABLET | Refills: 0 | Status: SHIPPED | OUTPATIENT
Start: 2024-04-04 | End: 2024-04-11

## 2024-04-04 RX ORDER — HALOPERIDOL 5 MG/ML
1 INJECTION INTRAMUSCULAR
Status: DISCONTINUED | OUTPATIENT
Start: 2024-04-04 | End: 2024-04-04 | Stop reason: HOSPADM

## 2024-04-04 RX ORDER — BUSPIRONE HYDROCHLORIDE 5 MG/1
5 TABLET ORAL 3 TIMES DAILY
Status: CANCELLED | OUTPATIENT
Start: 2024-04-04

## 2024-04-04 RX ORDER — ROPIVACAINE HYDROCHLORIDE 5 MG/ML
INJECTION, SOLUTION EPIDURAL; INFILTRATION; PERINEURAL
Status: COMPLETED | OUTPATIENT
Start: 2024-04-04 | End: 2024-04-04

## 2024-04-04 RX ORDER — ACETAMINOPHEN 500 MG
1000 TABLET ORAL ONCE
Status: COMPLETED | OUTPATIENT
Start: 2024-04-04 | End: 2024-04-04

## 2024-04-04 RX ORDER — FENTANYL CITRATE 50 UG/ML
INJECTION, SOLUTION INTRAMUSCULAR; INTRAVENOUS PRN
Status: DISCONTINUED | OUTPATIENT
Start: 2024-04-04 | End: 2024-04-04 | Stop reason: SDUPTHER

## 2024-04-04 RX ORDER — OXYCODONE HYDROCHLORIDE AND ACETAMINOPHEN 5; 325 MG/1; MG/1
1 TABLET ORAL EVERY 6 HOURS PRN
Qty: 28 TABLET | Refills: 0 | Status: SHIPPED | OUTPATIENT
Start: 2024-04-04 | End: 2024-04-04 | Stop reason: HOSPADM

## 2024-04-04 RX ORDER — DIPHENHYDRAMINE HYDROCHLORIDE 50 MG/ML
12.5 INJECTION INTRAMUSCULAR; INTRAVENOUS
Status: DISCONTINUED | OUTPATIENT
Start: 2024-04-04 | End: 2024-04-04 | Stop reason: HOSPADM

## 2024-04-04 RX ORDER — AMITRIPTYLINE HYDROCHLORIDE 25 MG/1
75 TABLET, FILM COATED ORAL NIGHTLY
Status: CANCELLED | OUTPATIENT
Start: 2024-04-04

## 2024-04-04 RX ORDER — NALOXONE HYDROCHLORIDE 0.4 MG/ML
INJECTION, SOLUTION INTRAMUSCULAR; INTRAVENOUS; SUBCUTANEOUS PRN
Status: DISCONTINUED | OUTPATIENT
Start: 2024-04-04 | End: 2024-04-04 | Stop reason: HOSPADM

## 2024-04-04 RX ORDER — PROCHLORPERAZINE EDISYLATE 5 MG/ML
5 INJECTION INTRAMUSCULAR; INTRAVENOUS
Status: DISCONTINUED | OUTPATIENT
Start: 2024-04-04 | End: 2024-04-04 | Stop reason: HOSPADM

## 2024-04-04 RX ORDER — MIDAZOLAM HYDROCHLORIDE 1 MG/ML
2 INJECTION INTRAMUSCULAR; INTRAVENOUS ONCE
Status: COMPLETED | OUTPATIENT
Start: 2024-04-04 | End: 2024-04-04

## 2024-04-04 RX ORDER — MULTIVIT-MIN/IRON/FOLIC ACID/K 18-600-40
1 CAPSULE ORAL DAILY
Status: CANCELLED | OUTPATIENT
Start: 2024-04-04

## 2024-04-04 RX ORDER — SCOLOPAMINE TRANSDERMAL SYSTEM 1 MG/1
1 PATCH, EXTENDED RELEASE TRANSDERMAL
Status: DISCONTINUED | OUTPATIENT
Start: 2024-04-04 | End: 2024-04-04 | Stop reason: HOSPADM

## 2024-04-04 RX ORDER — VENLAFAXINE HYDROCHLORIDE 75 MG/1
75 CAPSULE, EXTENDED RELEASE ORAL NIGHTLY
Status: CANCELLED | OUTPATIENT
Start: 2024-04-04

## 2024-04-04 RX ORDER — FUROSEMIDE 40 MG/1
40 TABLET ORAL DAILY PRN
Status: CANCELLED | OUTPATIENT
Start: 2024-04-04

## 2024-04-04 RX ORDER — DEXAMETHASONE SODIUM PHOSPHATE 10 MG/ML
INJECTION, SOLUTION INTRAMUSCULAR; INTRAVENOUS
Status: COMPLETED
Start: 2024-04-04 | End: 2024-04-04

## 2024-04-04 RX ORDER — CELECOXIB 100 MG/1
100 CAPSULE ORAL ONCE
Status: COMPLETED | OUTPATIENT
Start: 2024-04-04 | End: 2024-04-04

## 2024-04-04 RX ORDER — ACETAMINOPHEN 325 MG/1
650 TABLET ORAL EVERY 6 HOURS
Status: DISCONTINUED | OUTPATIENT
Start: 2024-04-05 | End: 2024-04-04 | Stop reason: HOSPADM

## 2024-04-04 RX ORDER — VANCOMYCIN HYDROCHLORIDE 1 G/20ML
INJECTION, POWDER, LYOPHILIZED, FOR SOLUTION INTRAVENOUS PRN
Status: DISCONTINUED | OUTPATIENT
Start: 2024-04-04 | End: 2024-04-04 | Stop reason: ALTCHOICE

## 2024-04-04 RX ORDER — MEPERIDINE HYDROCHLORIDE 50 MG/1
50 TABLET ORAL EVERY 4 HOURS PRN
Status: DISCONTINUED | OUTPATIENT
Start: 2024-04-04 | End: 2024-04-04 | Stop reason: HOSPADM

## 2024-04-04 RX ORDER — SODIUM CHLORIDE, SODIUM LACTATE, POTASSIUM CHLORIDE, CALCIUM CHLORIDE 600; 310; 30; 20 MG/100ML; MG/100ML; MG/100ML; MG/100ML
INJECTION, SOLUTION INTRAVENOUS CONTINUOUS
Status: DISCONTINUED | OUTPATIENT
Start: 2024-04-04 | End: 2024-04-04 | Stop reason: HOSPADM

## 2024-04-04 RX ORDER — ANTIARTHRITIC COMBINATION NO.2 900 MG
5000 TABLET ORAL DAILY
Status: CANCELLED | OUTPATIENT
Start: 2024-04-04

## 2024-04-04 RX ADMIN — BUPIVACAINE HYDROCHLORIDE IN DEXTROSE 2 ML: 7.5 INJECTION, SOLUTION SUBARACHNOID at 13:15

## 2024-04-04 RX ADMIN — PROPOFOL INJECTABLE EMULSION 30 MG: 10 INJECTION, EMULSION INTRAVENOUS at 13:33

## 2024-04-04 RX ADMIN — MIDAZOLAM 1 MG: 1 INJECTION INTRAMUSCULAR; INTRAVENOUS at 12:11

## 2024-04-04 RX ADMIN — FENTANYL CITRATE 75 MCG: 50 INJECTION, SOLUTION INTRAMUSCULAR; INTRAVENOUS at 13:05

## 2024-04-04 RX ADMIN — LISINOPRIL 20 MG: 20 TABLET ORAL at 17:58

## 2024-04-04 RX ADMIN — LIDOCAINE HYDROCHLORIDE 50 MG: 20 INJECTION, SOLUTION INTRAVENOUS at 15:14

## 2024-04-04 RX ADMIN — DEXAMETHASONE SODIUM PHOSPHATE 8 MG: 10 INJECTION, SOLUTION INTRAMUSCULAR; INTRAVENOUS at 11:31

## 2024-04-04 RX ADMIN — LIDOCAINE HYDROCHLORIDE 100 MG: 20 INJECTION, SOLUTION INTRAVENOUS at 13:19

## 2024-04-04 RX ADMIN — ROPIVACAINE HYDROCHLORIDE 30 ML: 5 INJECTION, SOLUTION EPIDURAL; INFILTRATION; PERINEURAL at 12:16

## 2024-04-04 RX ADMIN — DEXAMETHASONE SODIUM PHOSPHATE 10 MG: 10 INJECTION, SOLUTION INTRAMUSCULAR; INTRAVENOUS at 12:11

## 2024-04-04 RX ADMIN — SODIUM CHLORIDE, POTASSIUM CHLORIDE, SODIUM LACTATE AND CALCIUM CHLORIDE: 600; 310; 30; 20 INJECTION, SOLUTION INTRAVENOUS at 11:31

## 2024-04-04 RX ADMIN — FENTANYL CITRATE 25 MCG: 50 INJECTION, SOLUTION INTRAMUSCULAR; INTRAVENOUS at 13:15

## 2024-04-04 RX ADMIN — CELECOXIB 100 MG: 100 CAPSULE ORAL at 10:54

## 2024-04-04 RX ADMIN — PHENYLEPHRINE HYDROCHLORIDE 100 MCG: 10 INJECTION INTRAVENOUS at 13:44

## 2024-04-04 RX ADMIN — PROPOFOL INJECTABLE EMULSION 100 MCG/KG/MIN: 10 INJECTION, EMULSION INTRAVENOUS at 13:20

## 2024-04-04 RX ADMIN — ROPIVACAINE HYDROCHLORIDE 20 ML: 5 INJECTION, SOLUTION EPIDURAL; INFILTRATION; PERINEURAL at 12:11

## 2024-04-04 RX ADMIN — HYDROCODONE BITARTRATE AND ACETAMINOPHEN 1 TABLET: 10; 325 TABLET ORAL at 17:36

## 2024-04-04 RX ADMIN — ACETAMINOPHEN 1000 MG: 500 TABLET ORAL at 10:53

## 2024-04-04 RX ADMIN — FENTANYL CITRATE 50 MCG: 50 INJECTION INTRAMUSCULAR; INTRAVENOUS at 12:11

## 2024-04-04 RX ADMIN — CEFAZOLIN 2000 MG: 2 INJECTION, POWDER, FOR SOLUTION INTRAMUSCULAR; INTRAVENOUS at 16:06

## 2024-04-04 RX ADMIN — PROPOFOL INJECTABLE EMULSION 50 MG: 10 INJECTION, EMULSION INTRAVENOUS at 13:19

## 2024-04-04 RX ADMIN — SODIUM CHLORIDE, POTASSIUM CHLORIDE, SODIUM LACTATE AND CALCIUM CHLORIDE: 600; 310; 30; 20 INJECTION, SOLUTION INTRAVENOUS at 12:59

## 2024-04-04 RX ADMIN — PHENYLEPHRINE HYDROCHLORIDE 50 MCG: 10 INJECTION INTRAVENOUS at 14:14

## 2024-04-04 ASSESSMENT — PAIN DESCRIPTION - LOCATION: LOCATION: KNEE

## 2024-04-04 ASSESSMENT — PAIN - FUNCTIONAL ASSESSMENT: PAIN_FUNCTIONAL_ASSESSMENT: 0-10

## 2024-04-04 ASSESSMENT — PAIN DESCRIPTION - DESCRIPTORS
DESCRIPTORS: DISCOMFORT
DESCRIPTORS: BURNING;DISCOMFORT

## 2024-04-04 ASSESSMENT — PAIN DESCRIPTION - ORIENTATION: ORIENTATION: LEFT

## 2024-04-04 ASSESSMENT — PAIN SCALES - GENERAL
PAINLEVEL_OUTOF10: 3
PAINLEVEL_OUTOF10: 6

## 2024-04-04 ASSESSMENT — PAIN DESCRIPTION - PAIN TYPE: TYPE: SURGICAL PAIN

## 2024-04-04 ASSESSMENT — COPD QUESTIONNAIRES: CAT_SEVERITY: MODERATE

## 2024-04-04 NOTE — ANESTHESIA PROCEDURE NOTES
Spinal Block    Patient location during procedure: OR  End time: 4/4/2024 1:15 PM  Reason for block: primary anesthetic and at surgeon's request  Staffing  Performed: resident/CRNA   Anesthesiologist: Rey Mesa MD  Resident/CRNA: Emanuel Valle APRN - CRNA  Performed by: Emanuel Valle APRN - CRNA  Authorized by: Rey Mesa MD    Spinal Block  Patient position: sitting  Prep: Betadine and site prepped and draped  Patient monitoring: cardiac monitor, continuous pulse ox, continuous capnometry, frequent blood pressure checks and oxygen  Approach: midline  Location: L3/L4  Guidance: paresthesia technique  Provider prep: mask and sterile gloves  Local infiltration: lidocaine  Needle  Needle type: Pencan   Needle gauge: 24 G  Needle length: 4 in  Assessment  Sensory level: T6  Swirl obtained: Yes  CSF: clear  Attempts: 2 (one attempt w/ 25ga spinal unsuces, 24 ga w/ease)  Hemodynamics: stable  Preanesthetic Checklist  Completed: patient identified, IV checked, site marked, risks and benefits discussed, surgical/procedural consents, equipment checked, pre-op evaluation, timeout performed, anesthesia consent given, oxygen available, monitors applied/VS acknowledged, fire risk safety assessment completed and verbalized and blood product R/B/A discussed and consented

## 2024-04-04 NOTE — H&P
Knee:  Lachman's negative, Anterior Drawer negative, Posterior Drawer negative  Carmel's negative, Thallasy  negative,   PF grind test negative, Apprehension test negative,  Patellar J sign  negative     Xrays:    Moderate to severe  tricompartmental osteoarthritis and lateral joint space narrowing.  Mild  suprapatellar soft tissue swelling.     MRI:   None  Radiographic findings reviewed with patient     Impression:       Encounter Diagnosis   Name Primary?    Primary osteoarthritis of left knee Yes         Plan:   Natural history and expected course discussed. Questions answered.  Educational materials distributed.  Rest, ice, compression, and elevation (RICE) therapy.  Reduction in offending activity.  OTC analgesics as needed.     I had a lengthy discussion with the patient regarding their diagnosis. I explained treatment options including surgical vs non surgical treatment. I reviewed in detail the risks and benefits and outlined the procedure in detail with expected outcomes and possible complications.  I also discussed non surgical treatment such as injections (CSI and visco supplementation), physical therapy, topical creams and NSAID's. They have elected for surgical management at this time.         We discussed various treatment options both surgical and non-surgical.   The patient is unable to ambulate more than 100 feet and is unable to perform the average daily activities including:  Light housework, ADLs, donning clothes, toileting and exercise.  Patient has failed previous conservative measures including cortisone injections, NSAIDs, PT, HEP and pain medication and is currently a fall risk to the disability and decreased functioning.  The patient wishes to have the total knee arthroplasty.The risks and benefits of a total knee replacement were discussed with the patient.  The risks include but are not limited to: infection, injury to blood vessels and nerves, non relief of symptoms, arthrofibrosis of

## 2024-04-04 NOTE — DISCHARGE INSTRUCTIONS
[facility] Department of Orthopedics  [address]   [place] [address]    MD Jez Pereira DO K Brian Williams, DO    Orthopaedics Discharge Instructions   WBAT on left lower extremity  Pain medication Per Prescriptions  Contact Office for Medication Refill- [phone]  Office can refill pain med every 7 days  If patient discharging to facility then pain control will be continued per facility physician  Ice to operative/injured site for 15-30 minutes of each hour for next 5 days    Recommend that you continue to ice the area 2-3 times per day after this   Elevate operative/injured limb on 2 pillows at home  Goal is to have limb above the heart if able  Wound care -dressings remain clean dry intact.  On postoperative day 7 can be removed and surgical site can be clean with soapy water.  DVT prophylaxis,  mg BID.      Follow Up in Office in 2 weeks.       Call the office at [phone] for directions or with any questions.  Watch for these significant complications.  Call physician if they or any other problems occur:  Fever over 101°, redness, swelling or warmth at the operative site  Unrelieved nausea    Foul smelling or cloudy drainage at the operative site   Unrelieved pain    Blood soaked dressing. (Some oozing may be normal)     Numb, pale, blue, cold or tingling extremity

## 2024-04-04 NOTE — ANESTHESIA PROCEDURE NOTES
Peripheral Block    Patient location during procedure: procedure area  Reason for block: post-op pain management and at surgeon's request  Start time: 4/4/2024 12:11 PM  End time: 4/4/2024 12:17 PM  Staffing  Performed: anesthesiologist   Anesthesiologist: Rey Mesa MD  Performed by: Rey Mesa MD  Authorized by: Rey Mesa MD    Preanesthetic Checklist  Completed: patient identified, IV checked, site marked, risks and benefits discussed, surgical/procedural consents, equipment checked, pre-op evaluation, timeout performed, anesthesia consent given, oxygen available, monitors applied/VS acknowledged, fire risk safety assessment completed and verbalized and blood product R/B/A discussed and consented  Peripheral Block   Patient position: supine  Prep: ChloraPrep  Provider prep: mask and sterile gloves  Patient monitoring: cardiac monitor, continuous pulse ox, frequent blood pressure checks, IV access, oxygen and responsive to questions  Block type: Femoral  Adductor canal  Laterality: left  Injection technique: single-shot  Guidance: ultrasound guided    Needle   Needle type: insulated echogenic nerve stimulator needle   Needle gauge: 20 G  Needle localization: ultrasound guidance  Needle length: 10 cm  Assessment   Injection assessment: negative aspiration for heme, no paresthesia on injection, local visualized surrounding nerve on ultrasound and no intravascular symptoms  Paresthesia pain: none  Slow fractionated injection: yes  Hemodynamics: stable  Outcomes: uncomplicated and patient tolerated procedure well    Medications Administered  dexAMETHasone (DECADRON) (PF) 10 mg/mL injection - Other   10 mg - 4/4/2024 12:11:00 PM  ropivacaine (NAROPIN) injection 0.5% - Perineural   20 mL - 4/4/2024 12:11:00 PM

## 2024-04-04 NOTE — CARE COORDINATION
4/4/2024:SS NOTE:  SS Consult for post-op d/c planning and HHC/DME orders noted, pt seen in PAT, d/c plan for pt to return home with [facility], agency accepted referral for home PT for start of care tomorrow 4/5 and [facility] to deliver pt's ttb to [facility] for pt to take home prior to her discharge, ACC/Nursing informed.Electronically signed by KATIANA Mclaughlin on 4/4/2024 at 1:19 PM

## 2024-04-04 NOTE — OP NOTE
fat pad and anterior horns of the lateral and medial meniscus were sharply excised. The knee was flexed up. Anterior cruciate ligament and posterior cruciate ligament were then excised. All osteophytes on the distal femur were removed with rongeur. At this point, drilling of the intramedullary canal of the distal femur was then performed. Distal femoral cutting jig was then placed and pinned in appropriate position. An oscillating saw was used to make the distal femoral cut, discarding the pieces of cut femoral bone and sent for study. The posterior reference guide was then placed on the distal femur after the intramedullary guide and the distal femoral cutting guide were then removed. The posterior referencing guide was then pinned in appropriate position. Derrick wing was used to confirm appropriate femoral trial size according to pre-operative templating. Posterior reference guide was then removed. An appropriate sized 4-in-1 cutting guide was applied to the distal femur. Oscillating saw was used to make the anterior, posterior, and chamfer cuts. The 4-in-1 cutting guide was then removed. Attention was turned to the tibia. Intramedullary drilling was then performed of the proximal tibia. The intramedullary guide with the proximal tibial cutting guide was then placed on the tibia. Proximal tibial cutting guide was then pinned in appropriate position. After pinning the proximal tibial cutting guide in appropriate position, oscillating saw was then used to make the proximal tibial cut. The guide was then removed. The proximal tibia that was cut was sharply excised and removed. At this time, all osteophytes on the proximal tibia were then removed with a rongeur. Tensiometer was then placed in extension and flexion, confirming appropriate ligamentous balancing. The box-cutting guide for the posterior-stabilized knee was then placed on the distal femur. The box was then cut in the distal femur without complication.

## 2024-04-04 NOTE — ANESTHESIA POSTPROCEDURE EVALUATION
Department of Anesthesiology  Postprocedure Note    Patient: Mehran Blakely  MRN: [id number]  YOB: 1955  Date of evaluation: 4/4/2024    Procedure Summary       Date: 04/04/24 Room / Location: [address]    Anesthesia Start: 1258 Anesthesia Stop: 1526    Procedure: LEFT KNEE TOTAL ARTHROPLASTY **SMITH AND NEPHEW** (Left) Diagnosis:       Degenerative arthritis of left knee      (Degenerative arthritis of left knee [M17.12])    Surgeons: Asa Inman DO Responsible Provider: Rey Mesa MD    Anesthesia Type: Spinal, MAC, Regional ASA Status: 3            Anesthesia Type: Spinal, MAC, Regional    Melissa Phase I: Melissa Score: 10    Melissa Phase II:      Anesthesia Post Evaluation    Patient location during evaluation: PACU  Patient participation: complete - patient participated  Level of consciousness: awake and alert  Pain score: 0  Airway patency: patent  Nausea & Vomiting: no vomiting and no nausea  Cardiovascular status: blood pressure returned to baseline and hemodynamically stable  Respiratory status: acceptable and spontaneous ventilation  Hydration status: stable  Pain management: satisfactory to patient and adequate        No notable events documented.

## 2024-04-05 NOTE — PROGRESS NOTES
1150 to pacu for left adductor canal block by dr malik. Connected to monitors and O2 applied at 3 liters nasal canula.  1210 time out performed and premedicated per orders  1212 block started by dr malik using ultrasound  1216 30 ml 0.5% ropivacaine with 10 mg decadron PF injected to left adductor laron well .  
CLINICAL PHARMACY NOTE: MEDS TO BEDS    Total # of Prescriptions Filled: 3   The following medications were delivered to the patient:  Celecoxib 200 mg  Gabapentin 100 mg  Hydrocodone 5-325 mg    Additional Documentation:   
Reviewed preop instructions with pt.  Reinforced drinking Gatorade tonight as well as clear liquids only up to 2 hours prior.  Pt. Denies any respiratory, cardiac  difficulties or urinary difficulties.    
that this was due to her weight prior to WLS    UPPER GASTROINTESTINAL ENDOSCOPY  2009    UVULOPALATOPHARYGOPLASTY         SUBJECTIVE:    Precautions: Up with assistance,  full weight bearing as tolerated    Social history: Patient lives with daughter in a ranch home  with 3 steps, with rail  to enter home Tub shower       Equipment owned: Cane, Wheeled Walker, Rollator, Bedside commode, and Tub transfer bench    AM-PAC Basic Mobility       AM-PAC Basic Mobility - Inpatient   How much help is needed turning from your back to your side while in a flat bed without using bedrails?: A Little  How much help is needed moving from lying on your back to sitting on the side of a flat bed without using bedrails?: A Little  How much help is needed moving to and from a bed to a chair?: A Little  How much help is needed standing up from a chair using your arms?: A Little  How much help is needed walking in hospital room?: A Little  How much help is needed climbing 3-5 steps with a railing?: A Little  AM-[facility] Inpatient Mobility Raw Score : 18  AM-PAC Inpatient T-Scale Score : 43.63  Mobility Inpatient CMS 0-100% Score: 46.58  Mobility Inpatient CMS G-Code Modifier : CK    Nursing cleared patient for PT evaluation. The admitting diagnosis and active problem list as listed above have been reviewed prior to the initiation of this evaluation.    OBJECTIVE:   Initial Evaluation  Date: 4/4/2024 Treatment Date:     Short Term/ Long Term   Goals   Was pt agreeable to Eval/treatment? Yes  To be met in 1 days   Pain level   6/10  L knee     Bed Mobility  Using rails and head of bed elevated:     Rolling: Supervision     Supine to sit: Supervision     Sit to supine: Supervision     Scooting: Supervision     Rolling: Independent    Supine to sit: Independent    Sit to supine: Independent    Scooting: Independent     Transfers Sit to stand: Supervision    Sit to stand: Independent    Ambulation     2 x 125 feet using  wheeled walker with

## 2024-04-08 LAB — SURGICAL PATHOLOGY REPORT: NORMAL

## 2024-04-10 DIAGNOSIS — M17.12 PRIMARY OSTEOARTHRITIS OF LEFT KNEE: Primary | ICD-10-CM

## 2024-04-11 DIAGNOSIS — G89.18 POST-OP PAIN: ICD-10-CM

## 2024-04-12 RX ORDER — HYDROCODONE BITARTRATE AND ACETAMINOPHEN 5; 325 MG/1; MG/1
1 TABLET ORAL EVERY 6 HOURS PRN
Qty: 28 TABLET | Refills: 0 | Status: SHIPPED | OUTPATIENT
Start: 2024-04-12 | End: 2024-04-19

## 2024-04-16 ENCOUNTER — OFFICE VISIT (OUTPATIENT)
Dept: ORTHOPEDIC SURGERY | Age: 69
End: 2024-04-16

## 2024-04-16 VITALS — WEIGHT: 196 LBS | TEMPERATURE: 98 F | BODY MASS INDEX: 33.46 KG/M2 | HEIGHT: 64 IN

## 2024-04-16 DIAGNOSIS — M17.12 PRIMARY OSTEOARTHRITIS OF LEFT KNEE: Primary | ICD-10-CM

## 2024-04-16 PROCEDURE — 99024 POSTOP FOLLOW-UP VISIT: CPT | Performed by: ORTHOPAEDIC SURGERY

## 2024-04-16 NOTE — PROGRESS NOTES
Subjective:     Post-Operative week: 2 Status Post left Total Knee Arthroplasty, surgery date 04/04/24. Systemic or Specific Complaints:none    Objective:     General: alert, appears stated age and cooperative   Wound: Wound clean and dry no evidence of infection., No Erythema, No Edema and No Drainage   Motion: Flexion: 0 to 90 Degrees   DVT Exam: No evidence of DVT seen on physical exam.  Negative Liam's sign.  No cords or calf tenderness.  No significant calf/ankle edema.     Imaging:  Xrays:  No signs of fracture, there is good alignment, and no signs of aseptic loosening.   Radiographic findings reviewed with patient    Assessment:     Encounter Diagnosis   Name Primary?    Primary osteoarthritis of left knee Yes      Doing well postoperatively.     Plan:     Continues current post-op course, staples were removed at today's appointment  Patient is to continue taking enteric coated aspirin 81 mg, 1 tablet twice daily.    Patient is to continue wearing YON hose, on during the day and off at night.    Outpatient physical therapy is to begin immediately.    No bathing/swimming/hot tub for two weeks or until incision is completely closed.    We will see the patient back in 3 weeks for repeat xray and evaluation.  Please call office with any questions or concerns at [phone]

## 2024-04-22 ENCOUNTER — EVALUATION (OUTPATIENT)
Dept: PHYSICAL THERAPY | Age: 69
End: 2024-04-22
Payer: COMMERCIAL

## 2024-04-22 DIAGNOSIS — M17.12 PRIMARY OSTEOARTHRITIS OF LEFT KNEE: Primary | ICD-10-CM

## 2024-04-22 PROCEDURE — 97162 PT EVAL MOD COMPLEX 30 MIN: CPT | Performed by: PHYSICAL THERAPIST

## 2024-04-22 PROCEDURE — 97110 THERAPEUTIC EXERCISES: CPT | Performed by: PHYSICAL THERAPIST

## 2024-04-22 NOTE — PROGRESS NOTES
Physical Therapy Daily Treatment Note    Date: 2024  Patient Name: Mehran Blakely  : 1955   MRN: [id number]  DOInjury: -  DOSx: 2024  Referring Provider: [place] [address]     Medical Diagnosis:     M17.12 (ICD-10-CM) - Primary osteoarthritis of left knee     X = TO BE PERFORMED NEXT VISIT  > = PROGRESS TO THIS    S: See eval  O: Discussed anatomy, physiology, body mechanics, principles of loading, and progressive loading/activity.  Reviewed home exercise program extensively along with instructions for ice and elevation; written copy provided.     Access Code: [email or web address]  URL: [email or web address]  Date: 2024  Prepared by: Kristofer Seaman    Exercises  - Supine Heel Slides  - 2 x daily - 7 x weekly - 2 sets - 20 reps  - Small Range Straight Leg Raise (Mirrored)  - 2 x daily - 7 x weekly - 2 sets - 10 reps - 1-2 sec hold  - Supine Knee Extension Strengthening (Mirrored)  - 2 x daily - 7 x weekly - 2 sets - 10 reps - 3 sec hold  - Seated Long Arc Quad  - 2 x daily - 7 x weekly - 2 sets - 10 reps - 5 sec hold  - Supine Knee Extension Stretch on Towel Roll  - 2 x daily - 7 x weekly - 3 minutes hold    Time  9391-5314       Visit  1 Repeat outcome measure at mid point and end.    Pain     7/10     ROM  AROM: 96° Flexion,  -14° Extension  PROM: 98° Flexion,  -11° Extension     Modalities          MO   Manual      Stretching knee flexion   MT   Stretching       Patella mobs   TE   Prone hangs   TE   Heel props 1 x 2 min  TE   Knee flex stretch-seated 3 x 30 sec  TE   Prone self flexion stretch   TE   Exercise       Nustep  X  TE   Quad sets 5 sec hold 3 x 10 reps  TE   Heel slides 3 x 10   TE   SLR 3 x 10  TE   LAQ   TE   Marching   TA   Squat    TA   Step-ups - FWD    TA   Step-ups - LAT   TA   Step-ups - BWD    TA   Step up and over reciprocally    TA   [] TG  [] Leg Press 2-leg   TE   [] TG  [] Leg Press 1-leg   TE   CR    TE   Knee Extension Machine

## 2024-04-22 NOTE — PROGRESS NOTES
[facility] OUTPATIENT REHABILITATION  PHYSICAL THERAPY INITIAL EVALUATION         Date:  2024   Patient: Mehran Blakely  : 1955  MRN: [id number]  Referring Provider: [place] [address]     Medical Diagnosis:     M17.12 (ICD-10-CM) - Primary osteoarthritis of left kne    Physician Order: Eval and Treat     SUBJECTIVE:     Surgical procedure: L TKA    Date of surgery: 2024    Services provided following surgery: home care    History: TKA due to DJD.    Chief complaint: pain, decreased motion, inability / limited ability to use leg    Pain:   Current: 7/10       Symptom Type / Quality: aching  Location:: Knee: global     Imaging results: XR KNEE LEFT (1-2 VIEWS)    Result Date: 2024  EXAMINATION: TWO XRAY VIEWS OF THE LEFT KNEE 2024 8:42 am COMPARISON: 2024 HISTORY: ORDERING SYSTEM PROVIDED HISTORY: Primary osteoarthritis of left knee FINDINGS: Two views left knee demonstrate left total knee prosthesis in anatomic alignment and position with no evidence of loosening or dislocation.  Skin staples overlying the soft operative site.     Left total knee prosthesis in anatomic alignment and position with no evidence of loosening or dislocation.     XR KNEE LEFT (1-2 VIEWS)    Result Date: 2024  EXAMINATION: TWO XRAY VIEWS OF THE LEFT KNEE 2024 4:06 pm COMPARISON: Left knee from 2023 HISTORY: ORDERING SYSTEM PROVIDED HISTORY: postop TECHNOLOGIST PROVIDED HISTORY: Of operative side while in recovery room. Reason for exam:->postop FINDINGS: The components of a newly placed left total knee prosthesis are in anatomic alignment with no sign of fracture, loosening, or dislocation. Gas and fluid are seen in the joint space from recent surgery. Skin staples overlie the operative site.     Status post total knee arthroplasty. No sign of acute hardware complication.       Past Medical History:  Past Medical History:

## 2024-04-29 ENCOUNTER — TREATMENT (OUTPATIENT)
Dept: PHYSICAL THERAPY | Age: 69
End: 2024-04-29
Payer: COMMERCIAL

## 2024-04-29 DIAGNOSIS — G89.18 POST-OP PAIN: ICD-10-CM

## 2024-04-29 DIAGNOSIS — M17.12 PRIMARY OSTEOARTHRITIS OF LEFT KNEE: Primary | ICD-10-CM

## 2024-04-29 PROCEDURE — 97110 THERAPEUTIC EXERCISES: CPT

## 2024-04-29 PROCEDURE — 97112 NEUROMUSCULAR REEDUCATION: CPT

## 2024-04-29 NOTE — PROGRESS NOTES
Physical Therapy Daily Treatment Note    Date: 2024  Patient Name: Mehran Blakely  : 1955   MRN: [id number]  DOInjury:   DOSx:  2024   Referring Provider: [place] [address]     Medical Diagnosis:      Diagnosis Orders   1. Primary osteoarthritis of left knee            Outcome Measure:          X = TO BE PERFORMED NEXT VISIT  > = PROGRESS TO THIS    S: Pt reports 8/10 left knee pain. Pt states she rode in a car 2 hrs x 2 this weekend and her knee is very sore.  O:     Time  0900- 0940      Visit   Repeat outcome measure at mid point and end.    Pain    Pain 8/10     ROM  24  102* flex/11*Extension     Modalities          MO   Manual      Stretching knee flexion   MT   Stretching       Patella mobs X  TE   Prone hangs   TE   Heel props 1 x 3 min  TE   Knee flex stretch-seated 3 x 30 sec  TE   Prone self flexion stretch   TE   Exercise       Nustep  L5 x 7 min  TE   Quad sets 5 sec hold 2 x 10 reps  TE   Heel slides 2 x 10  TE   SLR  TE   LAQ   TE   SAQ 2 x 10     Marching   TA   Squat    TA   Step-ups - FWD    TA   Step-ups - LAT   TA   Step-ups - BWD    TA   Step up and over reciprocally    TA   [] TG  [] Leg Press 2-leg   TE   [] TG  [] Leg Press 1-leg   TE   CR    TE   Knee Extension Machine   TE   Marching gait   NR   Side stepping   NR               A: Tolerated well.  ROM measured as above. No new or significant changes this session. Feedback and cues necessary for developing neuromuscular control.  Movement education and guided movement interventions such as verbal and tactile cues used to improve performance and control. Discussed anatomy, physiology, body mechanics, principles of loading, and progressive loading/activity.  Reviewed home exercise program extensively along with instructions for ice and elevation.  P: Continue with rehab plan  Odette Anna PTA    Treatment Charges: Mins Units   Initial Evaluation     Re-Evaluation

## 2024-04-29 NOTE — TELEPHONE ENCOUNTER
.Last appointment 4/16/2024  Next appointment   Future Appointments   Date Time Provider Department Center   5/1/2024  8:30 AM Kailee Elias PTA [facility]   5/7/2024  8:30 AM Kailee Elias PTA [facility]   5/9/2024  8:30 AM Odette Anna PTA [facility]   5/14/2024  9:30 AM Asa Inman DO [place] [facility]      Last refill 04/12/2024  DOS: 04/04/2024      Patient called in requesting refill of :      HYDROcodone-acetaminophen (NORCO) 5-325 MG per tablet      [facility]/pharmacy #[id number] - [place], [place] - [address] - P [phone] - F [phone]

## 2024-04-30 RX ORDER — HYDROCODONE BITARTRATE AND ACETAMINOPHEN 5; 325 MG/1; MG/1
1 TABLET ORAL EVERY 6 HOURS PRN
Qty: 28 TABLET | Refills: 0 | Status: SHIPPED | OUTPATIENT
Start: 2024-04-30 | End: 2024-05-07

## 2024-05-01 ENCOUNTER — TREATMENT (OUTPATIENT)
Dept: PHYSICAL THERAPY | Age: 69
End: 2024-05-01
Payer: COMMERCIAL

## 2024-05-01 DIAGNOSIS — M17.12 PRIMARY OSTEOARTHRITIS OF LEFT KNEE: Primary | ICD-10-CM

## 2024-05-01 PROCEDURE — 97016 VASOPNEUMATIC DEVICE THERAPY: CPT

## 2024-05-01 PROCEDURE — 97110 THERAPEUTIC EXERCISES: CPT

## 2024-05-01 PROCEDURE — 97112 NEUROMUSCULAR REEDUCATION: CPT

## 2024-05-01 NOTE — PROGRESS NOTES
Physical Therapy Daily Treatment Note    Date: 2024  Patient Name: Mehran Blakely  : 1955   MRN: [id number]  DOInjury:   DOSx:  2024   Referring Provider: Asa Inman DO   [place] [phone]     Medical Diagnosis:     M17.12 (ICD-10-CM) - Primary osteoarthritis of left knee     Outcome Measure:          X = TO BE PERFORMED NEXT VISIT  > = PROGRESS TO THIS    S: Pt reports laid in bed most of the day yesterday. Very extensive pain  O:     Time  8376-1879      Visit  3/12 Repeat outcome measure at mid point and end.    Pain    Pain 9/10     ROM  24  102* flex/11*Extension     Modalities       Polo/compression 15 min  MO   Manual      Stretching knee flexion   MT   Stretching       Patella mobs 20-30x  TE   Prone hangs   TE   Heel props 1 x 3 min  TE   Knee flex stretch-seated 3 x 30 sec  TE   Prone self flexion stretch   TE   Exercise       Nustep  L5 x 7 min Seat 8 TE   Quad sets 5 sec hold 2 x 10 reps  TE   Heel slides 2 x 10  TE   SLR  TE   LAQ   TE   SAQ 2 x 10     Marching   TA   Squat    TA   Step-ups - FWD    TA   Step-ups - LAT   TA   Step-ups - BWD    TA   Step up and over reciprocally    TA   [] TG  [] Leg Press 2-leg   TE   [] TG  [] Leg Press 1-leg   TE   CR    TE   Knee Extension Machine   TE   Marching gait   NR   Side stepping   NR               A: Tolerated well.  ROM measured as above. No new or significant changes this session. Feedback and cues necessary for developing neuromuscular control.  Movement education and guided movement interventions such as verbal and tactile cues used to improve performance and control. Discussed anatomy, physiology, body mechanics, principles of loading, and progressive loading/activity.  Reviewed home exercise program extensively along with instructions for ice and elevation.  P: Continue with rehab plan  Kailee Elias PTA    Treatment Charges: Mins Units   Initial Evaluation     Re-Evaluation     Ther Exercise

## 2024-05-06 ENCOUNTER — HOSPITAL ENCOUNTER (EMERGENCY)
Age: 69
Discharge: HOME | End: 2024-05-06
Attending: EMERGENCY MEDICINE
Payer: COMMERCIAL

## 2024-05-06 ENCOUNTER — APPOINTMENT (OUTPATIENT)
Dept: GENERAL RADIOLOGY | Age: 69
End: 2024-05-06
Payer: COMMERCIAL

## 2024-05-06 VITALS
BODY MASS INDEX: 33.8 KG/M2 | HEART RATE: 80 BPM | TEMPERATURE: 98 F | WEIGHT: 198 LBS | SYSTOLIC BLOOD PRESSURE: 175 MMHG | RESPIRATION RATE: 19 BRPM | HEIGHT: 64 IN | DIASTOLIC BLOOD PRESSURE: 91 MMHG | OXYGEN SATURATION: 95 %

## 2024-05-06 DIAGNOSIS — R07.9 CHEST PAIN, UNSPECIFIED TYPE: Primary | ICD-10-CM

## 2024-05-06 LAB
ALBUMIN SERPL-MCNC: 4.5 G/DL (ref 3.5–5.2)
ALP SERPL-CCNC: 103 U/L (ref 35–104)
ALT SERPL-CCNC: 6 U/L (ref 0–32)
ANION GAP SERPL CALCULATED.3IONS-SCNC: 13 MMOL/L (ref 7–16)
AST SERPL-CCNC: 13 U/L (ref 0–31)
BASOPHILS # BLD: 0.06 K/UL (ref 0–0.2)
BASOPHILS NFR BLD: 1 % (ref 0–2)
BILIRUB SERPL-MCNC: 0.7 MG/DL (ref 0–1.2)
BNP SERPL-MCNC: 2870 PG/ML (ref 0–450)
BUN SERPL-MCNC: 13 MG/DL (ref 6–23)
CALCIUM SERPL-MCNC: 9.7 MG/DL (ref 8.6–10.2)
CHLORIDE SERPL-SCNC: 102 MMOL/L (ref 98–107)
CO2 SERPL-SCNC: 26 MMOL/L (ref 22–29)
CREAT SERPL-MCNC: 0.8 MG/DL (ref 0.5–1)
EOSINOPHIL # BLD: 0.16 K/UL (ref 0.05–0.5)
EOSINOPHILS RELATIVE PERCENT: 2 % (ref 0–6)
ERYTHROCYTE [DISTWIDTH] IN BLOOD BY AUTOMATED COUNT: 13.4 % (ref 11.5–15)
GFR, ESTIMATED: 83 ML/MIN/1.73M2
GLUCOSE SERPL-MCNC: 95 MG/DL (ref 74–99)
HCT VFR BLD AUTO: 33.4 % (ref 34–48)
HGB BLD-MCNC: 11.1 G/DL (ref 11.5–15.5)
IMM GRANULOCYTES # BLD AUTO: <0.03 K/UL (ref 0–0.58)
IMM GRANULOCYTES NFR BLD: 0 % (ref 0–5)
LYMPHOCYTES NFR BLD: 2.53 K/UL (ref 1.5–4)
LYMPHOCYTES RELATIVE PERCENT: 38 % (ref 20–42)
MAGNESIUM SERPL-MCNC: 1.7 MG/DL (ref 1.6–2.6)
MCH RBC QN AUTO: 30.7 PG (ref 26–35)
MCHC RBC AUTO-ENTMCNC: 33.2 G/DL (ref 32–34.5)
MCV RBC AUTO: 92.3 FL (ref 80–99.9)
MONOCYTES NFR BLD: 0.55 K/UL (ref 0.1–0.95)
MONOCYTES NFR BLD: 8 % (ref 2–12)
NEUTROPHILS NFR BLD: 50 % (ref 43–80)
NEUTS SEG NFR BLD: 3.34 K/UL (ref 1.8–7.3)
PLATELET # BLD AUTO: 292 K/UL (ref 130–450)
PMV BLD AUTO: 8.8 FL (ref 7–12)
POTASSIUM SERPL-SCNC: 4.4 MMOL/L (ref 3.5–5)
PROT SERPL-MCNC: 8.4 G/DL (ref 6.4–8.3)
RBC # BLD AUTO: 3.62 M/UL (ref 3.5–5.5)
SODIUM SERPL-SCNC: 141 MMOL/L (ref 132–146)
TROPONIN I SERPL HS-MCNC: 15 NG/L (ref 0–9)
TROPONIN I SERPL HS-MCNC: 17 NG/L (ref 0–9)
WBC OTHER # BLD: 6.7 K/UL (ref 4.5–11.5)

## 2024-05-06 PROCEDURE — 93005 ELECTROCARDIOGRAM TRACING: CPT | Performed by: EMERGENCY MEDICINE

## 2024-05-06 PROCEDURE — 85025 COMPLETE CBC W/AUTO DIFF WBC: CPT

## 2024-05-06 PROCEDURE — 83735 ASSAY OF MAGNESIUM: CPT

## 2024-05-06 PROCEDURE — 71045 X-RAY EXAM CHEST 1 VIEW: CPT

## 2024-05-06 PROCEDURE — 99285 EMERGENCY DEPT VISIT HI MDM: CPT

## 2024-05-06 PROCEDURE — 6370000000 HC RX 637 (ALT 250 FOR IP): Performed by: EMERGENCY MEDICINE

## 2024-05-06 PROCEDURE — 80053 COMPREHEN METABOLIC PANEL: CPT

## 2024-05-06 PROCEDURE — 83880 ASSAY OF NATRIURETIC PEPTIDE: CPT

## 2024-05-06 PROCEDURE — 84484 ASSAY OF TROPONIN QUANT: CPT

## 2024-05-06 RX ORDER — METOPROLOL SUCCINATE 25 MG/1
25 TABLET, EXTENDED RELEASE ORAL DAILY
Qty: 15 TABLET | Refills: 1 | Status: SHIPPED | OUTPATIENT
Start: 2024-05-06

## 2024-05-06 RX ADMIN — METOPROLOL TARTRATE 25 MG: 25 TABLET, FILM COATED ORAL at 21:13

## 2024-05-06 ASSESSMENT — PAIN SCALES - GENERAL: PAINLEVEL_OUTOF10: 7

## 2024-05-06 ASSESSMENT — PAIN DESCRIPTION - LOCATION: LOCATION: CHEST

## 2024-05-06 ASSESSMENT — PAIN - FUNCTIONAL ASSESSMENT: PAIN_FUNCTIONAL_ASSESSMENT: 0-10

## 2024-05-06 ASSESSMENT — PAIN DESCRIPTION - DESCRIPTORS: DESCRIPTORS: DISCOMFORT

## 2024-05-06 NOTE — ED PROVIDER NOTES
[facility] EMERGENCY DEPARTMENT  EMERGENCY DEPARTMENT ENCOUNTER        Pt Name: Mehran Blakely  MRN: [id number]  Birthdate 1955  Date of evaluation: 5/6/2024  Provider: Spencer Barron MD  Attending Provider: No att. providers found  PCP: Jovon Jarvis MD  Note Started: 7:18 PM EDT 5/6/24    CHIEF COMPLAINT       Chief Complaint   Patient presents with    Chest Pain     Pt reports chest heaviness and a-fib. Pt states she has h/x afib but feels worse past few days. H/x HTN.    Shortness of Breath     SOB on exertion; denies sob at rest. Pt states she woke up at 0200 with sob. Denies hx respiratory issues.        HISTORY OF PRESENT ILLNESS: 1 or more Elements   History From: Patient        Mehran Blakely is a 68 y.o. female with a past medical history of atrial fibrillation on Eliquis, prior MI, hypertension, Stanley-en-Y gastric bypass, cholecystectomy presenting with chest pain and shortness of breath.  Patient symptoms have been ongoing for the last several days.  She states that she feels like he has been in and out of atrial fibrillation which has happened to her in the past.  She has associated shortness of breath.  Her symptoms are not exertional.  She states that she is actually feeling better but that her family was worried about her and have her come in for further evaluation.  She states that she had an ablation at the [facility] which did not treat her atrial fibrillation.  She has been compliant with her medications including her Eliquis.  She took her Eliquis prior to coming into the emergency room today.  Patient denies lightheadedness, dizziness, headache, nausea, vomiting, abdominal pain, hematuria, dysuria, increasing decreasing urine frequency, black or tarry stool, constipation, diarrhea, leg pain, leg swelling    Of note, patient did have a left knee replacement several weeks ago and is being followed by Dr. Inman.  She has been undergoing physical

## 2024-05-07 ENCOUNTER — TREATMENT (OUTPATIENT)
Dept: PHYSICAL THERAPY | Age: 69
End: 2024-05-07
Payer: COMMERCIAL

## 2024-05-07 DIAGNOSIS — M17.12 PRIMARY OSTEOARTHRITIS OF LEFT KNEE: Primary | ICD-10-CM

## 2024-05-07 PROCEDURE — 97112 NEUROMUSCULAR REEDUCATION: CPT

## 2024-05-07 PROCEDURE — 97016 VASOPNEUMATIC DEVICE THERAPY: CPT

## 2024-05-07 PROCEDURE — 97110 THERAPEUTIC EXERCISES: CPT

## 2024-05-07 NOTE — PROGRESS NOTES
Physical Therapy Daily Treatment Note    Date: 2024  Patient Name: Mehran Blakely  : 1955   MRN: [id number]  DOInjury:   DOSx:  2024   Referring Provider: [place] [address]     Medical Diagnosis:     M17.12 (ICD-10-CM) - Primary osteoarthritis of left knee     Outcome Measure:          X = TO BE PERFORMED NEXT VISIT  > = PROGRESS TO THIS    S: Pt reports still have pain on each side of knee and underneath.  O: due to swelling    Time  1747-2033      Visit   Repeat outcome measure at mid point and end.    Pain    Pain 9/10     ROM  24  110* flex/11*Extension     Modalities       Polo/compression 15 min  MO   Manual      Stretching knee flexion   MT   Stretching       Patella mobs 20-30x  TE   Prone hangs   TE   Heel props 1 x 7min  TE   Knee flex stretch-seated 3 x 1 min  TE   Prone self flexion stretch   TE   Exercise       Nustep  L5 x 6 min Seat 8 TE   Quad sets  TE   Heel slides  TE   SLR  TE   LAQ   TE   SAQ 2 x 10     Marching   TA   Squat    TA   Step-ups - FWD  next  TA   Step-ups - LAT   TA   Step-ups - BWD    TA   Step up and over reciprocally    TA   [] TG  [x] Leg Press 2-leg L14 2 x 20  TE   [] TG  [] Leg Press 1-leg   TE   CR    TE   Knee Extension Machine   TE   Marching gait   NR   Side stepping   NR               A: Tolerated well.  ROM measured as above. No new or significant changes this session. Feedback and cues necessary for developing neuromuscular control.  Movement education and guided movement interventions such as verbal and tactile cues used to improve performance and control. Discussed anatomy, physiology, body mechanics, principles of loading, and progressive loading/activity.  Reviewed home exercise program extensively along with instructions for ice and elevation.  P: Continue with rehab plan  Kailee Elias PTA    Treatment Charges: Mins Units   Initial Evaluation     Re-Evaluation     Ther Exercise         TE 10

## 2024-05-07 NOTE — PROGRESS NOTES
Subjective:  Mehran was seen and examined at bedside in the ER today. The patient's questions were answered and tests were reviewed.  Patient presented with chest pain and shortness of breath which she experiences when she goes into [facility] with [facility]  Patient had undergone an ablation at [facility] but still having issues with her atrial fibrillation off and on    Scheduled Meds:   metoprolol tartrate  25 mg Oral Once     Continuous Infusions:  PRN Meds:    Objective:  BP (!) 173/85   Pulse 74   Temp 98 °F (36.7 °C)   Resp 19   Ht 1.626 m (5' 4\")   Wt 89.8 kg (198 lb)   SpO2 95%   BMI 33.99 kg/m²   No intake/output data recorded.   No intake/output data recorded.     AAO x 3, currently in NAD  Currently rate is controlled, pos S1, S2  CTA bilaterally, no wheeze, rales or rhonchi  bowel sounds present, nontender, nondistended  No clubbing, cyanosis, or edema  No neuro changes   No obvious rashes or lesions.    Recent Labs     05/06/24 1949   WBC 6.7   HGB 11.1*        Recent Labs     05/06/24 1949      K 4.4      CO2 26   BUN 13   CREATININE 0.8   GLUCOSE 95     Recent Labs     05/06/24 1949   BILITOT 0.7   ALKPHOS 103   AST 13   ALT 6     No results for input(s): \"INR\" in the last 72 hours.    Invalid input(s): \"PT\"  No results for input(s): \"CKTOTAL\", \"CKMB\", \"CKMBINDEX\", \"TROPONINI\" in the last 72 hours.      XR CHEST PORTABLE    Result Date: 5/6/2024  EXAMINATION: ONE XRAY VIEW OF THE CHEST 5/6/2024 8:07 pm COMPARISON: None. HISTORY: ORDERING SYSTEM PROVIDED HISTORY: Chest Pain TECHNOLOGIST PROVIDED HISTORY: Reason for exam:->Chest Pain FINDINGS: The lungs are without acute focal process.  There is no effusion or pneumothorax. The cardiomediastinal silhouette is without acute process. The osseous structures are without acute process.  There are signs of prior ACDF. There are postoperative changes of the left shoulder     No acute cardiopulmonary process.     Assessment:  Mehran SUE

## 2024-05-07 NOTE — DISCHARGE INSTRUCTIONS
Please take your medication as today as prescribed.  We recommend that you follow-up with your primary care physician, your electrophysiologist and your cardiologist.  We have attached information for a new cardiology you may choose to follow-up with.  Reasons to return would be worsening of her symptoms, severe chest pain, severe shortness of breath, or development of uncontrolled fevers.

## 2024-05-08 LAB
EKG ATRIAL RATE: 72 BPM
EKG Q-T INTERVAL: 408 MS
EKG QRS DURATION: 70 MS
EKG QTC CALCULATION (BAZETT): 455 MS
EKG R AXIS: -45 DEGREES
EKG T AXIS: 85 DEGREES
EKG VENTRICULAR RATE: 75 BPM

## 2024-05-08 PROCEDURE — 93010 ELECTROCARDIOGRAM REPORT: CPT | Performed by: INTERNAL MEDICINE

## 2024-05-09 ENCOUNTER — TREATMENT (OUTPATIENT)
Dept: PHYSICAL THERAPY | Age: 69
End: 2024-05-09
Payer: COMMERCIAL

## 2024-05-09 DIAGNOSIS — M17.12 PRIMARY OSTEOARTHRITIS OF LEFT KNEE: Primary | ICD-10-CM

## 2024-05-09 PROCEDURE — 97110 THERAPEUTIC EXERCISES: CPT

## 2024-05-09 PROCEDURE — 97112 NEUROMUSCULAR REEDUCATION: CPT

## 2024-05-09 PROCEDURE — 97016 VASOPNEUMATIC DEVICE THERAPY: CPT

## 2024-05-09 NOTE — PROGRESS NOTES
Physical Therapy Daily Treatment Note    Date: 2024  Patient Name: Mehran Blakely  : 1955   MRN: [id number]  DOInjury:   DOSx:  2024     Referring Provider:   Asa Inman DO   [place] [phone]         Medical Diagnosis:   M17.12 (ICD-10-CM) - Primary osteoarthritis of left knee     Outcome Measure:        X = TO BE PERFORMED NEXT VISIT  > = PROGRESS TO THIS    S: Pt reports she has a lot of pain when she wakes up in morning due to tightness and swelling.   O:   Time  2473-9944      Visit   Repeat outcome measure at mid point and end.    Pain    Pain 9/10     ROM  24  110* flex/11*Extension     Modalities       Polo/compression 15 min  MO   Manual      Stretching knee flexion   MT   Stretching       Patella mobs 20-30x  TE   Prone hangs   TE   Heel props 1 x 7 min  TE   Knee flex stretch-seated 3 x 1 min  TE   Prone self flexion stretch   TE   Exercise       Nustep  L5 x 8 min Seat 8 TE   Quad sets  TE   Heel slides  TE   SLR  TE   SAQ 2 x 10     LAQ 2 x 10, 5 sec hold New TE         Marching   TA   Squat    TA   Step-ups - FWD  Next  TA   Step-ups - LAT   TA   Step-ups - BWD    TA   Step up and over reciprocally    TA   [] TG  [x] Leg Press 2-leg L14 2 x 20  TE   [] TG  [] Leg Press 1-leg   TE   CR    TE   Knee Extension Machine   TE   Marching gait   NR   Side stepping   NR               A: Tolerated well. ROM measured as above. Feedback and cues necessary for developing neuromuscular control. Movement education and guided movement interventions such as verbal and tactile cues used to improve performance and control. Discussed anatomy, physiology, body mechanics, principles of loading, and progressive loading/activity. Reviewed home exercise program extensively along with instructions for ice and elevation.  P: Continue with rehab plan  Selene Gonzales PTA    Treatment Charges: Mins Units   Initial Evaluation     Re-Evaluation     Ther Exercise         TE

## 2024-05-14 ENCOUNTER — OFFICE VISIT (OUTPATIENT)
Dept: ORTHOPEDIC SURGERY | Age: 69
End: 2024-05-14

## 2024-05-14 ENCOUNTER — TREATMENT (OUTPATIENT)
Dept: PHYSICAL THERAPY | Age: 69
End: 2024-05-14
Payer: COMMERCIAL

## 2024-05-14 VITALS — TEMPERATURE: 98 F | HEIGHT: 64 IN | BODY MASS INDEX: 33.8 KG/M2 | WEIGHT: 198 LBS

## 2024-05-14 DIAGNOSIS — G89.18 POST-OP PAIN: ICD-10-CM

## 2024-05-14 DIAGNOSIS — M17.12 PRIMARY OSTEOARTHRITIS OF LEFT KNEE: Primary | ICD-10-CM

## 2024-05-14 DIAGNOSIS — Z96.652 S/P TOTAL KNEE ARTHROPLASTY, LEFT: Primary | ICD-10-CM

## 2024-05-14 PROCEDURE — 97016 VASOPNEUMATIC DEVICE THERAPY: CPT

## 2024-05-14 PROCEDURE — 99024 POSTOP FOLLOW-UP VISIT: CPT | Performed by: ORTHOPAEDIC SURGERY

## 2024-05-14 PROCEDURE — 97112 NEUROMUSCULAR REEDUCATION: CPT

## 2024-05-14 PROCEDURE — 97110 THERAPEUTIC EXERCISES: CPT

## 2024-05-14 RX ORDER — HYDROCODONE BITARTRATE AND ACETAMINOPHEN 5; 325 MG/1; MG/1
1 TABLET ORAL EVERY 6 HOURS PRN
Qty: 28 TABLET | Refills: 0 | Status: SHIPPED | OUTPATIENT
Start: 2024-05-14 | End: 2024-05-21

## 2024-05-14 NOTE — PROGRESS NOTES
Physical Therapy Daily Treatment Note    Date: 2024  Patient Name: Mehran Blakely  : 1955   MRN: [id number]  DOInjury:   DOSx:  2024     Referring Provider:   Asa Inman DO   [place] [phone]         Medical Diagnosis:   M17.12 (ICD-10-CM) - Primary osteoarthritis of left knee     Outcome Measure:        X = TO BE PERFORMED NEXT VISIT  > = PROGRESS TO THIS    S: Pt reports she has a lot of pain when she wakes up in morning due to tightness and swelling. Pt. Stated that pain increased in the left knee when it was hit by their dog's tail. Pt. Stated after an hour of standing, the left knee did not hurt but their back did. Pt. Stated that they can only ascend one stair at a time.   O:   Time  6024-4834      Visit   Repeat outcome measure at mid point and end.    Pain    Pain 10     ROM  24  115* flex/08*Extension     Modalities       Polo/compression 15 min  MO   Manual      Stretching knee flexion   MT   Stretching       Patella mobs 20-30x  TE   Prone hangs   TE   Heel props 1 x 7 min  TE   Knee flex stretch-seated 3 x 1 min  TE   Prone self flexion stretch   TE   Exercise       Nustep  L5 x 8 min Seat 8 TE   Quad sets  TE   Heel slides  TE   SLR  TE   SAQ 2 x 10     LAQ 2 x 10, 5 sec hold New TE         Marching   TA   Squat    TA   Step-ups - FWD  6 inch step, 20 x Hold onto rail and PTA mod A TA   Step-ups - LAT   TA   Step-ups - BWD    TA   Step up and over reciprocally  8 inch, 2 up, 2 down   TA   [] TG  [x] Leg Press 2-leg L16 2 x 20  TE   [] TG  [] Leg Press 1-leg   TE   CR    TE   Knee Extension Machine   TE   Marching gait   NR   Side stepping   NR               A: Tolerated well. ROM measured as above. Worked with pt on reciprocal steps difficult need mod A.  Feedback and cues necessary for developing neuromuscular control. Movement education and guided movement interventions such as verbal and tactile cues used to improve performance and control.

## 2024-05-14 NOTE — PROGRESS NOTES
Subjective:     Status Post left Total Knee Arthroplasty, surgery date 04/04/24. Systemic or Specific Complaints:none.  She is doing well.  PT is going well.     Objective:     General: alert, appears stated age and cooperative   Wound: Wound clean and dry no evidence of infection., No Erythema, No Edema and No Drainage   Motion: Flexion: 0 to 115 Degrees   DVT Exam: No evidence of DVT seen on physical exam.  Negative Liam's sign.  No cords or calf tenderness.  No significant calf/ankle edema.     Imaging:  Xrays:  No signs of fracture, there is good alignment, and no signs of aseptic loosening.   Radiographic findings reviewed with patient    Assessment:     Encounter Diagnoses   Name Primary?    Post-op pain     S/P total knee arthroplasty, left Yes        Doing well postoperatively.     Plan:     D/C YON hose, continue with HEP.  Follow up in 2 months.

## 2024-05-20 ENCOUNTER — TREATMENT (OUTPATIENT)
Dept: PHYSICAL THERAPY | Age: 69
End: 2024-05-20
Payer: COMMERCIAL

## 2024-05-20 DIAGNOSIS — M17.12 PRIMARY OSTEOARTHRITIS OF LEFT KNEE: Primary | ICD-10-CM

## 2024-05-20 PROCEDURE — 97016 VASOPNEUMATIC DEVICE THERAPY: CPT

## 2024-05-20 PROCEDURE — 97112 NEUROMUSCULAR REEDUCATION: CPT

## 2024-05-20 NOTE — PROGRESS NOTES
Physical Therapy Daily Treatment Note    Date: 2024  Patient Name: Mehran Blakely  : 1955   MRN: [id number]  DOInjury:   DOSx:  2024     Referring Provider:   Asa Inman DO   [place] [phone]         Medical Diagnosis:   M17.12 (ICD-10-CM) - Primary osteoarthritis of left knee     Outcome Measure:        X = TO BE PERFORMED NEXT VISIT  > = PROGRESS TO THIS    S: Pt reports late thur severe back pain and continues today. 7/10.  10/10 at night. she has a lot of pain when she wakes up in morning due to tightness and swelling. Pt. Stated that pain increased in the left knee when it was hit by their dog's tail. Pt. Stated after an hour of standing, the left knee did not hurt but their back did. Pt. Stated that they can only ascend one stair at a time.   O:   Time  3174-6367      Visit   Repeat outcome measure at mid point and end.    Pain    Pain 4/10     ROM  24  115* flex/08*Extension     Modalities       Polo/compression 15 min  MO   Manual      Stretching knee flexion   MT   Stretching       Patella mobs 20-30x  TE   Prone hangs   TE   Heel props 1 x 7 min  TE   Knee flex stretch-seated 3 x 1 min  TE   Prone self flexion stretch   TE   Exercise       Nustep  L5 x 8 min Seat 8 TE   Quad sets  TE   Heel slides  TE   SLR  TE   SAQ 2 x 10     LAQ 2 x 10, 5 sec hold New TE         Marching   TA   Squat    TA   Step-ups - FWD  6 inch step, 20 x Hold onto rail and PTA mod A TA   Step-ups - LAT   TA   Step-ups - BWD    TA   Step up and over reciprocally  8 inch, 2 up, 2 down   TA   [] TG  [x] Leg Press 2-leg L16 2 x 20  TE   [] TG  [] Leg Press 1-leg   TE   CR    TE   Knee Extension Machine   TE   Marching gait   NR   Side stepping   NR               A: Tolerated well. Due to increased pain instructed pt on SKTC and sitting flexion. Worked with pt on reciprocal steps difficult need mod A.  Feedback and cues necessary for developing neuromuscular control. Movement

## 2024-05-23 ENCOUNTER — TREATMENT (OUTPATIENT)
Dept: PHYSICAL THERAPY | Age: 69
End: 2024-05-23
Payer: COMMERCIAL

## 2024-05-23 DIAGNOSIS — M17.12 PRIMARY OSTEOARTHRITIS OF LEFT KNEE: Primary | ICD-10-CM

## 2024-05-23 PROCEDURE — 97112 NEUROMUSCULAR REEDUCATION: CPT

## 2024-05-23 PROCEDURE — 97016 VASOPNEUMATIC DEVICE THERAPY: CPT

## 2024-05-23 NOTE — PROGRESS NOTES
Physical Therapy Daily Treatment Note    Date: 2024  Patient Name: Mehran Blakely  : 1955   MRN: [id number]  DOInjury:   DOSx:  2024     Referring Provider:   Asa Inman DO   [place] [phone]         Medical Diagnosis:   M17.12 (ICD-10-CM) - Primary osteoarthritis of left knee     Outcome Measure:        X = TO BE PERFORMED NEXT VISIT  > = PROGRESS TO THIS    S: Pt reports 7/10 pain in the right buttocks when standing or walking.  she has a lot of pain when she wakes up in morning due to tightness and swelling of the left knee. Pt. Stated that they can only ascend one stair at a time. Pt. States that she believes her knee is getting better.   O:   Time  0820-910      Visit   Repeat outcome measure at mid point and end.    Pain    Pain 4/10     ROM  24  115* flex/08*Extension     Modalities       Polo/compression 15 min  MO   Manual      Stretching knee flexion   MT   Stretching       Patella mobs  TE   Prone hangs   TE   Heel props  TE   Knee flex stretch-seated  TE   Glue max stretch  2 x 10 with 15 second hold   TE   Exercise       Nustep  Seat 8 TE   Quad sets  TE   Heel slides  TE   SLR  TE   SAQ     LAQ 2 x 10, 5 sec hold with 2# New TE   Lumbar rotation  10 x bilaterally with a 3 second hold      Marching   TA   Squat    TA   Step-ups - FWD  Hold onto rail and PTA mod A TA   Step-ups - LAT   TA   Step-ups - BWD    TA   Step up and over reciprocally  8 inch, 4 up, 4 down.   TA   [] TG  [x] Leg Press 2-leg  TE   [] TG  [] Leg Press 1-leg   TE   CR    TE   Knee Extension Machine   TE   Marching gait   NR   Side stepping   NR               A: Tolerated well. Due to increased pain in the right buttock, instructed pt on SKTC and lumbar rotation. Worked with pt on reciprocal steps difficult need mod A from railing.  Feedback and cues necessary for developing neuromuscular control. Movement education and guided movement interventions such as verbal and tactile

## 2024-06-06 ENCOUNTER — HOSPITAL ENCOUNTER (OUTPATIENT)
Dept: GENERAL RADIOLOGY | Age: 69
Discharge: HOME | End: 2024-06-08
Payer: COMMERCIAL

## 2024-06-06 ENCOUNTER — HOSPITAL ENCOUNTER (OUTPATIENT)
Age: 69
Discharge: HOME | End: 2024-06-08
Payer: COMMERCIAL

## 2024-06-06 DIAGNOSIS — M54.40 LOW BACK PAIN WITH SCIATICA, SCIATICA LATERALITY UNSPECIFIED, UNSPECIFIED BACK PAIN LATERALITY, UNSPECIFIED CHRONICITY: ICD-10-CM

## 2024-06-06 PROCEDURE — 72100 X-RAY EXAM L-S SPINE 2/3 VWS: CPT

## 2024-06-06 PROCEDURE — 73502 X-RAY EXAM HIP UNI 2-3 VIEWS: CPT

## 2024-07-03 PROBLEM — D23.5 BENIGN NEOPLASM OF SKIN OF TRUNK, EXCEPT SCROTUM: Status: ACTIVE | Noted: 2024-07-03

## 2024-07-03 RX ORDER — FLUTICASONE FUROATE, UMECLIDINIUM BROMIDE AND VILANTEROL TRIFENATATE 100; 62.5; 25 UG/1; UG/1; UG/1
1 POWDER RESPIRATORY (INHALATION) DAILY
COMMUNITY
Start: 2024-05-14

## 2024-07-03 RX ORDER — NYSTATIN 100000 U/G
100000 CREAM TOPICAL PRN
COMMUNITY

## 2024-07-16 ENCOUNTER — OFFICE VISIT (OUTPATIENT)
Dept: PULMONOLOGY | Age: 69
End: 2024-07-16
Payer: COMMERCIAL

## 2024-07-16 VITALS
DIASTOLIC BLOOD PRESSURE: 70 MMHG | OXYGEN SATURATION: 95 % | SYSTOLIC BLOOD PRESSURE: 128 MMHG | TEMPERATURE: 98.2 F | HEART RATE: 62 BPM | BODY MASS INDEX: 33.8 KG/M2 | HEIGHT: 64 IN | WEIGHT: 198 LBS

## 2024-07-16 DIAGNOSIS — J44.9 CHRONIC OBSTRUCTIVE PULMONARY DISEASE, UNSPECIFIED COPD TYPE (HCC): Primary | ICD-10-CM

## 2024-07-16 DIAGNOSIS — G47.33 OSA (OBSTRUCTIVE SLEEP APNEA): ICD-10-CM

## 2024-07-16 DIAGNOSIS — Z87.891 PERSONAL HISTORY OF TOBACCO USE: ICD-10-CM

## 2024-07-16 DIAGNOSIS — E66.9 OBESITY (BMI 30-39.9): ICD-10-CM

## 2024-07-16 PROCEDURE — 99204 OFFICE O/P NEW MOD 45 MIN: CPT | Performed by: INTERNAL MEDICINE

## 2024-07-16 PROCEDURE — 1123F ACP DISCUSS/DSCN MKR DOCD: CPT | Performed by: INTERNAL MEDICINE

## 2024-07-16 PROCEDURE — 3023F SPIROM DOC REV: CPT | Performed by: INTERNAL MEDICINE

## 2024-07-16 PROCEDURE — G0296 VISIT TO DETERM LDCT ELIG: HCPCS | Performed by: INTERNAL MEDICINE

## 2024-07-16 PROCEDURE — G8400 PT W/DXA NO RESULTS DOC: HCPCS | Performed by: INTERNAL MEDICINE

## 2024-07-16 PROCEDURE — 3017F COLORECTAL CA SCREEN DOC REV: CPT | Performed by: INTERNAL MEDICINE

## 2024-07-16 PROCEDURE — 1090F PRES/ABSN URINE INCON ASSESS: CPT | Performed by: INTERNAL MEDICINE

## 2024-07-16 PROCEDURE — G8427 DOCREV CUR MEDS BY ELIG CLIN: HCPCS | Performed by: INTERNAL MEDICINE

## 2024-07-16 PROCEDURE — G8417 CALC BMI ABV UP PARAM F/U: HCPCS | Performed by: INTERNAL MEDICINE

## 2024-07-16 PROCEDURE — 1036F TOBACCO NON-USER: CPT | Performed by: INTERNAL MEDICINE

## 2024-07-16 RX ORDER — ALBUTEROL SULFATE 90 UG/1
2 AEROSOL, METERED RESPIRATORY (INHALATION) EVERY 6 HOURS PRN
Qty: 1 EACH | Refills: 4 | Status: SHIPPED | OUTPATIENT
Start: 2024-07-16

## 2024-07-16 RX ORDER — MECLIZINE HYDROCHLORIDE 25 MG/1
25 TABLET ORAL 3 TIMES DAILY PRN
COMMUNITY
Start: 2024-07-03

## 2024-07-16 ASSESSMENT — ENCOUNTER SYMPTOMS
SHORTNESS OF BREATH: 1
GASTROINTESTINAL NEGATIVE: 1
ALLERGIC/IMMUNOLOGIC NEGATIVE: 1
EYES NEGATIVE: 1

## 2024-07-16 NOTE — PATIENT INSTRUCTIONS
follow-up, he or she will help you understand what to do next.  After a lung cancer screening, you can go back to your usual activities right away.  A lung cancer screening test can't tell if you have lung cancer. If your results are positive, your doctor can't tell whether an abnormal finding is a harmless nodule, cancer, or something else without doing more tests.  What can you do to help prevent lung cancer?  Some lung cancers can't be prevented. But if you smoke, quitting smoking is the best step you can take to prevent lung cancer. If you want to quit, your doctor can recommend medicines or other ways to help.  Follow-up care is a key part of your treatment and safety. Be sure to make and go to all appointments, and call your doctor if you are having problems. It's also a good idea to know your test results and keep a list of the medicines you take.  Where can you learn more?  Go to https://www.[employer].net/patientEd and enter Q940 to learn more about \"Learning About Lung Cancer Screening.\"  Current as of: October 25, 2023  Content Version: 14.1  © 6139-8721 [employer].   Care instructions adapted under license by [employer]. If you have questions about a medical condition or this instruction, always ask your healthcare professional. [employer] disclaims any warranty or liability for your use of this information.

## 2024-07-16 NOTE — PROGRESS NOTES
Patient to follow up with physician in 6 weeks. Orders for PFT/6 min walk and CT Lung will be scheduled @ [facility].   
tablet, Take 1 tablet by mouth 3 times daily as needed for Dizziness, Disp: , Rfl:     nystatin (MYCOSTATIN) 828031 UNIT/GM cream, Apply 100,000 Units topically as needed for Dry Skin, Disp: , Rfl:     TRELEGY ELLIPTA 100-62.5-25 MCG/ACT AEPB inhaler, Inhale 1 puff into the lungs daily, Disp: , Rfl:     metoprolol succinate (TOPROL XL) 25 MG extended release tablet, Take 1 tablet by mouth daily, Disp: 15 tablet, Rfl: 1    gabapentin (NEURONTIN) 100 MG capsule, Take 1 capsule by mouth 3 times daily for 30 days. Intended supply: 30 days (Patient taking differently: Take 3 capsules by mouth 3 times daily. Intended supply: 30 days), Disp: 90 capsule, Rfl: 0    vibegron (GEMTESA) 75 MG TABS tablet, Take 1 tablet by mouth daily, Disp: , Rfl:     D-MANNOSE PO, Take by mouth, Disp: , Rfl:     acetaminophen (TYLENOL) 500 MG tablet, Take 2 tablets by mouth every 6 hours as needed for Pain, Disp: , Rfl:     lisinopril (PRINIVIL;ZESTRIL) 20 MG tablet, Take 1 tablet by mouth daily, Disp: , Rfl:     Biotin 5000 MCG TABS, Take 5,000 mcg by mouth daily, Disp: , Rfl:     amitriptyline (ELAVIL) 75 MG tablet, Take 1 tablet by mouth nightly, Disp: , Rfl:     tiZANidine (ZANAFLEX) 4 MG tablet, Take 2 tablets by mouth nightly, Disp: , Rfl:     furosemide (LASIX) 40 MG tablet, Take 1 tablet by mouth daily as needed (Swelling), Disp: , Rfl:     venlafaxine (EFFEXOR XR) 75 MG extended release capsule, Take 1 capsule by mouth nightly, Disp: , Rfl:     Cholecalciferol (VITAMIN D) 50 MCG (2000 UT) CAPS capsule, Take 1 capsule by mouth daily, Disp: , Rfl:     busPIRone (BUSPAR) 5 MG tablet, Take 1 tablet by mouth 3 times daily, Disp: , Rfl:     omeprazole (PRILOSEC) 40 MG delayed release capsule, Take 1 capsule by mouth every morning, Disp: , Rfl:     famotidine (PEPCID) 40 MG tablet, Take 1 tablet by mouth nightly, Disp: , Rfl:     apixaban (ELIQUIS) 5 MG TABS tablet, Take 1 tablet by mouth 2 times daily, Disp: , Rfl:      I reviewed the

## 2024-07-18 ENCOUNTER — OFFICE VISIT (OUTPATIENT)
Dept: PHYSICAL MEDICINE AND REHAB | Age: 69
End: 2024-07-18
Payer: COMMERCIAL

## 2024-07-18 VITALS
SYSTOLIC BLOOD PRESSURE: 142 MMHG | HEART RATE: 60 BPM | WEIGHT: 197 LBS | BODY MASS INDEX: 33.63 KG/M2 | HEIGHT: 64 IN | DIASTOLIC BLOOD PRESSURE: 84 MMHG

## 2024-07-18 DIAGNOSIS — M79.18 RIGHT BUTTOCK PAIN: ICD-10-CM

## 2024-07-18 DIAGNOSIS — M79.604 LOW BACK PAIN RADIATING TO RIGHT LEG: Primary | ICD-10-CM

## 2024-07-18 DIAGNOSIS — M25.551 RIGHT HIP PAIN: ICD-10-CM

## 2024-07-18 DIAGNOSIS — M54.50 LOW BACK PAIN RADIATING TO RIGHT LEG: Primary | ICD-10-CM

## 2024-07-18 DIAGNOSIS — M79.651 RIGHT THIGH PAIN: ICD-10-CM

## 2024-07-18 DIAGNOSIS — F40.240 CLAUSTROPHOBIA: ICD-10-CM

## 2024-07-18 PROCEDURE — 1123F ACP DISCUSS/DSCN MKR DOCD: CPT | Performed by: PHYSICAL MEDICINE & REHABILITATION

## 2024-07-18 PROCEDURE — 1090F PRES/ABSN URINE INCON ASSESS: CPT | Performed by: PHYSICAL MEDICINE & REHABILITATION

## 2024-07-18 PROCEDURE — G8427 DOCREV CUR MEDS BY ELIG CLIN: HCPCS | Performed by: PHYSICAL MEDICINE & REHABILITATION

## 2024-07-18 PROCEDURE — G8400 PT W/DXA NO RESULTS DOC: HCPCS | Performed by: PHYSICAL MEDICINE & REHABILITATION

## 2024-07-18 PROCEDURE — 99204 OFFICE O/P NEW MOD 45 MIN: CPT | Performed by: PHYSICAL MEDICINE & REHABILITATION

## 2024-07-18 PROCEDURE — 3017F COLORECTAL CA SCREEN DOC REV: CPT | Performed by: PHYSICAL MEDICINE & REHABILITATION

## 2024-07-18 PROCEDURE — G8417 CALC BMI ABV UP PARAM F/U: HCPCS | Performed by: PHYSICAL MEDICINE & REHABILITATION

## 2024-07-18 PROCEDURE — 1036F TOBACCO NON-USER: CPT | Performed by: PHYSICAL MEDICINE & REHABILITATION

## 2024-07-18 RX ORDER — LORAZEPAM 1 MG/1
1 TABLET ORAL ONCE
Qty: 1 TABLET | Refills: 0 | Status: SHIPPED | OUTPATIENT
Start: 2024-07-18 | End: 2024-07-18

## 2024-07-18 RX ORDER — HYDROCODONE BITARTRATE AND ACETAMINOPHEN 5; 325 MG/1; MG/1
1 TABLET ORAL EVERY 6 HOURS PRN
Qty: 28 TABLET | Refills: 0 | Status: SHIPPED | OUTPATIENT
Start: 2024-07-18 | End: 2024-07-25

## 2024-07-18 RX ORDER — GABAPENTIN 300 MG/1
300 CAPSULE ORAL 3 TIMES DAILY
COMMUNITY

## 2024-07-18 NOTE — PROGRESS NOTES
DO at [facility] OR    TRACHEOSTOMY      [employer] - Patient states that this was due to her weight prior to WLS    UPPER GASTROINTESTINAL ENDOSCOPY  2009    UVULOPALATOPHARYGOPLASTY         Family History   Problem Relation Age of Onset    Stroke Mother     High Blood Pressure Mother     Heart Disease Mother     Heart Disease Sister     High Blood Pressure Sister     Heart Disease Maternal Grandmother     Heart Disease Maternal Grandfather     Cancer Paternal Grandfather         Stomach       Social History     Tobacco Use    Smoking status: Former     Current packs/day: 0.00     Average packs/day: 0.5 packs/day for 40.0 years (20.0 ttl pk-yrs)     Types: Cigarettes     Start date: 1980     Quit date: 2020     Years since quittin.5    Smokeless tobacco: Never   Vaping Use    Vaping Use: Never used   Substance Use Topics    Alcohol use: No     Alcohol/week: 0.0 standard drinks of alcohol     Comment: rarely    Drug use: No          Functional Status:   The patient is able to ambulate and perform activities of daily living with the use of an assistive device.         ROS:    Constitutional: Denies fevers, chills, night sweats, unintentional weight loss     Skin: Denies rash or skin changes     Eyes: Denies vision changes    Ears/Nose/Throat: Denies nasal congestion or sore throat     Respiratory: Denies SOB or cough     Cardiovascular: Denies CP, palpitations, edema      Gastrointestinal: Denies abdominal pain,  N/V, constipation, or diarrhea    Genitourinary: Denies urinary symptoms    Neurologic: See HPI.     MSK: See HPI.     Psychiatric: Denies sleep disturbance, anxiety, depression    Hematologic/Lymphatic/Immunologic: Denies bruising       Physical Exam:   Blood pressure (!) 142/84, pulse 60, height 1.626 m (5' 4\"), weight 89.4 kg (197 lb), not currently breastfeeding.   General: well developed and well nourished in no acute distress.  HEENT: No rhinorrhea, sneezing, yawning, or lacrimation. No scleral

## 2024-08-08 ENCOUNTER — TRANSCRIBE ORDERS (OUTPATIENT)
Dept: ADMINISTRATIVE | Age: 69
End: 2024-08-08

## 2024-08-08 DIAGNOSIS — Z12.31 SCREENING MAMMOGRAM FOR HIGH-RISK PATIENT: Primary | ICD-10-CM

## 2024-08-14 ENCOUNTER — HOSPITAL ENCOUNTER (OUTPATIENT)
Dept: PULMONOLOGY | Age: 69
Discharge: HOME | End: 2024-08-14
Attending: INTERNAL MEDICINE
Payer: COMMERCIAL

## 2024-08-14 ENCOUNTER — TELEPHONE (OUTPATIENT)
Dept: PHYSICAL MEDICINE AND REHAB | Age: 69
End: 2024-08-14

## 2024-08-14 VITALS — BODY MASS INDEX: 34.15 KG/M2 | HEIGHT: 64 IN | WEIGHT: 200 LBS

## 2024-08-14 DIAGNOSIS — J44.9 CHRONIC OBSTRUCTIVE PULMONARY DISEASE, UNSPECIFIED COPD TYPE (HCC): ICD-10-CM

## 2024-08-14 PROCEDURE — 94618 PULMONARY STRESS TESTING: CPT

## 2024-08-14 PROCEDURE — 94060 EVALUATION OF WHEEZING: CPT

## 2024-08-14 PROCEDURE — 94729 DIFFUSING CAPACITY: CPT

## 2024-08-14 PROCEDURE — 94726 PLETHYSMOGRAPHY LUNG VOLUMES: CPT

## 2024-08-14 ASSESSMENT — 6 MINUTE WALK TEST (6MWT)
O2 SATURATION 5: 94
TOTAL DISTANCE WALKED (M): 240
O2 SATURATION 4: 86
BORG FATIGUE SCALE SCORE: NOTHING AT ALL
O2 FLOW RATE 6 (L/MIN): 1
ANY PROBLEMS WHILE EXERCISING?: BACK PAIN
BORG FATIGUE SCALE SCORE: MODERATE
O2 FLOW RATE 5 (L/MIN): 1
OXYGEN DEVICE: ROOM AIR
HEART RATE: 63
ADDTIONAL O2 FLOW RATE (L/MIN): YES
BORG DYSPNEA SCALE SCORE: MODERATE
O2 SATURATION: 98
BORG FATIGUE SCALE SCORE: MODERATE
O2 FLOW RATE 3 (L/MIN): 0
O2 SATURATION: 95
O2 FLOW RATE 4 (L/MIN): 0
BLOOD PRESSURE: 159/84
O2 FLOW RATE 2 (L/MIN): 0
O2 SATURATION: 88
HEART RATE: 60
% PREDICTED: 59.41
O2 SATURATION 2: 88
HEART RATE: 50
O2 FLOW RATE (L/MIN): 0
O2 SATURATION 3: 88
BORG DYSPNEA SCALE SCORE: MODERATE
BORG DYSPNEA SCALE SCORE: NOTHING AT ALL
O2 SATURATION 6: 98

## 2024-08-14 NOTE — TELEPHONE ENCOUNTER
Called and spoke with the patient and she is scheduled on 8-20-24.  Patient is aware and voiced understanding.

## 2024-08-14 NOTE — TELEPHONE ENCOUNTER
----- Message from Dr. Yessy Ross DO sent at 8/13/2024  9:59 PM EDT -----  Reviewed test abnormal, covering Dr. Sorto. Schedule non-urgent follow up with her when she returns.

## 2024-08-14 NOTE — PROGRESS NOTES
08/14/24 1110   Data Measured Before Walk   Height 1.626 m (5' 4\")   Weight - Scale 90.7 kg (200 lb)   HR (!) 50   O2 Saturation 95   O2 Device Room air   Greg Dyspnea Scale 0   Greg Fatigue Scale 0   Data Measured During the Walk   Heart Rate 63   O2 Flow Rate (l/min) 0 l/min   O2 Saturation 88   Need Additional O2 Flow Rate Rows Yes   O2 Flow Rate (l/min) 0 l/min   O2 Saturation 88   O2 Flow Rate (l/min) 0 l/min   O2 Saturation 88   O2 Flow Rate (l/min) 0 l/min   O2 Saturation 86   O2 Flow Rate (l/min) 1 l/min   O2 Saturation 94   O2 Flow Rate (l/min) 1 l/min   O2 Saturation 98   Symptoms   (back pain)   Any Problems While Exercising back pain   Greg Dyspnea Scale 3   Greg Fatigue Scale 3   Data Measured Immediately After Walk   Predicted Distance (m) 404 Meters   Total Distance Walked (m) 240 Meters   % Predicted 59.41   Heart Rate 60   Blood Pressure 159/84   O2 Saturation 98   Greg Dyspnea Scale 3   Greg Fatigue Scale 3

## 2024-08-15 ENCOUNTER — HOSPITAL ENCOUNTER (OUTPATIENT)
Dept: CT IMAGING | Age: 69
Discharge: HOME | End: 2024-08-14
Attending: INTERNAL MEDICINE
Payer: COMMERCIAL

## 2024-08-15 ENCOUNTER — HOSPITAL ENCOUNTER (OUTPATIENT)
Dept: MAMMOGRAPHY | Age: 69
Discharge: HOME | End: 2024-08-17
Attending: INTERNAL MEDICINE
Payer: COMMERCIAL

## 2024-08-15 DIAGNOSIS — Z12.31 SCREENING MAMMOGRAM FOR HIGH-RISK PATIENT: ICD-10-CM

## 2024-08-15 DIAGNOSIS — Z87.891 PERSONAL HISTORY OF TOBACCO USE: ICD-10-CM

## 2024-08-15 PROCEDURE — 77063 BREAST TOMOSYNTHESIS BI: CPT

## 2024-08-15 PROCEDURE — 94060 EVALUATION OF WHEEZING: CPT | Performed by: INTERNAL MEDICINE

## 2024-08-15 PROCEDURE — 94729 DIFFUSING CAPACITY: CPT | Performed by: INTERNAL MEDICINE

## 2024-08-15 PROCEDURE — 94726 PLETHYSMOGRAPHY LUNG VOLUMES: CPT | Performed by: INTERNAL MEDICINE

## 2024-08-15 NOTE — PROCEDURES
[address] [address]                           PULMONARY FUNCTION      PATIENT NAME: RADHIKA YATES              : 1955  MED REC NO: [id number]                        ROOM:   ACCOUNT NO: [id number]                       ADMIT DATE: 2024  PROVIDER: Andrew Ochoa MD      DATE OF PROCEDURE: 2024    The spirometry shows minimal reduction in FVC and mild reduction in FEV1.  Post bronchodilator, FEV1/FVC is normal.  The maximum voluntary ventilation is 53% of the predicted value.    According to Z-score criteria, the FVC is under the area of curve, within standard deviation of -1.64, whereas the FEV1 is outside the area of curve, more than standard deviation of -1.64.  The FEV1/FVC is under the area of curve, within standard deviation of -1.64.  After bronchodilator therapy, there is no improvement seen in the spirometry.    The lung volume study shows normal TLC, but increased RV.  ERV is markedly decreased.    The diffusion capacity is mildly decreased.    IMPRESSION:  The above study shows mild restrictive ventilatory impairment and the differential diagnosis includes PRISM which means preserved ratio, but impaired spirometry and this can be seen in about 12% of the patient with chronic obstructive pulmonary disease.  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  There is air trapping present and there is no improvement after bronchodilator therapy.          ANDREW OCHOA MD      D:  2024 10:53:54     T:  08/15/2024 07:24:25     PRIMITIVO/SHELLIE  Job #:  [id number]     Doc#:  [id number]

## 2024-08-20 ENCOUNTER — OFFICE VISIT (OUTPATIENT)
Dept: PHYSICAL MEDICINE AND REHAB | Age: 69
End: 2024-08-20
Payer: COMMERCIAL

## 2024-08-20 VITALS
SYSTOLIC BLOOD PRESSURE: 150 MMHG | HEART RATE: 85 BPM | DIASTOLIC BLOOD PRESSURE: 70 MMHG | WEIGHT: 201 LBS | HEIGHT: 64 IN | BODY MASS INDEX: 34.31 KG/M2

## 2024-08-20 DIAGNOSIS — M79.604 LOW BACK PAIN RADIATING TO RIGHT LEG: ICD-10-CM

## 2024-08-20 DIAGNOSIS — M48.061 NEUROFORAMINAL STENOSIS OF LUMBAR SPINE: ICD-10-CM

## 2024-08-20 DIAGNOSIS — M54.50 LOW BACK PAIN RADIATING TO RIGHT LEG: ICD-10-CM

## 2024-08-20 DIAGNOSIS — M54.17 LUMBOSACRAL RADICULITIS: Primary | ICD-10-CM

## 2024-08-20 DIAGNOSIS — M47.816 LUMBAR SPONDYLOSIS: ICD-10-CM

## 2024-08-20 PROCEDURE — G8417 CALC BMI ABV UP PARAM F/U: HCPCS | Performed by: PHYSICAL MEDICINE & REHABILITATION

## 2024-08-20 PROCEDURE — 1036F TOBACCO NON-USER: CPT | Performed by: PHYSICAL MEDICINE & REHABILITATION

## 2024-08-20 PROCEDURE — 99214 OFFICE O/P EST MOD 30 MIN: CPT | Performed by: PHYSICAL MEDICINE & REHABILITATION

## 2024-08-20 PROCEDURE — G8400 PT W/DXA NO RESULTS DOC: HCPCS | Performed by: PHYSICAL MEDICINE & REHABILITATION

## 2024-08-20 PROCEDURE — 1090F PRES/ABSN URINE INCON ASSESS: CPT | Performed by: PHYSICAL MEDICINE & REHABILITATION

## 2024-08-20 PROCEDURE — 1123F ACP DISCUSS/DSCN MKR DOCD: CPT | Performed by: PHYSICAL MEDICINE & REHABILITATION

## 2024-08-20 PROCEDURE — 3017F COLORECTAL CA SCREEN DOC REV: CPT | Performed by: PHYSICAL MEDICINE & REHABILITATION

## 2024-08-20 PROCEDURE — G8427 DOCREV CUR MEDS BY ELIG CLIN: HCPCS | Performed by: PHYSICAL MEDICINE & REHABILITATION

## 2024-08-20 RX ORDER — PREGABALIN 75 MG/1
75 CAPSULE ORAL 2 TIMES DAILY
Qty: 60 CAPSULE | Refills: 0 | Status: SHIPPED | OUTPATIENT
Start: 2024-08-20 | End: 2024-09-19

## 2024-08-20 RX ORDER — SODIUM CHLORIDE 0.9 % (FLUSH) 0.9 %
5-40 SYRINGE (ML) INJECTION EVERY 12 HOURS SCHEDULED
OUTPATIENT
Start: 2024-08-20

## 2024-08-20 RX ORDER — SODIUM CHLORIDE 9 MG/ML
INJECTION, SOLUTION INTRAVENOUS PRN
OUTPATIENT
Start: 2024-08-20

## 2024-08-20 RX ORDER — SODIUM CHLORIDE 0.9 % (FLUSH) 0.9 %
5-40 SYRINGE (ML) INJECTION PRN
OUTPATIENT
Start: 2024-08-20

## 2024-08-20 RX ORDER — DULOXETIN HYDROCHLORIDE 30 MG/1
40 CAPSULE, DELAYED RELEASE ORAL DAILY
COMMUNITY

## 2024-08-20 RX ORDER — HYDROCODONE BITARTRATE AND ACETAMINOPHEN 5; 325 MG/1; MG/1
1 TABLET ORAL EVERY 6 HOURS PRN
Qty: 28 TABLET | Refills: 0 | Status: SHIPPED | OUTPATIENT
Start: 2024-08-20 | End: 2024-08-27

## 2024-08-20 NOTE — PROGRESS NOTES
recommended treatment as described above.     Follow up after epidural         Maria Fernanda Sorto DO, FAAPMR   Board Certified Physical Medicine and Rehabilitation      Controlled Substance Monitoring:    Acute and Chronic Pain Monitoring:   RX Monitoring Periodic Controlled Substance Monitoring   8/20/2024   9:05 AM No signs of potential drug abuse or diversion identified.

## 2024-08-20 NOTE — H&P
Maria Fernanda Sorto, DO  [facility]  [address] [place] [address]  Phone: [phone]  Fax: [phone]    PCP: Jovon Jarvis MD  Date of visit: 8/20/24    Chief Complaint   Patient presents with    Back Pain     F/u MRI results       Interval:   Patient presents today to review lumbar MRI. MRI reveals multi-level DDD, facet changes causing NF stenosis at multiple levels, worst on the right. She has continued pain in the right low back and radiates into the right thigh.  The pain (using VAS) is rated Pain Score:   9/10, is described as twisting, and is located in the right low back with radiation to the buttock, hip, groin and thigh. The pain is better with nothing.  The pain is worse with standing. There is no associated numbness/tingling. There is weakness. Using cane. There is no bowel/bladder changes.   Was doing PT and pain became too severe to continue. Using norco sparingly. Provides some relief on very bad days. Taking neurontin 300mg TID without relief.     The prior workup has included: Xray R hip, XR L spine     The prior treatment has included:  PT: yes    Chiropractic: none   Modalities: yes    OTC Tylenol: yes   NSAIDS: on eliquis  IM steroid   IM toradol  Opioids: norco  Membrane stabilizers: elavil, neurontin currently    Muscle relaxers: zanaflex   Previous injections: none    Previous surgery at this site: none    Allergies   Allergen Reactions    Oxycodone     Morphine And Codeine Nausea And Vomiting    Percocet [Oxycodone-Acetaminophen] Nausea And Vomiting       Current Outpatient Medications   Medication Sig Dispense Refill    DULoxetine (CYMBALTA) 30 MG extended release capsule Take 40 mg by mouth daily      pregabalin (LYRICA) 75 MG capsule Take 1 capsule by mouth 2 times daily for 30 days. Max Daily Amount: 150 mg 60 capsule 0    HYDROcodone-acetaminophen (NORCO) 5-325 MG per tablet Take 1 tablet by mouth every 6 hours

## 2024-08-23 ENCOUNTER — TELEPHONE (OUTPATIENT)
Dept: PHYSICAL MEDICINE AND REHAB | Age: 69
End: 2024-08-23

## 2024-08-23 NOTE — TELEPHONE ENCOUNTER
Called and spoke with the patient and informed her I received approval for epidural but have to get clearance to hold eliquis from Dr English patient stated that Dr English is not prescribing her eliquis Dr Jovon Jarvis prescribes it. Will send clearance to him.

## 2024-08-27 ENCOUNTER — OFFICE VISIT (OUTPATIENT)
Dept: PULMONOLOGY | Age: 69
End: 2024-08-27
Payer: COMMERCIAL

## 2024-08-27 VITALS
TEMPERATURE: 96.3 F | OXYGEN SATURATION: 93 % | RESPIRATION RATE: 14 BRPM | SYSTOLIC BLOOD PRESSURE: 143 MMHG | DIASTOLIC BLOOD PRESSURE: 62 MMHG | HEART RATE: 57 BPM

## 2024-08-27 DIAGNOSIS — J44.9 CHRONIC OBSTRUCTIVE PULMONARY DISEASE, UNSPECIFIED COPD TYPE (HCC): Primary | ICD-10-CM

## 2024-08-27 DIAGNOSIS — E66.9 OBESITY (BMI 30-39.9): ICD-10-CM

## 2024-08-27 PROCEDURE — 1123F ACP DISCUSS/DSCN MKR DOCD: CPT | Performed by: INTERNAL MEDICINE

## 2024-08-27 PROCEDURE — 99213 OFFICE O/P EST LOW 20 MIN: CPT | Performed by: INTERNAL MEDICINE

## 2024-08-27 PROCEDURE — G8417 CALC BMI ABV UP PARAM F/U: HCPCS | Performed by: INTERNAL MEDICINE

## 2024-08-27 PROCEDURE — 1090F PRES/ABSN URINE INCON ASSESS: CPT | Performed by: INTERNAL MEDICINE

## 2024-08-27 PROCEDURE — 1036F TOBACCO NON-USER: CPT | Performed by: INTERNAL MEDICINE

## 2024-08-27 PROCEDURE — 3023F SPIROM DOC REV: CPT | Performed by: INTERNAL MEDICINE

## 2024-08-27 PROCEDURE — G8400 PT W/DXA NO RESULTS DOC: HCPCS | Performed by: INTERNAL MEDICINE

## 2024-08-27 PROCEDURE — G8428 CUR MEDS NOT DOCUMENT: HCPCS | Performed by: INTERNAL MEDICINE

## 2024-08-27 PROCEDURE — 3017F COLORECTAL CA SCREEN DOC REV: CPT | Performed by: INTERNAL MEDICINE

## 2024-08-27 NOTE — PROGRESS NOTES
Progress Note    Mehran Blakely  1955    CC: COPD       HPI : 69 year old female with stage II COPD, sob  on exertion, no cough or wheezing. She had LDCT of chest, reviewed, showed no nodule or mass. She uses Trelegy inhaler daily. She has ERIK and is awaiting for Hypoglossal nerve stimulation procedure. She has AF and is going to [facility], had ablation in the past.     Past Medical History:   Diagnosis Date    Acid reflux     Atrial fibrillation (HCC)     Cancer (HCC) 5/19/2005    Breast Cancer (Left Breast)    Chronic back pain     CPAP (continuous positive airway pressure) dependence     not using CPAP    Fibromyalgia     History of bariatric surgery 04/17/2002    Dr. Sommer [facility] - Open RYGB    History of cardiovascular stress test 8/10/2012    LEXISCAN    Hyperlipidemia     Hypertension     Kidney stones     Low iron     Osteoarthritis     Unspecified sleep apnea     Patient does not use C-Pap       Past Surgical History:   Procedure Laterality Date    APPENDECTOMY      BARIATRIC SURGERY  2003    BLADDER SUSPENSION  1998    BREAST SURGERY  2005    Dr. Onofre - [facility] - Breast  Cancer - Left Breast    CHOLECYSTECTOMY  2004    Dr. Sommer [facility] - Lap Trixie    COLONOSCOPY  2008    ECHO COMPL W DOP COLOR FLOW  8/10/2012         HERNIA REPAIR  2004    Dr. Sommer [facility] -     HERNIA REPAIR Left 11/4/2021    LEFT INGUINAL HERNIA REPAIR WITH MESH, POSSIBLE BILATERAL,  LAPAROSCOPIC ROBOTIC XI performed by Eliel Cook MD at [facility] OR    HYSTERECTOMY (CERVIX STATUS UNKNOWN)  1988    Dr. Chacon - [facility]     JOINT REPLACEMENT  2009    Right Knee    NECK SURGERY  06/2015    fused 4,5,6- screws, in [place]    ASHOK-EN-Y GASTRIC BYPASS  04/17/2002    Dr. Sommer [facility] - Open RYGB    SHOULDER ARTHROSCOPY  2/17/12    LEFT REPAIR ROTATOR CUFF TEAR SUBACROMIAL DECOMPRESSION    TONSILLECTOMY      TOTAL KNEE  Allergies.    Patient seen today for: No chief complaint on file.       Review of Systems      Physical Exam:  There were no vitals filed for this visit.    Physical Exam  Constitutional:       Appearance: Normal appearance. She is obese.   HENT:      Head: Normocephalic and atraumatic.      Nose: Nose normal.      Mouth/Throat:      Mouth: Mucous membranes are moist.      Pharynx: Oropharynx is clear.   Eyes:      Extraocular Movements: Extraocular movements intact.      Conjunctiva/sclera: Conjunctivae normal.      Pupils: Pupils are equal, round, and reactive to light.   Cardiovascular:      Rate and Rhythm: Normal rate. Rhythm irregular.      Heart sounds: Murmur heard.   Pulmonary:      Effort: Pulmonary effort is normal.      Breath sounds: Normal breath sounds.   Abdominal:      General: Bowel sounds are normal.      Palpations: Abdomen is soft.   Musculoskeletal:         General: Normal range of motion.      Cervical back: Normal range of motion and neck supple.   Skin:     General: Skin is warm.   Neurological:      General: No focal deficit present.      Mental Status: She is alert and oriented to person, place, and time.          Assessment/Plan:   Diagnosis Orders   1. Chronic obstructive pulmonary disease, unspecified COPD type (HCC)        2. Obesity (BMI 30-39.9)          Chest CT, August 2024 : 1. There is no pulmonary infiltrate, mass or suspicious pulmonary nodule.  2. Benign calcified granulomatous disease.    Chest CT reviewed.   Satge II COPD, continue Trelegy inhaler.    Follow up in 6 months.        Electronically by Jessica Negro MD  on 8/27/24 at 10:11 AM EDT

## 2024-08-30 ENCOUNTER — TELEPHONE (OUTPATIENT)
Dept: PHYSICAL MEDICINE AND REHAB | Age: 69
End: 2024-08-30

## 2024-08-30 ENCOUNTER — PREP FOR PROCEDURE (OUTPATIENT)
Dept: PHYSICAL MEDICINE AND REHAB | Age: 69
End: 2024-08-30

## 2024-08-30 DIAGNOSIS — M54.17 LUMBOSACRAL RADICULITIS: ICD-10-CM

## 2024-08-30 RX ORDER — METOPROLOL TARTRATE 25 MG/1
25 TABLET, FILM COATED ORAL
COMMUNITY
Start: 2024-07-21

## 2024-08-30 NOTE — TELEPHONE ENCOUNTER
Patient called in to schedule injection. Aware medical clearance received to hold Eliquis for 3 days prior tp procedure. Per [employer] approval CPT 44317.52903   Auth [id number]  Approval dates 8/29/24 to 9/26/24  Patient scheduled 9/5/24 per he request

## 2024-09-05 ENCOUNTER — HOSPITAL ENCOUNTER (OUTPATIENT)
Dept: OPERATING ROOM | Age: 69
Setting detail: OUTPATIENT SURGERY
Discharge: HOME | End: 2024-09-05
Attending: PHYSICAL MEDICINE & REHABILITATION
Payer: COMMERCIAL

## 2024-09-05 ENCOUNTER — HOSPITAL ENCOUNTER (OUTPATIENT)
Age: 69
Setting detail: OUTPATIENT SURGERY
Discharge: HOME | End: 2024-09-05
Attending: PHYSICAL MEDICINE & REHABILITATION | Admitting: PHYSICAL MEDICINE & REHABILITATION
Payer: COMMERCIAL

## 2024-09-05 VITALS
DIASTOLIC BLOOD PRESSURE: 63 MMHG | TEMPERATURE: 97.6 F | SYSTOLIC BLOOD PRESSURE: 176 MMHG | HEART RATE: 82 BPM | WEIGHT: 201 LBS | RESPIRATION RATE: 18 BRPM | OXYGEN SATURATION: 95 % | HEIGHT: 64 IN | BODY MASS INDEX: 34.31 KG/M2

## 2024-09-05 DIAGNOSIS — M54.17 LUMBOSACRAL RADICULITIS: ICD-10-CM

## 2024-09-05 PROCEDURE — 2709999900 HC NON-CHARGEABLE SUPPLY: Performed by: PHYSICAL MEDICINE & REHABILITATION

## 2024-09-05 PROCEDURE — 6360000004 HC RX CONTRAST MEDICATION: Performed by: PHYSICAL MEDICINE & REHABILITATION

## 2024-09-05 PROCEDURE — 2500000003 HC RX 250 WO HCPCS: Performed by: PHYSICAL MEDICINE & REHABILITATION

## 2024-09-05 PROCEDURE — 7100000011 HC PHASE II RECOVERY - ADDTL 15 MIN: Performed by: PHYSICAL MEDICINE & REHABILITATION

## 2024-09-05 PROCEDURE — 2580000003 HC RX 258: Performed by: PHYSICAL MEDICINE & REHABILITATION

## 2024-09-05 PROCEDURE — 6360000002 HC RX W HCPCS: Performed by: PHYSICAL MEDICINE & REHABILITATION

## 2024-09-05 PROCEDURE — 7100000010 HC PHASE II RECOVERY - FIRST 15 MIN: Performed by: PHYSICAL MEDICINE & REHABILITATION

## 2024-09-05 PROCEDURE — 3600000005 HC SURGERY LEVEL 5 BASE: Performed by: PHYSICAL MEDICINE & REHABILITATION

## 2024-09-05 RX ORDER — SODIUM CHLORIDE 0.9 % (FLUSH) 0.9 %
5-40 SYRINGE (ML) INJECTION EVERY 12 HOURS SCHEDULED
Status: DISCONTINUED | OUTPATIENT
Start: 2024-09-05 | End: 2024-09-05 | Stop reason: HOSPADM

## 2024-09-05 RX ORDER — SODIUM CHLORIDE 0.9 % (FLUSH) 0.9 %
5-40 SYRINGE (ML) INJECTION PRN
Status: DISCONTINUED | OUTPATIENT
Start: 2024-09-05 | End: 2024-09-05 | Stop reason: HOSPADM

## 2024-09-05 RX ORDER — LIDOCAINE HYDROCHLORIDE 10 MG/ML
INJECTION, SOLUTION EPIDURAL; INFILTRATION; INTRACAUDAL; PERINEURAL PRN
Status: DISCONTINUED | OUTPATIENT
Start: 2024-09-05 | End: 2024-09-05 | Stop reason: ALTCHOICE

## 2024-09-05 RX ORDER — SODIUM CHLORIDE 9 MG/ML
INJECTION, SOLUTION INTRAVENOUS PRN
Status: DISCONTINUED | OUTPATIENT
Start: 2024-09-05 | End: 2024-09-05 | Stop reason: HOSPADM

## 2024-09-05 ASSESSMENT — PAIN - FUNCTIONAL ASSESSMENT
PAIN_FUNCTIONAL_ASSESSMENT: NONE - DENIES PAIN
PAIN_FUNCTIONAL_ASSESSMENT: NONE - DENIES PAIN
PAIN_FUNCTIONAL_ASSESSMENT: 0-10
PAIN_FUNCTIONAL_ASSESSMENT: NONE - DENIES PAIN

## 2024-09-05 NOTE — H&P
Blood Pressure Sister      Heart Disease Maternal Grandmother      Heart Disease Maternal Grandfather      Cancer Paternal Grandfather           Stomach         Social History            Tobacco Use    Smoking status: Former       Current packs/day: 0.00       Average packs/day: 0.5 packs/day for 40.0 years (20.0 ttl pk-yrs)       Types: Cigarettes       Start date: 1980       Quit date: 2020       Years since quittin.6    Smokeless tobacco: Never   Vaping Use    Vaping status: Never Used   Substance Use Topics    Alcohol use: No       Alcohol/week: 0.0 standard drinks of alcohol       Comment: rarely    Drug use: No            Functional Status:   The patient is able to ambulate and perform activities of daily living with the use of an assistive device.         ROS:    Constitutional: Denies fevers, chills, night sweats, unintentional weight loss     Skin: Denies rash or skin changes     Eyes: Denies vision changes    Ears/Nose/Throat: Denies nasal congestion or sore throat     Respiratory: Denies SOB or cough     Cardiovascular: Denies CP, palpitations, edema      Gastrointestinal: Denies abdominal pain,  N/V, constipation, or diarrhea    Genitourinary: Denies urinary symptoms    Neurologic: See HPI.     MSK: See HPI.     Psychiatric: Denies sleep disturbance, anxiety, depression    Hematologic/Lymphatic/Immunologic: Denies bruising         Physical Exam:   Blood pressure (!) 150/70, pulse 85, height 1.626 m (5' 4\"), weight 91.2 kg (201 lb), not currently breastfeeding.   General: well developed and well nourished in no acute distress.  HEENT: No rhinorrhea, sneezing, yawning, or lacrimation. No scleral icterus or conjunctival injection.  Resp: symmetrical chest expansion, unlabored breathing, respirations unlabored.   CV: Heart rate is regular. Peripheral pulses are palpable  Lymph: No visible regional lymphadenopathy.  Skin: No rashes or ecchymosis. Normal turgor.   Psych: Mood is calm. Affect is

## 2024-09-05 NOTE — OP NOTE
contrast was injected at this level.  The contrast was observed to flow under the pedicle.  Radiographs were obtained for documentation purposes.     After attaining flow of contrast documented above, the above injectate was administered into the transforaminal epidural space.    The patient tolerated the procedure well and was discharged after an appropriate period of observation.  If there are any complications, the patient was instructed to call us.  The patient is to follow-up with the requesting physician after the injection, preferably within three weeks.    Maria Fernanda Sorto DO, FAAPMR   Board Certified Physical Medicine and Rehabilitation

## 2024-09-05 NOTE — DISCHARGE INSTRUCTIONS
Post-op instruction Block  Dr. Sorto  [phone]      Rest 12-24 hours following procedure. DO NOT DRIVE till the following day.    Keep dressing clean and dry. Do not bathe, shower, or sit in hot tub the day of procedure .You may remove the dressing following A.M.    You may return to work/school tomorrow.    Drink extra fluids for the next 24 hours.    Resume your regular diet.    Resume previously prescribed medications. Resume Coumadin, Plavix, NSAIDS, Ibuprofen products 24 hours after procedure.    You need to have a responsible adult stay with you this evening.    If you experience any discomfort relating to this procedure, you may take Tylenol 2 tablets every 4 hrs as needed for pain and/or apply ice to the affected area.    Please follow up with Dr. Sorto or Dr. Ross. If you were a hip injection follow up with your orthopedic Doctor.    If you experience any unusual symptoms or problems, call your physician’s answering service at [phone] or go directly to [facility]’s Emergency Department.

## 2024-09-06 ENCOUNTER — TELEPHONE (OUTPATIENT)
Dept: PHYSICAL MEDICINE AND REHAB | Age: 69
End: 2024-09-06

## 2024-09-09 ENCOUNTER — TELEPHONE (OUTPATIENT)
Dept: PHYSICAL MEDICINE AND REHAB | Age: 69
End: 2024-09-09

## 2024-09-09 RX ORDER — METHYLPREDNISOLONE 4 MG
TABLET, DOSE PACK ORAL
Qty: 1 KIT | Refills: 0 | Status: SHIPPED | OUTPATIENT
Start: 2024-09-09

## 2024-09-10 ENCOUNTER — TELEPHONE (OUTPATIENT)
Dept: PHYSICAL MEDICINE AND REHAB | Age: 69
End: 2024-09-10

## 2024-09-10 DIAGNOSIS — M54.50 LOW BACK PAIN RADIATING TO RIGHT LEG: ICD-10-CM

## 2024-09-10 DIAGNOSIS — M54.17 LUMBOSACRAL RADICULITIS: Primary | ICD-10-CM

## 2024-09-10 DIAGNOSIS — M79.604 LOW BACK PAIN RADIATING TO RIGHT LEG: ICD-10-CM

## 2024-09-10 RX ORDER — PREGABALIN 100 MG/1
100 CAPSULE ORAL 2 TIMES DAILY
Qty: 42 CAPSULE | Refills: 0 | Status: SHIPPED | OUTPATIENT
Start: 2024-09-10 | End: 2024-10-01

## 2024-09-20 DIAGNOSIS — M54.17 LUMBOSACRAL RADICULITIS: ICD-10-CM

## 2024-09-24 ENCOUNTER — OFFICE VISIT (OUTPATIENT)
Dept: PHYSICAL MEDICINE AND REHAB | Age: 69
End: 2024-09-24
Payer: COMMERCIAL

## 2024-09-24 ENCOUNTER — TELEPHONE (OUTPATIENT)
Dept: PHYSICAL MEDICINE AND REHAB | Age: 69
End: 2024-09-24

## 2024-09-24 VITALS
WEIGHT: 197 LBS | BODY MASS INDEX: 33.63 KG/M2 | HEIGHT: 64 IN | DIASTOLIC BLOOD PRESSURE: 79 MMHG | SYSTOLIC BLOOD PRESSURE: 138 MMHG | HEART RATE: 85 BPM

## 2024-09-24 DIAGNOSIS — M48.061 NEUROFORAMINAL STENOSIS OF LUMBAR SPINE: ICD-10-CM

## 2024-09-24 DIAGNOSIS — M54.50 LOW BACK PAIN RADIATING TO RIGHT LEG: ICD-10-CM

## 2024-09-24 DIAGNOSIS — M79.604 LOW BACK PAIN RADIATING TO RIGHT LEG: ICD-10-CM

## 2024-09-24 DIAGNOSIS — M79.604 RIGHT LEG PAIN: ICD-10-CM

## 2024-09-24 DIAGNOSIS — R60.0 LEG EDEMA, RIGHT: ICD-10-CM

## 2024-09-24 DIAGNOSIS — M54.17 LUMBOSACRAL RADICULITIS: Primary | ICD-10-CM

## 2024-09-24 DIAGNOSIS — M47.816 LUMBAR SPONDYLOSIS: ICD-10-CM

## 2024-09-24 PROCEDURE — 1090F PRES/ABSN URINE INCON ASSESS: CPT | Performed by: PHYSICAL MEDICINE & REHABILITATION

## 2024-09-24 PROCEDURE — G8427 DOCREV CUR MEDS BY ELIG CLIN: HCPCS | Performed by: PHYSICAL MEDICINE & REHABILITATION

## 2024-09-24 PROCEDURE — 1036F TOBACCO NON-USER: CPT | Performed by: PHYSICAL MEDICINE & REHABILITATION

## 2024-09-24 PROCEDURE — 1123F ACP DISCUSS/DSCN MKR DOCD: CPT | Performed by: PHYSICAL MEDICINE & REHABILITATION

## 2024-09-24 PROCEDURE — 99214 OFFICE O/P EST MOD 30 MIN: CPT | Performed by: PHYSICAL MEDICINE & REHABILITATION

## 2024-09-24 PROCEDURE — 3017F COLORECTAL CA SCREEN DOC REV: CPT | Performed by: PHYSICAL MEDICINE & REHABILITATION

## 2024-09-24 PROCEDURE — G8400 PT W/DXA NO RESULTS DOC: HCPCS | Performed by: PHYSICAL MEDICINE & REHABILITATION

## 2024-09-24 PROCEDURE — G8417 CALC BMI ABV UP PARAM F/U: HCPCS | Performed by: PHYSICAL MEDICINE & REHABILITATION

## 2024-09-24 RX ORDER — PREGABALIN 150 MG/1
150 CAPSULE ORAL 2 TIMES DAILY
Qty: 60 CAPSULE | Refills: 1 | Status: SHIPPED | OUTPATIENT
Start: 2024-09-24 | End: 2024-10-24

## 2024-09-30 ENCOUNTER — TELEPHONE (OUTPATIENT)
Dept: PHYSICAL MEDICINE AND REHAB | Age: 69
End: 2024-09-30

## 2024-09-30 NOTE — TELEPHONE ENCOUNTER
Called and left message on patient voicemail that I spoke with the pharmacy and she can  the medication on Wednesday.

## 2024-10-04 DIAGNOSIS — M48.061 NEUROFORAMINAL STENOSIS OF LUMBAR SPINE: ICD-10-CM

## 2024-10-04 DIAGNOSIS — M54.50 LOW BACK PAIN RADIATING TO RIGHT LEG: ICD-10-CM

## 2024-10-04 DIAGNOSIS — M79.604 LOW BACK PAIN RADIATING TO RIGHT LEG: ICD-10-CM

## 2024-10-04 DIAGNOSIS — M54.17 LUMBOSACRAL RADICULITIS: ICD-10-CM

## 2024-10-04 DIAGNOSIS — M47.816 LUMBAR SPONDYLOSIS: ICD-10-CM

## 2024-10-04 RX ORDER — PREGABALIN 150 MG/1
150 CAPSULE ORAL 2 TIMES DAILY
Qty: 60 CAPSULE | Refills: 1 | OUTPATIENT
Start: 2024-10-04 | End: 2024-11-03

## 2024-11-04 DIAGNOSIS — M54.17 LUMBOSACRAL RADICULITIS: ICD-10-CM

## 2024-11-04 DIAGNOSIS — M79.604 LOW BACK PAIN RADIATING TO RIGHT LEG: ICD-10-CM

## 2024-11-04 DIAGNOSIS — M48.061 NEUROFORAMINAL STENOSIS OF LUMBAR SPINE: ICD-10-CM

## 2024-11-04 DIAGNOSIS — M47.816 LUMBAR SPONDYLOSIS: ICD-10-CM

## 2024-11-04 DIAGNOSIS — M54.50 LOW BACK PAIN RADIATING TO RIGHT LEG: ICD-10-CM

## 2024-11-04 RX ORDER — PREGABALIN 150 MG/1
150 CAPSULE ORAL 2 TIMES DAILY
Qty: 60 CAPSULE | Refills: 1 | Status: SHIPPED | OUTPATIENT
Start: 2024-11-04 | End: 2024-12-04

## 2024-11-04 NOTE — TELEPHONE ENCOUNTER
Patient called and left message on office refill line requesting refill on Pregabalin 150 mg sent 9-24-24 #60 1 refill.  Patient last visit 9-24-24 no return visit scheduled.  The OARRS report was obtained and provided for the physician to review today. It was Appropriate and revealed appropriate prescriptions without multiple providers, altered prescriptions, or early refills. Please advise.

## 2024-11-04 NOTE — TELEPHONE ENCOUNTER
Called  and patient notified of medication refill. States no call received yet from for surgeon Referral was faxed and re faxed again today

## 2024-11-18 ENCOUNTER — APPOINTMENT (OUTPATIENT)
Dept: CT IMAGING | Age: 69
End: 2024-11-18
Payer: COMMERCIAL

## 2024-11-18 ENCOUNTER — HOSPITAL ENCOUNTER (EMERGENCY)
Age: 69
Discharge: HOME | End: 2024-11-18
Attending: EMERGENCY MEDICINE
Payer: COMMERCIAL

## 2024-11-18 VITALS
WEIGHT: 200 LBS | DIASTOLIC BLOOD PRESSURE: 86 MMHG | BODY MASS INDEX: 34.33 KG/M2 | SYSTOLIC BLOOD PRESSURE: 170 MMHG | TEMPERATURE: 98.2 F | OXYGEN SATURATION: 94 % | RESPIRATION RATE: 18 BRPM | HEART RATE: 56 BPM

## 2024-11-18 DIAGNOSIS — S39.012A STRAIN OF LUMBAR REGION, INITIAL ENCOUNTER: Primary | ICD-10-CM

## 2024-11-18 DIAGNOSIS — N30.00 ACUTE CYSTITIS WITHOUT HEMATURIA: ICD-10-CM

## 2024-11-18 DIAGNOSIS — M54.31 SCIATICA OF RIGHT SIDE: ICD-10-CM

## 2024-11-18 LAB
ANION GAP SERPL CALCULATED.3IONS-SCNC: 9 MMOL/L (ref 7–16)
BACTERIA URNS QL MICRO: ABNORMAL
BASOPHILS # BLD: 0.03 K/UL (ref 0–0.2)
BASOPHILS NFR BLD: 1 % (ref 0–2)
BILIRUB UR QL STRIP: NEGATIVE
BUN SERPL-MCNC: 20 MG/DL (ref 6–23)
CALCIUM SERPL-MCNC: 9.2 MG/DL (ref 8.6–10.2)
CHLORIDE SERPL-SCNC: 104 MMOL/L (ref 98–107)
CLARITY UR: ABNORMAL
CO2 SERPL-SCNC: 28 MMOL/L (ref 22–29)
COLOR UR: YELLOW
CREAT SERPL-MCNC: 0.8 MG/DL (ref 0.5–1)
EOSINOPHIL # BLD: 0.12 K/UL (ref 0.05–0.5)
EOSINOPHILS RELATIVE PERCENT: 2 % (ref 0–6)
EPI CELLS #/AREA URNS HPF: ABNORMAL /HPF
ERYTHROCYTE [DISTWIDTH] IN BLOOD BY AUTOMATED COUNT: 13.2 % (ref 11.5–15)
GFR, ESTIMATED: 80 ML/MIN/1.73M2
GLUCOSE SERPL-MCNC: 106 MG/DL (ref 74–99)
GLUCOSE UR STRIP-MCNC: NEGATIVE MG/DL
HCT VFR BLD AUTO: 33.3 % (ref 34–48)
HGB BLD-MCNC: 11.2 G/DL (ref 11.5–15.5)
HGB UR QL STRIP.AUTO: NEGATIVE
IMM GRANULOCYTES # BLD AUTO: <0.03 K/UL (ref 0–0.58)
IMM GRANULOCYTES NFR BLD: 0 % (ref 0–5)
KETONES UR STRIP-MCNC: NEGATIVE MG/DL
LEUKOCYTE ESTERASE UR QL STRIP: NEGATIVE
LYMPHOCYTES NFR BLD: 1.41 K/UL (ref 1.5–4)
LYMPHOCYTES RELATIVE PERCENT: 24 % (ref 20–42)
MCH RBC QN AUTO: 30.5 PG (ref 26–35)
MCHC RBC AUTO-ENTMCNC: 33.6 G/DL (ref 32–34.5)
MCV RBC AUTO: 90.7 FL (ref 80–99.9)
MONOCYTES NFR BLD: 0.42 K/UL (ref 0.1–0.95)
MONOCYTES NFR BLD: 7 % (ref 2–12)
NEUTROPHILS NFR BLD: 66 % (ref 43–80)
NEUTS SEG NFR BLD: 3.89 K/UL (ref 1.8–7.3)
NITRITE UR QL STRIP: POSITIVE
PH UR STRIP: 6 [PH] (ref 5–9)
PLATELET # BLD AUTO: 160 K/UL (ref 130–450)
PMV BLD AUTO: 9.3 FL (ref 7–12)
POTASSIUM SERPL-SCNC: 4.3 MMOL/L (ref 3.5–5)
PROT UR STRIP-MCNC: NEGATIVE MG/DL
RBC # BLD AUTO: 3.67 M/UL (ref 3.5–5.5)
RBC #/AREA URNS HPF: ABNORMAL /HPF
SODIUM SERPL-SCNC: 141 MMOL/L (ref 132–146)
SP GR UR STRIP: >1.03 (ref 1–1.03)
UROBILINOGEN UR STRIP-ACNC: 1 EU/DL (ref 0–1)
WBC #/AREA URNS HPF: ABNORMAL /HPF
WBC OTHER # BLD: 5.9 K/UL (ref 4.5–11.5)

## 2024-11-18 PROCEDURE — 80048 BASIC METABOLIC PNL TOTAL CA: CPT

## 2024-11-18 PROCEDURE — 96372 THER/PROPH/DIAG INJ SC/IM: CPT

## 2024-11-18 PROCEDURE — 72131 CT LUMBAR SPINE W/O DYE: CPT

## 2024-11-18 PROCEDURE — 81001 URINALYSIS AUTO W/SCOPE: CPT

## 2024-11-18 PROCEDURE — 87086 URINE CULTURE/COLONY COUNT: CPT

## 2024-11-18 PROCEDURE — 85025 COMPLETE CBC W/AUTO DIFF WBC: CPT

## 2024-11-18 PROCEDURE — 99284 EMERGENCY DEPT VISIT MOD MDM: CPT

## 2024-11-18 PROCEDURE — 6360000002 HC RX W HCPCS

## 2024-11-18 RX ORDER — ORPHENADRINE CITRATE 30 MG/ML
60 INJECTION INTRAMUSCULAR; INTRAVENOUS ONCE
Status: COMPLETED | OUTPATIENT
Start: 2024-11-18 | End: 2024-11-18

## 2024-11-18 RX ORDER — METHOCARBAMOL 750 MG/1
750 TABLET, FILM COATED ORAL 4 TIMES DAILY
Qty: 20 TABLET | Refills: 0 | Status: SHIPPED | OUTPATIENT
Start: 2024-11-18 | End: 2024-11-23

## 2024-11-18 RX ORDER — CEFDINIR 300 MG/1
300 CAPSULE ORAL 2 TIMES DAILY
Qty: 14 CAPSULE | Refills: 0 | Status: SHIPPED | OUTPATIENT
Start: 2024-11-18 | End: 2024-11-25

## 2024-11-18 RX ORDER — KETOROLAC TROMETHAMINE 30 MG/ML
30 INJECTION, SOLUTION INTRAMUSCULAR; INTRAVENOUS ONCE
Status: COMPLETED | OUTPATIENT
Start: 2024-11-18 | End: 2024-11-18

## 2024-11-18 RX ADMIN — KETOROLAC TROMETHAMINE 30 MG: 30 INJECTION, SOLUTION INTRAMUSCULAR at 15:52

## 2024-11-18 RX ADMIN — ORPHENADRINE CITRATE 60 MG: 30 INJECTION, SOLUTION INTRAMUSCULAR; INTRAVENOUS at 15:52

## 2024-11-18 ASSESSMENT — PAIN SCALES - GENERAL: PAINLEVEL_OUTOF10: 6

## 2024-11-18 NOTE — DISCHARGE INSTRUCTIONS
Follow up with Dr. Meier regarding your back pain  Start to take muscle relaxers every 4 hours for pain, do not drive while taking muscle relaxers  Take antibiotic because your urine shows infection    CT LUMBAR SPINE WO CONTRAST   Final Result   1. No acute fracture of the lumbar spine.   2. Multilevel facet joint degenerative changes with grade 1 anterolisthesis   L4 over L5.

## 2024-11-18 NOTE — ED PROVIDER NOTES
pk-yrs)     Types: Cigarettes     Start date: 1980     Quit date: 2020     Years since quittin.8    Smokeless tobacco: Never   Vaping Use    Vaping status: Never Used   Substance Use Topics    Alcohol use: No     Alcohol/week: 0.0 standard drinks of alcohol     Comment: rarely    Drug use: No       SCREENINGS        Janet Coma Scale  Eye Opening: Spontaneous  Best Verbal Response: Oriented  Best Motor Response: Obeys commands  Janet Coma Scale Score: 15                CIWA Assessment  BP: (!) 170/86  Pulse: 56           PHYSICAL EXAM  1 or more Elements     ED Triage Vitals [24 1357]   BP Systolic BP Percentile Diastolic BP Percentile Temp Temp src Pulse Respirations SpO2   (!) 170/86 -- -- 98.2 °F (36.8 °C) -- 56 18 94 %      Height Weight - Scale         -- 90.7 kg (200 lb)             Physical Exam  Constitutional:       General: She is not in acute distress.     Appearance: Normal appearance. She is not ill-appearing.   HENT:      Head: Normocephalic.      Mouth/Throat:      Mouth: Mucous membranes are moist.   Eyes:      Conjunctiva/sclera: Conjunctivae normal.      Pupils: Pupils are equal, round, and reactive to light.   Cardiovascular:      Rate and Rhythm: Normal rate and regular rhythm.      Pulses: Normal pulses.   Pulmonary:      Effort: Pulmonary effort is normal. No respiratory distress.      Breath sounds: Normal breath sounds. No stridor. No wheezing or rales.   Abdominal:      General: There is no distension.      Palpations: Abdomen is soft.      Tenderness: There is no abdominal tenderness. There is no guarding.   Musculoskeletal:         General: Tenderness present.      Cervical back: Normal range of motion and neck supple.      Comments: Generalized lumbar tenderness  No swelling  No deformity   DP pulses are palpable b/l    Skin:     Findings: No erythema.      Comments: No erythema, induration, fluctuance to the lumbar spine           DIAGNOSTIC RESULTS   LABS:    Labs

## 2024-11-20 LAB
MICROORGANISM SPEC CULT: ABNORMAL
SERVICE CMNT-IMP: ABNORMAL
SPECIMEN DESCRIPTION: ABNORMAL

## 2024-11-25 RX ORDER — ALBUTEROL SULFATE 90 UG/1
2 INHALANT RESPIRATORY (INHALATION) EVERY 6 HOURS PRN
Qty: 1 EACH | Refills: 4 | Status: SHIPPED | OUTPATIENT
Start: 2024-11-25

## 2024-11-25 RX ORDER — ALBUTEROL SULFATE 90 UG/1
INHALANT RESPIRATORY (INHALATION)
Qty: 18 EACH | Refills: 4 | OUTPATIENT
Start: 2024-11-25

## 2025-02-21 RX ORDER — TRAMADOL HYDROCHLORIDE 50 MG/1
50 TABLET ORAL EVERY 6 HOURS PRN
COMMUNITY
Start: 2024-11-21

## 2025-02-24 ENCOUNTER — OFFICE VISIT (OUTPATIENT)
Dept: PULMONOLOGY | Age: 70
End: 2025-02-24
Payer: COMMERCIAL

## 2025-02-24 VITALS
WEIGHT: 211 LBS | OXYGEN SATURATION: 93 % | TEMPERATURE: 97.4 F | HEART RATE: 64 BPM | SYSTOLIC BLOOD PRESSURE: 136 MMHG | BODY MASS INDEX: 36.02 KG/M2 | HEIGHT: 64 IN | DIASTOLIC BLOOD PRESSURE: 78 MMHG

## 2025-02-24 DIAGNOSIS — E66.9 OBESITY (BMI 30-39.9): ICD-10-CM

## 2025-02-24 DIAGNOSIS — J44.9 CHRONIC OBSTRUCTIVE PULMONARY DISEASE, UNSPECIFIED COPD TYPE (HCC): Primary | ICD-10-CM

## 2025-02-24 PROCEDURE — 1090F PRES/ABSN URINE INCON ASSESS: CPT | Performed by: INTERNAL MEDICINE

## 2025-02-24 PROCEDURE — G8417 CALC BMI ABV UP PARAM F/U: HCPCS | Performed by: INTERNAL MEDICINE

## 2025-02-24 PROCEDURE — 1036F TOBACCO NON-USER: CPT | Performed by: INTERNAL MEDICINE

## 2025-02-24 PROCEDURE — G8427 DOCREV CUR MEDS BY ELIG CLIN: HCPCS | Performed by: INTERNAL MEDICINE

## 2025-02-24 PROCEDURE — G8400 PT W/DXA NO RESULTS DOC: HCPCS | Performed by: INTERNAL MEDICINE

## 2025-02-24 PROCEDURE — 3023F SPIROM DOC REV: CPT | Performed by: INTERNAL MEDICINE

## 2025-02-24 PROCEDURE — 1159F MED LIST DOCD IN RCRD: CPT | Performed by: INTERNAL MEDICINE

## 2025-02-24 PROCEDURE — 3017F COLORECTAL CA SCREEN DOC REV: CPT | Performed by: INTERNAL MEDICINE

## 2025-02-24 PROCEDURE — 99214 OFFICE O/P EST MOD 30 MIN: CPT | Performed by: INTERNAL MEDICINE

## 2025-02-24 PROCEDURE — 1123F ACP DISCUSS/DSCN MKR DOCD: CPT | Performed by: INTERNAL MEDICINE

## 2025-02-24 RX ORDER — ROSUVASTATIN CALCIUM 5 MG/1
5 TABLET, COATED ORAL DAILY
COMMUNITY

## 2025-02-24 NOTE — PROGRESS NOTES
Patient to follow up with physician in 6 months.  
lungs every 6 hours as needed for Wheezing, Disp: 1 each, Rfl: 4    pregabalin (LYRICA) 150 MG capsule, Take 1 capsule by mouth 2 times daily for 30 days. Max Daily Amount: 300 mg, Disp: 60 capsule, Rfl: 1    metoprolol tartrate (LOPRESSOR) 25 MG tablet, Take 1 tablet by mouth daily (before dinner), Disp: , Rfl:     DULoxetine (CYMBALTA) 30 MG extended release capsule, Take 40 mg by mouth daily, Disp: , Rfl:     meclizine (ANTIVERT) 25 MG tablet, Take 1 tablet by mouth 3 times daily as needed for Dizziness, Disp: , Rfl:     nystatin (MYCOSTATIN) 277553 UNIT/GM cream, Apply 100,000 Units topically as needed for Dry Skin, Disp: , Rfl:     TRELEGY ELLIPTA 100-62.5-25 MCG/ACT AEPB inhaler, Inhale 1 puff into the lungs daily, Disp: , Rfl:     vibegron (GEMTESA) 75 MG TABS tablet, Take 1 tablet by mouth daily, Disp: , Rfl:     D-MANNOSE PO, Take 2,000 mg by mouth daily, Disp: , Rfl:     acetaminophen (TYLENOL) 500 MG tablet, Take 2 tablets by mouth every 6 hours as needed for Pain, Disp: , Rfl:     lisinopril (PRINIVIL;ZESTRIL) 20 MG tablet, Take 1 tablet by mouth daily, Disp: , Rfl:     Biotin 5000 MCG TABS, Take 10,000 mcg by mouth daily, Disp: , Rfl:     amitriptyline (ELAVIL) 75 MG tablet, Take 1 tablet by mouth nightly, Disp: , Rfl:     tiZANidine (ZANAFLEX) 4 MG tablet, Take 2 tablets by mouth nightly, Disp: , Rfl:     furosemide (LASIX) 40 MG tablet, Take 1 tablet by mouth daily as needed (Swelling), Disp: , Rfl:     Cholecalciferol (VITAMIN D) 50 MCG (2000 UT) CAPS capsule, Take by mouth daily Takes 5000 IU, Disp: , Rfl:     busPIRone (BUSPAR) 5 MG tablet, Take 1 tablet by mouth 3 times daily, Disp: , Rfl:     omeprazole (PRILOSEC) 40 MG delayed release capsule, Take 1 capsule by mouth every morning, Disp: , Rfl:     famotidine (PEPCID) 40 MG tablet, Take 1 tablet by mouth nightly, Disp: , Rfl:     apixaban (ELIQUIS) 5 MG TABS tablet, Take 1 tablet by mouth 2 times daily, Disp: , Rfl:     traMADol (ULTRAM) 50 MG

## 2025-02-24 NOTE — PATIENT INSTRUCTIONS
As of 3/31/25, [facility] will be welcoming patients at our new location.    [facility]  [address] [place] [address] [address]  [place] [address]  Ph:  [phone]  Fax:  [phone]

## 2025-07-04 ENCOUNTER — HOSPITAL ENCOUNTER (EMERGENCY)
Age: 70
Discharge: HOME | End: 2025-07-04
Payer: COMMERCIAL

## 2025-07-04 VITALS
OXYGEN SATURATION: 99 % | WEIGHT: 204 LBS | RESPIRATION RATE: 18 BRPM | HEART RATE: 82 BPM | TEMPERATURE: 97.3 F | SYSTOLIC BLOOD PRESSURE: 104 MMHG | BODY MASS INDEX: 35.02 KG/M2 | DIASTOLIC BLOOD PRESSURE: 73 MMHG

## 2025-07-04 DIAGNOSIS — N39.0 URINARY TRACT INFECTION WITHOUT HEMATURIA, SITE UNSPECIFIED: Primary | ICD-10-CM

## 2025-07-04 LAB
BACTERIA URNS QL MICRO: ABNORMAL
BILIRUB UR QL STRIP: ABNORMAL
CASTS #/AREA URNS LPF: ABNORMAL /LPF
CLARITY UR: ABNORMAL
COLOR UR: YELLOW
EPI CELLS #/AREA URNS HPF: ABNORMAL /HPF
GLUCOSE UR STRIP-MCNC: NEGATIVE MG/DL
HGB UR QL STRIP.AUTO: NEGATIVE
KETONES UR STRIP-MCNC: ABNORMAL MG/DL
LEUKOCYTE ESTERASE UR QL STRIP: ABNORMAL
NITRITE UR QL STRIP: NEGATIVE
PH UR STRIP: 5.5 [PH] (ref 5–8)
PROT UR STRIP-MCNC: ABNORMAL MG/DL
RBC #/AREA URNS HPF: ABNORMAL /HPF
SP GR UR STRIP: 1.02 (ref 1–1.03)
UROBILINOGEN UR STRIP-ACNC: 1 EU/DL (ref 0–1)
WBC #/AREA URNS HPF: ABNORMAL /HPF

## 2025-07-04 PROCEDURE — 81001 URINALYSIS AUTO W/SCOPE: CPT

## 2025-07-04 PROCEDURE — 99211 OFF/OP EST MAY X REQ PHY/QHP: CPT

## 2025-07-04 PROCEDURE — 87086 URINE CULTURE/COLONY COUNT: CPT

## 2025-07-04 PROCEDURE — 87088 URINE BACTERIA CULTURE: CPT

## 2025-07-04 RX ORDER — CEFDINIR 300 MG/1
300 CAPSULE ORAL 2 TIMES DAILY
Qty: 14 CAPSULE | Refills: 0 | Status: SHIPPED | OUTPATIENT
Start: 2025-07-04 | End: 2025-07-04

## 2025-07-04 RX ORDER — CEFDINIR 300 MG/1
300 CAPSULE ORAL 2 TIMES DAILY
Qty: 14 CAPSULE | Refills: 0 | Status: SHIPPED | OUTPATIENT
Start: 2025-07-04 | End: 2025-07-11

## 2025-07-04 NOTE — ED PROVIDER NOTES
Independent MATHEUS Visit.         Department of Emergency Medicine   ED  Encounter Note  Admit Date/RoomTime: 2025  8:54 AM  ED Room:     NAME: Mehran Blakely  : 1955  MRN: [id number]     Chief Complaint:  Urinary Urgency (Urinary urgency & burning since yesterday)    History of Present Illness       Mehran Blakely is a 70 y.o. old female who presents to the emergency department by private vehicle, for urinary urge frequency and dysuria 1 day prior to arrival.  Patient has history of UTIs in the past.  She reports this feels similar.  Patient Nuys chest pain, shortness of breath, fever, chills, nausea coming, diarrhea or abdominal pain.  Patient has not been on antibiotics within the past month.        ROS   Pertinent positives and negatives are stated within HPI, all other systems reviewed and are negative.    Past Medical History:  has a past medical history of Acid reflux, Atrial fibrillation (HCC), Cancer (HCC), Chronic back pain, CPAP (continuous positive airway pressure) dependence, Fibromyalgia, History of bariatric surgery, History of cardiovascular stress test, Hyperlipidemia, Hypertension, Kidney stones, Low iron, Osteoarthritis, and Unspecified sleep apnea.    Surgical History:  has a past surgical history that includes tracheostomy (); Stanley-en-Y Gastric Bypass (2002); Cholecystectomy (); hernia repair (); Hysterectomy (); Appendectomy; Tonsillectomy; bladder suspension (); joint replacement (); Upper gastrointestinal endoscopy (); Colonoscopy (); Bariatric Surgery (); Breast surgery (); Shoulder arthroscopy (12); ECHO Compl W Dop Color Flow (8/10/2012); Uvulopalatopharygoplasty; Neck surgery (2015); hernia repair (Left, 2021); Total knee arthroplasty (Left, 2024); and Pain management procedure (Right, 2024).    Social History:  reports that she quit smoking about 5 years ago. Her smoking use included cigarettes. She started

## 2025-07-06 ENCOUNTER — RESULTS FOLLOW-UP (OUTPATIENT)
Dept: PHARMACY | Age: 70
End: 2025-07-06

## 2025-07-06 LAB
MICROORGANISM SPEC CULT: ABNORMAL
SPECIMEN DESCRIPTION: ABNORMAL

## 2025-07-14 ENCOUNTER — APPOINTMENT (OUTPATIENT)
Dept: CT IMAGING | Age: 70
End: 2025-07-14
Payer: COMMERCIAL

## 2025-07-14 ENCOUNTER — HOSPITAL ENCOUNTER (EMERGENCY)
Age: 70
Discharge: HOME | End: 2025-07-14
Payer: COMMERCIAL

## 2025-07-14 VITALS
HEART RATE: 54 BPM | SYSTOLIC BLOOD PRESSURE: 140 MMHG | RESPIRATION RATE: 18 BRPM | BODY MASS INDEX: 35.02 KG/M2 | DIASTOLIC BLOOD PRESSURE: 76 MMHG | OXYGEN SATURATION: 95 % | TEMPERATURE: 98 F | WEIGHT: 204 LBS

## 2025-07-14 DIAGNOSIS — K59.00 CONSTIPATION, UNSPECIFIED CONSTIPATION TYPE: Primary | ICD-10-CM

## 2025-07-14 DIAGNOSIS — R30.0 DYSURIA: ICD-10-CM

## 2025-07-14 LAB
ALBUMIN SERPL-MCNC: 4.2 G/DL (ref 3.5–5.2)
ALP SERPL-CCNC: 69 U/L (ref 35–104)
ALT SERPL-CCNC: 10 U/L (ref 0–35)
ANION GAP SERPL CALCULATED.3IONS-SCNC: 10 MMOL/L (ref 7–16)
AST SERPL-CCNC: 18 U/L (ref 0–35)
BASOPHILS # BLD: 0.05 K/UL (ref 0–0.2)
BASOPHILS NFR BLD: 1 % (ref 0–2)
BILIRUB SERPL-MCNC: 0.6 MG/DL (ref 0–1.2)
BILIRUB UR QL STRIP: NEGATIVE
BUN SERPL-MCNC: 25 MG/DL (ref 8–23)
CALCIUM SERPL-MCNC: 9.4 MG/DL (ref 8.8–10.2)
CHLORIDE SERPL-SCNC: 101 MMOL/L (ref 98–107)
CLARITY UR: CLEAR
CO2 SERPL-SCNC: 25 MMOL/L (ref 22–29)
COLOR UR: YELLOW
CREAT SERPL-MCNC: 0.9 MG/DL (ref 0.5–1)
EOSINOPHIL # BLD: 0.14 K/UL (ref 0.05–0.5)
EOSINOPHILS RELATIVE PERCENT: 2 % (ref 0–6)
EPI CELLS #/AREA URNS HPF: ABNORMAL /HPF
ERYTHROCYTE [DISTWIDTH] IN BLOOD BY AUTOMATED COUNT: 14.3 % (ref 11.5–15)
GFR, ESTIMATED: 72 ML/MIN/1.73M2
GLUCOSE SERPL-MCNC: 91 MG/DL (ref 74–99)
GLUCOSE UR STRIP-MCNC: NEGATIVE MG/DL
HCT VFR BLD AUTO: 36.4 % (ref 34–48)
HGB BLD-MCNC: 12.1 G/DL (ref 11.5–15.5)
HGB UR QL STRIP.AUTO: NEGATIVE
IMM GRANULOCYTES # BLD AUTO: <0.03 K/UL (ref 0–0.58)
IMM GRANULOCYTES NFR BLD: 0 % (ref 0–5)
KETONES UR STRIP-MCNC: NEGATIVE MG/DL
LACTATE BLDV-SCNC: 1 MMOL/L (ref 0.5–2.2)
LEUKOCYTE ESTERASE UR QL STRIP: NEGATIVE
LYMPHOCYTES NFR BLD: 2.11 K/UL (ref 1.5–4)
LYMPHOCYTES RELATIVE PERCENT: 33 % (ref 20–42)
MCH RBC QN AUTO: 30.1 PG (ref 26–35)
MCHC RBC AUTO-ENTMCNC: 33.2 G/DL (ref 32–34.5)
MCV RBC AUTO: 90.5 FL (ref 80–99.9)
MONOCYTES NFR BLD: 0.51 K/UL (ref 0.1–0.95)
MONOCYTES NFR BLD: 8 % (ref 2–12)
NEUTROPHILS NFR BLD: 56 % (ref 43–80)
NEUTS SEG NFR BLD: 3.57 K/UL (ref 1.8–7.3)
NITRITE UR QL STRIP: POSITIVE
PH UR STRIP: 6 [PH] (ref 5–8)
PLATELET # BLD AUTO: 174 K/UL (ref 130–450)
PMV BLD AUTO: 8.9 FL (ref 7–12)
POTASSIUM SERPL-SCNC: 5 MMOL/L (ref 3.5–5.1)
PROT SERPL-MCNC: 7.2 G/DL (ref 6.4–8.3)
PROT UR STRIP-MCNC: NEGATIVE MG/DL
RBC # BLD AUTO: 4.02 M/UL (ref 3.5–5.5)
RBC #/AREA URNS HPF: ABNORMAL /HPF
SODIUM SERPL-SCNC: 136 MMOL/L (ref 136–145)
SP GR UR STRIP: 1.02 (ref 1–1.03)
UROBILINOGEN UR STRIP-ACNC: 4 EU/DL (ref 0–1)
WBC #/AREA URNS HPF: ABNORMAL /HPF
WBC OTHER # BLD: 6.4 K/UL (ref 4.5–11.5)

## 2025-07-14 PROCEDURE — 80053 COMPREHEN METABOLIC PANEL: CPT

## 2025-07-14 PROCEDURE — 74176 CT ABD & PELVIS W/O CONTRAST: CPT

## 2025-07-14 PROCEDURE — 85025 COMPLETE CBC W/AUTO DIFF WBC: CPT

## 2025-07-14 PROCEDURE — 83605 ASSAY OF LACTIC ACID: CPT

## 2025-07-14 PROCEDURE — 81001 URINALYSIS AUTO W/SCOPE: CPT

## 2025-07-14 PROCEDURE — 87086 URINE CULTURE/COLONY COUNT: CPT

## 2025-07-14 PROCEDURE — 99284 EMERGENCY DEPT VISIT MOD MDM: CPT

## 2025-07-14 PROCEDURE — 87040 BLOOD CULTURE FOR BACTERIA: CPT

## 2025-07-14 RX ORDER — POLYETHYLENE GLYCOL 3350 17 G/17G
17 POWDER, FOR SOLUTION ORAL DAILY
Qty: 510 G | Refills: 0 | Status: SHIPPED | OUTPATIENT
Start: 2025-07-14 | End: 2025-08-13

## 2025-07-14 RX ORDER — SODIUM PHOSPHATE,MONO-DIBASIC 19G-7G/197
1 ENEMA (ML) RECTAL DAILY
Qty: 197 ML | Refills: 0 | Status: SHIPPED | OUTPATIENT
Start: 2025-07-14 | End: 2025-07-15

## 2025-07-14 RX ORDER — DOCUSATE SODIUM 100 MG/1
100 CAPSULE, LIQUID FILLED ORAL 2 TIMES DAILY
Qty: 60 CAPSULE | Refills: 0 | Status: SHIPPED | OUTPATIENT
Start: 2025-07-14 | End: 2025-08-13

## 2025-07-14 RX ORDER — SULFAMETHOXAZOLE AND TRIMETHOPRIM 800; 160 MG/1; MG/1
1 TABLET ORAL 2 TIMES DAILY
Qty: 14 TABLET | Refills: 0 | Status: SHIPPED | OUTPATIENT
Start: 2025-07-14 | End: 2025-07-21

## 2025-07-14 NOTE — ED PROVIDER NOTES
Independent MATHEUS Visit.          [facility] EMERGENCY DEPARTMENT  EMERGENCY DEPARTMENT ENCOUNTER        Pt Name: Mehran Blakely  MRN: [id number]  Birthdate 1955  Date of evaluation: 7/14/2025  Provider: HAILEY Constantino - SHEILA  PCP: Jovon Jarvis MD  Note Started: 2:31 PM EDT 7/14/25    CHIEF COMPLAINT       Chief Complaint   Patient presents with    Dysuria     On abx for uti, still having symptoms, no fever but does have chills, right flank pain       HISTORY OF PRESENT ILLNESS: 1 or more Elements   History From: Patient and patient's medical record    Mehran Blakely is a 70 y.o. female who presents to the emergency department today concern for urinary tract infection.  Patient states that on 7/3/25 she started to experience urinary urgency, frequency, and dysuria.  She was seen in the emergency department on 7/4/2025 and was diagnosed with urinary tract infection she was on cefdinir 300 mg twice daily for 7 days.  She states that she completed all of those antibiotics.  She was still symptomatic therefore she was seen at a telehealth visit and she was prescribed Macrobid she believes for 5 days.  She states that her symptoms did not improve.  She came to the emergency department for evaluation due to her ongoing dysuria and hematuria.  Patient today complaining of dysuria, hematuria, suprapubic discomfort, nausea, chills, constipation and right flank pain.  She states that she developed the right flank pain about 4 days ago.  She has taken Zofran at home for symptom control which has helped.  She has not had any fevers.  She reports that in the past she has followed with [facility].  She states that she does have a history of nephrosis as a child and she only had 1 flareup in pregnancy several years ago.  She has no other reported complaints currently.  Denies any chest pain, shortness of breath, myalgias, syncope, lightheadedness, dizziness, vaginal bleeding, vaginal discharge,

## 2025-07-15 LAB
MICROORGANISM SPEC CULT: NO GROWTH
SERVICE CMNT-IMP: NORMAL
SPECIMEN DESCRIPTION: NORMAL

## 2025-08-26 ENCOUNTER — HOSPITAL ENCOUNTER (OUTPATIENT)
Age: 70
Discharge: HOME | End: 2025-08-28
Payer: COMMERCIAL

## 2025-08-26 ENCOUNTER — HOSPITAL ENCOUNTER (OUTPATIENT)
Dept: LAB | Age: 70
Discharge: HOME | End: 2025-08-26
Payer: COMMERCIAL

## 2025-08-26 ENCOUNTER — HOSPITAL ENCOUNTER (OUTPATIENT)
Dept: GENERAL RADIOLOGY | Age: 70
Discharge: HOME | End: 2025-08-28
Payer: COMMERCIAL

## 2025-08-26 DIAGNOSIS — Z01.811 PRE-OP CHEST EXAM: ICD-10-CM

## 2025-08-26 LAB
ANION GAP SERPL CALCULATED.3IONS-SCNC: 13 MMOL/L (ref 7–16)
BUN SERPL-MCNC: 18 MG/DL (ref 8–23)
CALCIUM SERPL-MCNC: 9.6 MG/DL (ref 8.8–10.2)
CHLORIDE SERPL-SCNC: 103 MMOL/L (ref 98–107)
CO2 SERPL-SCNC: 24 MMOL/L (ref 22–29)
CREAT SERPL-MCNC: 1 MG/DL (ref 0.5–1)
ERYTHROCYTE [DISTWIDTH] IN BLOOD BY AUTOMATED COUNT: 14.4 % (ref 11.5–15)
GFR, ESTIMATED: 63 ML/MIN/1.73M2
GLUCOSE SERPL-MCNC: 101 MG/DL (ref 74–99)
HCT VFR BLD AUTO: 36.4 % (ref 34–48)
HGB BLD-MCNC: 12 G/DL (ref 11.5–15.5)
INR PPP: 1.5
MAGNESIUM SERPL-MCNC: 1.8 MG/DL (ref 1.6–2.4)
MCH RBC QN AUTO: 29.9 PG (ref 26–35)
MCHC RBC AUTO-ENTMCNC: 33 G/DL (ref 32–34.5)
MCV RBC AUTO: 90.8 FL (ref 80–99.9)
PLATELET # BLD AUTO: 200 K/UL (ref 130–450)
PMV BLD AUTO: 8.7 FL (ref 7–12)
POTASSIUM SERPL-SCNC: 4.5 MMOL/L (ref 3.5–5.1)
PROTHROMBIN TIME: 16.2 SEC (ref 9.3–12.4)
RBC # BLD AUTO: 4.01 M/UL (ref 3.5–5.5)
SODIUM SERPL-SCNC: 139 MMOL/L (ref 136–145)
WBC OTHER # BLD: 6.4 K/UL (ref 4.5–11.5)

## 2025-08-26 PROCEDURE — 80048 BASIC METABOLIC PNL TOTAL CA: CPT

## 2025-08-26 PROCEDURE — 36415 COLL VENOUS BLD VENIPUNCTURE: CPT

## 2025-08-26 PROCEDURE — 85027 COMPLETE CBC AUTOMATED: CPT

## 2025-08-26 PROCEDURE — 83735 ASSAY OF MAGNESIUM: CPT

## 2025-08-26 PROCEDURE — 85610 PROTHROMBIN TIME: CPT

## 2025-08-26 PROCEDURE — 71046 X-RAY EXAM CHEST 2 VIEWS: CPT

## 2025-08-26 RX ORDER — MIRABEGRON 25 MG/1
25 TABLET, FILM COATED, EXTENDED RELEASE ORAL DAILY
COMMUNITY

## 2025-08-26 RX ORDER — NITROFURANTOIN 25; 75 MG/1; MG/1
100 CAPSULE ORAL 2 TIMES DAILY
COMMUNITY
Start: 2025-07-10

## 2025-08-27 ENCOUNTER — OFFICE VISIT (OUTPATIENT)
Dept: PULMONOLOGY | Age: 70
End: 2025-08-27
Payer: COMMERCIAL

## 2025-08-27 VITALS
DIASTOLIC BLOOD PRESSURE: 74 MMHG | SYSTOLIC BLOOD PRESSURE: 130 MMHG | BODY MASS INDEX: 36.02 KG/M2 | TEMPERATURE: 97.6 F | HEIGHT: 64 IN | HEART RATE: 70 BPM | OXYGEN SATURATION: 94 % | WEIGHT: 211 LBS

## 2025-08-27 DIAGNOSIS — E66.9 OBESITY (BMI 30-39.9): ICD-10-CM

## 2025-08-27 DIAGNOSIS — J44.9 CHRONIC OBSTRUCTIVE PULMONARY DISEASE, UNSPECIFIED COPD TYPE (HCC): Primary | ICD-10-CM

## 2025-08-27 DIAGNOSIS — G47.33 OSA (OBSTRUCTIVE SLEEP APNEA): ICD-10-CM

## 2025-08-27 PROCEDURE — G8417 CALC BMI ABV UP PARAM F/U: HCPCS | Performed by: INTERNAL MEDICINE

## 2025-08-27 PROCEDURE — G8400 PT W/DXA NO RESULTS DOC: HCPCS | Performed by: INTERNAL MEDICINE

## 2025-08-27 PROCEDURE — 1036F TOBACCO NON-USER: CPT | Performed by: INTERNAL MEDICINE

## 2025-08-27 PROCEDURE — 99214 OFFICE O/P EST MOD 30 MIN: CPT | Performed by: INTERNAL MEDICINE

## 2025-08-27 PROCEDURE — 3023F SPIROM DOC REV: CPT | Performed by: INTERNAL MEDICINE

## 2025-08-27 PROCEDURE — G8427 DOCREV CUR MEDS BY ELIG CLIN: HCPCS | Performed by: INTERNAL MEDICINE

## 2025-08-27 PROCEDURE — 1123F ACP DISCUSS/DSCN MKR DOCD: CPT | Performed by: INTERNAL MEDICINE

## 2025-08-27 PROCEDURE — 1159F MED LIST DOCD IN RCRD: CPT | Performed by: INTERNAL MEDICINE

## 2025-08-27 PROCEDURE — 1090F PRES/ABSN URINE INCON ASSESS: CPT | Performed by: INTERNAL MEDICINE

## 2025-08-27 PROCEDURE — 3017F COLORECTAL CA SCREEN DOC REV: CPT | Performed by: INTERNAL MEDICINE

## 2025-08-28 ENCOUNTER — TRANSCRIBE ORDERS (OUTPATIENT)
Dept: ADMINISTRATIVE | Age: 70
End: 2025-08-28

## 2025-08-28 DIAGNOSIS — Z12.31 OTHER SCREENING MAMMOGRAM: Primary | ICD-10-CM

## 2025-09-03 ENCOUNTER — ANESTHESIA EVENT (OUTPATIENT)
Dept: POSTOP/PACU | Age: 70
End: 2025-09-03
Payer: COMMERCIAL

## 2025-09-03 ENCOUNTER — ANESTHESIA (OUTPATIENT)
Dept: POSTOP/PACU | Age: 70
End: 2025-09-03
Payer: COMMERCIAL

## 2025-09-03 ENCOUNTER — HOSPITAL ENCOUNTER (OUTPATIENT)
Dept: POSTOP/PACU | Age: 70
Setting detail: OUTPATIENT SURGERY
Discharge: HOME | End: 2025-09-03
Payer: COMMERCIAL

## 2025-09-03 VITALS
SYSTOLIC BLOOD PRESSURE: 190 MMHG | DIASTOLIC BLOOD PRESSURE: 89 MMHG | TEMPERATURE: 97.3 F | HEIGHT: 64 IN | WEIGHT: 212.4 LBS | OXYGEN SATURATION: 94 % | RESPIRATION RATE: 16 BRPM | HEART RATE: 80 BPM | BODY MASS INDEX: 36.26 KG/M2

## 2025-09-03 LAB
EKG ATRIAL RATE: 59 BPM
EKG ATRIAL RATE: 71 BPM
EKG P AXIS: 114 DEGREES
EKG P AXIS: 59 DEGREES
EKG P-R INTERVAL: 180 MS
EKG P-R INTERVAL: 204 MS
EKG Q-T INTERVAL: 434 MS
EKG Q-T INTERVAL: 480 MS
EKG QRS DURATION: 78 MS
EKG QRS DURATION: 84 MS
EKG QTC CALCULATION (BAZETT): 471 MS
EKG QTC CALCULATION (BAZETT): 475 MS
EKG R AXIS: 34 DEGREES
EKG R AXIS: 54 DEGREES
EKG T AXIS: 90 DEGREES
EKG T AXIS: 95 DEGREES
EKG VENTRICULAR RATE: 59 BPM
EKG VENTRICULAR RATE: 71 BPM

## 2025-09-03 PROCEDURE — 7100000010 HC PHASE II RECOVERY - FIRST 15 MIN: Performed by: ANESTHESIOLOGY

## 2025-09-03 PROCEDURE — 3700000000 HC ANESTHESIA ATTENDED CARE: Performed by: ANESTHESIOLOGY

## 2025-09-03 PROCEDURE — 7100000000 HC PACU RECOVERY - FIRST 15 MIN: Performed by: ANESTHESIOLOGY

## 2025-09-03 PROCEDURE — 6360000002 HC RX W HCPCS: Performed by: NURSE ANESTHETIST, CERTIFIED REGISTERED

## 2025-09-03 PROCEDURE — 7100000011 HC PHASE II RECOVERY - ADDTL 15 MIN: Performed by: ANESTHESIOLOGY

## 2025-09-03 PROCEDURE — 7100000001 HC PACU RECOVERY - ADDTL 15 MIN: Performed by: ANESTHESIOLOGY

## 2025-09-03 PROCEDURE — 2580000003 HC RX 258: Performed by: NURSE ANESTHETIST, CERTIFIED REGISTERED

## 2025-09-03 RX ORDER — SODIUM CHLORIDE 9 MG/ML
INJECTION, SOLUTION INTRAVENOUS CONTINUOUS
Status: DISCONTINUED | OUTPATIENT
Start: 2025-09-03 | End: 2025-09-03 | Stop reason: SDUPTHER

## 2025-09-03 RX ORDER — PROPOFOL 10 MG/ML
INJECTION, EMULSION INTRAVENOUS
Status: DISCONTINUED | OUTPATIENT
Start: 2025-09-03 | End: 2025-09-03 | Stop reason: SDUPTHER

## 2025-09-03 RX ORDER — PROPOFOL 10 MG/ML
INJECTION, EMULSION INTRAVENOUS
Status: COMPLETED
Start: 2025-09-03 | End: 2025-09-03

## 2025-09-03 RX ORDER — SODIUM CHLORIDE 9 MG/ML
INJECTION, SOLUTION INTRAVENOUS
Status: DISCONTINUED | OUTPATIENT
Start: 2025-09-03 | End: 2025-09-03 | Stop reason: SDUPTHER

## 2025-09-03 RX ADMIN — SODIUM CHLORIDE: 9 INJECTION, SOLUTION INTRAVENOUS at 07:34

## 2025-09-03 RX ADMIN — PROPOFOL 50 MG: 10 INJECTION, EMULSION INTRAVENOUS at 07:37

## 2025-09-03 ASSESSMENT — PAIN - FUNCTIONAL ASSESSMENT: PAIN_FUNCTIONAL_ASSESSMENT: 0-10

## 2025-09-03 ASSESSMENT — COPD QUESTIONNAIRES: CAT_SEVERITY: MODERATE

## (undated) DEVICE — NEEDLE HYPO 18GA L1.5IN PNK POLYPR HUB S STL REG BVL STR

## (undated) DEVICE — NEEDLE SPNL 22GA L3.5IN BLK HUB S STL REG WALL FIT STYL

## (undated) DEVICE — ENCORE® LATEX TEXTURED SIZE 6.5, STERILE LATEX POWDER-FREE SURGICAL GLOVE: Brand: ENCORE

## (undated) DEVICE — GLOVE ORANGE PI 7 1/2   MSG9075

## (undated) DEVICE — Device: Brand: INSTRUSAFE

## (undated) DEVICE — PACK SURG LAP CHOLE CUSTOM

## (undated) DEVICE — SET ENDO INSTR LAPAROSCOPIC INCISIONAL

## (undated) DEVICE — DOUBLE BASIN SET: Brand: MEDLINE INDUSTRIES, INC.

## (undated) DEVICE — WIPES SKIN CLOTH READYPREP 9 X 10.5 IN 2% CHLORHEX GLUCONATE CHG PREOP

## (undated) DEVICE — ELECTRO LUBE IS A SINGLE PATIENT USE DEVICE THAT IS INTENDED TO BE USED ON ELECTROSURGICAL ELECTRODES TO REDUCE STICKING.: Brand: KEY SURGICAL ELECTRO LUBE

## (undated) DEVICE — 4-PORT MANIFOLD: Brand: NEPTUNE 2

## (undated) DEVICE — NEEDLE SPNL L3.5IN PNK HUB S STL REG WALL FIT STYL W/ QNCKE

## (undated) DEVICE — FORCE BIPOLAR: Brand: ENDOWRIST

## (undated) DEVICE — 6 ML SYRINGE LUER-LOCK TIP: Brand: MONOJECT

## (undated) DEVICE — PLUMEPORT LAPAROSCOPIC SMOKE FILTRATION DEVICE: Brand: PLUMEPORT ACTIV

## (undated) DEVICE — GARMENT,MEDLINE,DVT,INT,CALF,MED, GEN2: Brand: MEDLINE

## (undated) DEVICE — GAUZE,SPONGE,4"X4",12PLY,STERILE,LF,2'S: Brand: MEDLINE

## (undated) DEVICE — SYRINGE MED 10ML TRNSLUC BRL PLUNG BLK MRK POLYPR CTRL

## (undated) DEVICE — INSUFFLATION NEEDLE TO ESTABLISH PNEUMOPERITONEUM.: Brand: INSUFFLATION NEEDLE

## (undated) DEVICE — SUTURE ABSRB L6IN L37MM 0 GS-21 GRN 1/2 CIR TAPR PNT NDL VLOCL0306

## (undated) DEVICE — TROCAR: Brand: KII FIOS FIRST ENTRY

## (undated) DEVICE — NEEDLE HYPO 25GA L1.5IN BLU POLYPR HUB S STL REG BVL STR

## (undated) DEVICE — NON-DEHP CATHETER EXTENSION SET, MALE LUER LOCK ADAPTER

## (undated) DEVICE — SUTURE STRATAFIX SYMMETRIC SZ 1 L18IN ABSRB VLT CT1 L36CM SXPP1A404

## (undated) DEVICE — SUTURE SUTTAPE L40IN DIA1.3MM NONABSORBABLE WHT BLU L26.5MM AR7500

## (undated) DEVICE — ELECTRODE PT RET AD L9FT HI MOIST COND ADH HYDRGEL CORDED

## (undated) DEVICE — 3 ML SYRINGE LUER-LOCK TIP: Brand: MONOJECT

## (undated) DEVICE — BANDAGE ADH W1XL3IN NAT FAB WVN FLX DURABLE N ADH PD SEAL

## (undated) DEVICE — APPLICATOR MEDICATED 26 CC SOLUTION HI LT ORNG CHLORAPREP

## (undated) DEVICE — [HIGH FLOW INSUFFLATOR,  DO NOT USE IF PACKAGE IS DAMAGED,  KEEP DRY,  KEEP AWAY FROM SUNLIGHT,  PROTECT FROM HEAT AND RADIOACTIVE SOURCES.]: Brand: PNEUMOSURE

## (undated) DEVICE — SET INST DAVINCI XI ACCESSORIES

## (undated) DEVICE — DRESSING HYDROFIBER AQUACEL AG ADVANTAGE 3.5X12 IN

## (undated) DEVICE — CANNULA SEAL

## (undated) DEVICE — MEGA SUTURECUT ND: Brand: ENDOWRIST

## (undated) DEVICE — SCOPE DAVINCI XI 30 DEG W/CORD

## (undated) DEVICE — BASIC DOUBLE BASIN 2-LF: Brand: MEDLINE INDUSTRIES, INC.

## (undated) DEVICE — HANDPIECE SET WITH BONE CLEANING TIP: Brand: INTERPULSE

## (undated) DEVICE — COLUMN DRAPE

## (undated) DEVICE — MARKER,SKIN,WI/RULER AND LABELS: Brand: MEDLINE

## (undated) DEVICE — ARM DRAPE

## (undated) DEVICE — TOTAL KNEE PACK: Brand: MEDLINE INDUSTRIES, INC.

## (undated) DEVICE — 3M™ RED DOT™ MONITORING ELECTRODE WITH FOAM TAPE AND STICKY GEL 2560, 50/BAG, 20/CASE, 72/PLT: Brand: RED DOT™

## (undated) DEVICE — GOWN,SIRUS,NONRNF,SETINSLV,XL,20/CS: Brand: MEDLINE

## (undated) DEVICE — TOWEL,OR,DSP,ST,BLUE,STD,6/PK,12PK/CS: Brand: MEDLINE

## (undated) DEVICE — 3 BONE CEMENT MIXER: Brand: MIXEVAC

## (undated) DEVICE — GLOVE ORTHO 8   MSG9480

## (undated) DEVICE — COVER,LIGHT HANDLE,FLX,1/PK: Brand: MEDLINE INDUSTRIES, INC.

## (undated) DEVICE — BLADELESS OBTURATOR: Brand: WECK VISTA

## (undated) DEVICE — GLOVE ORANGE PI 8   MSG9080

## (undated) DEVICE — SOLUTION IV 1000ML 0.9% SOD CHL PH 5 INJ USP VIAFLX PLAS

## (undated) DEVICE — SOLUTION IRRIG 1000ML 0.9% SOD CHL USP POUR PLAS BTL

## (undated) DEVICE — Z DISCONTINUED USE 2272124 DRAPE SURG XL N INVASIVE 2 LAYR DISP

## (undated) DEVICE — WARMER SCP LAP